# Patient Record
Sex: MALE | Race: WHITE | Employment: OTHER | URBAN - NONMETROPOLITAN AREA
[De-identification: names, ages, dates, MRNs, and addresses within clinical notes are randomized per-mention and may not be internally consistent; named-entity substitution may affect disease eponyms.]

---

## 2020-06-28 ENCOUNTER — HOSPITAL ENCOUNTER (EMERGENCY)
Age: 80
Discharge: ANOTHER HOSPITAL | End: 2020-06-28
Attending: EMERGENCY MEDICINE
Payer: MEDICARE

## 2020-06-28 VITALS
DIASTOLIC BLOOD PRESSURE: 55 MMHG | HEART RATE: 75 BPM | RESPIRATION RATE: 14 BRPM | OXYGEN SATURATION: 97 % | TEMPERATURE: 97.6 F | WEIGHT: 277 LBS | BODY MASS INDEX: 39.65 KG/M2 | HEIGHT: 70 IN | SYSTOLIC BLOOD PRESSURE: 136 MMHG

## 2020-06-28 LAB
ACETAMINOPHEN LEVEL: < 5 UG/ML (ref 0–20)
ALBUMIN SERPL-MCNC: 4 G/DL (ref 3.5–5.1)
ALP BLD-CCNC: 60 U/L (ref 38–126)
ALT SERPL-CCNC: 12 U/L (ref 11–66)
AMPHETAMINE+METHAMPHETAMINE URINE SCREEN: NEGATIVE
ANION GAP SERPL CALCULATED.3IONS-SCNC: 11 MEQ/L (ref 8–16)
AST SERPL-CCNC: 14 U/L (ref 5–40)
BARBITURATE QUANTITATIVE URINE: NEGATIVE
BENZODIAZEPINE QUANTITATIVE URINE: NEGATIVE
BILIRUB SERPL-MCNC: 0.4 MG/DL (ref 0.3–1.2)
BUN BLDV-MCNC: 30 MG/DL (ref 7–22)
CALCIUM SERPL-MCNC: 9.2 MG/DL (ref 8.5–10.5)
CANNABINOID QUANTITATIVE URINE: NEGATIVE
CHLORIDE BLD-SCNC: 101 MEQ/L (ref 98–111)
CO2: 24 MEQ/L (ref 23–33)
COCAINE METABOLITE QUANTITATIVE URINE: NEGATIVE
CREAT SERPL-MCNC: 1.1 MG/DL (ref 0.4–1.2)
EKG ATRIAL RATE: 71 BPM
EKG P AXIS: 5 DEGREES
EKG P-R INTERVAL: 216 MS
EKG Q-T INTERVAL: 426 MS
EKG QRS DURATION: 156 MS
EKG QTC CALCULATION (BAZETT): 462 MS
EKG R AXIS: -37 DEGREES
EKG T AXIS: 11 DEGREES
EKG VENTRICULAR RATE: 71 BPM
ERYTHROCYTE [DISTWIDTH] IN BLOOD BY AUTOMATED COUNT: 13.3 % (ref 11.5–14.5)
ERYTHROCYTE [DISTWIDTH] IN BLOOD BY AUTOMATED COUNT: 47.1 FL (ref 35–45)
ETHYL ALCOHOL, SERUM: < 0.01 %
GFR SERPL CREATININE-BSD FRML MDRD: 65 ML/MIN/1.73M2
GLUCOSE BLD-MCNC: 204 MG/DL (ref 70–108)
HCT VFR BLD CALC: 42.3 % (ref 42–52)
HEMOGLOBIN: 13.8 GM/DL (ref 14–18)
MCH RBC QN AUTO: 31.3 PG (ref 26–33)
MCHC RBC AUTO-ENTMCNC: 32.6 GM/DL (ref 32.2–35.5)
MCV RBC AUTO: 95.9 FL (ref 80–94)
OPIATES, URINE: NEGATIVE
OSMOLALITY CALCULATION: 284 MOSMOL/KG (ref 275–300)
OXYCODONE: NEGATIVE
PHENCYCLIDINE QUANTITATIVE URINE: NEGATIVE
PLATELET # BLD: 151 THOU/MM3 (ref 130–400)
PMV BLD AUTO: 10.8 FL (ref 9.4–12.4)
POTASSIUM REFLEX MAGNESIUM: 4.6 MEQ/L (ref 3.5–5.2)
RBC # BLD: 4.41 MILL/MM3 (ref 4.7–6.1)
SALICYLATE, SERUM: < 0.3 MG/DL (ref 2–10)
SARS-COV-2, NAAT: NOT DETECTED
SODIUM BLD-SCNC: 136 MEQ/L (ref 135–145)
T4 FREE: 1.09 NG/DL (ref 0.93–1.76)
TOTAL PROTEIN: 6.8 G/DL (ref 6.1–8)
TSH SERPL DL<=0.05 MIU/L-ACNC: 2.54 UIU/ML (ref 0.4–4.2)
WBC # BLD: 7 THOU/MM3 (ref 4.8–10.8)

## 2020-06-28 PROCEDURE — 84439 ASSAY OF FREE THYROXINE: CPT

## 2020-06-28 PROCEDURE — G0480 DRUG TEST DEF 1-7 CLASSES: HCPCS

## 2020-06-28 PROCEDURE — 80053 COMPREHEN METABOLIC PANEL: CPT

## 2020-06-28 PROCEDURE — 36415 COLL VENOUS BLD VENIPUNCTURE: CPT

## 2020-06-28 PROCEDURE — 99284 EMERGENCY DEPT VISIT MOD MDM: CPT

## 2020-06-28 PROCEDURE — 80307 DRUG TEST PRSMV CHEM ANLYZR: CPT

## 2020-06-28 PROCEDURE — 85027 COMPLETE CBC AUTOMATED: CPT

## 2020-06-28 PROCEDURE — 84443 ASSAY THYROID STIM HORMONE: CPT

## 2020-06-28 PROCEDURE — 93010 ELECTROCARDIOGRAM REPORT: CPT | Performed by: INTERNAL MEDICINE

## 2020-06-28 PROCEDURE — U0002 COVID-19 LAB TEST NON-CDC: HCPCS

## 2020-06-28 PROCEDURE — 93005 ELECTROCARDIOGRAM TRACING: CPT | Performed by: EMERGENCY MEDICINE

## 2020-06-28 RX ORDER — LEVOTHYROXINE SODIUM 0.07 MG/1
75 TABLET ORAL EVERY 8 HOURS PRN
COMMUNITY

## 2020-06-28 RX ORDER — ATORVASTATIN CALCIUM 40 MG/1
40 TABLET, FILM COATED ORAL DAILY
COMMUNITY

## 2020-06-28 RX ORDER — TAMSULOSIN HYDROCHLORIDE 0.4 MG/1
0.4 CAPSULE ORAL DAILY
COMMUNITY

## 2020-06-28 RX ORDER — LISINOPRIL 10 MG/1
10 TABLET ORAL DAILY
Status: ON HOLD | COMMUNITY
End: 2021-03-05 | Stop reason: HOSPADM

## 2020-06-28 RX ORDER — SERTRALINE HYDROCHLORIDE 100 MG/1
100 TABLET, FILM COATED ORAL DAILY
Status: ON HOLD | COMMUNITY
End: 2021-02-17

## 2020-06-28 RX ORDER — CLOMIPRAMINE HYDROCHLORIDE 50 MG/1
50 CAPSULE ORAL NIGHTLY
Status: ON HOLD | COMMUNITY
End: 2021-02-17

## 2020-06-28 RX ORDER — GABAPENTIN 600 MG/1
800 TABLET ORAL 3 TIMES DAILY
Status: ON HOLD | COMMUNITY
End: 2021-02-17

## 2020-06-28 ASSESSMENT — SLEEP AND FATIGUE QUESTIONNAIRES
DO YOU HAVE DIFFICULTY SLEEPING: YES
RESTFUL SLEEP: NO
DIFFICULTY ARISING: NO
SLEEP PATTERN: DIFFICULTY FALLING ASLEEP;DISTURBED/INTERRUPTED SLEEP;RESTLESSNESS
DIFFICULTY STAYING ASLEEP: YES
DO YOU USE A SLEEP AID: NO
DIFFICULTY FALLING ASLEEP: YES
AVERAGE NUMBER OF SLEEP HOURS: 6

## 2020-06-28 ASSESSMENT — ENCOUNTER SYMPTOMS
SINUS PAIN: 0
CHEST TIGHTNESS: 0
SHORTNESS OF BREATH: 0
BLOOD IN STOOL: 0
ABDOMINAL PAIN: 0
DIARRHEA: 0
EYE PAIN: 0
SINUS PRESSURE: 0
CONSTIPATION: 0
RECTAL PAIN: 0
BACK PAIN: 0
COUGH: 0
NAUSEA: 0

## 2020-06-28 ASSESSMENT — PATIENT HEALTH QUESTIONNAIRE - PHQ9: SUM OF ALL RESPONSES TO PHQ QUESTIONS 1-9: 11

## 2020-06-28 NOTE — ED NOTES
Pt up and ambulated in hallway to relieve discomfort. Returned to bed. Vitals stable. Call light in reach.  Will continue to monitor     David Yanes RN  06/28/20 [id number]

## 2020-06-28 NOTE — PROGRESS NOTES
This staff met with pt and his son Mago Morris to discuss advance care planning. Pt does have his adv dir completed appointing son Mago Morris as his Kirit Florch. They are on file at [facility]. Pt daughter is second DPOA. A son  in a car accident in the , pts depression began. It became worse after his parents and wife  15+ years ago. Pt was in an assisted living until his monies ran out, he is now in an apartment in [facility]. COVID has been very challenging emotionally for him.      See ACP full note

## 2020-06-28 NOTE — PROGRESS NOTES
Provisional Diagnosis:   Major Depressive Disorder     Risk, Psychosocial and Contextual Factors:  Pt is not able to identify a reason for living, very depressed and don't care if he wakes up or not    Current  Treatment: With Dr. Yeimi Carroll      Present Suicidal Behavior:      Verbal: XX         Attempt:    Access to Weapons:  Kitchen knives    Current Suicide Risk: Low, Moderate or High:  Moderate      Past Suicidal Behavior:       Verbal:    Attempt: XX    Self-Injurious/Self-Mutilation: Denies    Traumatic Event Within Past 2 Weeks:  Denies     Current Abuse: Denies    Legal: Denies    Violence: Denies    Protective Factors:  Pt was not able to identify any    Housing:   Resides alone in an apartment     CPAP/Oxygen/Ambulation Difficulties: CPAP     Basic Vital Signs Normal?: Check with Patients Nurse prior to Calling Psychiatry    Critical Labs?: Check with Patients Nurse prior to Calling Psychiatry    Clinical Summary:      Patient is a 79year old male who presents to the ED voluntarily by EMS reporting he has suffered from Major Depressive disorder for several years and he has suffered a lot of loss in his life. Pt was in assisted living for about 10 years and less than a year ago he was discharged from the home and his son got him an apartment. Pt's son reports pt had been doing well until [facility] when his elderly day services closed, his house keeper was not able to come back he hs not been able to get out much. Pt reports he has lost interest in things and its gotten worse in the past week or so. Pt's son reports his dad is on the tablet all the time and he reads all this stuff on COVID and all the other news that is taking place currently and it's getting pt down worse. Pt reports he is currently having suicidal ideations and wishes he could just go to sleep and not wake up. Pt reports he has no current plan to end life and he also can't identify any reason for living.  Pt reports a few years ago he attempted

## 2020-06-28 NOTE — ED NOTES
Pt updated on POC at this time. Pt verbalized understanding. Pt vitals stable. Denies any needs. Call light in reach. Will continue to monitor.       Jannie Maldonado RN  06/28/20 [id number]

## 2020-06-28 NOTE — ED PROVIDER NOTES
[place] ENCOUNTER          Pt Name: Semaj Hdz  MRN: [id number]  [place] 1940  Date of evaluation: 6/28/2020  Physician: Jose Gaytan MD, Ronald Sunshine [facility]    CHIEF COMPLAINT       Chief Complaint   Patient presents with    Depression     History obtained from the patient, his son and chart review. HISTORY OF PRESENT ILLNESS    HPI  Semaj Hdz is a 79 y.o. male with a past medical history of HTN, Depression, and Arthritis who presents to the emergency department for evaluation of depression and suicidal ideation with no intention. Patient reports a history of depression and suicidal thought. Patient reports a history of attempts by cutting his wrist with a knife. Patient states that he has had these thoughts again in the last 3 weeks, but denies acting on them. Patient's son reports a history of these depressive episodes that onset around 1995 when the patient lost his son suddenly due to an MVC. Son states these episodes onset again in 2004 when then patient lost his wife to cancer. Son reports the patient has been evaluated and stayed at many different facilities to receive treatment. The patient was doing well at one facility, but had to leave due to not enough finances to continue to stay. Patient is currently living in an apartment independently. Son reports the patient has established care with Dr. Isabela Card (psychiatrist). Patient was last evaluated via Tele-health around February, and does not have another appointment until September. Patient reports compliance with all of his medications. Patient state that his depression and suicidal ideations have been increased over the last three weeks. Son believes things have gradually progressed since the lockdown were started [facility] to [facility]. He reports that he is continuously struggling with doubts concerning his Mandaen, the Bible, and God.  Patient states that he has lost all of his hope and kary. Patient reports there is no meaning in life, and that nothing matters because everything dies. Patient reports a recent fall that resulted in minor skin breakdown to his left knee. Patient reports this was caused by crawling to the couch to help assist himself off the ground. Patient also reports diabetic ulcer to his right foot. Patient has both of the wounds managed and monitored by the wound clinic. The patient has no other acute complaints at this time. HPI provided by the patient and patient's son. REVIEW OF SYSTEMS   Review of Systems   Constitutional: Negative for chills, fever and unexpected weight change. HENT: Negative for congestion, ear pain, nosebleeds, sinus pressure and sinus pain. Eyes: Negative for pain. Respiratory: Negative for cough, chest tightness and shortness of breath. Cardiovascular: Negative for chest pain. Gastrointestinal: Negative for abdominal pain, blood in stool, constipation, diarrhea, nausea and rectal pain. Endocrine: Negative for polydipsia and polyuria. Genitourinary: Negative for dysuria and flank pain. Musculoskeletal: Negative for arthralgias, back pain, myalgias and neck pain. Skin: Negative for rash. Neurological: Negative for tremors, seizures, weakness and headaches. Psychiatric/Behavioral: Positive for dysphoric mood (depression), self-injury (history of ) and suicidal ideas (no intention ). All other systems reviewed and are negative. PAST MEDICAL AND SURGICAL HISTORY     Past Medical History:   Diagnosis Date    Arthritis     Depression     Hypertension      Past Surgical History:   Procedure Laterality Date    KNEE SURGERY Right          MEDICATIONS   No current facility-administered medications for this encounter.      Current Outpatient Medications:     levothyroxine (SYNTHROID) 75 MCG tablet, Take 75 mcg by mouth Daily, Disp: , Rfl:     metoprolol tartrate (LOPRESSOR) 25 MG tablet, Take 25 mg by mouth 2 times daily, Disp: , Rfl:     lisinopril (PRINIVIL;ZESTRIL) 10 MG tablet, Take 10 mg by mouth daily, Disp: , Rfl:     sertraline (ZOLOFT) 100 MG tablet, Take 100 mg by mouth daily, Disp: , Rfl:     tamsulosin (FLOMAX) 0.4 MG capsule, Take 0.4 mg by mouth daily, Disp: , Rfl:     atorvastatin (LIPITOR) 40 MG tablet, Take 40 mg by mouth daily, Disp: , Rfl:     clomiPRAMINE (ANAFRANIL) 50 MG capsule, Take 50 mg by mouth nightly, Disp: , Rfl:     gabapentin (NEURONTIN) 600 MG tablet, Take 600 mg by mouth 3 times daily. , Disp: , Rfl:       SOCIAL HISTORY     Social History     Social History Narrative    Not on file     Social History     Tobacco Use    Smoking status: Never Smoker    Smokeless tobacco: Never Used   Substance Use Topics    Alcohol use: Not Currently    Drug use: Never         ALLERGIES     Allergies   Allergen Reactions    Triamcinolone Rash     Patient states his arm felt like it was hot, like it was burning. FAMILY HISTORY   History reviewed. No pertinent family history. PREVIOUS RECORDS   Previous records reviewed: This is this patient's first visit to [facility] ED, no previous records available on EMR. PHYSICAL EXAM     ED Triage Vitals [06/28/20 1124]   BP Temp Temp Source Pulse Resp SpO2 Height Weight   (!) 129/47 97.6 °F (36.4 °C) Oral 70 14 97 % 5' 10\" (1.778 m) 277 lb (125.6 kg)     Initial vital signs and nursing assessment reviewed and normal. Pulsoximetry is normal per my interpretation. Additional Vital Signs:  Vitals:    06/28/20 1310   BP: 125/61   Pulse: 69   Resp: 14   Temp:    SpO2: 96%       Physical Exam  Vitals signs and nursing note reviewed. Constitutional:       General: He is not in acute distress. Appearance: He is well-developed. HENT:      Head: Normocephalic and atraumatic.       Right Ear: External ear normal.      Left Ear: External ear normal.      Nose: Nose normal.      Mouth/Throat:      Mouth: Mucous membranes are moist.   Eyes:

## 2020-06-28 NOTE — ACP (ADVANCE CARE PLANNING)
otherwise healthy. Unfortunately, CPR does not typically restart the heart for people who have serious health conditions or who are very sick. \"    \"In the event your heart stopped as a result of an underlying serious health condition, would you want attempts to be made to restart your heart (answer \"yes\" for attempt to resuscitate) or would you prefer a natural death (answer \"no\" for do not attempt to resuscitate)? \" yes         [x] Yes   [] No   Educated Patient / Rhonda Quinn regarding differences between Advance Directives and portable DNR orders. Length of ACP Conversation in minutes:  15    Conversation Outcomes:  [x] ACP discussion completed  [x] Existing advance directive reviewed with patient; no changes to patient's previously recorded wishes  [facility]                                               [] New Advance Directive completed  [] Portable Do Not Rescitate prepared for Provider review and signature  [] POLST/POST/MOLST/MOST prepared for Provider review and signature      Follow-up plan:    [] Schedule follow-up conversation to continue planning  [x] Referred individual to Provider for additional questions/concerns   [] Advised patient/agent/surrogate to review completed ACP document and update if needed with changes in condition, patient preferences or care setting    [x] This note routed to one or more involved healthcare providers     This staff met with pt and his son Mago Morris to discuss advance care planning. Pt does have his adv dir completed appointing son Mago Morris as his Douglasraine Petra. They are on file at [facility]. Pt daughter is second DPOA. A son  in a car accident in the , pts depression began. It became worse after his parents and wife  15+ years ago. Pt was in an assisted living until his monies ran out, he is now in an apartment in [facility]. COVID has been very challenging emotionally for him.

## 2020-07-04 LAB — THC GC/MS CONF: < 15 NG/ML

## 2020-07-15 ENCOUNTER — OUTSIDE SERVICES (OUTPATIENT)
Dept: FAMILY MEDICINE CLINIC | Age: 80
End: 2020-07-15
Payer: MEDICARE

## 2020-07-15 VITALS
RESPIRATION RATE: 18 BRPM | HEART RATE: 68 BPM | TEMPERATURE: 98.7 F | BODY MASS INDEX: 38.68 KG/M2 | WEIGHT: 269.6 LBS | OXYGEN SATURATION: 94 % | DIASTOLIC BLOOD PRESSURE: 73 MMHG | SYSTOLIC BLOOD PRESSURE: 144 MMHG

## 2020-07-15 PROBLEM — E03.9 ACQUIRED HYPOTHYROIDISM: Status: ACTIVE | Noted: 2020-07-15

## 2020-07-15 PROBLEM — F33.2 SEVERE EPISODE OF RECURRENT MAJOR DEPRESSIVE DISORDER, WITHOUT PSYCHOTIC FEATURES (HCC): Status: ACTIVE | Noted: 2020-07-15

## 2020-07-15 PROBLEM — E11.9 TYPE 2 DIABETES MELLITUS WITHOUT COMPLICATION, WITHOUT LONG-TERM CURRENT USE OF INSULIN (HCC): Status: ACTIVE | Noted: 2020-07-15

## 2020-07-15 PROCEDURE — 99490 CHRNC CARE MGMT STAFF 1ST 20: CPT | Performed by: NURSE PRACTITIONER

## 2020-07-15 PROCEDURE — 99306 1ST NF CARE HIGH MDM 50: CPT | Performed by: NURSE PRACTITIONER

## 2020-07-15 ASSESSMENT — ENCOUNTER SYMPTOMS
SORE THROAT: 0
WHEEZING: 0
EYE REDNESS: 0
SHORTNESS OF BREATH: 0
NAUSEA: 0
COUGH: 0
CONSTIPATION: 0
VOMITING: 0
DIARRHEA: 0
RHINORRHEA: 0

## 2020-07-15 NOTE — PROGRESS NOTES
Ray Geller is a 79 y.o. male who presents today for his medical conditions/complaints as noted below. Chief Complaint   Patient presents with    New Patient     Admission to the facility            HPI:     Veronica Sharp is seen today for admission visit. He was admitted from [place] [address] after an admission for MDD for increasing depressive symptoms affecting his day to day activity along with suicidal ideation. He was admitted on 6/29 to the hospital and discharged here on 6/14. Based on review of charting from the hospital his s/s were not changed and he remained depressed, dysphoric and hopeless. He made remarks this am that he has recurring thoughts of harming self and others, but has no plan. He has been placed on suicide precautions. He has other health conditions of DM, HTN, hypothyroidism, OA. These are overall controlled. He does complain of chronic pain in the back of his neck and he has been on Neurontin for this pain. He is on SSI and accu checks but his blood sugars are controlled and he is refusing this. His last A1C was 5.9 and he is not on any other medications. Past Medical History:   Diagnosis Date    Arthritis     Depression     Hypertension       Past Surgical History:   Procedure Laterality Date    KNEE SURGERY Right        No family history on file. Social History     Tobacco Use    Smoking status: Never Smoker    Smokeless tobacco: Never Used   Substance Use Topics    Alcohol use: Not Currently      Allergies   Allergen Reactions    Triamcinolone Rash     Patient states his arm felt like it was hot, like it was burning.          Health Maintenance   Topic Date Due    Lipid screen  06/07/1950    DTaP/Tdap/Td vaccine (1 - Tdap) 06/07/1960    Shingles Vaccine (1 of 2) 06/07/1991    Pneumococcal 65+ years Vaccine (1 of 1 - PPSV23) 06/07/2006    Annual Wellness Visit (AWV)  06/10/2020    Flu vaccine (1) 09/01/2020    Potassium monitoring  06/28/2021    Creatinine rate and regular rhythm. Heart sounds: Normal heart sounds. Pulmonary:      Effort: Pulmonary effort is normal. No respiratory distress. Breath sounds: Normal breath sounds. No stridor. No wheezing or rales. Abdominal:      General: Bowel sounds are normal. There is no distension. Palpations: Abdomen is soft. Tenderness: There is no abdominal tenderness. Musculoskeletal: Normal range of motion. General: No deformity. Right shoulder: He exhibits no tenderness, no bony tenderness and no pain. Left shoulder: He exhibits no tenderness, no bony tenderness and no pain. Cervical back: He exhibits no tenderness, no bony tenderness and no pain. Thoracic back: He exhibits no tenderness, no bony tenderness, no pain and no spasm. Lumbar back: He exhibits no tenderness, no bony tenderness and no pain. Lymphadenopathy:      Cervical: No cervical adenopathy. Upper Body:      Right upper body: No supraclavicular adenopathy. Left upper body: No supraclavicular adenopathy. Skin:     General: Skin is warm and dry. Findings: No erythema or rash. Neurological:      Mental Status: He is alert and oriented to person, place, and time. Motor: No atrophy, abnormal muscle tone or seizure activity. Psychiatric:         Mood and Affect: Mood is depressed. Affect is labile and flat. Speech: Speech is delayed. Behavior: Behavior is withdrawn. Behavior is cooperative. Thought Content: Thought content is not paranoid or delusional. Thought content includes suicidal (no stated plan) ideation. Thought content does not include suicidal plan. Judgment: Judgment is not impulsive or inappropriate. BP (!) 144/73   Pulse 68   Temp 98.7 °F (37.1 °C)   Resp 18   Wt 269 lb 9.6 oz (122.3 kg)   SpO2 94%   BMI 38.68 kg/m²     Assessment/Plan      1.  Severe episode of recurrent major depressive disorder, without psychotic features [facility])   Continue suicide precaution; contact [facility] psych services; continue Zoloft; Seroquel; Encourage to journal. Contact psych unit for potential readmission. 2. Essential hypertension - chronic and controlled overall. Continue Metoprolol and Lisinopril    3. Arthritis - chronic and mainly in neck. Therapy to eval and treat. Continue Tylenol and Neurontin. 4. ASHD (arteriosclerotic heart disease) - Denies chest pain. Continue Lipitor and Metoprolol. 5. Acquired hypothyroidism - Chronic and continue Levothyroxine and monitor labs. 6. Type 2 diabetes mellitus without complication, without long-term current use of insulin (HCC) - Will dc SSI and monitor labs. Resident has MDD, HTN, ASHD, DM and it is expected to last 15 or more months. These chronic conditions place resident at a significant risk of death, acute exacerbation, decline in function or functional decline. His comprehensive plan was established today. I spent 20 minutes reviewing plan. Patient seen and examined. History partially obtained by chart review and nursing notes. I have reviewed patient's past medical, surgical, social, and family history and have made updates where appropriate.   See facility EMR for updated medication list.       Electronically signed by SYLVESTER Alcazar CNP on 7/15/2020 at 1:13 PM

## 2020-08-05 ENCOUNTER — OUTSIDE SERVICES (OUTPATIENT)
Dept: FAMILY MEDICINE CLINIC | Age: 80
End: 2020-08-05
Payer: MEDICARE

## 2020-08-05 VITALS
RESPIRATION RATE: 16 BRPM | TEMPERATURE: 98 F | HEART RATE: 58 BPM | OXYGEN SATURATION: 92 % | SYSTOLIC BLOOD PRESSURE: 100 MMHG | BODY MASS INDEX: 36.99 KG/M2 | WEIGHT: 257.8 LBS | DIASTOLIC BLOOD PRESSURE: 48 MMHG

## 2020-08-05 PROBLEM — U07.1 COVID-19 WITH MULTIPLE COMORBIDITIES: Status: ACTIVE | Noted: 2020-08-05

## 2020-08-05 PROCEDURE — 99309 SBSQ NF CARE MODERATE MDM 30: CPT | Performed by: NURSE PRACTITIONER

## 2020-08-05 PROCEDURE — 99358 PROLONG SERVICE W/O CONTACT: CPT | Performed by: NURSE PRACTITIONER

## 2020-08-05 ASSESSMENT — ENCOUNTER SYMPTOMS
SHORTNESS OF BREATH: 0
RHINORRHEA: 0
CHEST TIGHTNESS: 0
DIARRHEA: 1
VOMITING: 0
SORE THROAT: 0
WHEEZING: 0
NAUSEA: 0
EYE REDNESS: 0
COUGH: 1
CONSTIPATION: 0

## 2020-08-18 ENCOUNTER — OUTSIDE SERVICES (OUTPATIENT)
Dept: FAMILY MEDICINE CLINIC | Age: 80
End: 2020-08-18
Payer: MEDICARE

## 2020-08-18 PROCEDURE — 99490 CHRNC CARE MGMT STAFF 1ST 20: CPT | Performed by: PHYSICAL MEDICINE & REHABILITATION

## 2020-08-18 PROCEDURE — 99306 1ST NF CARE HIGH MDM 50: CPT | Performed by: PHYSICAL MEDICINE & REHABILITATION

## 2020-08-22 VITALS
OXYGEN SATURATION: 93 % | WEIGHT: 253.7 LBS | RESPIRATION RATE: 18 BRPM | SYSTOLIC BLOOD PRESSURE: 130 MMHG | DIASTOLIC BLOOD PRESSURE: 64 MMHG | BODY MASS INDEX: 36.4 KG/M2 | TEMPERATURE: 98.2 F | HEART RATE: 84 BPM

## 2020-08-22 PROBLEM — Z92.89 HISTORY OF RECENT HOSPITALIZATION: Status: ACTIVE | Noted: 2020-08-22

## 2020-08-22 ASSESSMENT — ENCOUNTER SYMPTOMS
DIARRHEA: 0
FACIAL SWELLING: 0
EYE DISCHARGE: 0
RHINORRHEA: 0
CHOKING: 0
SHORTNESS OF BREATH: 1
EYE REDNESS: 0
CONSTIPATION: 0
COLOR CHANGE: 0
ABDOMINAL DISTENTION: 0

## 2020-08-22 NOTE — PROGRESS NOTES
Sabra Goldman is a 79 y.o. male that presents for Other (Admission visit)      This visit was performed via synchronous telecommunication system. Patient is located in the SNF  I am located in my office at home. HPI:    Huseyin Hook is seen with the assist of Urvashi Cardona the nurse on the Covid unit. He was readmitted to [facility] on 7/31/2020 from [facility] for psychiatry in [facility]. He was originally admitted to the facility on 7/14/2020 after a camila-psych stay at [facility]. He was admitted there and essentially he remained despondent and depressed with suicidal ideation. He was referred back to [facility] but they did not feel they could assist any further, so he was referred to [facility] for Psychiatry in [place]. He was admitted there on 7/16/2020 and readmitted here on 7/31/2020. He had multiple medication changes. In ER he remained suicidal and stated he had a plan, but could not state what or pick which plan he would use. He had also reported homicidal ideation but not towards any one person. He reported to psychiatry \"the loss of all hopes and beliefs. \" He had also reported on intake about his closet homosexuality. He had stated he has known he has been homosexual for decades and has been hiding this from his family. He feels this will hurt them. He has other chronic health conditions of DM, HTN, OA, ASHD, hypothyroidism, and DM. He was tested for COVID 19 and it was detected. He initially had no s/s and later developed respiratory s/s of congestion, cough, low pulse ox requiring supplemental oxygen, and was started on Azithromycin. He also had diarrhea and confusion. He is currently not complaining of pain in neck or back but he does have chronic pain and remains on Aleve and Gabapentin. At this time, he is not on any meds for his diabetes and of note his blood sugar was elevated in the hospital.  His blood sugars are being monitored.     When seen virtually today, he reports doing much better, both mentally and physically. He is scheduled to come out of isolation on August 26th. Currently on 4L of suppplemental O2 per NC. He has been sleeping and his bowels have been moving. He reports his appetite is somewhat decreased. I have reviewed the patient's past medical history, past surgical history, allergies,medications, social and family history and I have made updates where appropriate. Past Medical History:   Diagnosis Date    Arthritis     Depression     Hypertension        Past Surgical History:   Procedure Laterality Date    KNEE SURGERY Right        Social History     Tobacco Use    Smoking status: Never Smoker    Smokeless tobacco: Never Used   Substance Use Topics    Alcohol use: Not Currently    Drug use: Never       No family history on file. Review of Systems   Constitutional: Positive for activity change and appetite change. Negative for diaphoresis. Tolerating therapies. HENT: Negative for drooling, ear discharge, facial swelling, rhinorrhea and sneezing. Eyes: Negative for discharge and redness. Respiratory: Positive for shortness of breath. Negative for choking. Cardiovascular: Negative for chest pain. Gastrointestinal: Negative for abdominal distention, constipation and diarrhea. Endocrine: Negative for polydipsia, polyphagia and polyuria. Genitourinary: Negative for difficulty urinating, flank pain and frequency. Musculoskeletal: Positive for myalgias. Chronic pain syndrome   Skin: Negative for color change. Neurological: Negative for tremors, seizures, facial asymmetry, speech difficulty and headaches. Psychiatric/Behavioral: Positive for confusion and hallucinations. Negative for sleep disturbance. The patient is nervous/anxious.          Hallucinations at times; improving           PHYSICAL EXAM:  Vitals:    08/22/20 1642   BP: 130/64   Pulse: 84   Resp: 18   Temp: 98.2 °F (36.8 °C)   SpO2: 93%        Physical Exam  Vitals signs and nursing note reviewed. Exam conducted with a chaperone present. Constitutional:       General: He is not in acute distress. Appearance: Normal appearance. He is not toxic-appearing. HENT:      Head: Normocephalic and atraumatic. Right Ear: External ear normal.      Left Ear: External ear normal.      Nose: Nose normal. No rhinorrhea. Eyes:      General:         Right eye: No discharge. Left eye: No discharge. Extraocular Movements: Extraocular movements intact. Conjunctiva/sclera: Conjunctivae normal.   Neck:      Musculoskeletal: Normal range of motion. Cardiovascular:      Rate and Rhythm: Normal rate. Pulses: Normal pulses. Pulmonary:      Effort: Pulmonary effort is normal. No respiratory distress. Abdominal:      General: Abdomen is flat. There is no distension. Musculoskeletal:         General: No signs of injury. Right lower leg: No edema. Left lower leg: No edema. Skin:     Coloration: Skin is not jaundiced. Neurological:      Mental Status: He is alert. Mental status is at baseline. ASSESSMENT & PLAN    Chay Peres was seen today for admission visit   Diagnoses and all orders for this visit:     1. Severe episode of recurrent major depressive disorder, without psychotic features ([address]  2. Recent psych stay related to suicide ideation. Continue to follow up with psych services. Support and provide family visit through video conference. Wellbutrin, Abilify, Zoloft, Doxepin, Remeron, and Buspar. Recent decrease in Buspar related to increased somnolence. Will monitor. 3.  COVID-19 with multiple comorbidities  Covid + after recent psych stay. . Completed course of Azithromycin. Continue Combivent and Symbicort. Respiratory therapy to continue to see. Oxygen therapy as resident continues to desaturate without it. CPAP at night or when asleep. Encourage to be up and continue cough and deep breath.  Continue good intakes as noted recent weight loss from 261.2# in July to 253.7# in August, multivitamin and zinc added. 4.  Essential hypertension  Chronic; Continue Lisinopril and Metoprolol and continue parameters. .   5. Arthritis  Denies pain today. Currently on Gabapentin and Aleve. Will continue and monitor. Encourage to be up and ambulate as tolerated. Monitor for falls. 6.  ASHD (arteriosclerotic heart disease)  Denies chest pain. Continue BB (Metoprolol) and blood pressure management. 7.  Acquired hypothyroidism  Chronic; continue Levothyroxine. 8.  Type 2 diabetes mellitus without complication, without long-term current use of insulin ([facility] [address]  Blood sugar elevated at hospital. Last A1C was 5.9. Noted weight loss and recent Covid illness. Continue to monitor blood sugars. Accuchecks have been stable.      No follow-ups on file.     Resident has DM, MDD, ASHD, HTN, Covid 19 and it is expected to last 12 or more months. These chronic conditions place resident at a significant risk of death, acute exacerbation, decline in function or functional decline. The patient's comprehensive plan was revised andmonitored today. I spent 20 minutes reviewing plan.      I certify that I spent 47 minutes reviewing hospital records and devising plan with staff and implementing plan of care.      I have seen and examined the patient virtually and chaperone was present. The history was obtained through the patient, past medical records, and the staff. The patient's chart was updated as appropriate.   Please see facility EMR for complete medication reconciliation.     Pursuant to the emergency declaration under the [facility]5 waiver authority and the [employer] and [employer] Act, this Virtual  Visit was conducted, with patient's consent, to reduce the patient's risk of exposure to COVID-19 and provide continuity of care for an established patient.     Services were provided through a video synchronous discussion virtually to substitute for in-person SNF visit.       Electronically signed by Marsha Saucedo MD on 8/18/2020 at 4:47 PM

## 2020-09-16 ENCOUNTER — OUTSIDE SERVICES (OUTPATIENT)
Dept: FAMILY MEDICINE CLINIC | Age: 80
End: 2020-09-16
Payer: MEDICARE

## 2020-09-16 VITALS
DIASTOLIC BLOOD PRESSURE: 72 MMHG | RESPIRATION RATE: 16 BRPM | WEIGHT: 267 LBS | SYSTOLIC BLOOD PRESSURE: 136 MMHG | HEART RATE: 86 BPM | TEMPERATURE: 97.8 F | OXYGEN SATURATION: 100 % | BODY MASS INDEX: 38.31 KG/M2

## 2020-09-16 PROCEDURE — 99309 SBSQ NF CARE MODERATE MDM 30: CPT | Performed by: NURSE PRACTITIONER

## 2020-09-16 PROCEDURE — 99490 CHRNC CARE MGMT STAFF 1ST 20: CPT | Performed by: NURSE PRACTITIONER

## 2020-09-16 ASSESSMENT — ENCOUNTER SYMPTOMS
COUGH: 0
WHEEZING: 0
RHINORRHEA: 0
EYE REDNESS: 0
SHORTNESS OF BREATH: 0
CONSTIPATION: 0
DIARRHEA: 0
VOMITING: 0
SORE THROAT: 0
NAUSEA: 0

## 2020-09-16 NOTE — PROGRESS NOTES
Sean Lopez is a 79 y.o. male who presents today for his medical conditions/complaints as noted below. Chief Complaint   Patient presents with    1 Month Follow-Up     Follow up on chronic health conditions           HPI:     Gia Haley is seen today for follow up on chronic health conditions including long standing depression, DM, HTN, OA, ASHD, Hypothyroidism, and DM. He recently returned at the end of July for and tested positive for Covid 19. He was treated x 2 with Azithromycin and breathing treatments. He does complain of some tremors but states that these are not bothersome. He does continue to follow with Dr. Aundrea Almanzar monthly. He had labs on 9/24 and these were reviewed. He will have repeat labs next month. His blood sugars are overall controlled. Blood pressures are currently controlled. He is planning to [place] in the near future. Past Medical History:   Diagnosis Date    Arthritis     Depression     Hypertension       Past Surgical History:   Procedure Laterality Date    KNEE SURGERY Right        No family history on file. Social History     Tobacco Use    Smoking status: Never Smoker    Smokeless tobacco: Never Used   Substance Use Topics    Alcohol use: Not Currently      Allergies   Allergen Reactions    Triamcinolone Rash     Patient states his arm felt like it was hot, like it was burning. Health Maintenance   Topic Date Due    Lipid screen  06/07/1950    DTaP/Tdap/Td vaccine (1 - Tdap) 06/07/1960    Shingles Vaccine (1 of 2) 06/07/1991    Pneumococcal 65+ years Vaccine (1 of 1 - PPSV23) 06/07/2006    Annual Wellness Visit (AWV)  06/10/2020    Flu vaccine (1) 09/01/2020    TSH testing  06/28/2021    Potassium monitoring  06/28/2021    Creatinine monitoring  06/28/2021    Hepatitis A vaccine  Aged Out    Hib vaccine  Aged Out    Meningococcal (ACWY) vaccine  Aged Out       Subjective:      Review of Systems   Constitutional: Negative for chills, fatigue and fever.    HENT: Negative for congestion, rhinorrhea and sore throat. Eyes: Positive for visual disturbance (wears glasses). Negative for redness. Respiratory: Negative for cough, shortness of breath and wheezing. Cardiovascular: Negative for chest pain and leg swelling. Gastrointestinal: Negative for constipation, diarrhea, nausea and vomiting. Endocrine: Negative for polydipsia and polyuria. Genitourinary: Negative for dysuria, frequency and hematuria. Musculoskeletal: Negative for arthralgias, gait problem and myalgias. Skin: Negative for rash and wound. Allergic/Immunologic: Negative for environmental allergies and immunocompromised state. Neurological: Positive for tremors. Negative for dizziness, light-headedness and headaches. Hematological: Does not bruise/bleed easily. Psychiatric/Behavioral: Negative for dysphoric mood and sleep disturbance. The patient is not nervous/anxious. Objective:     Physical Exam  Vitals signs and nursing note reviewed. Constitutional:       General: He is not in acute distress. Appearance: He is well-developed. HENT:      Head: Normocephalic and atraumatic. Right Ear: External ear normal.      Left Ear: External ear normal.      Nose: Nose normal.   Eyes:      General: No scleral icterus. Right eye: No discharge. Left eye: No discharge. Conjunctiva/sclera: Conjunctivae normal.   Neck:      Musculoskeletal: Neck supple. Thyroid: No thyromegaly. Vascular: No JVD. Cardiovascular:      Rate and Rhythm: Normal rate and regular rhythm. Heart sounds: Normal heart sounds. Pulmonary:      Effort: Pulmonary effort is normal. No respiratory distress. Breath sounds: Normal breath sounds. No stridor. No wheezing or rales. Abdominal:      General: Bowel sounds are normal. There is no distension. Palpations: Abdomen is soft. Tenderness: There is no abdominal tenderness.    Musculoskeletal: Normal range of motion. General: No deformity. Right shoulder: He exhibits no tenderness, no bony tenderness and no pain. Left shoulder: He exhibits no tenderness, no bony tenderness and no pain. Cervical back: He exhibits no tenderness, no bony tenderness and no pain. Thoracic back: He exhibits no tenderness, no bony tenderness, no pain and no spasm. Lumbar back: He exhibits no tenderness, no bony tenderness and no pain. Lymphadenopathy:      Cervical: No cervical adenopathy. Upper Body:      Right upper body: No supraclavicular adenopathy. Left upper body: No supraclavicular adenopathy. Skin:     General: Skin is warm and dry. Findings: No erythema or rash. Neurological:      Mental Status: He is alert and oriented to person, place, and time. Motor: Tremor (mild) present. No atrophy, abnormal muscle tone or seizure activity. Psychiatric:         Speech: Speech normal.         Behavior: Behavior normal.       /72   Pulse 86   Temp 97.8 °F (36.6 °C)   Resp 16   Wt 267 lb (121.1 kg)   SpO2 100%   BMI 38.31 kg/m²     Assessment/Plan      1. Severe episode of recurrent major depressive disorder, without psychotic features ([facility] [address] -follows with Dr. Erum Wiley. Mood much improved. Continue Wellbutrin, Abilify, Zoloft, doxepin, Remeron, and BuSpar. No change in current medications as he is tolerating all well thank you. 2. Essential hypertension -chronic and improved. Continue metoprolol and lisinopril. No hypo-or hypertension episodes noted. 3. Arthritis -denies pain continue gabapentin and Aleve. Able to ambulate and dress self independently. Getting ready to go to assisted living once Medicaid process goes through. 4. ASHD (arteriosclerotic heart disease) -denies chest pain. Continue beta-blocker and blood pressure management. 5. Acquired hypothyroidism chronic and continue levothyroxine.    6. Type 2 diabetes mellitus without complication, without long-term current use of insulin (HCC) -weight is improving since COVID-19 exposure. Almost back to baseline. A1c due next month. Resident has DM, Hypothyroidism, HTN, ASHD and it is expected to last 15 or more months. These chronic conditions place resident at a significant risk of death, acute exacerbation, or functional decline. The patient's comprehensive plan was monitored today. I spent 20 minutes reviewing plan. Patient seen and examined. History partially obtained by chart review and nursing notes. I have reviewed patient's past medical, surgical, social, and family history and have made updates where appropriate.   See facility EMR for updated medication list.       Electronically signed by SYLVESTER Mascorro CNP on 9/16/2020 at 1:18 PM

## 2020-10-16 ENCOUNTER — OUTSIDE SERVICES (OUTPATIENT)
Dept: FAMILY MEDICINE CLINIC | Age: 80
End: 2020-10-16
Payer: MEDICARE

## 2020-10-16 VITALS
RESPIRATION RATE: 18 BRPM | SYSTOLIC BLOOD PRESSURE: 138 MMHG | WEIGHT: 273.8 LBS | TEMPERATURE: 97.8 F | DIASTOLIC BLOOD PRESSURE: 60 MMHG | HEART RATE: 71 BPM | BODY MASS INDEX: 39.29 KG/M2 | OXYGEN SATURATION: 97 %

## 2020-10-16 PROCEDURE — 99490 CHRNC CARE MGMT STAFF 1ST 20: CPT | Performed by: NURSE PRACTITIONER

## 2020-10-16 PROCEDURE — 99309 SBSQ NF CARE MODERATE MDM 30: CPT | Performed by: NURSE PRACTITIONER

## 2020-10-16 ASSESSMENT — ENCOUNTER SYMPTOMS
SHORTNESS OF BREATH: 0
DIARRHEA: 0
WHEEZING: 0
COUGH: 0
RHINORRHEA: 0
EYE REDNESS: 0
CONSTIPATION: 0
NAUSEA: 0
SORE THROAT: 0
VOMITING: 0

## 2020-10-16 NOTE — PROGRESS NOTES
Neha Cruz is a 79 y.o. male who presents today for his medical conditions/complaints as noted below. Chief Complaint   Patient presents with    1 Month Follow-Up     Follow up on chronic health conditons           HPI:     Mahogany Kennedy is seen today for follow up on chronic health conditions including long standing depression, DM, HTN, OA, ASHD, Hypothyroidism, and DM. He does continue to follow with Dr. Christel Salazar monthly. He had labs on 10/12and these were reviewed and overall stable. His blood sugars are overall controlled. Blood pressures are currently controlled on current regimen. He is planning to [place] hopefully within the next month. Past Medical History:   Diagnosis Date    Arthritis     Depression     Hypertension       Past Surgical History:   Procedure Laterality Date    KNEE SURGERY Right        No family history on file. Social History     Tobacco Use    Smoking status: Never Smoker    Smokeless tobacco: Never Used   Substance Use Topics    Alcohol use: Not Currently      Allergies   Allergen Reactions    Triamcinolone Rash     Patient states his arm felt like it was hot, like it was burning. Health Maintenance   Topic Date Due    Lipid screen  06/07/1950    DTaP/Tdap/Td vaccine (1 - Tdap) 06/07/1960    Shingles Vaccine (1 of 2) 06/07/1991    Pneumococcal 65+ years Vaccine (1 of 1 - PPSV23) 06/07/2006    Annual Wellness Visit (AWV)  06/10/2020    Flu vaccine (1) 09/01/2020    TSH testing  06/28/2021    Potassium monitoring  06/28/2021    Creatinine monitoring  06/28/2021    Hepatitis A vaccine  Aged Out    Hib vaccine  Aged Out    Meningococcal (ACWY) vaccine  Aged Out       Subjective:      Review of Systems   Constitutional: Negative for chills, fatigue and fever. HENT: Negative for congestion, rhinorrhea and sore throat. Eyes: Positive for visual disturbance (wears glasses). Negative for redness. Respiratory: Negative for cough, shortness of breath and wheezing. Cardiovascular: Negative for chest pain and leg swelling. Gastrointestinal: Negative for constipation, diarrhea, nausea and vomiting. Endocrine: Negative for polydipsia and polyuria. Genitourinary: Negative for dysuria, frequency and hematuria. Musculoskeletal: Positive for gait problem (one fall in the past month). Negative for arthralgias and myalgias. Skin: Negative for rash and wound. Allergic/Immunologic: Negative for environmental allergies and immunocompromised state. Neurological: Negative for dizziness, tremors, light-headedness and headaches. Hematological: Bruises/bleeds easily. Psychiatric/Behavioral: Negative for dysphoric mood and sleep disturbance. The patient is not nervous/anxious. Objective:     Physical Exam  Vitals signs and nursing note reviewed. Constitutional:       General: He is not in acute distress. Appearance: He is well-developed. HENT:      Head: Normocephalic and atraumatic. Right Ear: External ear normal.      Left Ear: External ear normal.      Nose: Nose normal.   Eyes:      General: No scleral icterus. Right eye: No discharge. Left eye: No discharge. Conjunctiva/sclera: Conjunctivae normal.   Neck:      Musculoskeletal: Neck supple. Thyroid: No thyromegaly. Vascular: No JVD. Cardiovascular:      Rate and Rhythm: Normal rate and regular rhythm. Heart sounds: Normal heart sounds. Pulmonary:      Effort: Pulmonary effort is normal. No respiratory distress. Breath sounds: Normal breath sounds. No stridor. No wheezing or rales. Abdominal:      General: Bowel sounds are normal. There is no distension. Palpations: Abdomen is soft. Tenderness: There is no abdominal tenderness. Musculoskeletal: Normal range of motion. General: No deformity. Right shoulder: He exhibits no tenderness, no bony tenderness and no pain.       Left shoulder: He exhibits no tenderness, no bony tenderness and no pain. Cervical back: He exhibits no tenderness, no bony tenderness and no pain. Thoracic back: He exhibits no tenderness, no bony tenderness, no pain and no spasm. Lumbar back: He exhibits no tenderness, no bony tenderness and no pain. Lymphadenopathy:      Cervical: No cervical adenopathy. Upper Body:      Right upper body: No supraclavicular adenopathy. Left upper body: No supraclavicular adenopathy. Skin:     General: Skin is warm and dry. Findings: Bruising (left arm) present. No erythema or rash. Neurological:      Mental Status: He is alert and oriented to person, place, and time. Motor: Tremor (mild) present. No atrophy, abnormal muscle tone or seizure activity. Psychiatric:         Attention and Perception: Attention normal.         Mood and Affect: Mood normal.         Speech: Speech normal.         Behavior: Behavior normal.         Cognition and Memory: Memory normal.       /60   Pulse 71   Temp 97.8 °F (36.6 °C)   Resp 18   Wt 273 lb 12.8 oz (124.2 kg)   SpO2 97%   BMI 39.29 kg/m²     Assessment/Plan      1. Severe episode of recurrent major depressive disorder, without psychotic features ([facility] [address] -follows with Dr. Tk Richard. Mood much improved. Continue Wellbutrin, Abilify, Zoloft, doxepin, Remeron, and BuSpar. No change in current medications as he is tolerating all well. 2. Essential hypertension -chronic and improved. Continue metoprolol and lisinopril. No hypo-or hypertension episodes noted. 3. Arthritis -denies pain continue gabapentin and Aleve. Able to ambulate and dress self independently. One fall in the last month and states was not using walker. May need home health when moving to [place]. Reviewed this with him upon discharge. .   4. ASHD (arteriosclerotic heart disease) -denies chest pain. Continue beta-blocker and blood pressure management. 5. Acquired hypothyroidism chronic and continue levothyroxine.    6. Type 2 diabetes mellitus without complication, without long-term current use of insulin (HCC) -weight is up 6 pounds. A1c 5.3     Resident has DM, Hypothyroidism, HTN, ASHD and it is expected to last 12 or more months. These chronic conditions place resident at a significant risk of death, acute exacerbation, or functional decline. The patient's comprehensive plan was monitored today. I spent 20 minutes reviewing plan. Patient seen and examined. History partially obtained by chart review and nursing notes. I have reviewed patient's past medical, surgical, social, and family history and have made updates where appropriate.   See facility EMR for updated medication list.       Electronically signed by SYLVESTER Borrero CNP on 10/16/2020 at 5:22 PM

## 2020-11-25 ENCOUNTER — OUTSIDE SERVICES (OUTPATIENT)
Dept: FAMILY MEDICINE CLINIC | Age: 80
End: 2020-11-25
Payer: MEDICARE

## 2020-11-25 VITALS
DIASTOLIC BLOOD PRESSURE: 62 MMHG | OXYGEN SATURATION: 95 % | SYSTOLIC BLOOD PRESSURE: 131 MMHG | HEART RATE: 69 BPM | BODY MASS INDEX: 40.63 KG/M2 | TEMPERATURE: 97.8 F | RESPIRATION RATE: 18 BRPM | WEIGHT: 283.2 LBS

## 2020-11-25 PROCEDURE — 99490 CHRNC CARE MGMT STAFF 1ST 20: CPT | Performed by: NURSE PRACTITIONER

## 2020-11-25 ASSESSMENT — ENCOUNTER SYMPTOMS
WHEEZING: 0
DIARRHEA: 0
RHINORRHEA: 0
NAUSEA: 0
SORE THROAT: 0
EYE REDNESS: 0
COUGH: 0
SHORTNESS OF BREATH: 0
CHEST TIGHTNESS: 1
VOMITING: 0
CONSTIPATION: 0

## 2020-11-25 NOTE — PROGRESS NOTES
Daren Franklin is a 79 y.o. male who presents today for his medical conditions/complaints as noted below. Chief Complaint   Patient presents with    3 Month Follow-Up     Follow up on chronic health conditions           HPI:     Loren Chiu is seen today for admission to the assisted living. He was [facility] to the [place] on 10/19/2020. He has chronic health conditions of depression, DM, hypertension, osteoarthritis, ASHD, hypothyroidism. He states he is doing well and is enjoying his time in the assisted living. He has had no falls and blood pressures and blood sugars are doing well. He states his weight is going up since he has moved to the assisted living. He does complain of pain in his chest wall from turning wrong in the bed. Past Medical History:   Diagnosis Date    Arthritis     Depression     Hypertension       Past Surgical History:   Procedure Laterality Date    KNEE SURGERY Right        No family history on file. Social History     Tobacco Use    Smoking status: Never Smoker    Smokeless tobacco: Never Used   Substance Use Topics    Alcohol use: Not Currently      Allergies   Allergen Reactions    Triamcinolone Rash     Patient states his arm felt like it was hot, like it was burning. Health Maintenance   Topic Date Due    Lipid screen  06/07/1950    DTaP/Tdap/Td vaccine (1 - Tdap) 06/07/1960    Shingles Vaccine (1 of 2) 06/07/1991    Pneumococcal 65+ years Vaccine (1 of 1 - PPSV23) 06/07/2006    Annual Wellness Visit (AWV)  06/10/2020    Flu vaccine (1) 09/01/2020    TSH testing  06/28/2021    Potassium monitoring  06/28/2021    Creatinine monitoring  06/28/2021    Hepatitis A vaccine  Aged Out    Hib vaccine  Aged Out    Meningococcal (ACWY) vaccine  Aged Out       Subjective:      Review of Systems   Constitutional: Negative for chills, fatigue and fever. HENT: Negative for congestion, rhinorrhea and sore throat. Eyes: Negative for redness and visual disturbance. Respiratory: Positive for chest tightness (muscular). Negative for cough, shortness of breath and wheezing. Cardiovascular: Negative for chest pain and leg swelling. Gastrointestinal: Negative for constipation, diarrhea, nausea and vomiting. Endocrine: Negative for polydipsia and polyuria. Genitourinary: Negative for dysuria, frequency and hematuria. Musculoskeletal: Negative for arthralgias, gait problem and myalgias. Skin: Negative for rash and wound. Allergic/Immunologic: Negative for environmental allergies and immunocompromised state. Neurological: Negative for dizziness, light-headedness and headaches. Hematological: Does not bruise/bleed easily. Psychiatric/Behavioral: Negative for dysphoric mood and sleep disturbance. The patient is not nervous/anxious. Objective:     Physical Exam  Vitals signs and nursing note reviewed. Constitutional:       General: He is not in acute distress. Appearance: He is well-developed. HENT:      Head: Normocephalic and atraumatic. Right Ear: External ear normal.      Left Ear: External ear normal.      Nose: Nose normal.   Eyes:      General: No scleral icterus. Right eye: No discharge. Left eye: No discharge. Conjunctiva/sclera: Conjunctivae normal.   Neck:      Musculoskeletal: Neck supple. Thyroid: No thyromegaly. Vascular: No JVD. Cardiovascular:      Rate and Rhythm: Normal rate and regular rhythm. Heart sounds: Normal heart sounds. Pulmonary:      Effort: Pulmonary effort is normal. No respiratory distress. Breath sounds: Normal breath sounds. No stridor. No wheezing or rales. Abdominal:      General: Bowel sounds are normal. There is no distension. Palpations: Abdomen is soft. Tenderness: There is no abdominal tenderness. Musculoskeletal: Normal range of motion. General: No deformity. Right shoulder: He exhibits no tenderness, no bony tenderness and no pain. Left shoulder: He exhibits no tenderness, no bony tenderness and no pain. Cervical back: He exhibits no tenderness, no bony tenderness and no pain. Thoracic back: He exhibits no tenderness, no bony tenderness, no pain and no spasm. Lumbar back: He exhibits no tenderness, no bony tenderness and no pain. Right lower le+ Pitting Edema present. Left lower le+ Pitting Edema present. Lymphadenopathy:      Cervical: No cervical adenopathy. Upper Body:      Right upper body: No supraclavicular adenopathy. Left upper body: No supraclavicular adenopathy. Skin:     General: Skin is warm and dry. Findings: No erythema or rash. Neurological:      Mental Status: He is alert and oriented to person, place, and time. Motor: No atrophy, abnormal muscle tone or seizure activity. Psychiatric:         Speech: Speech normal.         Behavior: Behavior normal.       /62   Pulse 69   Temp 97.8 °F (36.6 °C)   Resp 18   Wt 283 lb 3.2 oz (128.5 kg)   SpO2 95%   BMI 40.63 kg/m²     Assessment/Plan      1. Severe episode of recurrent major depressive disorder, without psychotic features ([facility] - Chronic and continue to follow with Dr. Edilia Soulier. Mood much improved. Sleeping and eating well. Continue all psych meds. 2. Essential hypertension - Blood pressures stable. Continue Lisinopril. 3. Arthritis - Chronic and continue Gabapentin, aleve and Tylenol. 4. ASHD (arteriosclerotic heart disease) - Denies chest pain or shortness of breath. Continue Statin and monitor for chest pain. 5. Acquired hypothyroidism - Chronic and continue Levothyroxine and monitor labs. 6. Type 2 diabetes mellitus without complication, without long-term current use of insulin ([facility] - Will continue labs. Monitor intakes and weights. Resident has MDD, HTN, ASHD, DM and it is expected to last 15 or more months.  These chronic conditions place resident at a significant risk of death, acute exacerbation, or functional decline. The patient's comprehensive plan was monitored today. I spent 20 minutes reviewing plan. Patient seen and examined. History partially obtained by chart review and nursing notes. I have reviewed patient's past medical, surgical, social, and family history and have made updates where appropriate.   See facility EMR for updated medication list.       Electronically signed by SYLVESTER Valentin CNP on 11/25/2020 at 3:20 PM

## 2021-01-27 ENCOUNTER — OUTSIDE SERVICES (OUTPATIENT)
Dept: FAMILY MEDICINE CLINIC | Age: 81
End: 2021-01-27
Payer: MEDICARE

## 2021-01-27 VITALS
WEIGHT: 299.6 LBS | BODY MASS INDEX: 42.99 KG/M2 | RESPIRATION RATE: 16 BRPM | SYSTOLIC BLOOD PRESSURE: 127 MMHG | DIASTOLIC BLOOD PRESSURE: 70 MMHG | TEMPERATURE: 98.1 F | OXYGEN SATURATION: 97 % | HEART RATE: 57 BPM

## 2021-01-27 DIAGNOSIS — M19.90 ARTHRITIS: ICD-10-CM

## 2021-01-27 DIAGNOSIS — I25.10 ASHD (ARTERIOSCLEROTIC HEART DISEASE): ICD-10-CM

## 2021-01-27 DIAGNOSIS — E03.9 ACQUIRED HYPOTHYROIDISM: ICD-10-CM

## 2021-01-27 DIAGNOSIS — I10 ESSENTIAL HYPERTENSION: ICD-10-CM

## 2021-01-27 DIAGNOSIS — L03.115 CELLULITIS OF RIGHT LOWER EXTREMITY: Primary | ICD-10-CM

## 2021-01-27 DIAGNOSIS — R91.1 PULMONARY NODULE: ICD-10-CM

## 2021-01-27 DIAGNOSIS — F33.2 SEVERE EPISODE OF RECURRENT MAJOR DEPRESSIVE DISORDER, WITHOUT PSYCHOTIC FEATURES (HCC): ICD-10-CM

## 2021-01-27 DIAGNOSIS — E11.9 TYPE 2 DIABETES MELLITUS WITHOUT COMPLICATION, WITHOUT LONG-TERM CURRENT USE OF INSULIN (HCC): ICD-10-CM

## 2021-01-27 PROCEDURE — 99309 SBSQ NF CARE MODERATE MDM 30: CPT | Performed by: NURSE PRACTITIONER

## 2021-01-27 ASSESSMENT — ENCOUNTER SYMPTOMS
SORE THROAT: 0
RHINORRHEA: 0
VOMITING: 0
CONSTIPATION: 0
DIARRHEA: 0
EYE REDNESS: 0
NAUSEA: 0
WHEEZING: 0
SHORTNESS OF BREATH: 0
COUGH: 0

## 2021-01-27 NOTE — PROGRESS NOTES
Nicole Romo is a 79 y.o. male who presents today for his medical conditions/complaints as noted below. Chief Complaint   Patient presents with    Other     Review of medication           HPI:     Beau Stewart is seen today for review of medications. He was admitted on 1/26/21 from [facility] after an admission on 1/22/21 for AMS and noted sepsis secondary to cellulitis. He had a chief complaint of having generalized weakness. He was admitted from the ED and was found to be septic secondary to right leg cellulitis. Further work-up and management was noted with a pulmonary nodule which was an incidental finding on a CT of the chest.  This nodule is 3.2 x 2.6 cm and the impression is noted to be malignancy as the diagnosis of exclusion. They did recommend a PET/CT with biopsy this is scheduled for end of this week or beginning of next week. Patient was started on IV Zosyn and is cultured were followed up with. Blood cultures grew E. coli. His hospital course was uneventful and he returned to the facility on IV Rocephin for 7 days. He does have a past medical history of hypertension, ASHD, diabetes mellitus, osteoarthritis, MDD, hypothyroidism, history of COVID-19. Patient is seen in his room today and states he is feeling better than he was a week ago. He does state that he is weak and is continuing to have some pain in his right lower extremity but there is no red or warmth noted to the right lower extremity. He also had a Velez catheter placed while in the hospital and this was to ascertain urinalysis. He was not able to void and this may be likely related to being septic. We are going to discontinue this today and they may reinsert if patient is not able to void. Past Medical History:   Diagnosis Date    Arthritis     Depression     Hypertension       Past Surgical History:   Procedure Laterality Date    KNEE SURGERY Right        No family history on file.     Social History     Tobacco Use    Smoking status: Never Smoker    Smokeless tobacco: Never Used   Substance Use Topics    Alcohol use: Not Currently      Allergies   Allergen Reactions    Triamcinolone Rash     Patient states his arm felt like it was hot, like it was burning. Health Maintenance   Topic Date Due    Hepatitis C screen  1940    Lipid screen  06/07/1951    COVID-19 Vaccine (1 of 2) 06/07/1957    DTaP/Tdap/Td vaccine (1 - Tdap) 06/07/1960    Shingles Vaccine (1 of 2) 06/07/1991    Pneumococcal 65+ years Vaccine (1 of 1 - PPSV23) 06/07/2006    Annual Wellness Visit (AWV)  06/10/2020    Flu vaccine (1) 09/01/2020    TSH testing  06/28/2021    Potassium monitoring  06/28/2021    Creatinine monitoring  06/28/2021    Hepatitis A vaccine  Aged Out    Hib vaccine  Aged Out    Meningococcal (ACWY) vaccine  Aged Out       Subjective:      Review of Systems   Constitutional: Positive for fatigue. Negative for chills, fever and unexpected weight change. HENT: Negative for congestion, rhinorrhea and sore throat. Eyes: Positive for visual disturbance. Negative for redness. Respiratory: Negative for cough, shortness of breath and wheezing. Cardiovascular: Negative for chest pain and leg swelling. Gastrointestinal: Negative for constipation, diarrhea, nausea and vomiting. Endocrine: Negative for polydipsia and polyuria. Genitourinary: Negative for dysuria, frequency and hematuria. Velez catheter   Musculoskeletal: Positive for gait problem. Negative for arthralgias and myalgias. Skin: Positive for pallor. Negative for rash and wound. Allergic/Immunologic: Negative for environmental allergies and immunocompromised state. Neurological: Positive for weakness. Negative for dizziness, light-headedness and headaches. Hematological: Does not bruise/bleed easily. Psychiatric/Behavioral: Positive for decreased concentration. Negative for dysphoric mood and sleep disturbance.  The patient is not nervous/anxious. Objective:     Physical Exam  Vitals signs and nursing note reviewed. Constitutional:       General: He is not in acute distress. Appearance: He is well-developed. He is obese. HENT:      Head: Normocephalic and atraumatic. Right Ear: External ear normal.      Left Ear: External ear normal.      Nose: Nose normal.   Eyes:      General: No scleral icterus. Right eye: No discharge. Left eye: No discharge. Conjunctiva/sclera: Conjunctivae normal.   Neck:      Musculoskeletal: Neck supple. Thyroid: No thyromegaly. Vascular: No JVD. Cardiovascular:      Rate and Rhythm: Normal rate and regular rhythm. Heart sounds: Normal heart sounds. Pulmonary:      Effort: Pulmonary effort is normal. No respiratory distress. Breath sounds: Normal breath sounds. No stridor. No wheezing or rales. Abdominal:      General: Bowel sounds are normal. There is no distension. Palpations: Abdomen is soft. Tenderness: There is no abdominal tenderness. Musculoskeletal: Normal range of motion. General: No deformity. Right shoulder: He exhibits no tenderness, no bony tenderness and no pain. Left shoulder: He exhibits no tenderness, no bony tenderness and no pain. Cervical back: He exhibits no tenderness, no bony tenderness and no pain. Thoracic back: He exhibits no tenderness, no bony tenderness, no pain and no spasm. Lumbar back: He exhibits no tenderness, no bony tenderness and no pain. Right lower leg: No edema. Left lower leg: No edema. Lymphadenopathy:      Cervical: No cervical adenopathy. Upper Body:      Right upper body: No supraclavicular adenopathy. Left upper body: No supraclavicular adenopathy. Skin:     General: Skin is warm and dry. Coloration: Skin is pale. Findings: No erythema or rash.           Neurological:      Mental Status: He is alert and oriented to person, place, and time. Motor: No atrophy, abnormal muscle tone or seizure activity. Psychiatric:         Speech: Speech normal.         Behavior: Behavior normal.       /70   Pulse 57   Temp 98.1 °F (36.7 °C)   Resp 16   Wt 299 lb 9.6 oz (135.9 kg)   SpO2 97%   BMI 42.99 kg/m²     Assessment/Plan      1. Cellulitis of right lower extremity -sent hospitalization. Therapy to eval and treat. Continue Rocephin. 2. Severe episode of recurrent major depressive disorder, without psychotic features ([facility] [address] -chronic and continue current medications of Zoloft, Abilify, Wellbutrin, BuSpar, doxepin, Remeron,  quetiapine. Patient has been stable on all medications and reduction in these medication increase symptoms. 3. Essential hypertension -blood pressure stable on current medications. Continue metoprolol and lisinopril. 4. Arthritis -states has pain in lower extremities. Continue Tylenol for pain. 5. ASHD (arteriosclerotic heart disease) -denies chest pain. Continue Metoprolol, Mag ox and atorvastatin. 6. Acquired hypothyroidism -chronic and continue current dose of levothyroxine. Continue to monitor labs. 7. Type 2 diabetes mellitus without complication, without long-term current use of insulin (HCC) -no current medications. Continue to monitor blood sugars. 8. Pulmonary nodule -follow-up with PET scan. Continue to follow-up with pulmonology subsequently afterwards. Patient seen and examined. History partially obtained by chart review and nursing notes. I have reviewed patient's past medical, surgical, social, and family history and have made updates where appropriate.   See facility EMR for updated medication list.       Electronically signed by SYLVESTER Harris CNP on 1/27/2021 at 5:32 PM

## 2021-02-04 ENCOUNTER — OUTSIDE SERVICES (OUTPATIENT)
Dept: FAMILY MEDICINE CLINIC | Age: 81
End: 2021-02-04
Payer: MEDICARE

## 2021-02-04 VITALS
TEMPERATURE: 97.3 F | DIASTOLIC BLOOD PRESSURE: 69 MMHG | HEART RATE: 81 BPM | WEIGHT: 288.6 LBS | SYSTOLIC BLOOD PRESSURE: 128 MMHG | OXYGEN SATURATION: 98 % | RESPIRATION RATE: 16 BRPM | BODY MASS INDEX: 41.41 KG/M2

## 2021-02-04 DIAGNOSIS — F33.2 SEVERE EPISODE OF RECURRENT MAJOR DEPRESSIVE DISORDER, WITHOUT PSYCHOTIC FEATURES (HCC): ICD-10-CM

## 2021-02-04 DIAGNOSIS — E03.9 ACQUIRED HYPOTHYROIDISM: ICD-10-CM

## 2021-02-04 DIAGNOSIS — R91.1 PULMONARY NODULE: ICD-10-CM

## 2021-02-04 DIAGNOSIS — L03.115 CELLULITIS OF RIGHT LOWER EXTREMITY: Primary | ICD-10-CM

## 2021-02-04 DIAGNOSIS — I25.10 ASHD (ARTERIOSCLEROTIC HEART DISEASE): ICD-10-CM

## 2021-02-04 DIAGNOSIS — M19.90 ARTHRITIS: ICD-10-CM

## 2021-02-04 DIAGNOSIS — I10 ESSENTIAL HYPERTENSION: ICD-10-CM

## 2021-02-04 DIAGNOSIS — E11.9 TYPE 2 DIABETES MELLITUS WITHOUT COMPLICATION, WITHOUT LONG-TERM CURRENT USE OF INSULIN (HCC): ICD-10-CM

## 2021-02-04 PROCEDURE — 99306 1ST NF CARE HIGH MDM 50: CPT | Performed by: PHYSICAL MEDICINE & REHABILITATION

## 2021-02-04 PROCEDURE — 99490 CHRNC CARE MGMT STAFF 1ST 20: CPT | Performed by: PHYSICAL MEDICINE & REHABILITATION

## 2021-02-04 ASSESSMENT — ENCOUNTER SYMPTOMS
EYE PAIN: 0
STRIDOR: 0
RHINORRHEA: 0
CONSTIPATION: 0
COUGH: 0
PHOTOPHOBIA: 0
DIARRHEA: 0
WHEEZING: 0
EYE ITCHING: 0
FACIAL SWELLING: 0
SHORTNESS OF BREATH: 0
SORE THROAT: 0
EYE DISCHARGE: 0
NAUSEA: 0
ABDOMINAL PAIN: 0
VOMITING: 0
VOICE CHANGE: 0
CHOKING: 0
TROUBLE SWALLOWING: 0
EYE REDNESS: 0

## 2021-02-04 NOTE — PROGRESS NOTES
75 MCG tablet Take 75 mcg by mouth Daily      metoprolol tartrate (LOPRESSOR) 25 MG tablet Take 25 mg by mouth 2 times daily      lisinopril (PRINIVIL;ZESTRIL) 10 MG tablet Take 10 mg by mouth daily      sertraline (ZOLOFT) 100 MG tablet Take 100 mg by mouth daily      tamsulosin (FLOMAX) 0.4 MG capsule Take 0.4 mg by mouth daily      atorvastatin (LIPITOR) 40 MG tablet Take 40 mg by mouth daily      clomiPRAMINE (ANAFRANIL) 50 MG capsule Take 50 mg by mouth nightly      gabapentin (NEURONTIN) 600 MG tablet Take 600 mg by mouth 3 times daily. No current facility-administered medications for this visit. Allergies   Allergen Reactions    Triamcinolone Rash     Patient states his arm felt like it was hot, like it was burning. Health Maintenance   Topic Date Due    Hepatitis C screen  1940    Lipid screen  06/07/1951    COVID-19 Vaccine (1 of 2) 06/07/1957    DTaP/Tdap/Td vaccine (1 - Tdap) 06/07/1960    Shingles Vaccine (1 of 2) 06/07/1991    Pneumococcal 65+ years Vaccine (1 of 1 - PPSV23) 06/07/2006    Annual Wellness Visit (AWV)  06/10/2020    Flu vaccine (1) 09/01/2020    TSH testing  06/28/2021    Potassium monitoring  06/28/2021    Creatinine monitoring  06/28/2021    Hepatitis A vaccine  Aged Out    Hib vaccine  Aged Out    Meningococcal (ACWY) vaccine  Aged Out       Subjective:      Review of Systems   Constitutional: Positive for activity change. Negative for chills, fatigue and fever. He has been tolerating therapies   HENT: Negative for congestion, drooling, ear discharge, facial swelling, rhinorrhea, sore throat, trouble swallowing and voice change. Eyes: Negative for photophobia, pain, discharge, redness, itching and visual disturbance. Respiratory: Negative for cough, choking, shortness of breath, wheezing and stridor. Cardiovascular: Negative for chest pain, palpitations and leg swelling.    Gastrointestinal: Negative for abdominal pain, constipation, diarrhea, nausea and vomiting. Endocrine: Negative for polydipsia and polyuria. Genitourinary: Negative for dysuria, frequency, hematuria and urgency. Musculoskeletal: Negative for arthralgias, gait problem and myalgias. Skin: Negative for rash and wound. Allergic/Immunologic: Negative for environmental allergies and immunocompromised state. Neurological: Negative for dizziness, seizures, facial asymmetry, speech difficulty, light-headedness and headaches. Hematological: Does not bruise/bleed easily. Psychiatric/Behavioral: Negative for agitation, behavioral problems, confusion, decreased concentration, dysphoric mood and sleep disturbance. The patient is not nervous/anxious. Objective:     Physical Exam  Vitals signs and nursing note reviewed. Constitutional:       General: He is not in acute distress. Appearance: Normal appearance. He is well-developed. Comments: Overweight with a BMI of 41.41   HENT:      Head: Normocephalic and atraumatic. Right Ear: External ear normal.      Left Ear: External ear normal.      Nose: Nose normal. No congestion or rhinorrhea. Mouth/Throat:      Mouth: Mucous membranes are moist.      Pharynx: Oropharynx is clear. Eyes:      General: No scleral icterus. Right eye: No discharge. Left eye: No discharge. Extraocular Movements: Extraocular movements intact. Conjunctiva/sclera: Conjunctivae normal.      Pupils: Pupils are equal, round, and reactive to light. Neck:      Musculoskeletal: Neck supple. Thyroid: No thyromegaly. Vascular: No JVD. Cardiovascular:      Rate and Rhythm: Normal rate and regular rhythm. Pulses: Normal pulses. Heart sounds: Normal heart sounds. No friction rub. No gallop. Pulmonary:      Effort: Pulmonary effort is normal. No respiratory distress. Breath sounds: Normal breath sounds. No stridor. No wheezing, rhonchi or rales.    Abdominal: General: Bowel sounds are normal. There is no distension. Palpations: Abdomen is soft. Tenderness: There is no abdominal tenderness. There is no guarding or rebound. Musculoskeletal: Normal range of motion. General: No deformity. Right shoulder: He exhibits no tenderness, no bony tenderness and no pain. Left shoulder: He exhibits no tenderness, no bony tenderness and no pain. Cervical back: He exhibits no tenderness, no bony tenderness and no pain. Thoracic back: He exhibits no tenderness, no bony tenderness, no pain and no spasm. Lumbar back: He exhibits no tenderness, no bony tenderness and no pain. Lymphadenopathy:      Cervical: No cervical adenopathy. Upper Body:      Right upper body: No supraclavicular adenopathy. Left upper body: No supraclavicular adenopathy. Skin:     General: Skin is warm and dry. Coloration: Skin is not jaundiced. Findings: No erythema or rash. Neurological:      Mental Status: He is alert and oriented to person, place, and time. Cranial Nerves: No cranial nerve deficit. Motor: No atrophy, abnormal muscle tone or seizure activity. Coordination: Coordination normal.   Psychiatric:         Mood and Affect: Mood normal.         Speech: Speech normal.         Behavior: Behavior normal.         Thought Content: Thought content normal.         Judgment: Judgment normal.         VITALS  /69   Pulse 81   Temp 97.3 °F (36.3 °C)   Resp 16   Wt 288 lb 9.6 oz (130.9 kg)   SpO2 98%   BMI 41.41 kg/m²     Assessment/Plan:     1. Cellulitis of right lower extremity -s/p hospitalization. Therapy following   Course of Rocephin completed. 2. Severe episode of recurrent major depressive disorder, without psychotic features ([facility] [address] -chronic and continue current medications of Zoloft, Abilify, Wellbutrin, BuSpar, doxepin, Remeron, quetiapine.  Patient has been stable on all medications and reduction in these medication increase symptoms. 3. Essential hypertension -blood pressure stable on current medications. Continue metoprolol and lisinopril. 4. Arthritis -states has pain in lower extremities. Continue Tylenol for pain. 5. ASHD (arteriosclerotic heart disease) -denies chest pain. Continue Metoprolol, Mag ox and atorvastatin. 6. Acquired hypothyroidism -chronic and continue current dose of levothyroxine. Continue to monitor labs. 7. Type 2 diabetes mellitus without complication, without long-term current use of insulin (HCC) -no current medications. Continue to monitor blood sugars. 8. Pulmonary nodule -follow-up with PET scan. Continue to follow-up with pulmonology . Patient seen and examined. History partially obtained by chart review and nursing notes. I have reviewed patient's past medical, surgical, social, and family history and have made updates where appropriate. See facility EMR for updated medication list.     Resident has depression, hypertension, arthritis, and arteriosclerotic heart disease and they are expected to last 12 or more months. These chronic conditions place resident at a significant risk of death, acute exacerbation, or functional decline. The patient's comprehensive plan was monitored today. I spent 20 minutes reviewing plan.      Electronically signed by Silver Horne MD on 2/4/2021 at 11:36 AM

## 2021-02-16 PROBLEM — I95.9 HYPOTENSION: Status: ACTIVE | Noted: 2021-02-16

## 2021-02-16 NOTE — PROGRESS NOTES
Patient is a 79year old from [facility] ICU with Dr Olga Lidia Thompson transferring. Patient was admitted 2/14/2021 with fever, weakness, diaphoresis, loose stools and hypotension. CDiff (+). T-Max 101.4F. Levophed was started yesterday, currently on 6 mcg. Creatinine July 2020 was 1.1 and today is 4.3. K+ normal today. HCO3 gtt running. WBC 22,800 with Lactic 2.9. They had consider hemodialysis yesterday but since K+ decreased they held off. Patient accepted to ICU under Dr Lisa Kelley with diagnosis ARF/Hypotension and will transfer here once an ICU bed becomes available.

## 2021-02-17 ENCOUNTER — APPOINTMENT (OUTPATIENT)
Dept: GENERAL RADIOLOGY | Age: 81
DRG: 853 | End: 2021-02-17
Attending: INTERNAL MEDICINE
Payer: MEDICARE

## 2021-02-17 ENCOUNTER — APPOINTMENT (OUTPATIENT)
Dept: CT IMAGING | Age: 81
DRG: 853 | End: 2021-02-17
Attending: INTERNAL MEDICINE
Payer: MEDICARE

## 2021-02-17 ENCOUNTER — HOSPITAL ENCOUNTER (INPATIENT)
Age: 81
LOS: 16 days | Discharge: NURSING, REHAB OR LONG-TERM CARE | DRG: 853 | End: 2021-03-05
Attending: INTERNAL MEDICINE | Admitting: INTERNAL MEDICINE
Payer: MEDICARE

## 2021-02-17 ENCOUNTER — APPOINTMENT (OUTPATIENT)
Dept: ULTRASOUND IMAGING | Age: 81
DRG: 853 | End: 2021-02-17
Attending: INTERNAL MEDICINE
Payer: MEDICARE

## 2021-02-17 DIAGNOSIS — Z90.49 S/P TOTAL COLECTOMY: Primary | ICD-10-CM

## 2021-02-17 LAB
ALBUMIN SERPL-MCNC: 2 G/DL (ref 3.5–5.1)
ALP BLD-CCNC: 61 U/L (ref 38–126)
ALT SERPL-CCNC: 11 U/L (ref 11–66)
AMYLASE: 34 U/L (ref 20–104)
ANION GAP SERPL CALCULATED.3IONS-SCNC: 11 MEQ/L (ref 8–16)
ANION GAP SERPL CALCULATED.3IONS-SCNC: 13 MEQ/L (ref 8–16)
ANION GAP SERPL CALCULATED.3IONS-SCNC: 9 MEQ/L (ref 8–16)
ANION GAP SERPL CALCULATED.3IONS-SCNC: 9 MEQ/L (ref 8–16)
AST SERPL-CCNC: 19 U/L (ref 5–40)
BACTERIA: ABNORMAL
BILIRUB SERPL-MCNC: 0.4 MG/DL (ref 0.3–1.2)
BILIRUBIN DIRECT: < 0.2 MG/DL (ref 0–0.3)
BILIRUBIN URINE: NEGATIVE
BLOOD, URINE: ABNORMAL
BUN BLDV-MCNC: 83 MG/DL (ref 7–22)
BUN BLDV-MCNC: 86 MG/DL (ref 7–22)
BUN BLDV-MCNC: 90 MG/DL (ref 7–22)
BUN BLDV-MCNC: 92 MG/DL (ref 7–22)
CALCIUM IONIZED: 0.92 MMOL/L (ref 1.12–1.32)
CALCIUM SERPL-MCNC: 7.6 MG/DL (ref 8.5–10.5)
CALCIUM SERPL-MCNC: 7.6 MG/DL (ref 8.5–10.5)
CALCIUM SERPL-MCNC: 7.7 MG/DL (ref 8.5–10.5)
CALCIUM SERPL-MCNC: 7.7 MG/DL (ref 8.5–10.5)
CASTS: ABNORMAL /LPF
CASTS: ABNORMAL /LPF
CHARACTER, URINE: ABNORMAL
CHLORIDE BLD-SCNC: 100 MEQ/L (ref 98–111)
CHLORIDE BLD-SCNC: 101 MEQ/L (ref 98–111)
CHLORIDE BLD-SCNC: 102 MEQ/L (ref 98–111)
CHLORIDE BLD-SCNC: 102 MEQ/L (ref 98–111)
CO2: 25 MEQ/L (ref 23–33)
CO2: 25 MEQ/L (ref 23–33)
CO2: 27 MEQ/L (ref 23–33)
CO2: 27 MEQ/L (ref 23–33)
COLOR: YELLOW
CORTISOL COLLECTION INFO: NORMAL
CORTISOL: 19.85 UG/DL
CREAT SERPL-MCNC: 4.6 MG/DL (ref 0.4–1.2)
CREAT SERPL-MCNC: 4.7 MG/DL (ref 0.4–1.2)
CREAT SERPL-MCNC: 4.9 MG/DL (ref 0.4–1.2)
CREAT SERPL-MCNC: 5 MG/DL (ref 0.4–1.2)
CREATININE URINE: 114.2 MG/DL
CREATININE URINE: 222.4 MG/DL
CRYSTALS: ABNORMAL
EPITHELIAL CELLS, UA: ABNORMAL /HPF
ERYTHROCYTE [DISTWIDTH] IN BLOOD BY AUTOMATED COUNT: 15.5 % (ref 11.5–14.5)
ERYTHROCYTE [DISTWIDTH] IN BLOOD BY AUTOMATED COUNT: 54 FL (ref 35–45)
GFR SERPL CREATININE-BSD FRML MDRD: 11 ML/MIN/1.73M2
GFR SERPL CREATININE-BSD FRML MDRD: 11 ML/MIN/1.73M2
GFR SERPL CREATININE-BSD FRML MDRD: 12 ML/MIN/1.73M2
GFR SERPL CREATININE-BSD FRML MDRD: 12 ML/MIN/1.73M2
GLUCOSE BLD-MCNC: 139 MG/DL (ref 70–108)
GLUCOSE BLD-MCNC: 154 MG/DL (ref 70–108)
GLUCOSE BLD-MCNC: 164 MG/DL (ref 70–108)
GLUCOSE BLD-MCNC: 167 MG/DL (ref 70–108)
GLUCOSE, URINE: NEGATIVE MG/DL
HCT VFR BLD CALC: 27 % (ref 42–52)
HCT VFR BLD CALC: 32.3 % (ref 42–52)
HEMOGLOBIN: 10.2 GM/DL (ref 14–18)
HEMOGLOBIN: 8.7 GM/DL (ref 14–18)
INR BLD: 1.25 (ref 0.85–1.13)
KETONES, URINE: NEGATIVE
LACTIC ACID, SEPSIS: 1.5 MMOL/L (ref 0.5–1.9)
LACTIC ACID, SEPSIS: 1.6 MMOL/L (ref 0.5–1.9)
LEUKOCYTE EST, POC: ABNORMAL
LIPASE: 12.3 U/L (ref 5.6–51.3)
MCH RBC QN AUTO: 29.8 PG (ref 26–33)
MCHC RBC AUTO-ENTMCNC: 31.6 GM/DL (ref 32.2–35.5)
MCV RBC AUTO: 94.4 FL (ref 80–94)
MISCELLANEOUS LAB TEST RESULT: ABNORMAL
NITRITE, URINE: NEGATIVE
OSMOLALITY URINE: 363 MOSMOL/KG (ref 250–750)
PH UA: 5 (ref 5–9)
PHOSPHORUS: 3.3 MG/DL (ref 2.4–4.7)
PLATELET # BLD: 147 THOU/MM3 (ref 130–400)
PMV BLD AUTO: 11.5 FL (ref 9.4–12.4)
POTASSIUM REFLEX MAGNESIUM: 4.4 MEQ/L (ref 3.5–5.2)
POTASSIUM REFLEX MAGNESIUM: 4.4 MEQ/L (ref 3.5–5.2)
POTASSIUM REFLEX MAGNESIUM: 4.5 MEQ/L (ref 3.5–5.2)
POTASSIUM SERPL-SCNC: 4.4 MEQ/L (ref 3.5–5.2)
PROT/CREAT RATIO, UR: 0.64
PROTEIN UA: 100 MG/DL
PROTEIN, URINE: 73.5 MG/DL
RBC # BLD: 3.42 MILL/MM3 (ref 4.7–6.1)
RBC URINE: ABNORMAL /HPF
RENAL EPITHELIAL, UA: ABNORMAL
SODIUM BLD-SCNC: 136 MEQ/L (ref 135–145)
SODIUM BLD-SCNC: 137 MEQ/L (ref 135–145)
SODIUM BLD-SCNC: 138 MEQ/L (ref 135–145)
SODIUM BLD-SCNC: 140 MEQ/L (ref 135–145)
SODIUM URINE: 44 MEQ/L
SPECIFIC GRAVITY UA: 1.02 (ref 1–1.03)
TOTAL CK: 54 U/L (ref 55–170)
TOTAL PROTEIN: 5.2 G/DL (ref 6.1–8)
UROBILINOGEN, URINE: 0.2 EU/DL (ref 0–1)
VANCOMYCIN RESISTANT ENTEROCOCCUS: NEGATIVE
WBC # BLD: 18.5 THOU/MM3 (ref 4.8–10.8)
WBC UA: ABNORMAL /HPF
YEAST: ABNORMAL

## 2021-02-17 PROCEDURE — 84156 ASSAY OF PROTEIN URINE: CPT

## 2021-02-17 PROCEDURE — 2000000000 HC ICU R&B

## 2021-02-17 PROCEDURE — 83690 ASSAY OF LIPASE: CPT

## 2021-02-17 PROCEDURE — 80076 HEPATIC FUNCTION PANEL: CPT

## 2021-02-17 PROCEDURE — 85018 HEMOGLOBIN: CPT

## 2021-02-17 PROCEDURE — 85014 HEMATOCRIT: CPT

## 2021-02-17 PROCEDURE — 85610 PROTHROMBIN TIME: CPT

## 2021-02-17 PROCEDURE — 6360000002 HC RX W HCPCS: Performed by: STUDENT IN AN ORGANIZED HEALTH CARE EDUCATION/TRAINING PROGRAM

## 2021-02-17 PROCEDURE — 2580000003 HC RX 258: Performed by: NURSE PRACTITIONER

## 2021-02-17 PROCEDURE — 83605 ASSAY OF LACTIC ACID: CPT

## 2021-02-17 PROCEDURE — 71045 X-RAY EXAM CHEST 1 VIEW: CPT

## 2021-02-17 PROCEDURE — P9047 ALBUMIN (HUMAN), 25%, 50ML: HCPCS | Performed by: NURSE PRACTITIONER

## 2021-02-17 PROCEDURE — 82150 ASSAY OF AMYLASE: CPT

## 2021-02-17 PROCEDURE — 84100 ASSAY OF PHOSPHORUS: CPT

## 2021-02-17 PROCEDURE — 36556 INSERT NON-TUNNEL CV CATH: CPT

## 2021-02-17 PROCEDURE — 02HV33Z INSERTION OF INFUSION DEVICE INTO SUPERIOR VENA CAVA, PERCUTANEOUS APPROACH: ICD-10-PCS | Performed by: FAMILY MEDICINE

## 2021-02-17 PROCEDURE — 2500000003 HC RX 250 WO HCPCS: Performed by: STUDENT IN AN ORGANIZED HEALTH CARE EDUCATION/TRAINING PROGRAM

## 2021-02-17 PROCEDURE — 82330 ASSAY OF CALCIUM: CPT

## 2021-02-17 PROCEDURE — 87040 BLOOD CULTURE FOR BACTERIA: CPT

## 2021-02-17 PROCEDURE — 85027 COMPLETE CBC AUTOMATED: CPT

## 2021-02-17 PROCEDURE — 2500000003 HC RX 250 WO HCPCS: Performed by: INTERNAL MEDICINE

## 2021-02-17 PROCEDURE — 99223 1ST HOSP IP/OBS HIGH 75: CPT | Performed by: SURGERY

## 2021-02-17 PROCEDURE — 99223 1ST HOSP IP/OBS HIGH 75: CPT | Performed by: INTERNAL MEDICINE

## 2021-02-17 PROCEDURE — 99221 1ST HOSP IP/OBS SF/LOW 40: CPT | Performed by: INTERNAL MEDICINE

## 2021-02-17 PROCEDURE — 81001 URINALYSIS AUTO W/SCOPE: CPT

## 2021-02-17 PROCEDURE — 83935 ASSAY OF URINE OSMOLALITY: CPT

## 2021-02-17 PROCEDURE — 84300 ASSAY OF URINE SODIUM: CPT

## 2021-02-17 PROCEDURE — 82550 ASSAY OF CK (CPK): CPT

## 2021-02-17 PROCEDURE — 6370000000 HC RX 637 (ALT 250 FOR IP): Performed by: NURSE PRACTITIONER

## 2021-02-17 PROCEDURE — 6360000002 HC RX W HCPCS: Performed by: NURSE PRACTITIONER

## 2021-02-17 PROCEDURE — 2580000003 HC RX 258: Performed by: STUDENT IN AN ORGANIZED HEALTH CARE EDUCATION/TRAINING PROGRAM

## 2021-02-17 PROCEDURE — APPNB180 APP NON BILLABLE TIME > 60 MINS: Performed by: NURSE PRACTITIONER

## 2021-02-17 PROCEDURE — 87106 FUNGI IDENTIFICATION YEAST: CPT

## 2021-02-17 PROCEDURE — 74018 RADEX ABDOMEN 1 VIEW: CPT

## 2021-02-17 PROCEDURE — 2500000003 HC RX 250 WO HCPCS: Performed by: NURSE PRACTITIONER

## 2021-02-17 PROCEDURE — 87086 URINE CULTURE/COLONY COUNT: CPT

## 2021-02-17 PROCEDURE — 82533 TOTAL CORTISOL: CPT

## 2021-02-17 PROCEDURE — 76705 ECHO EXAM OF ABDOMEN: CPT

## 2021-02-17 PROCEDURE — 80048 BASIC METABOLIC PNL TOTAL CA: CPT

## 2021-02-17 PROCEDURE — 87081 CULTURE SCREEN ONLY: CPT

## 2021-02-17 PROCEDURE — 74176 CT ABD & PELVIS W/O CONTRAST: CPT

## 2021-02-17 PROCEDURE — 87500 VANOMYCIN DNA AMP PROBE: CPT

## 2021-02-17 PROCEDURE — 2580000003 HC RX 258: Performed by: INTERNAL MEDICINE

## 2021-02-17 PROCEDURE — 36556 INSERT NON-TUNNEL CV CATH: CPT | Performed by: FAMILY MEDICINE

## 2021-02-17 PROCEDURE — 36415 COLL VENOUS BLD VENIPUNCTURE: CPT

## 2021-02-17 PROCEDURE — 82570 ASSAY OF URINE CREATININE: CPT

## 2021-02-17 RX ORDER — GABAPENTIN 800 MG/1
800 TABLET ORAL 3 TIMES DAILY
COMMUNITY

## 2021-02-17 RX ORDER — ACETAMINOPHEN 650 MG/1
650 SUPPOSITORY RECTAL EVERY 6 HOURS PRN
Status: DISCONTINUED | OUTPATIENT
Start: 2021-02-17 | End: 2021-03-05 | Stop reason: HOSPADM

## 2021-02-17 RX ORDER — OYSTER SHELL CALCIUM WITH VITAMIN D 500; 200 MG/1; [IU]/1
1 TABLET, FILM COATED ORAL DAILY
Status: ON HOLD | COMMUNITY
End: 2021-02-23

## 2021-02-17 RX ORDER — NOREPINEPHRINE BIT/0.9 % NACL 16MG/250ML
2-100 INFUSION BOTTLE (ML) INTRAVENOUS CONTINUOUS
Status: DISCONTINUED | OUTPATIENT
Start: 2021-02-17 | End: 2021-02-17 | Stop reason: SDUPTHER

## 2021-02-17 RX ORDER — CLOMIPRAMINE HYDROCHLORIDE 50 MG/1
50 CAPSULE ORAL NIGHTLY
Status: DISCONTINUED | OUTPATIENT
Start: 2021-02-17 | End: 2021-02-21 | Stop reason: ALTCHOICE

## 2021-02-17 RX ORDER — 0.9 % SODIUM CHLORIDE 0.9 %
1000 INTRAVENOUS SOLUTION INTRAVENOUS ONCE
Status: COMPLETED | OUTPATIENT
Start: 2021-02-17 | End: 2021-02-17

## 2021-02-17 RX ORDER — NOREPINEPHRINE BIT/0.9 % NACL 16MG/250ML
2-100 INFUSION BOTTLE (ML) INTRAVENOUS CONTINUOUS
Status: DISCONTINUED | OUTPATIENT
Start: 2021-02-17 | End: 2021-02-23

## 2021-02-17 RX ORDER — PROMETHAZINE HYDROCHLORIDE 25 MG/1
12.5 TABLET ORAL EVERY 6 HOURS PRN
Status: DISCONTINUED | OUTPATIENT
Start: 2021-02-17 | End: 2021-02-25 | Stop reason: SDUPTHER

## 2021-02-17 RX ORDER — 0.9 % SODIUM CHLORIDE 0.9 %
1000 INTRAVENOUS SOLUTION INTRAVENOUS ONCE
Status: DISCONTINUED | OUTPATIENT
Start: 2021-02-17 | End: 2021-02-17

## 2021-02-17 RX ORDER — SODIUM CHLORIDE 9 MG/ML
INJECTION, SOLUTION INTRAVENOUS CONTINUOUS
Status: DISCONTINUED | OUTPATIENT
Start: 2021-02-17 | End: 2021-02-22

## 2021-02-17 RX ORDER — DOXEPIN HYDROCHLORIDE 10 MG/1
10 CAPSULE ORAL NIGHTLY
COMMUNITY
End: 2021-07-07

## 2021-02-17 RX ORDER — M-VIT,TX,IRON,MINS/CALC/FOLIC 27MG-0.4MG
1 TABLET ORAL DAILY
COMMUNITY

## 2021-02-17 RX ORDER — ERGOCALCIFEROL 1.25 MG/1
50000 CAPSULE ORAL WEEKLY
COMMUNITY

## 2021-02-17 RX ORDER — SERTRALINE HYDROCHLORIDE 100 MG/1
100 TABLET, FILM COATED ORAL DAILY
Status: DISCONTINUED | OUTPATIENT
Start: 2021-02-17 | End: 2021-02-21 | Stop reason: DRUGHIGH

## 2021-02-17 RX ORDER — BUSPIRONE HYDROCHLORIDE 15 MG/1
10 TABLET ORAL 3 TIMES DAILY
COMMUNITY

## 2021-02-17 RX ORDER — COVID-19 ANTIGEN TEST
1 KIT MISCELLANEOUS 2 TIMES DAILY PRN
Status: ON HOLD | COMMUNITY
End: 2021-03-05 | Stop reason: HOSPADM

## 2021-02-17 RX ORDER — ONDANSETRON 2 MG/ML
4 INJECTION INTRAMUSCULAR; INTRAVENOUS EVERY 6 HOURS PRN
Status: DISCONTINUED | OUTPATIENT
Start: 2021-02-17 | End: 2021-02-25 | Stop reason: SDUPTHER

## 2021-02-17 RX ORDER — ATORVASTATIN CALCIUM 40 MG/1
40 TABLET, FILM COATED ORAL DAILY
Status: DISCONTINUED | OUTPATIENT
Start: 2021-02-17 | End: 2021-03-05 | Stop reason: HOSPADM

## 2021-02-17 RX ORDER — SERTRALINE HYDROCHLORIDE 100 MG/1
200 TABLET, FILM COATED ORAL DAILY
COMMUNITY

## 2021-02-17 RX ORDER — BUPROPION HYDROCHLORIDE 300 MG/1
300 TABLET ORAL EVERY MORNING
COMMUNITY

## 2021-02-17 RX ORDER — MIRTAZAPINE 45 MG/1
45 TABLET, FILM COATED ORAL DAILY
COMMUNITY

## 2021-02-17 RX ORDER — LEVOTHYROXINE SODIUM 0.07 MG/1
75 TABLET ORAL DAILY
Status: DISCONTINUED | OUTPATIENT
Start: 2021-02-17 | End: 2021-03-05 | Stop reason: HOSPADM

## 2021-02-17 RX ORDER — METOPROLOL SUCCINATE 100 MG/1
6.25 TABLET, EXTENDED RELEASE ORAL 2 TIMES DAILY
Status: ON HOLD | COMMUNITY
End: 2021-02-17

## 2021-02-17 RX ORDER — NOREPINEPHRINE BIT/0.9 % NACL 16MG/250ML
INFUSION BOTTLE (ML) INTRAVENOUS
Status: DISPENSED
Start: 2021-02-17 | End: 2021-02-17

## 2021-02-17 RX ORDER — ACETAMINOPHEN 325 MG/1
650 TABLET ORAL EVERY 4 HOURS PRN
COMMUNITY
End: 2021-03-15 | Stop reason: ALTCHOICE

## 2021-02-17 RX ORDER — QUETIAPINE FUMARATE 100 MG/1
100 TABLET, FILM COATED ORAL DAILY
COMMUNITY
End: 2021-07-07

## 2021-02-17 RX ORDER — TAMSULOSIN HYDROCHLORIDE 0.4 MG/1
0.4 CAPSULE ORAL DAILY
Status: DISCONTINUED | OUTPATIENT
Start: 2021-02-17 | End: 2021-03-05 | Stop reason: HOSPADM

## 2021-02-17 RX ORDER — POLYETHYLENE GLYCOL 3350 17 G/17G
17 POWDER, FOR SOLUTION ORAL DAILY PRN
Status: DISCONTINUED | OUTPATIENT
Start: 2021-02-17 | End: 2021-03-05 | Stop reason: HOSPADM

## 2021-02-17 RX ORDER — DOCUSATE SODIUM 100 MG/1
100 CAPSULE, LIQUID FILLED ORAL DAILY PRN
COMMUNITY

## 2021-02-17 RX ORDER — ALBUMIN (HUMAN) 12.5 G/50ML
25 SOLUTION INTRAVENOUS EVERY 6 HOURS
Status: COMPLETED | OUTPATIENT
Start: 2021-02-17 | End: 2021-02-17

## 2021-02-17 RX ORDER — LOPERAMIDE HYDROCHLORIDE 2 MG/1
2 CAPSULE ORAL EVERY 8 HOURS PRN
COMMUNITY

## 2021-02-17 RX ORDER — HEPARIN SODIUM 5000 [USP'U]/ML
5000 INJECTION, SOLUTION INTRAVENOUS; SUBCUTANEOUS EVERY 8 HOURS
Status: DISCONTINUED | OUTPATIENT
Start: 2021-02-17 | End: 2021-03-05 | Stop reason: HOSPADM

## 2021-02-17 RX ORDER — ARIPIPRAZOLE 10 MG/1
10 TABLET ORAL NIGHTLY
COMMUNITY
End: 2021-03-15 | Stop reason: ALTCHOICE

## 2021-02-17 RX ORDER — DOCUSATE SODIUM 100 MG/1
100 CAPSULE, LIQUID FILLED ORAL DAILY
COMMUNITY
End: 2021-03-15 | Stop reason: ALTCHOICE

## 2021-02-17 RX ORDER — ACETAMINOPHEN 325 MG/1
650 TABLET ORAL EVERY 6 HOURS PRN
Status: DISCONTINUED | OUTPATIENT
Start: 2021-02-17 | End: 2021-03-05 | Stop reason: HOSPADM

## 2021-02-17 RX ADMIN — Medication 500 MG: at 17:26

## 2021-02-17 RX ADMIN — ALBUMIN (HUMAN) 25 G: 0.25 INJECTION, SOLUTION INTRAVENOUS at 14:28

## 2021-02-17 RX ADMIN — Medication 500 MG: at 13:00

## 2021-02-17 RX ADMIN — ALBUMIN (HUMAN) 25 G: 0.25 INJECTION, SOLUTION INTRAVENOUS at 09:05

## 2021-02-17 RX ADMIN — ALBUMIN (HUMAN) 25 G: 0.25 INJECTION, SOLUTION INTRAVENOUS at 20:44

## 2021-02-17 RX ADMIN — ACETAMINOPHEN 650 MG: 325 TABLET ORAL at 03:23

## 2021-02-17 RX ADMIN — FAMOTIDINE 20 MG: 10 INJECTION, SOLUTION INTRAVENOUS at 20:44

## 2021-02-17 RX ADMIN — METRONIDAZOLE 500 MG: 500 INJECTION, SOLUTION INTRAVENOUS at 14:36

## 2021-02-17 RX ADMIN — Medication 500 MG: at 23:45

## 2021-02-17 RX ADMIN — SODIUM CHLORIDE: 9 INJECTION, SOLUTION INTRAVENOUS at 18:02

## 2021-02-17 RX ADMIN — TAMSULOSIN HYDROCHLORIDE 0.4 MG: 0.4 CAPSULE ORAL at 13:06

## 2021-02-17 RX ADMIN — HEPARIN SODIUM 5000 UNITS: 5000 INJECTION INTRAVENOUS; SUBCUTANEOUS at 21:40

## 2021-02-17 RX ADMIN — SODIUM CHLORIDE 1000 ML: 9 INJECTION, SOLUTION INTRAVENOUS at 03:23

## 2021-02-17 RX ADMIN — METOPROLOL TARTRATE 25 MG: 25 TABLET ORAL at 23:03

## 2021-02-17 RX ADMIN — MICONAZOLE NITRATE: 20 POWDER TOPICAL at 21:35

## 2021-02-17 RX ADMIN — FAMOTIDINE 20 MG: 10 INJECTION, SOLUTION INTRAVENOUS at 09:05

## 2021-02-17 RX ADMIN — SERTRALINE 100 MG: 100 TABLET, FILM COATED ORAL at 13:05

## 2021-02-17 RX ADMIN — ATORVASTATIN CALCIUM 40 MG: 40 TABLET, FILM COATED ORAL at 13:06

## 2021-02-17 RX ADMIN — CLOMIPRAMINE HYDROCHLORIDE 50 MG: 50 CAPSULE ORAL at 20:45

## 2021-02-17 RX ADMIN — SODIUM CHLORIDE 1000 ML: 9 INJECTION, SOLUTION INTRAVENOUS at 02:28

## 2021-02-17 RX ADMIN — CALCIUM GLUCONATE 2000 MG: 98 INJECTION, SOLUTION INTRAVENOUS at 07:32

## 2021-02-17 RX ADMIN — METRONIDAZOLE 500 MG: 500 INJECTION, SOLUTION INTRAVENOUS at 22:58

## 2021-02-17 RX ADMIN — LEVOTHYROXINE SODIUM 75 MCG: 75 TABLET ORAL at 13:06

## 2021-02-17 RX ADMIN — METRONIDAZOLE 500 MG: 500 INJECTION, SOLUTION INTRAVENOUS at 07:32

## 2021-02-17 ASSESSMENT — PAIN SCALES - WONG BAKER
WONGBAKER_NUMERICALRESPONSE: 2
WONGBAKER_NUMERICALRESPONSE: 0
WONGBAKER_NUMERICALRESPONSE: 2
WONGBAKER_NUMERICALRESPONSE: 0
WONGBAKER_NUMERICALRESPONSE: 2

## 2021-02-17 ASSESSMENT — PAIN SCALES - GENERAL
PAINLEVEL_OUTOF10: 0
PAINLEVEL_OUTOF10: 0

## 2021-02-17 NOTE — CARE COORDINATION
2/17/21, 10:49 AM EST    DISCHARGE PLANNING EVALUATION    Order received stating pt is from [facility]. KARL spoke with pt, states he plans to return to [facility]. SW called VC of [facility] and left detailed message and requested staff return call. KARL called pt son Anjelica Carpio, he states pt is actually from [address] [place] [address] on a Waiver. Son states he went to [facility] after last hospitalization to receive IVAB for sepsis and then was released approximately one week ago back to [place]. Son states pt will need to go into ecf at discharge for additional rehab prior to going back to [place]. Oksana Sharp will be needed, son is aware of that. Waiting on [facility] of [facility] to return my call. Angel Werner from [facility] accepted pt pending precert and covid test prior to coming.

## 2021-02-17 NOTE — PROGRESS NOTES
Wound ostomy consulted for DTPI to coccyx, POA. Patient in bed upon arrival. Assessment and photo to follow. Primary RN assisted with evaluation. Patient assisted onto left side. Cleansed wound with normal saline and gauze. Pat dry with clean gauze. Patient noted to have blanchable redness to coccyx and bilateral buttocks. Evidence of chronic staining. EPC barrier cream applied. Staff to apply BID and PRN. Velez catheter and rectal tube in place. Chux pad changed due to minimal fecal leakage from rectal tube. Pillow placed under right hip, BLE, and BUE. Continue to turn every 2 hours and PRN, offload with pillows, ensure patient is on low air loss support surface Zimplistic, SPR+, [place], Bariatric bed), utilize moisture wicking underpads, limit use of depends, and if applicable, use waffle cushion when in chair. Primary RN in room, bed in low. Wound ostomy to sign off at this time. Call as needed. Wound type:  Intact blanchable redness to coccyx and bilateral buttock with chronic staining  Wound assessment/color: 100% pink  Rosy wound: Intact, blanchable  Exposed structure: None

## 2021-02-17 NOTE — PLAN OF CARE
Problem: Falls - Risk of:  Goal: Absence of physical injury  Description: Absence of physical injury  Outcome: Met This Shift     Problem: Discharge Planning:  Goal: Participates in care planning  Description: Participates in care planning  Outcome: Ongoing  Note: Pt is very lethargic. Updated patient and family as well with plan of care     Problem: Discharge Planning:  Goal: Discharged to appropriate level of care  Description: Discharged to appropriate level of care  Outcome: Ongoing  Note: Plan discharge to skilled nursing facility when stable.  assisting with discharge plans     Problem: Bowel Function - Altered:  Goal: Bowel elimination is within specified parameters  Description: Bowel elimination is within specified parameters  Outcome: Ongoing  Note: Continues with diarrhea. Bloody today. Continue to monitor H/H and BP     Problem: Cardiac Output - Decreased:  Goal: Hemodynamic stability will improve  Description: Hemodynamic stability will improve  Outcome: Ongoing  Note: Weaning levophed as able throughout the day     Problem: Fluid Volume - Imbalance:  Goal: Absence of imbalanced fluid volume signs and symptoms  Description: Absence of imbalanced fluid volume signs and symptoms  Outcome: Ongoing  Note: Kidney function remains impaired. Starting IV fluid at 50 ml/hour. Reassess in am for possible need for dialysis     Problem: Pain:  Goal: Pain level will decrease  Description: Pain level will decrease  Outcome: Ongoing  Note: Pt resting except pain with turning.       Problem: Pain:  Goal: Recognizes and communicates pain  Description: Recognizes and communicates pain  Outcome: Ongoing     Problem: Pain:  Goal: Control of acute pain  Description: Control of acute pain  Outcome: Ongoing     Problem: Pain:  Goal: Control of chronic pain  Description: Control of chronic pain  Outcome: Ongoing     Problem: Skin Integrity - Impaired:  Goal: Will show no infection signs and symptoms  Description: Will show no infection signs and symptoms  Outcome: Ongoing  Note: Low grade temp. Continue to monitor     Problem: Skin Integrity - Impaired:  Goal: Absence of new skin breakdown  Description: Absence of new skin breakdown  Outcome: Ongoing  Note: Turn and reposition every 2 hours and PRN. Keep heels and elbows elevated off bed. Watch for pressure from medical devices. Problem: Urinary Elimination:  Goal: Signs and symptoms of infection will decrease  Description: Signs and symptoms of infection will decrease  Outcome: Ongoing  Note: No sign of infection at catheter site. Continue to monitor     Problem: Urinary Elimination:  Goal: Complications related to the disease process, condition or treatment will be avoided or minimized  Description: Complications related to the disease process, condition or treatment will be avoided or minimized  Outcome: Ongoing  Note: King care every shift. Remove king when no longer indicated. Problem: Falls - Risk of:  Goal: Will remain free from falls  Description: Will remain free from falls  Outcome: Ongoing  Note: No fall this shift. Pt is very lethargic and weak. Frequent checks and hourly rounds to assess needs. Bed alarm on    Care plan reviewed with patient and family. Patient and family verbalize understanding of the plan of care and contribute to goal setting.

## 2021-02-17 NOTE — CARE COORDINATION
2/17/21, 8:14 AM EST  DISCHARGE PLANNING EVALUATION:    Catalina Vickers       Admitted: 2/17/2021/ [address] day: 0   Location: -10/010-A Reason for admit: Hypotension [I95.9]   PMH:  has a past medical history of Arthritis, Depression, and Hypertension. Procedure:   2/17 CVC RIJ  2/17 CT Abd/pelvis: Pancolitis consistent with the clinical history of C. difficile colitis, no bowel perforation; probable cholelithiasis; Bibasilar pulmonary consolidation which may be on the basis of atelectasis or pneumonia; small bilateral nonobstructing renal calculi; small RML pulmonary nodule  2/17 US GB/RUQ: Distended appearance to the gallbladder which has a slightly thickened wall. Sludge is also present within its lumen; Sonographic Hamilton's sign is positive per the technologist. These findings could be on the basis of acute cholecystitis    Barriers to Discharge: Presented to [facility] on 2/14 with c/o hypotension, diaphoretic, Tmax 102. Diagnosed with sepsis, given fluid resuscitation, and started on levophed drip. Started on Zosyn. Had worsening renal function and Nephrology consulted. On 2/16 had change in mental status, worsening creatinine, and +Cdiff. Transferred to [facility] ICU. General Surgery consulted for fulminant colitis and cholelithiasis. Nephrology consulted for acute on chronic kidney injury. Wound Care consulted for DTI coccyx. May need CRRT vs HD. Tmax 101.2. NSR. Sats 100% on 3L O2. Ox3, not situation. Follows commands. Lethargic this morning. Flexiseal. Velez. Levo @ 8 mcg/min, IV albumin Q6H, lipitor, clomipramine, IV pepcid, sq heparin, synthroid, IV flagyl, zoloft, flomax, po vancomycin, Electrolyte replacement protocols. Received 2L in fld bolus. BUN 83 - now 86, Creat 4.9 - now 5, Ical 0.92, alb 2, wbc 18.5, hgb 10.2. Blood and urine cultures sent.    PCP: Pete Lozano MD  Readmission Risk Score: 12%    Patient Goals/Plan/Treatment Preferences: From [facility]; plan for skilled bed at [facility] at discharge - will require precert. Will need PT/OT when more alert. Transportation/Food Security/Housekeeping Addressed:  No issues identified.

## 2021-02-17 NOTE — CONSULTS
[address] Surgery Consultation - Colleen Llamas MD      Pt Name: Jamar Osei  MRN: [id number]  YOB: 1940  Date of evaluation: 2/17/2021  Primary Care Physician: Edel Adams MD  Patient evaluated at the request of  [address]  Reason for evaluation: colitis  IMPRESSIONS:   1. C. difficile pancolitis nonsurgical abdomen at this point  2. Mildly distended gallbladder with normal hepatic functions. .  Gallbladder wall does not appear thickened to me given his body habitus. 3. UTI  4. Acute on chronic renal failure  5. Hypovolemia likely secondary to #1. 6. Pulmonary nodule-PET scan outside facility most suggestive of neoplasm cannot rule out inflammatory process. 7. History of recent leg cellulitis  8.   has a past medical history of Arthritis, Depression, and Hypertension. RECOMMENDATIONS:   1. Continue medical treatment of colitis with oral vancomycin and IV Flagyl. No acute indications for surgical intervention at this time. 2. I do not feel he has evidence of cholecystitis requiring any intervention at this point. Diffuse abdominal pain likely secondary to colitis. No rebound guarding or peritoneal signs. 3. Surgery will follow. Of colitis fails to resolve with adequate treatment consider consultation with infectious disease as well. SUBJECTIVE:   History of Chief Complaint:    Oracio Grubbs is a 79 y.o.male who was transferred to [facility] from an outside facility. Patient is tired history obtained from medical record. He was admitted from an ECF to joint [facility] in [facility] with altered mental status and sepsis from right leg cellulitis at the end of January. He had an incidental pulmonary nodule at that time as well. He had blood cultures that grew E. coli. He returned to a skilled nursing facility on IV Rocephin. On February 14 he returned to the hospital he was diaphoretic and hypotensive. His temperature was 102.   He was diagnosed with sepsis with unknown etiology. Further evaluation revealed positive C. difficile. He was started on vancomycin just yesterday. His creatinine worsened and he was transferred to [facility]. Past Medical History   has a past medical history of Arthritis, Depression, and Hypertension. Past Surgical History   has a past surgical history that includes knee surgery (Right). Medications  Prior to Admission medications    Medication Sig Start Date End Date Taking? Authorizing Provider   levothyroxine (SYNTHROID) 75 MCG tablet Take 75 mcg by mouth Daily    Historical Provider, MD   metoprolol tartrate (LOPRESSOR) 25 MG tablet Take 25 mg by mouth 2 times daily    Historical Provider, MD   lisinopril (PRINIVIL;ZESTRIL) 10 MG tablet Take 10 mg by mouth daily    Historical Provider, MD   sertraline (ZOLOFT) 100 MG tablet Take 100 mg by mouth daily    Historical Provider, MD   tamsulosin (FLOMAX) 0.4 MG capsule Take 0.4 mg by mouth daily    Historical Provider, MD   atorvastatin (LIPITOR) 40 MG tablet Take 40 mg by mouth daily    Historical Provider, MD   clomiPRAMINE (ANAFRANIL) 50 MG capsule Take 50 mg by mouth nightly    Historical Provider, MD   gabapentin (NEURONTIN) 600 MG tablet Take 600 mg by mouth 3 times daily.     Historical Provider, MD    Scheduled Meds:   norepinephrine-sodium chloride        heparin (porcine)  5,000 Units Subcutaneous Q8H    atorvastatin  40 mg Oral Daily    clomiPRAMINE  50 mg Oral Nightly    levothyroxine  75 mcg Oral Daily    metoprolol tartrate  25 mg Oral BID    sertraline  100 mg Oral Daily    tamsulosin  0.4 mg Oral Daily    metroNIDAZOLE  500 mg Intravenous Q8H    vancomycin  500 mg Per NG tube 4 times per day    famotidine (PEPCID) injection  20 mg Intravenous BID    calcium replacement protocol   Other RX Placeholder    albumin human  25 g Intravenous Q6H     Continuous Infusions:   norepinephrine 8 mcg/min (02/17/21 0955)     PRN Meds:.promethazine **OR** ondansetron, polyethylene glycol, acetaminophen **OR** acetaminophen  Allergies  is allergic to triamcinolone. Family History  family history is not on file. Social History   reports that he has never smoked. He has never used smokeless tobacco. He reports previous alcohol use. He reports that he does not use drugs. Review of Systems  Patient unable to state at this point. SUBJECTIVE:   CURRENT VITALS:  weight is 296 lb 4.8 oz (134.4 kg). His rectal temperature is 99.2 °F (37.3 °C). His blood pressure is 118/47 (abnormal) and his pulse is 89. His respiration is 15 and oxygen saturation is 97%. Body mass index is 42.51 kg/m².   Temperature Range (24h):Temp: 99.2 °F (37.3 °C) Temp  Av.6 °F (37.6 °C)  Min: 98.4 °F (36.9 °C)  Max: 101.2 °F (38.4 °C)  BP Range (31R): Systolic (78WMZ), YXR:415 , Min:70 , AVR:967     Diastolic (50OTU), YVQ:60, Min:34, Max:121    Pulse Range (24h): Pulse  Av.7  Min: 49  Max: 99  Respiration Range (24h): Resp  Av.8  Min: 13  Max: 26  Current Pulse Ox (24h):  SpO2: 97 %  Pulse Ox Range (24h):  SpO2  Av.9 %  Min: 96 %  Max: 100 %  Oxygen Amount and Delivery: O2 Flow Rate (L/min): 3 L/min  CONSTITUTIONAL: Follows commands appears fatigued  SKIN: Warm and dry  LYMPH: No palpable cervical adenopathy  HEENT: Pupils equal and reactive sclera anicteric  NECK: Full no JVD   CHEST/LUNGS: Clear no audible wheezing or rhonchi  CARDIOVASCULAR: Normal heart rate   ABDOMEN: Soft, nondistended, subjective diffuse tenderness without rebound or guarding  RECTAL: Flexi-Seal in place  NEUROLOGIC: Moves all extremities oriented x1  EXTREMITIES: No gross edema  LABS:     Recent Labs     21  0318 21  0830    137   K 4.5 4.4    101   CO2 25 27   BUN 83* 86*   CREATININE 4.9* 5.0*   PHOS 3.3  --    CALCIUM 7.7* 7.7*   AST 19  --    ALT 11  --    BILITOT 0.4  --    BILIDIR <0.2  --    AMYLASE 34  --    LIPASE 12.3  --      RADIOLOGY:     US GALLBLADDER RUQ   Final Result   Distended appearance to the gallbladder which has a slightly thickened wall. Sludge is also present within its lumen. Sonographic Hamilton's sign is positive per the technologist. These findings could be on the basis of acute cholecystitis. If clinically    warranted, further evaluation with a nuclear medicine HIDA scan would be beneficial for further characterization. **This report has been created using voice recognition software. It may contain minor errors which are inherent in voice recognition technology. **      Final report electronically signed by Dr Debora Kong on 2/17/2021 9:37 AM      CT ABDOMEN PELVIS WO CONTRAST Additional Contrast? None   Final Result   Impression:   1. Pancolitis consistent with the clinical history of C. difficile    colitis. No bowel perforation. 2.  Probable cholelithiasis. 3.  Bibasilar pulmonary consolidation which may be on the basis of    atelectasis or pneumonia. 4.  Small bilateral nonobstructing renal calculi. 5.  Small right middle lobe pulmonary nodule. This document has been electronically signed by: Carol Garcia MD on    02/17/2021 05:51 AM      All CTs at this facility use dose modulation techniques and iterative    reconstructions, and/or weight-based dosing   when appropriate to reduce radiation to a low as reasonably achievable. XR CHEST PORTABLE   Final Result   Impression:      Right central line tip proximal SVC. Stable bibasilar consolidation and atelectasis. This document has been electronically signed by: Leonel Diza MD on    02/17/2021 04:20 AM      XR CHEST PORTABLE   Final Result   Impression:      Stable bilateral basilar consolidation and atelectasis      This document has been electronically signed by: Leonel Diaz MD on    02/17/2021 03:20 AM      XR ABDOMEN FOR NG/OG/NE TUBE PLACEMENT   Final Result   Impression:   1. Distal NG tube is not well seen but is likely in the distal stomach.       This document has been electronically signed by: Yolie Turcios MD on    02/17/2021 02:30 AM      XR CHEST PORTABLE   Final Result   Impression:   1. Right lower lobe airspace disease may represent atelectasis or    pneumonia.       This document has been electronically signed by: Yolie Turcios MD on    02/17/2021 02:28 AM          Imaging reviewed  Electronically signed by Rose Ferguson MD on 2/17/2021 at 11:01 AM

## 2021-02-17 NOTE — H&P
CRITICAL CARE PROGRESS NOTE      Patient:  Vanesa Ferrer    Unit/Bed:4D-10/010-A  YOB: 1940  MRN: [id number]   PCP: Moni Del Rosario MD  Date of Admission: 2/17/2021  Chief Complaint:- hypotension    Assessment and Plan:    1. Both Hypovolemic and Distributive: At time of admission to the ICU patient appears dry. 0.9NS fluid bolusing will be completed and will continue Levophed to maintain MAP >65. Lactic Acid is pending  2. Fulminant Colitis: 2/17/2021 CTof ABD/PELVIS  Pancolitis consistent with c difficile colitis. No bowel perforation, no ileus. Positive for generalized abdominal pain on examination and AMS. Oral Vancomycin and Flagyl continues. Surgery will be consulted. 3. Acute on chronic kidney (baseline Crea 1.9):  Nonoliguric, Urine positive for bacteria from outlying facility. IV fluids to continue, awaiting on labs. Patient appears dehydrated. Levophed needed to maintain MAP >65. Nephrology to follow. Urine Elytes are pending. Dose medications to GFR, no NSAIDS to be given. 4. Complicated UTI:  Awaiting on culture. 5. Pulmonary nodule:  PET scan completed 2/12/2021, pulmonary nodule noted RLL ? metastatic however infectious and inflammatory could have similar appearance. Monitor for now. Should repeat CT chest in  3 months. 6. Cholithiasis, probable:  See on CT of ABD/PELVIS, US of GB is pending. 7. Acute metabolic encephalopathy:  Likely secondary to above. Continue to monitor. INITIAL H AND P AND ICU COURSE:  Chris Gonzalez is a 78-year-old  male admitted to [facility] ICU on 02- as a transfer from [facility].     Patient has a past medical history that is consistent of lifetime non-smoker, obstructive sleep apnea, ASHD s/p CABG, hypothyroidism, dysphagia, CKD stage 3 (baseline Crea 1.1), anemia, pulmonary nodule, cellulitis, pneumonia, osteoarthritis, Covid-19, depression    Patient was admitted to [facility] on 1/26/2021 from [facility] after admission on 1/22/2021 for altered mental status and noted sepsis secondary to right leg cellulitis. Other work-up and management was noted to have a pulmonary nodule which was an incidental finding on a CT scan of the chest.  This nodule is 3 x 2 x 2.6 cm and the impression is noted to be malignancy as a diagnosis of exclusion. Blood cultures grew E. coli. She did return to [facility] for IV Rocephin for 7 days. Glenda Alberto was transferred to [facility] from [facility] on 2/14/2021 as he was found to be diaphoretic and hypotensive. T-max 102. Patient received Covid-19 vaccine on 1/9/2021    Patient was evaluated emergency room he was diagnosed to have sepsis but source was unknown at the time of admission patient was given fluid resuscitation was started on Levophed drip. Patient was empirically started on Zosyn. Patient was found to have a worsening renal function and subsequently Nephrology was consulted. 2/16/2021 patient was seen by Dr. Tyler Denny. Further workup did reveal C. difficile colitis. Patient was started on vancomycin. 2/16/201 noted change in mental status and worsening in creatinine patient was transferred to [facility] ICU for further management and care. Past Medical History: See HPI. Family History: Mother and father unknown. Social History: Lifetime non-smoker denies any alcohol or illicit drug use.     ROS   GENERAL: Positive for fever, fatigue, poor appetite   SKN: No lesions or rashes.   HEAD: No headaches or recent injury  EYES: No acute changes in vision, no diplopia or blurred vision, drainage  EARS: No hearing loss, no tinnitus, no drainage  NOSE/THROAT: No rhinorrhea or pharyngitis, no nasal drainage  NECK: No lumps or unusual neck stiffness  PULMONARY: Positive for hypoxia no dyspnea, orthopnea or paroxysmal nocturnal dyspnea, writer or wheezing  CARDIAC: Hypotension no chest pain, pressure, anus or tightness  GI: Positive for abdominal pain, and nonbloody diarrhea no history melena or hematochezia, no constipation  PERIPHERAL VASCULAR: No intermittent claudication or unusual leg cramps  MUSCULOSKELETAL: Occasional arthralgias, myalgias  NEUROLOGICAL: No Seizures or Syncope  HEMATOLOGIC:  No  unusual bruising or bleeding  PSYCH: No homicidal or suicidal ideations    Scheduled Meds:   norepinephrine-sodium chloride        sodium chloride  1,000 mL Intravenous Once    Followed by    sodium chloride  1,000 mL Intravenous Once     Continuous Infusions:    PHYSICAL EXAMINATION:  T: 101.2.  P: 93. RR: 20. B/P: 121/101. FiO2: 4 L. O2 Sat: 99.  I/O: Pending  Body mass index is 42.51 kg/m². GCS:   12  General:   Flushed pale dry warm acutely ill in appearance  HEENT:  normocephalic and atraumatic. No scleral icterus. PERR  Neck: supple. No Thyromegaly. Lungs: clear to auscultation, crackles in right base. No retractions  Cardiac: RRR-tachycardic and hypotensive. No JVD. Abdomen: soft, bowel sounds present positive for generalized pain aggravated with palpation did various ecchymotic areas in all quadrants of abdomen. Extremities:  No clubbing, cyanosis, or edema x 4. Noted left leg scarring questionable skin grafting history  Vasculature: capillary refill < 3 seconds. Palpable dorsalis pedis pulses. Skin:  warm and dry. Psych:  Alert and oriented x 1. Affect appropriate  Lymph:  No supraclavicular adenopathy. Neurologic:  No focal deficit. No seizures. Data: (All radiographs, tracings, PFTs, and imaging are personally viewed and interpreted unless otherwise noted).     Results were obtained from outlying facility   02- 05 30 magnesium 1.8, phosphorus 3.6, glucose 151   BUN 73, creatinine is 4.3,   sodium 133 potassium 3.9 chlorides 87 CO2 36 calcium 6.6   White count 22.8 hemoglobin 12.5 hematocrit is 38.0 platelet count 564   02- BUN 68 creatinine 3.8   02- BUN 52 creatinine 2.8   02- renal ultrasound unremarkable appearance of the kidneys without hydronephrosis   02- urinalysis cloudy negative for blood negative for protein negative for nitrates 1+ bacteria   02- lactic acid 2.9    urine sodium less than 31, urine chloride less than 25   02- BUN 65 creatinine is 4.0   02- chest x-ray no acute cardiopulmonary abnormality, status post sternotomy and CABG. Heart size mildly enlarged. No focal consolidation, pleural effusion or pneumothorax. Pulmonary vasculature is within normal limits. Degenerative changes in the left glenohumeral joint.  02- influenza A-negative influenza B-negative   Covid-19 - negative   02-    02- PET scan-solitary pulmonary nodule left lower lobe. A mildly hypermetabolic 3 x 2 x 2.4 cm mass in the left lower lobe has an appearance most suggestive of neoplasia, although an infectious or inflammatory process could appear the same. No findings of metastatic disease. Aortic valve leaflet calcified a finding that can be seen with aortic valve stenosis. Severe coronary atherosclerotic calcification status post bypass grafting and possible stenting grafting mildly distended gallbladder containing layering hyperdensity potentially due to noncalcified gallstones or biliary sludge. Mild to moderate pancreatic atrophy with some parenchymal calcifications in the tail suggesting chronic pancreatitis. Moderate colonic diverticulosis without findings of acute diverticulitis.  2/14/ 2021 EKG sinus rhythm with first-degree AV block left axis deviation right bundle branch block heart rate 97     Cardiac telemetry normal sinus rhythm with bundle branch block    I did speak to his son-POA reviewed current condition and plan of care. CODE STATUS was reviewed patient is a limited CODE STATUS no to intubation no do CPR no to defibrillation no to emergency medicines.   Yes they are agreeable to Levophed for blood pressure support and to continue current course of treatment in the ICU. Consent was obtained for CVC catheter placement. Seen with multidisciplinary ICU team yes. Meets Continued ICU Level Care Criteria:    [x] Yes   [] No - Transfer Planned to listed location:  [] HOSPITALIST CONTACTED-      Case and plan discussed with Dr. Rita Gardner. Electronically signed by SYLVESTER Patterson - [facility]  CRITICAL CARE SPECIALIST  Patient seen by me. Case discussed with NP. Patient with both distributive and hypovolemic shock. On pressors and volume resuscitation while receiving antibiotics for C. Diff colitis. Patient is a limited code. Continue to add volume for renal failure. Time was discontiguous. Time does not include procedures. Time does include my direct assessment of the patient and coordination of care.   Electronically signed by Nilda Abebe MD on 2/17/2021 at 9:30 AM

## 2021-02-17 NOTE — PROCEDURES
Central Venous Catheterization Procedure Note    Indication:  [] Lack of adequate peripheral IV access    [x] Infusion of medications with high risk of extravasation injury  [] Hemodynamic monitoring  [] Extracorporeal therapies  [] Other:                     Time Out:  [x] Time out was completed immediately prior to the start of the procedure which included verification of the correct patient, correct site and agreement on the procedure to be done. [] Time out was not done because procedure was emergent    Consent:  [x] The patient/surrogate decision maker was informed of the procedure indications, risks, benefits and alternatives. Questions were answered and consent was obtained to proceed with the procedure. [] Consent was not obtained, because the procedure was emergent or patient was unable to consent and surrogate decision maker was not available. Type of Central Line Being Placed:   [x] Central venous    [] Hemodialysis  [] Pulmonary artery    Location:   [x] Internal Jugular Vein [x] Right [] Left  [] Subclavian Vein  [] Right [] Left  [] Femoral Vein   [] Right [] Left      Central Line Bundle:  [x] I washed/disinfected my hands prior to starting procedure  [x] Hat      [x] Mask      [x] Sterile gown      [x] Sterile gloves were worn throughout the procedure  [x] Everyone in the room during the procedure wore a mask  [x] Chlorhexidine was utilized to prep the skin and allowed to dry completely  [] Povidone-iodine was used to prep the skin and allowed to dry completely  [x] Full body drape was used to cover the patient    Ultrasound Guidance:   [x] Yes      [] No    Anesthetic Used:  [x] Lidocaine      [] Other    Sterile Technique Used Throughout Procedure?   [x] Yes      [] No    Description/Findings:   [x] Dark non-pulsatile blood return obtained from all ports  [] Appropriate waveform(s) seen  [] Other    Complications:  [x] None apparent    Follow-up x-ray: reviewed by me / line placement appropriate / no PTX    Procedure Performed By: Dr. Nieves Martines    Electronically signed by Aide Vogel MD on 2/17/2021 at 4:07 AM

## 2021-02-17 NOTE — PLAN OF CARE
Mr. Marlee Arredondo was seen and examined on 2/17 and plan of care was discussed with the attending intensivist.  Mr. Mike Hidalgo was altered on exam, and lethargic. There is difficulties arousing him to answer questions, however he was able to answer several ROS questions. He states continued diffuse abdominal pain in the urge to defecate. He denies chest pain, headache, or difficulty breathing. On physical exam, he did have bloody discharge from his NG tube. He also has mild edema in all 4 limbs. He was seen and examined by surgery as well to due to the results of the CT scan with concern for bowel perforation. Surgery states that at this point time there does not appear to be any perforation. Patient is now on Flagyl and oral vancomycin. We will continue to trend CBC, BMP, and other necessary lab values to determine if treatment is working. Patient was on Levophed upon entering the ICU, trying to wean down as able. We will also trend creatinine and urine output. May have to start CRRT or dialysis. Brief review of labs shows:  Sodium, potassium, chloride, within normal limits  Calcium 7.7 with an iCal of 0.97, replacement protocol in place  Lipase, amylase normal  Creatinine 4.9 with a baseline of 1.1.   Low urine output    Edith Rodriguez, DO

## 2021-02-17 NOTE — CONSULTS
[facility]    [address], [address] [place]  [facility]  2/17/2021 10:52 AM    Pt Name:    Ventura Pyle  MRN:     [id number]   [id number]  YOB: 1940  Admit Date:    2/17/2021 12:35 AM  Primary Care Physician: Nicohlas Monsivais MD    CSN Number:   [id number]    Reason for Consult:  Acute kidney injury  Requesting provider:  SYLVESTER Orlando Arm-CNP    History:   The patient is a 79 y.o. male with history of hypertension, coronary artery disease, CKD with baseline creatinine of 1.1, pulmonary nodule, osteoarthritis, thyroid disorder who was recently admitted at [address] [place] [address] for cellulitis. At that time patient was noted to have E. coli bacteremia. He was managed and then discharged to [facility] on 26 January 2021. Patient was transferred back to [facility] on 14 February with complaints of low blood pressure associated with some diarrhea. Nephrology was consulted for management of acute kidney injury and hyperkalemia. Initially patient was noted to be hyperkalemic. He was managed medically for hyperkalemia initially. However patient's serum creatinine continued to rise slowly over time. Clinical condition declined and he was transferred to intensive care unit at [facility] and was started on IV Levophed support. Due to decline in his clinical condition and worsening renal function,  patient was transferred from [address] [place] [address] to [facility]. Nephrology is seeing him for continued management of acute kidney injury. Patient was seen and examined earlier today. He is lethargic. He is on Levophed. Past Medical History:  Past Medical History:   Diagnosis Date    Arthritis     Depression     Hypertension        Past Surgical History:  Past Surgical History:   Procedure Laterality Date    KNEE SURGERY Right        Family History:  No family history on file.     Social Provider, MD   atorvastatin (LIPITOR) 40 MG tablet Take 40 mg by mouth daily    Historical Provider, MD   clomiPRAMINE (ANAFRANIL) 50 MG capsule Take 50 mg by mouth nightly    Historical Provider, MD   gabapentin (NEURONTIN) 600 MG tablet Take 600 mg by mouth 3 times daily. Historical Provider, MD       Review of Systems:  Cannot be obtained from patient himself. Current Meds:  Infusion:    norepinephrine 8 mcg/min (02/17/21 0955)     Meds:    norepinephrine-sodium chloride        heparin (porcine)  5,000 Units Subcutaneous Q8H    atorvastatin  40 mg Oral Daily    clomiPRAMINE  50 mg Oral Nightly    levothyroxine  75 mcg Oral Daily    metoprolol tartrate  25 mg Oral BID    sertraline  100 mg Oral Daily    tamsulosin  0.4 mg Oral Daily    metroNIDAZOLE  500 mg Intravenous Q8H    vancomycin  500 mg Per NG tube 4 times per day    famotidine (PEPCID) injection  20 mg Intravenous BID    calcium replacement protocol   Other RX Placeholder    albumin human  25 g Intravenous Q6H     Meds prn: promethazine **OR** ondansetron, polyethylene glycol, acetaminophen **OR** acetaminophen     Allergies/Intolerances: ALLERGIES: Triamcinolone    24HR INTAKE/OUTPUT:      Intake/Output Summary (Last 24 hours) at 2/17/2021 1052  Last data filed at 2/17/2021 0610  Gross per 24 hour   Intake 2047 ml   Output 250 ml   Net 1797 ml     I/O last 3 completed shifts: In: 2047 [I.V.:2047]  Out: 250 [Urine:150; Stool:100]  No intake/output data recorded. Admission weight: 296 lb 4.8 oz (134.4 kg)  Wt Readings from Last 3 Encounters:   02/17/21 296 lb 4.8 oz (134.4 kg)   11/25/20 283 lb 3.2 oz (128.5 kg)   10/16/20 273 lb 12.8 oz (124.2 kg)     Body mass index is 42.51 kg/m².     Physical Examination:  VITALS:   Vitals:    02/17/21 0645 02/17/21 0700 02/17/21 0715 02/17/21 0730   BP: (!) 108/52 (!) 116/48 (!) 127/50 (!) 118/47   Pulse: 90 90 92 89   Resp: 14 14 16 15   Temp:       TempSrc:       SpO2: 98% 98% 98% 97%   Weight: Weight:   Wt Readings from Last 3 Encounters:   02/17/21 296 lb 4.8 oz (134.4 kg)   11/25/20 283 lb 3.2 oz (128.5 kg)   10/16/20 273 lb 12.8 oz (124.2 kg)     Constitutional and General Appearance: Ill-appearing, no acute distress  Eyes: no icteric sclera in left eye or right eye, both eyes  Ears and Nose: normal external appearance of left and right ear. No active drainage from nose. Oral: Mucous membranes moist  Neck: No jugular venous distention noted  Lungs: Air entry diminished, no use of accessory muscles or labored breathing  Heart:  S1, S2  Extremities: Trace LE edema, no tenderness  GI: soft, non-tender, no guarding, no distention  Skin: no rash seen on exposed extremities  Neuro: no slurred speech, no facial drooping    Lab Data  CBC: No results for input(s): WBC, HGB, HCT, PLT in the last 72 hours. BMP:  Recent Labs     02/17/21  0318 02/17/21  0830    137   K 4.5 4.4    101   CO2 25 27   BUN 83* 86*   CREATININE 4.9* 5.0*   GLUCOSE 139* 164*   CALCIUM 7.7* 7.7*     Hepatic:   Recent Labs     02/17/21  0318   LABALBU 2.0*   AST 19   ALT 11   BILITOT 0.4   ALKPHOS 61       Additional Labs: Chest x-ray shows stable bibasilar consolidation and atelectasis  Diagnostics: US kidneys: Unremarkable    Old labs and diagnostics reviewed. Baseline creatinine 1.1 June 2020      Impression and Plan:  1. Acute kidney injury superimposed on CKD stage II. CARMEN is likely secondary to ischemic ATN. Patient is oliguric. He has underlying sepsis from C. difficile. He is currently on IV pressors. We will start him on normal saline at 50 mL/h. Serum creatinine will be repeated. Immediate need for renal replacement therapy but if renal function continues to decline then patient will require dialysis likely next 1 to 2 days. Clinically very ill-appearing. On Levophed pressor support. Will repeat UA  2. Shock. Likely hypovolemic from GI losses. Patient is on IV pressors. Add IV fluids.   3. C. difficile colitis. Patient is on oral vancomycin and IV Flagyl  4. Pulmonary nodule  5. History of CAD   6. Azotemia. Prerenal azotemia. Continue with IV fluids. Discussed with nursing staff  Thank you for the consult. Please feel free to call me if you have any questions.      Siva Driver MD  [facility]

## 2021-02-18 LAB
ANION GAP SERPL CALCULATED.3IONS-SCNC: 10 MEQ/L (ref 8–16)
BUN BLDV-MCNC: 95 MG/DL (ref 7–22)
CALCIUM SERPL-MCNC: 7.7 MG/DL (ref 8.5–10.5)
CHLORIDE BLD-SCNC: 103 MEQ/L (ref 98–111)
CO2: 26 MEQ/L (ref 23–33)
CREAT SERPL-MCNC: 4.6 MG/DL (ref 0.4–1.2)
ERYTHROCYTE [DISTWIDTH] IN BLOOD BY AUTOMATED COUNT: 15.7 % (ref 11.5–14.5)
ERYTHROCYTE [DISTWIDTH] IN BLOOD BY AUTOMATED COUNT: 54.9 FL (ref 35–45)
GFR SERPL CREATININE-BSD FRML MDRD: 12 ML/MIN/1.73M2
GLUCOSE BLD-MCNC: 126 MG/DL (ref 70–108)
HCT VFR BLD CALC: 27.2 % (ref 42–52)
HEMOGLOBIN: 8.7 GM/DL (ref 14–18)
MAGNESIUM: 2.2 MG/DL (ref 1.6–2.4)
MCH RBC QN AUTO: 30.2 PG (ref 26–33)
MCHC RBC AUTO-ENTMCNC: 32 GM/DL (ref 32.2–35.5)
MCV RBC AUTO: 94.4 FL (ref 80–94)
PHOSPHORUS: 2.7 MG/DL (ref 2.4–4.7)
PLATELET # BLD: 131 THOU/MM3 (ref 130–400)
PMV BLD AUTO: 12 FL (ref 9.4–12.4)
POTASSIUM SERPL-SCNC: 4.2 MEQ/L (ref 3.5–5.2)
RBC # BLD: 2.88 MILL/MM3 (ref 4.7–6.1)
SODIUM BLD-SCNC: 139 MEQ/L (ref 135–145)
WBC # BLD: 14.5 THOU/MM3 (ref 4.8–10.8)

## 2021-02-18 PROCEDURE — 36415 COLL VENOUS BLD VENIPUNCTURE: CPT

## 2021-02-18 PROCEDURE — 99233 SBSQ HOSP IP/OBS HIGH 50: CPT | Performed by: SURGERY

## 2021-02-18 PROCEDURE — 2000000000 HC ICU R&B

## 2021-02-18 PROCEDURE — 84100 ASSAY OF PHOSPHORUS: CPT

## 2021-02-18 PROCEDURE — 2700000000 HC OXYGEN THERAPY PER DAY

## 2021-02-18 PROCEDURE — 99291 CRITICAL CARE FIRST HOUR: CPT | Performed by: INTERNAL MEDICINE

## 2021-02-18 PROCEDURE — 99233 SBSQ HOSP IP/OBS HIGH 50: CPT | Performed by: INTERNAL MEDICINE

## 2021-02-18 PROCEDURE — 85027 COMPLETE CBC AUTOMATED: CPT

## 2021-02-18 PROCEDURE — 2500000003 HC RX 250 WO HCPCS: Performed by: NURSE PRACTITIONER

## 2021-02-18 PROCEDURE — 94761 N-INVAS EAR/PLS OXIMETRY MLT: CPT

## 2021-02-18 PROCEDURE — 83735 ASSAY OF MAGNESIUM: CPT

## 2021-02-18 PROCEDURE — 2580000003 HC RX 258: Performed by: NURSE PRACTITIONER

## 2021-02-18 PROCEDURE — 6370000000 HC RX 637 (ALT 250 FOR IP): Performed by: NURSE PRACTITIONER

## 2021-02-18 PROCEDURE — 6360000002 HC RX W HCPCS: Performed by: NURSE PRACTITIONER

## 2021-02-18 PROCEDURE — 80048 BASIC METABOLIC PNL TOTAL CA: CPT

## 2021-02-18 PROCEDURE — 2500000003 HC RX 250 WO HCPCS: Performed by: STUDENT IN AN ORGANIZED HEALTH CARE EDUCATION/TRAINING PROGRAM

## 2021-02-18 RX ADMIN — FAMOTIDINE 20 MG: 10 INJECTION, SOLUTION INTRAVENOUS at 09:04

## 2021-02-18 RX ADMIN — METRONIDAZOLE 500 MG: 500 INJECTION, SOLUTION INTRAVENOUS at 06:49

## 2021-02-18 RX ADMIN — ACETAMINOPHEN 650 MG: 325 TABLET ORAL at 13:11

## 2021-02-18 RX ADMIN — SODIUM CHLORIDE: 9 INJECTION, SOLUTION INTRAVENOUS at 18:00

## 2021-02-18 RX ADMIN — LEVOTHYROXINE SODIUM 75 MCG: 75 TABLET ORAL at 06:50

## 2021-02-18 RX ADMIN — MICONAZOLE NITRATE: 20 POWDER TOPICAL at 09:16

## 2021-02-18 RX ADMIN — METRONIDAZOLE 500 MG: 500 INJECTION, SOLUTION INTRAVENOUS at 15:06

## 2021-02-18 RX ADMIN — CLOMIPRAMINE HYDROCHLORIDE 50 MG: 50 CAPSULE ORAL at 21:18

## 2021-02-18 RX ADMIN — Medication 500 MG: at 12:17

## 2021-02-18 RX ADMIN — ATORVASTATIN CALCIUM 40 MG: 40 TABLET, FILM COATED ORAL at 09:00

## 2021-02-18 RX ADMIN — MICONAZOLE NITRATE: 20 POWDER TOPICAL at 21:18

## 2021-02-18 RX ADMIN — TAMSULOSIN HYDROCHLORIDE 0.4 MG: 0.4 CAPSULE ORAL at 09:00

## 2021-02-18 RX ADMIN — HEPARIN SODIUM 5000 UNITS: 5000 INJECTION INTRAVENOUS; SUBCUTANEOUS at 23:12

## 2021-02-18 RX ADMIN — SERTRALINE 100 MG: 100 TABLET, FILM COATED ORAL at 09:00

## 2021-02-18 RX ADMIN — Medication 500 MG: at 06:48

## 2021-02-18 RX ADMIN — Medication 500 MG: at 18:11

## 2021-02-18 ASSESSMENT — PAIN SCALES - WONG BAKER
WONGBAKER_NUMERICALRESPONSE: 2
WONGBAKER_NUMERICALRESPONSE: 0
WONGBAKER_NUMERICALRESPONSE: 2
WONGBAKER_NUMERICALRESPONSE: 0

## 2021-02-18 ASSESSMENT — PAIN SCALES - GENERAL
PAINLEVEL_OUTOF10: 0
PAINLEVEL_OUTOF10: 0

## 2021-02-18 NOTE — FLOWSHEET NOTE
1830-Family, daughter, son in law and grandson here to visit. Updated and questions answered. Pt more alert and talking appropriately.

## 2021-02-18 NOTE — PROGRESS NOTES
out for metastatic process. The nodule could also be infectious or inflammatory, monitor for now. CT of the chest should be repeated in 3 months. 6. Probable cholecystitis: CT scan of abdomen showed probable cholelithiasis, and subsequent US gallbladder shows an enlarged gallbladder with mild wall thickening and sludge. On exam, the patient is diffusely tender across the abdomen. There is no sign of bowel perforation, and surgery has already seen the patient and stated the will not do surgery at this time. WBC is currently downtrending, if there is enough spike in the count, will obtain a MRCP. If patient suddenly becomes nauseous or has increased vomiting, we will obtain a lipase to check for pancreatitis. 7. Acute metabolic encephalopathy: Secondary to hypotension and hypoperfusion of the brain, as well as uremia. Continue to monitor for resolution with treatment. Patient seems of gotten more confused overnight. 8. Hypocalcemia: Calcium has remained low since arrival with a secondary low iCal.  Replacement protocol in place, replace as necessary. 9. Acute on chronic anemia: Hgb on arrival was 10.2, subsequent decrease in 2/18 to 8.7 following reports from the nursing staff of blood in his stools and his NG tube. There is a component of blood loss anemia on chronic macrocytic anemia. Patient's heparin has been held, applying SCDs instead. Continue to trend hemoglobin, will transfuse if <7.0. INITIAL H AND P AND ICU COURSE:   Oswaldo Collado is a 56-year-old male was transferred from [facility] [place] on 2/17 for acute kidney injury and C. difficile colitis. Patient was recently admitted to [facility] on 2/26 for altered mental status and sepsis secondary to right leg cellulitis. He was treated with Zosyn and Rocephin, and also discharged with antibiotics to the nursing home at the time. During the course of hospital treatment, they also noted a pulmonary nodule on CT scan that measured 3 x 2 x 2.6 cm. It was a concern for malignancy at the time, the requested follow-up however patient does not have any further scans or PET scans. Blood cultures grew E. coli at the time. During this hospital course, the patient was originally sent from the SNF to [facility] [place] on 2/14 when he was found to be diaphoretic and hypotensive with a fever of 102 °F.  Patient was evaluated in the emergency room and diagnosed to have sepsis but the source was unknown at the time and he was given fluid resuscitation and started on a Levophed drip. Patient was started on Zosyn at time. During his hospital stay he was found to have worsening renal function and nephrology was consulted. On 2/16 the patient was seen by Dr. Kai Sood from nephrology. Further work-up did show C. difficile colitis. Patient was started on vancomycin. He had a noted mental change and worsening creatinine on 2/16 so he was transferred to [facility] ICU for further management and care per Dr. Kai Sood.    2/18: Patient this morning remains on pressors as needed to maintain his blood pressure. He has been receiving fluid throughout the night. His hemoglobin did drop from 10 to about 8, however it is stayed stable since then, continue to monitor. His WBC has decreased along with his creatinine. However his BUN increased overnight. The patient seems more confused today with an inability to answer where or when he is. The nephrologist stated that if his BUN increases over 100 dialysis would be warranted at that time. The surgeon also states that at this current point if surgery is warranted it would be a total colectomy. Both of these options were discussed with the family who stated that they would be okay starting dialysis if needed. They with prefer to hold off on surgery right now.     Past Medical History: OLGA, ASHD s/p CABG, hypothyroidism, dysphagia, CKD III, anemia, pulmonary nodule, history of cellulitis, osteoarthritis, prior COVID-19 infection, and depression. Family History: No significant family medical history. Social History: Lifetime non-smoker, currently denies alcohol use. ROS   Review of Systems - General ROS: negative for - chills, fatigue or fever  Respiratory ROS: no cough, shortness of breath, or wheezing  Cardiovascular ROS: no chest pain or dyspnea on exertion  Gastrointestinal ROS: positive for -feeling of needing to defecate, abdominal pain diffuse  negative for - constipation, gas/bloating, heartburn or nausea/vomiting  Genito-Urinary ROS: no dysuria, trouble voiding, or hematuria  Musculoskeletal ROS: negative for - joint stiffness, joint swelling, muscle pain or muscular weakness  Neurological ROS: positive for - confusion  negative for - dizziness, headaches, numbness/tingling or seizures  Dermatological ROS: negative for - pruritus, rash or skin lesion changes      Scheduled Meds:   heparin (porcine)  5,000 Units Subcutaneous Q8H    atorvastatin  40 mg Oral Daily    clomiPRAMINE  50 mg Oral Nightly    levothyroxine  75 mcg Oral Daily    metoprolol tartrate  25 mg Oral BID    sertraline  100 mg Oral Daily    tamsulosin  0.4 mg Oral Daily    metroNIDAZOLE  500 mg Intravenous Q8H    vancomycin  500 mg Per NG tube 4 times per day    famotidine (PEPCID) injection  20 mg Intravenous BID    calcium replacement protocol   Other RX Placeholder    miconazole   Topical BID     Continuous Infusions:   norepinephrine 2 mcg/min (02/17/21 1704)    sodium chloride 100 mL/hr at 02/17/21 2320       PHYSICAL EXAMINATION:  T:  99.6F.  P:  89. RR:  25. B/P:  100/39. FiO2:  2L NC. O2 Sat:  95%. I/O:  +265  Body mass index is 42.51 kg/m². GCS:   14, confused  General:   Generally ill-appearing elderly male  HEENT:  normocephalic and atraumatic. No scleral icterus. PERRL  Neck: supple. No Thyromegaly. Lungs: Diminished breath sounds over bilateral bases, with mild crackles. No wheezes heard. Cardiac: RRR. No JVD.   Abdomen: soft, but firmer than yesterday, diffusely/moderately tender. Somewhat increased bowel sounds heard  Extremities:  No clubbing or cyanosis, mild edema x4  Vasculature: capillary refill < 3 seconds. Palpable dorsalis pedis pulses. Skin:  warm and dry. Psych: Spontaneously opens eyes, less lethargic than yesterday however more confused and cannot answer to where or when he is. Lymph:  No supraclavicular adenopathy. Neurologic:  No focal deficit. No seizures. Data: (All radiographs, tracings, PFTs, and imaging are personally viewed and interpreted unless otherwise noted).  Sodium 139   Potassium 4.2   Calcium 7.7    Creatinine 4.6   HgB 8.7 (drop from yesterday by 2 points)   WBC 14.5      Phosphorus 2.7   Magnesium 2.2   Telemetry shows Normal Sinus Rhythm   CXR 2/17 shows right lower lobe airspace disease, right central line tip placement in SVC   X-ray abdomen 2/17 shows NG tube in the distal stomach   CT abdomen pelvis without contrast shows pancolitis consistent with C. difficile, no bowel perforation, probable cholelithiasis, and bibasilar pulmonary consolidations   US gallbladder shows distended gallbladder with slight wall thickening, sludge present within the lumen, Hamilton sign positive        Seen with multidisciplinary ICU team.  Meets Continued ICU Level Care Criteria:  [x] Yes, On pressors   [] No - Transfer Planned to listed location:  [] HOSPITALIST CONTACTED-      Case and plan discussed with Dr. Ana Becerril. Electronically signed by Sammy Adams DO  CRITICAL CARE SPECIALIST  Patient seen by me. Case discussed with resident physician. Case discussed with Dr. Yadi Patterson.  Undergoing volume resuscitation for volume contraction related to C. difficile colitis. If surgical intervention is desired, he will require total colectomy. Patient also approaching the point of requiring dialysis. Need to verify with family the parameters of care.   Presently current level of care will not provide sufficient intervention for survival.  Patient should either accelerate care with dialysis or proceed with comfort measures. CC time 35 minutes. Time was discontiguous. Time does not include procedures. Time does include my direct assessment of the patient and coordination of care.   Electronically signed by Jessica Sanchez MD on 2/18/2021 at 3:59 PM

## 2021-02-18 NOTE — PROGRESS NOTES
Pharmacy Renal Adjustment    Keyshawn Hannon is a 79 y.o. male. Pharmacy renally adjust the following medications per P&T approved policy: famotidine    Recent Labs     02/17/21  2145 02/18/21  0350   BUN 92* 95*   CREATININE 4.6* 4.6*     CrCl cannot be calculated (Unknown ideal weight.). Calculated CrCl: 18    Height:   Ht Readings from Last 1 Encounters:   06/28/20 5' 10\" (1.778 m)     Weight:  Wt Readings from Last 1 Encounters:   02/18/21 299 lb 6.2 oz (135.8 kg)     Plan: Adjustments based on renal function:          Decrease famotidine from 20 mg twice daily to 20 mg daily.                 Lilian Gupta, PharmD, BCPS, BCCCP  2/18/2021 11:49 AM

## 2021-02-18 NOTE — PROGRESS NOTES
[facility]   Nephrology progress note  2/18/2021, 10:58 AM      Pt Name:    Yasmin Godinez  MRN:     [id number]     YOB: 1940  Admit Date:    2/17/2021 12:35 AM  Primary Care Physician: Joanie Monge MD   Room number  4D-10/010-A    Chief Complaint: Nephrology following for CARMEN    Subjective:  Patient seen and examined  Lethargic  Remains on Levophed  Normal saline  Has dark stool output  Urine output 1200 mL in last 24 hours    Objective:  24HR INTAKE/OUTPUT:      Intake/Output Summary (Last 24 hours) at 2/18/2021 1058  Last data filed at 2/18/2021 0800  Gross per 24 hour   Intake 2005 ml   Output 1650 ml   Net 355 ml     I/O last 3 completed shifts: In: 1915 [P.O.:150; I.V.:1765]  Out: 1650 [Urine:1200; Stool:450]  I/O this shift:  In: 240 [P.O.:240]  Out: -   Admission weight: 296 lb 4.8 oz (134.4 kg)  Wt Readings from Last 3 Encounters:   02/18/21 299 lb 6.2 oz (135.8 kg)   11/25/20 283 lb 3.2 oz (128.5 kg)   10/16/20 273 lb 12.8 oz (124.2 kg)     Body mass index is 42.96 kg/m².     Physical examination  VITALS:     Vitals:    02/18/21 0930 02/18/21 0945 02/18/21 1000 02/18/21 1015   BP: (!) 104/53 (!) 107/54 (!) 115/50 (!) 110/51   Pulse: 91 91 92 92   Resp: 17 17 17 21   Temp:       TempSrc:       SpO2: 97% 97% 96% 96%   Weight:         General Appearance: Ill-appearing, lethargic  Mouth/Throat: Oral mucosa dry  Neck: No JVD  Lungs: Air entry B/L, no rales, no use of accessory muscles  Heart:  S1, S2 heard  GI: soft, non-tender, no guarding  Extremities: trace LE edema      Lab Data  CBC:   Recent Labs     02/17/21  1105 02/17/21  2145 02/18/21  0350   WBC 18.5*  --  14.5*   HGB 10.2* 8.7* 8.7*   HCT 32.3* 27.0* 27.2*     --  131     BMP:  Recent Labs     02/17/21  0318 02/17/21  0318 02/17/21  1615 02/17/21  2145 02/18/21  0350      < > 138 140 139   K 4.5   < > 4.4 4.4 4.2      < > 102 102 103   CO2 25   < > 27 25 26   BUN 83*   < > 90* 92* 95* CREATININE 4.9*   < > 4.7* 4.6* 4.6*   GLUCOSE 139*   < > 154* 167* 126*   CALCIUM 7.7*   < > 7.6* 7.6* 7.7*   MG  --   --   --   --  2.2   PHOS 3.3  --   --   --  2.7    < > = values in this interval not displayed. Hepatic:   Recent Labs     02/17/21  0318   LABALBU 2.0*   AST 19   ALT 11   BILITOT 0.4   ALKPHOS 61         Meds:  Infusion:    norepinephrine 4 mcg/min (02/18/21 0855)    sodium chloride 100 mL/hr at 02/17/21 2320     Meds:    heparin (porcine)  5,000 Units Subcutaneous Q8H    atorvastatin  40 mg Oral Daily    clomiPRAMINE  50 mg Oral Nightly    levothyroxine  75 mcg Oral Daily    metoprolol tartrate  25 mg Oral BID    sertraline  100 mg Oral Daily    tamsulosin  0.4 mg Oral Daily    metroNIDAZOLE  500 mg Intravenous Q8H    vancomycin  500 mg Per NG tube 4 times per day    famotidine (PEPCID) injection  20 mg Intravenous BID    calcium replacement protocol   Other RX Placeholder    miconazole   Topical BID     Meds prn: promethazine **OR** ondansetron, polyethylene glycol, acetaminophen **OR** acetaminophen       Impression and Plan:  1. Acute kidney injury secondary to septic ATN  Patient is on Levophed   urine output low but appears to have improved little bit in last 24 hours  No immediate need for renal replacement therapy  However will likely need dialysis in the next 24 hours if no improvement in renal function  I spoke with patient's daughter Aaliyah Grace on the phone [phone]  Family would like patient to be dialyzed if and when needed    2.  C. difficile colitis- mildly bloody stools: Surgery following, input noted/appreciated  3. Azotemia likely secondary to CARMEN plus ? GI bleed  4. Pulmonary nodule  5. History of CAD  6.   Anemia    D/W ICU RN, intensivist  Discussed with family including daughter and son-in-law on the phone in detail  We will plan hemodialysis treatment tomorrow if no improvement in next 24 hours    Ml Ventura MD  [facility]

## 2021-02-18 NOTE — CARE COORDINATION
2/18/21, 11:36 AM EST    DISCHARGE ON GOING EVALUATION    [facility] day: 1  Location: -10/010-A Reason for admit: Hypotension [I95.9]   Procedure:   2/17 CVC RIJ  2/17 CT Abd/pelvis: Pancolitis consistent with the clinical history of C. difficile colitis, no bowel perforation; probable cholelithiasis; Bibasilar pulmonary consolidation which may be on the basis of atelectasis or pneumonia; small bilateral nonobstructing renal calculi; small RML pulmonary nodule  2/17 US GB/RUQ: Distended appearance to the gallbladder which has a slightly thickened wall. Sludge is also present within its lumen; Sonographic Hamilton's sign is positive per the technologist. These findings could be on the basis of acute cholecystitis    Barriers to Discharge: May need dialysis soon per Nephrology. Tmax 99.6. NSR. On room air. Oriented to self. Follow commands. Intensivist, Nephrology, and General Surgery following. Wound Care following. PT/OT. CVC. Flexiseal. Velez. Levo @ 4 mcg/min, lipitor, clomipramine, IV pepcid, sq heparin, synthroid, IV flagyl, desenex, zoloft, flomax, po vancomycin, Electrolyte replacement protocols. BUN 95, Creat 4.6, wbc 14.5, hgb 8.7, INR 1.25. PCP: Mony Garcia MD  Readmission Risk Score: 15%  Patient Goals/Plan/Treatment Preferences: From [facility]; plan for skilled bed at [facility] at discharge - will require precert. Will need PT/OT when more alert.

## 2021-02-18 NOTE — PROGRESS NOTES
Monae Maciel MD  Daily Progress Note  Pt Name: Arcelia Page  Medical Record Number: [id number]  Date of Birth 1940   Today's Date: 2/18/2021  Chief complaint: Nursing reports increased confusion  ASSESSMENT:   1. Hospital day # 1   2. C. difficile pancolitis-mildly bloody stools  3. Gallbladder distention likely secondary to #2  4. Acute on chronic renal failure some improvement. 5. Cute on chronic anemia  6. UTI   has a past medical history of Arthritis, Chronic kidney disease, Depression, Diabetes mellitus ([address] Hyperlipidemia, Hypertension, and Thyroid disease. RECOMMENDATIONS:   1. IV hydration, oral vancomycin and IV Flagyl. Consider vancomycin enema  2. Analgesics and antiemetics as needed  3. N.p.o.  4. Local management per ICU. 5. Patient hemodynamically stable but some increased confusion. Multiple medical comorbidities. High risk for surgical intervention which if indicated would need total colectomy with end ileostomy. Family may be in later this evening. Discussed with ICU staff they need to discuss with family as the patient is a DNR if they would want to even consider any surgical intervention if becomes warranted. Certainly they would have to consider at least short-term ventilatory support if they wanted to proceed with any surgical intervention. Continue medical management at present. SUBJECTIVE:   Jennifer Velez is about the same. Renal functions look slightly improved. Nephrology was considering possible dialysis. Patient's abdomen remains soft without guarding. Mildly tender right lower quadrant on today's examination no significant tenderness over the gallbladder itself. Flexi-Seal green blood-tinged stool. With rehydration hemoglobin has dropped a couple grams.   MEDICATIONS   Scheduled Meds:   heparin (porcine)  5,000 Units Subcutaneous Q8H    atorvastatin  40 mg Oral Daily    clomiPRAMINE  50 mg Oral Nightly    levothyroxine  75 mcg Oral Daily    metoprolol tartrate  25 mg Oral BID    sertraline  100 mg Oral Daily    tamsulosin  0.4 mg Oral Daily    metroNIDAZOLE  500 mg Intravenous Q8H    vancomycin  500 mg Per NG tube 4 times per day    famotidine (PEPCID) injection  20 mg Intravenous BID    calcium replacement protocol   Other RX Placeholder    miconazole   Topical BID     Continuous Infusions:   norepinephrine 2 mcg/min (21 1704)    sodium chloride 100 mL/hr at 21 2320     PRN Meds:.promethazine **OR** ondansetron, polyethylene glycol, acetaminophen **OR** acetaminophen  OBJECTIVE   CURRENT VITALS:  weight is 299 lb 6.2 oz (135.8 kg). His oral temperature is 99.6 °F (37.6 °C). His blood pressure is 114/89 and his pulse is 93. His respiration is 14 and oxygen saturation is 94%. Temperature Range (24h):Temp: 99.6 °F (37.6 °C) Temp  Av.2 °F (37.3 °C)  Min: 99 °F (37.2 °C)  Max: 99.6 °F (37.6 °C)  BP Range (74G): Systolic (79EJF), EYS:639 , Min:70 , IP     Diastolic (84TJQ), MLF:96, Min:37, Max:99    Pulse Range (24h): Pulse  Av.5  Min: 89  Max: 107  Respiration Range (24h): Resp  Av.3  Min: 12  Max: 30  Current Pulse Ox (24h):  SpO2: 94 %  Pulse Ox Range (24h):  SpO2  Av %  Min: 77 %  Max: 100 %  Oxygen Amount and Delivery: O2 Flow Rate (L/min): 3 L/min  Incentive Spirometry Tx:            GENERAL: Ill-appearing though in no acute distress. Confused  LUNGS: Diminished in the bases  HEART: Normal heart rate  ABDOMEN: Obese soft nontympanic.   Mild tenderness to palpation nonfocal   EXTREMITY: Mild edema  In: 1251 [I.V.:1251]  Out: 800 [Urine:650]     Date 21 0000 - 21 2359   Shift 7804-4247 4141-4622 2156-4219 24 Hour Total   INTAKE   I.V.(mL/kg) 905(6.7)   905(6.7)   Shift Total(mL/kg) 905(6.7)   905(6.7)   OUTPUT   Urine(mL/kg/hr) 250(0.2)   250   Stool(mL/kg) 50(0.4)   50(0.4)   Shift Total(mL/kg) 300(2.2)   300(2.2)   Weight (kg) 135.8 135.8 135.8 135.8     LABS     Recent Labs signed by: Dhruv Suazo MD on    02/17/2021 05:51 AM      All CTs at this facility use dose modulation techniques and iterative    reconstructions, and/or weight-based dosing   when appropriate to reduce radiation to a low as reasonably achievable. XR CHEST PORTABLE   Final Result   Impression:      Right central line tip proximal SVC. Stable bibasilar consolidation and atelectasis. This document has been electronically signed by: Vladimir Rowell MD on    02/17/2021 04:20 AM      XR CHEST PORTABLE   Final Result   Impression:      Stable bilateral basilar consolidation and atelectasis      This document has been electronically signed by: Vladimir Rowell MD on    02/17/2021 03:20 AM      XR ABDOMEN FOR NG/OG/NE TUBE PLACEMENT   Final Result   Impression:   1. Distal NG tube is not well seen but is likely in the distal stomach. This document has been electronically signed by: Yvon Luna MD on    02/17/2021 02:30 AM      XR CHEST PORTABLE   Final Result   Impression:   1. Right lower lobe airspace disease may represent atelectasis or    pneumonia.       This document has been electronically signed by: Yvon Luna MD on    02/17/2021 02:28 AM          Electronically signed by Keiko Wood MD on 2/18/2021 at 8:28 AM

## 2021-02-19 ENCOUNTER — APPOINTMENT (OUTPATIENT)
Dept: GENERAL RADIOLOGY | Age: 81
DRG: 853 | End: 2021-02-19
Attending: INTERNAL MEDICINE
Payer: MEDICARE

## 2021-02-19 LAB
ALBUMIN SERPL-MCNC: 2.6 G/DL (ref 3.5–5.1)
ALP BLD-CCNC: 59 U/L (ref 38–126)
ALT SERPL-CCNC: 9 U/L (ref 11–66)
ANION GAP SERPL CALCULATED.3IONS-SCNC: 9 MEQ/L (ref 8–16)
AST SERPL-CCNC: 16 U/L (ref 5–40)
BILIRUB SERPL-MCNC: 0.2 MG/DL (ref 0.3–1.2)
BILIRUBIN DIRECT: < 0.2 MG/DL (ref 0–0.3)
BUN BLDV-MCNC: 95 MG/DL (ref 7–22)
CALCIUM SERPL-MCNC: 7.6 MG/DL (ref 8.5–10.5)
CHLORIDE BLD-SCNC: 105 MEQ/L (ref 98–111)
CO2: 26 MEQ/L (ref 23–33)
CREAT SERPL-MCNC: 3.7 MG/DL (ref 0.4–1.2)
EKG ATRIAL RATE: 100 BPM
EKG P AXIS: 27 DEGREES
EKG P-R INTERVAL: 212 MS
EKG Q-T INTERVAL: 364 MS
EKG QRS DURATION: 158 MS
EKG QTC CALCULATION (BAZETT): 469 MS
EKG R AXIS: -48 DEGREES
EKG T AXIS: -7 DEGREES
EKG VENTRICULAR RATE: 100 BPM
ERYTHROCYTE [DISTWIDTH] IN BLOOD BY AUTOMATED COUNT: 15.9 % (ref 11.5–14.5)
ERYTHROCYTE [DISTWIDTH] IN BLOOD BY AUTOMATED COUNT: 55.9 FL (ref 35–45)
GFR SERPL CREATININE-BSD FRML MDRD: 16 ML/MIN/1.73M2
GLUCOSE BLD-MCNC: 136 MG/DL (ref 70–108)
HCT VFR BLD CALC: 30.3 % (ref 42–52)
HEMOGLOBIN: 9.4 GM/DL (ref 14–18)
MCH RBC QN AUTO: 29.6 PG (ref 26–33)
MCHC RBC AUTO-ENTMCNC: 31 GM/DL (ref 32.2–35.5)
MCV RBC AUTO: 95.3 FL (ref 80–94)
MRSA SCREEN: NORMAL
MRSA SCREEN: NORMAL
ORGANISM: ABNORMAL
PLATELET # BLD: 142 THOU/MM3 (ref 130–400)
PMV BLD AUTO: 11.8 FL (ref 9.4–12.4)
POTASSIUM SERPL-SCNC: 4.2 MEQ/L (ref 3.5–5.2)
RBC # BLD: 3.18 MILL/MM3 (ref 4.7–6.1)
SODIUM BLD-SCNC: 140 MEQ/L (ref 135–145)
TOTAL PROTEIN: 5.4 G/DL (ref 6.1–8)
URINE CULTURE, ROUTINE: ABNORMAL
WBC # BLD: 17.3 THOU/MM3 (ref 4.8–10.8)

## 2021-02-19 PROCEDURE — 82248 BILIRUBIN DIRECT: CPT

## 2021-02-19 PROCEDURE — 2000000000 HC ICU R&B

## 2021-02-19 PROCEDURE — 80053 COMPREHEN METABOLIC PANEL: CPT

## 2021-02-19 PROCEDURE — 2500000003 HC RX 250 WO HCPCS: Performed by: NURSE PRACTITIONER

## 2021-02-19 PROCEDURE — 93005 ELECTROCARDIOGRAM TRACING: CPT | Performed by: INTERNAL MEDICINE

## 2021-02-19 PROCEDURE — 6360000002 HC RX W HCPCS: Performed by: INTERNAL MEDICINE

## 2021-02-19 PROCEDURE — 85027 COMPLETE CBC AUTOMATED: CPT

## 2021-02-19 PROCEDURE — 99233 SBSQ HOSP IP/OBS HIGH 50: CPT | Performed by: INTERNAL MEDICINE

## 2021-02-19 PROCEDURE — 99233 SBSQ HOSP IP/OBS HIGH 50: CPT | Performed by: SURGERY

## 2021-02-19 PROCEDURE — 2580000003 HC RX 258: Performed by: NURSE PRACTITIONER

## 2021-02-19 PROCEDURE — 2500000003 HC RX 250 WO HCPCS: Performed by: STUDENT IN AN ORGANIZED HEALTH CARE EDUCATION/TRAINING PROGRAM

## 2021-02-19 PROCEDURE — 74018 RADEX ABDOMEN 1 VIEW: CPT

## 2021-02-19 PROCEDURE — 36415 COLL VENOUS BLD VENIPUNCTURE: CPT

## 2021-02-19 PROCEDURE — 94761 N-INVAS EAR/PLS OXIMETRY MLT: CPT

## 2021-02-19 PROCEDURE — 6370000000 HC RX 637 (ALT 250 FOR IP): Performed by: NURSE PRACTITIONER

## 2021-02-19 PROCEDURE — 99291 CRITICAL CARE FIRST HOUR: CPT | Performed by: INTERNAL MEDICINE

## 2021-02-19 PROCEDURE — 6360000002 HC RX W HCPCS: Performed by: NURSE PRACTITIONER

## 2021-02-19 RX ORDER — MORPHINE SULFATE 2 MG/ML
2 INJECTION, SOLUTION INTRAMUSCULAR; INTRAVENOUS
Status: DISCONTINUED | OUTPATIENT
Start: 2021-02-19 | End: 2021-02-25

## 2021-02-19 RX ADMIN — TAMSULOSIN HYDROCHLORIDE 0.4 MG: 0.4 CAPSULE ORAL at 08:18

## 2021-02-19 RX ADMIN — MICONAZOLE NITRATE: 20 POWDER TOPICAL at 21:09

## 2021-02-19 RX ADMIN — MORPHINE SULFATE 2 MG: 2 INJECTION, SOLUTION INTRAMUSCULAR; INTRAVENOUS at 10:21

## 2021-02-19 RX ADMIN — ACETAMINOPHEN 650 MG: 325 TABLET ORAL at 09:29

## 2021-02-19 RX ADMIN — MICONAZOLE NITRATE: 20 POWDER TOPICAL at 08:19

## 2021-02-19 RX ADMIN — METRONIDAZOLE 500 MG: 500 INJECTION, SOLUTION INTRAVENOUS at 15:07

## 2021-02-19 RX ADMIN — SODIUM CHLORIDE: 9 INJECTION, SOLUTION INTRAVENOUS at 17:48

## 2021-02-19 RX ADMIN — ONDANSETRON 4 MG: 2 INJECTION INTRAMUSCULAR; INTRAVENOUS at 09:29

## 2021-02-19 RX ADMIN — SERTRALINE 100 MG: 100 TABLET, FILM COATED ORAL at 08:18

## 2021-02-19 RX ADMIN — METRONIDAZOLE 500 MG: 500 INJECTION, SOLUTION INTRAVENOUS at 00:24

## 2021-02-19 RX ADMIN — METRONIDAZOLE 500 MG: 500 INJECTION, SOLUTION INTRAVENOUS at 22:21

## 2021-02-19 RX ADMIN — HEPARIN SODIUM 5000 UNITS: 5000 INJECTION INTRAVENOUS; SUBCUTANEOUS at 06:08

## 2021-02-19 RX ADMIN — Medication 500 MG: at 00:25

## 2021-02-19 RX ADMIN — LEVOTHYROXINE SODIUM 75 MCG: 75 TABLET ORAL at 06:08

## 2021-02-19 RX ADMIN — Medication 500 MG: at 14:11

## 2021-02-19 RX ADMIN — FAMOTIDINE 20 MG: 10 INJECTION, SOLUTION INTRAVENOUS at 08:18

## 2021-02-19 RX ADMIN — ACETAMINOPHEN 650 MG: 325 TABLET ORAL at 22:21

## 2021-02-19 RX ADMIN — Medication 500 MG: at 17:52

## 2021-02-19 RX ADMIN — ATORVASTATIN CALCIUM 40 MG: 40 TABLET, FILM COATED ORAL at 08:18

## 2021-02-19 RX ADMIN — METRONIDAZOLE 500 MG: 500 INJECTION, SOLUTION INTRAVENOUS at 06:08

## 2021-02-19 RX ADMIN — HEPARIN SODIUM 5000 UNITS: 5000 INJECTION INTRAVENOUS; SUBCUTANEOUS at 21:08

## 2021-02-19 RX ADMIN — CLOMIPRAMINE HYDROCHLORIDE 50 MG: 50 CAPSULE ORAL at 22:21

## 2021-02-19 RX ADMIN — Medication 500 MG: at 06:08

## 2021-02-19 RX ADMIN — HEPARIN SODIUM 5000 UNITS: 5000 INJECTION INTRAVENOUS; SUBCUTANEOUS at 14:13

## 2021-02-19 RX ADMIN — SODIUM CHLORIDE: 9 INJECTION, SOLUTION INTRAVENOUS at 06:18

## 2021-02-19 ASSESSMENT — PAIN SCALES - GENERAL
PAINLEVEL_OUTOF10: 5
PAINLEVEL_OUTOF10: 4
PAINLEVEL_OUTOF10: 3

## 2021-02-19 ASSESSMENT — PAIN SCALES - WONG BAKER: WONGBAKER_NUMERICALRESPONSE: 2

## 2021-02-19 NOTE — CARE COORDINATION
2/19/21, 1:17 PM EST    DISCHARGE ON GOING EVALUATION    [facility] day: 2  Location: -10/010-A Reason for admit: Hypotension [I95.9]   Procedure:   2/17 CVC RIJ  2/17 CT Abd/pelvis: Pancolitis consistent with the clinical history of C. difficile colitis, no bowel perforation; probable cholelithiasis; Bibasilar pulmonary consolidation which may be on the basis of atelectasis or pneumonia; small bilateral nonobstructing renal calculi; small RML pulmonary nodule  2/17 US GB/RUQ: Distended appearance to the gallbladder which has a slightly thickened wall. Sludge is also present within its lumen; Sonographic Hamilton's sign is positive per the technologist. These findings could be on the basis of acute cholecystitis    Barriers to Discharge: Patient/family wanting surgery as last resort. Clear liquid diet. ID consulted for cdiff and increasing WBC. Tmax 100.4. NSR. On room air. Ox3, not time. Follow commands. Urine +candida albicans. Intensivist, Nephrology, and General Surgery following. Wound Care following. PT/OT. CVC. NG clamped. Flexiseal. Velez. IVF, Levo @ 3 mcg/min, lipitor, clomipramine, IV pepcid, sq heparin, synthroid, lopressor, IV flagyl, desenex, prn IV morphine, prn zofran, zoloft, flomax, po vancomycin, Electrolyte replacement protocols. BUN 95, Creat 3.7, alb 2.6, wbc 17.3, hgb 9.4. PCP: Rebecca Cheney MD  Readmission Risk Score: 15%  Patient Goals/Plan/Treatment Preferences: From [facility]; plan for skilled bed at [facility] at discharge - will require precert.

## 2021-02-19 NOTE — PROGRESS NOTES
CRITICAL CARE PROGRESS NOTE      Patient:  Drew Becerril    Unit/Bed:4D-10/010-A  YOB: 1940  MRN: [id number]   PCP: Jessica Youngblood MD  Date of Admission: 2/17/2021  Chief Complaint:- Hypotension, worsening creatinine in the setting of C. difficile colitis    Assessment and Plan:    1. Hypotension secondary to hypovolemia and distributive shock: At time of admission to ICU, patient appeared dry. Patient was given normal saline bolus. Patient remained hypotensive, so Levophed was continued to maintain a MAP >65. Lactic acid normal.  Patient required Levophed overnight, weaning down as able. Continue to treat with fluids as able. 2. Fulminant C. difficile colitis: CT of the abdomen pelvis on 2/17 shows pancolitis that is consistent with C. difficile. There was no bowel perforation or ileus. Surgery has seen the patient and states that no operation is warranted at this time. Patient does have generalized abdominal pain on examination and altered mental status. Continue with oral vancomycin and Flagyl. Patient's WBC increased overnight. Due to this we are consulting infectious disease. 3. CARMEN on CKD II: Baseline creatinine 1.1. Patient is oliguric. UA tested positive for bacteria and yeast.  IV fluids should be continued at this time. Patient appears clinically dehydrated on arrival and needed Levophed to maintain blood pressures. Nephrology following. CARMEN secondary to ischemic ATN. Dose medications appropriately. Patient making scant urine. Creatinine is minimally decreasing, BUN is increasing. Nephrology stated that if BUN >100, dialysis would be warranted. The family is okay with starting dialysis if needed. Currently the patient's creatinine is downtrending, and does not need dialysis at this moment. 4. Complicated UTI: UA shows bacteria, yeast, blood, and WBC. Blood culture currently negative, awaiting results of urine culture.   Patient currently on vancomycin and Flagyl. 5. Incidental pulmonary nodule: Seen incidentally on CT scan in January. PET scan completed in 2/12. Nodule noted in right lower lobe, with primary rule out for metastatic process. The nodule could also be infectious or inflammatory, monitor for now. CT of the chest should be repeated in 3 months. 6. Probable cholecystitis: CT scan of abdomen showed probable cholelithiasis, and subsequent US gallbladder shows an enlarged gallbladder with mild wall thickening and sludge. On exam, the patient is diffusely tender across the abdomen. There is no sign of bowel perforation, and surgery has already seen the patient and stated the will not do surgery at this time. WBC is currently downtrending, if there is enough spike in the count, will obtain a MRCP. If patient suddenly becomes nauseous or has increased vomiting, we will obtain a lipase to check for pancreatitis. WBC did increase overnight, however this does not appear to be from cholecystitis at this time, though the patient does have more right-sided pain than left-sided. 7. Acute metabolic encephalopathy: Secondary to hypotension and hypoperfusion of the brain, as well as uremia. Continue to monitor for resolution with treatment. Patient seems of gotten more confused overnight. Medication review shows that multiple psychiatric medications that the patient was on to have a harmful effect on the kidneys. The only one that may be able to be restarted as Wellbutrin, however that will likely not cause any significant change in encephalopathy. 8. Hypocalcemia: Calcium has remained low since arrival with a secondary low iCal.  Replacement protocol in place, replace as necessary. 9. Acute on chronic anemia: Hgb on arrival was 10.2, subsequent decrease in 2/18 to 8.7 following reports from the nursing staff of blood in his stools and his NG tube. There is a component of blood loss anemia on chronic macrocytic anemia.   Patient's heparin has been held, applying SCDs instead. Continue to trend hemoglobin, will transfuse if <7.0. INITIAL H AND P AND ICU COURSE:   Elías Naik is a 67-year-old male was transferred from [facility] on 2/17 for acute kidney injury and C. difficile colitis. Patient was recently admitted to [facility] on 2/26 for altered mental status and sepsis secondary to right leg cellulitis. He was treated with Zosyn and Rocephin, and also discharged with antibiotics to the nursing home at the time. During the course of hospital treatment, they also noted a pulmonary nodule on CT scan that measured 3 x 2 x 2.6 cm. It was a concern for malignancy at the time, the requested follow-up however patient does not have any further scans or PET scans. Blood cultures grew E. coli at the time. During this hospital course, the patient was originally sent from the SNF to [facility] on 2/14 when he was found to be diaphoretic and hypotensive with a fever of 102 °F.  Patient was evaluated in the emergency room and diagnosed to have sepsis but the source was unknown at the time and he was given fluid resuscitation and started on a Levophed drip. Patient was started on Zosyn at time. During his hospital stay he was found to have worsening renal function and nephrology was consulted. On 2/16 the patient was seen by Dr. Karthik Ramirez from nephrology. Further work-up did show C. difficile colitis. Patient was started on vancomycin. He had a noted mental change and worsening creatinine on 2/16 so he was transferred to [facility] ICU for further management and care per Dr. Karthik Ramirez.    2/18: Patient this morning remains on pressors as needed to maintain his blood pressure. He has been receiving fluid throughout the night. His hemoglobin did drop from 10 to about 8, however it is stayed stable since then, continue to monitor. His WBC has decreased along with his creatinine. However his BUN increased overnight.   The patient seems more confused today with an inability to answer where or when he is. The nephrologist stated that if his BUN increases over 100 dialysis would be warranted at that time. The surgeon also states that at this current point if surgery is warranted it would be a total colectomy. Both of these options were discussed with the family who stated that they would be okay starting dialysis if needed. They with prefer to hold off on surgery right now. 2/19: Patient remains on pressors to maintain his blood pressure however the dose has been weaned significantly. He still currently receiving IVF. Hgb has remained stable. WBC did increase overnight. His creatinine has decreased to 3.7. BUN has stated 95. The nephrologist had seen the patient earlier in the morning, and stated that dialysis is not needed at this time. Due to the rising WBC, infectious disease has been consulted. Past Medical History: OLGA, ASHD s/p CABG, hypothyroidism, dysphagia, CKD III, anemia, pulmonary nodule, history of cellulitis, osteoarthritis, prior COVID-19 infection, and depression. Family History: No significant family medical history. Social History: Lifetime non-smoker, currently denies alcohol use.     ROS   Review of Systems - General ROS: negative for - chills, fatigue or fever  Respiratory ROS: no cough, shortness of breath, or wheezing  Cardiovascular ROS: no chest pain or dyspnea on exertion  Gastrointestinal ROS: positive for -feeling of needing to defecate, abdominal pain diffuse with a focal pain on the right side, just beneath the right upper quadrant  negative for - constipation, gas/bloating, heartburn or nausea/vomiting  Genito-Urinary ROS: no dysuria, trouble voiding, or hematuria  Musculoskeletal ROS: negative for - joint stiffness, joint swelling, muscle pain or muscular weakness  Neurological ROS: positive for - confusion  negative for - dizziness, headaches, numbness/tingling or seizures  Dermatological ROS: negative for - pruritus, rash or skin lesion changes      Scheduled Meds:   famotidine (PEPCID) injection  20 mg Intravenous Daily    heparin (porcine)  5,000 Units Subcutaneous Q8H    atorvastatin  40 mg Oral Daily    clomiPRAMINE  50 mg Oral Nightly    levothyroxine  75 mcg Oral Daily    metoprolol tartrate  25 mg Oral BID    sertraline  100 mg Oral Daily    tamsulosin  0.4 mg Oral Daily    metroNIDAZOLE  500 mg Intravenous Q8H    vancomycin  500 mg Per NG tube 4 times per day    calcium replacement protocol   Other RX Placeholder    miconazole   Topical BID     Continuous Infusions:   norepinephrine 3 mcg/min (02/18/21 1800)    sodium chloride 100 mL/hr at 02/18/21 1800       PHYSICAL EXAMINATION:  T:  100.4F. P:  101. RR:  16. B/P:  115/59. FiO2:  2L NC. O2 Sat:  93%. I/O:  +974. 9mL  Body mass index is 42.96 kg/m². GCS:   14, confused  General:   Generally ill-appearing elderly male  HEENT:  normocephalic and atraumatic. No scleral icterus. PERRL  Neck: supple. No Thyromegaly. Lungs: Diminished breath sounds over bilateral bases, with mild crackles. No wheezes heard. Cardiac: RRR. No JVD. Abdomen: soft, but firmer than yesterday, diffusely/moderately tender. Somewhat increased bowel sounds heard  Extremities:  No clubbing or cyanosis, mild edema x4  Vasculature: capillary refill < 3 seconds. Palpable dorsalis pedis pulses. Skin:  warm and dry. Psych: Spontaneously opens eyes, less lethargic than yesterday however more confused and cannot answer to where or when he is. Lymph:  No supraclavicular adenopathy. Neurologic:  No focal deficit. No seizures. Data: (All radiographs, tracings, PFTs, and imaging are personally viewed and interpreted unless otherwise noted).     Sodium 140   Potassium 4.2   Calcium 7.6    Creatinine 3.7   HgB 9.4   WBC 17.3      Phosphorus 2.7   Magnesium 2.2   Telemetry shows Normal Sinus Rhythm   CXR 2/17 shows right lower lobe airspace disease, right central line tip placement in SVC   X-ray abdomen 2/17 shows NG tube in the distal stomach   CT abdomen pelvis without contrast shows pancolitis consistent with C. difficile, no bowel perforation, probable cholelithiasis, and bibasilar pulmonary consolidations   US gallbladder shows distended gallbladder with slight wall thickening, sludge present within the lumen, Hamilton sign positive   CXR 2/19 shows good NG tube placement.  Urine Culture 2/19 shows yeast (unspecified currently) with 90,000 colonies. Seen with multidisciplinary ICU team.  Meets Continued ICU Level Care Criteria:  [x] Yes, On pressors   [] No - Transfer Planned to listed location:  [] HOSPITALIST CONTACTED-      Case and plan discussed with Dr. Lisa Kelley. Electronically signed by Mahendra Leggett DO  CRITICAL CARE SPECIALIST  Patient seen by me. Case discussed with resident physician. Case discussed with Dr Gary Salinas. Continue with volume resuscitation. Agree with loop ileostomy with colon irrigation with vancomycin if current therapy fails. BUN remains 95. Presently, no dialysis required. Continue with pressors. Patient remains critical..  CC time 35 minutes. Time was discontiguous. Time does not include procedures. Time does include my direct assessment of the patient and coordination of care.   Electronically signed by Yu Samuel MD on 2/19/2021 at 6:08 PM

## 2021-02-19 NOTE — PLAN OF CARE
Problem: Falls - Risk of:  Goal: Will remain free from falls  Description: Will remain free from falls  Outcome: Met This Shift  Note: Pt is very lethargic and confused at times. Frequent checks and hourly rounds to assess needs. Bed alarm on     Problem: Falls - Risk of:  Goal: Absence of physical injury  Description: Absence of physical injury  Outcome: Met This Shift     Problem: Discharge Planning:  Goal: Participates in care planning  Outcome: Ongoing  Note: Pt and family kept updated and contributing to plan of care. Problem: Discharge Planning:  Goal: Discharged to appropriate level of care  Description: Discharged to appropriate level of care  Outcome: Ongoing  Note: Pt will go to SNF at discharge.  assisting with discharge plans     Problem: Cardiac Output - Decreased:  Goal: Hemodynamic stability will improve  Description: Hemodynamic stability will improve  Outcome: Ongoing  Note: Remains on levophed. Continue to wean if able     Problem: Fluid Volume - Imbalance:  Goal: Absence of imbalanced fluid volume signs and symptoms  Description: Absence of imbalanced fluid volume signs and symptoms  Outcome: Ongoing  Note: Pt continues on IV fluid saline at 100 ml/hour. Making fair urine output, plan for possible dialysis in the morning     Problem: Pain:  Goal: Pain level will decrease  Description: Pain level will decrease  Outcome: Ongoing  Note: Pt complains of generalized pain with repositioning.  Medicate with tylenol as needed     Problem: Pain:  Goal: Recognizes and communicates pain  Description: Recognizes and communicates pain  Outcome: Ongoing     Problem: Pain:  Goal: Control of acute pain  Description: Control of acute pain  Outcome: Ongoing     Problem: Pain:  Goal: Control of chronic pain  Description: Control of chronic pain  Outcome: Ongoing     Problem: Skin Integrity - Impaired:  Goal: Will show no infection signs and symptoms  Description: Will show no infection signs and symptoms  Outcome: Ongoing     Problem: Skin Integrity - Impaired:  Goal: Absence of new skin breakdown  Description: Absence of new skin breakdown  Outcome: Ongoing  Note: Rectal area reddened and purple. Turn and reposition every 2 hours and PRN. Keep heels and elbows elevated off bed. Watch for pressure from medical devices. Protective cream to rectal area and desenex powder to excoriated groins and breast areas     Problem: Urinary Elimination:  Goal: Signs and symptoms of infection will decrease  Description: Signs and symptoms of infection will decrease  Outcome: Ongoing     Problem: Urinary Elimination:  Goal: Complications related to the disease process, condition or treatment will be avoided or minimized  Description: Complications related to the disease process, condition or treatment will be avoided or minimized  Outcome: Ongoing  Note: Continue king for now to monitor output. Remove when no longer indicated     Problem: Skin Integrity:  Goal: Will show no infection signs and symptoms  Description: Will show no infection signs and symptoms  Outcome: Ongoing     Problem: Skin Integrity:  Goal: Absence of new skin breakdown  Description: Absence of new skin breakdown  Outcome: Ongoing     Problem: Bowel/Gastric:  Goal: Occurrences of diarrhea will decrease  Description: Occurrences of diarrhea will decrease  Outcome: Ongoing  Note: Continues with fair amount of liquid stool     Problem: Physical Regulation:  Goal: Signs and symptoms of infection will decrease  Description: Signs and symptoms of infection will decrease  Outcome: Ongoing     Problem: Physical Regulation:  Goal: Prevent transmision of infection  Description: Prevent transmision of infection  Outcome: Ongoing  Note: Continue contact plus precautions.      Problem: Physical Regulation:  Goal: Ability to avoid or minimize complications of infection will improve  Description: Ability to avoid or minimize complications of infection will improve  Outcome: Ongoing     Problem: Bowel Function - Altered:  Goal: Bowel elimination is within specified parameters  Description: Bowel elimination is within specified parameters  Outcome: Not Met This Shift  Note: Pt continues to have blood tinged liquid brown stools. Flexiseal continued    Care plan reviewed with patient and family. Patient and family verbalize understanding of the plan of care and contribute to goal setting.

## 2021-02-19 NOTE — FLOWSHEET NOTE
0800 Mr Kanika Contreras rests in the bed. He has complaints of pain to his right lower abd. He denies SOB. He has an NGT at 65 cm which is currently clamped. He has a Flexicil in place which is draining liquid stool. He has a king patent to gravity. he has a 3 Lumen CVC right IJ site clear. He requires Levophed at 4 ug.min to maintain his BP at ordered parameters. The CVC site remains clear. He is currently on room air.     0900 The intensivist has come to see. 0930 Dr Arthur Samples has come to see. ID has been consulted to see.

## 2021-02-19 NOTE — PROGRESS NOTES
WBC  --   --  18.5*  --   --  14.5* 17.3*   HGB  --    < > 10.2*  --  8.7* 8.7* 9.4*   HCT  --    < > 32.3*  --  27.0* 27.2* 30.3*   PLT  --   --  147  --   --  131 142      < >  --    < > 140 139 140   K 4.5   < >  --    < > 4.4 4.2 4.2      < >  --    < > 102 103 105   CO2 25   < >  --    < > 25 26 26   BUN 83*   < >  --    < > 92* 95* 95*   CREATININE 4.9*   < >  --    < > 4.6* 4.6* 3.7*   MG  --   --   --   --   --  2.2  --    PHOS 3.3  --   --   --   --  2.7  --    CALCIUM 7.7*   < >  --    < > 7.6* 7.7* 7.6*    < > = values in this interval not displayed. Recent Labs     02/17/21  2145   INR 1.25*     Recent Labs     02/17/21  0318   AST 19   ALT 11   BILITOT 0.4   BILIDIR <0.2   AMYLASE 34   LIPASE 12.3     No results for input(s): TROPONINT in the last 72 hours. RADIOLOGY     XR ABDOMEN FOR NG/OG/NE TUBE PLACEMENT   Final Result   Impression:   Limited exam   1. NG tube in the stomach. Nonspecific bowel gas pattern      This document has been electronically signed by: Adrián Saez MD on    02/19/2021 01:59 AM      US GALLBLADDER RUQ   Final Result   Distended appearance to the gallbladder which has a slightly thickened wall. Sludge is also present within its lumen. Sonographic Hamilton's sign is positive per the technologist. These findings could be on the basis of acute cholecystitis. If clinically    warranted, further evaluation with a nuclear medicine HIDA scan would be beneficial for further characterization. **This report has been created using voice recognition software. It may contain minor errors which are inherent in voice recognition technology. **      Final report electronically signed by Dr Schwartz on 2/17/2021 9:37 AM      CT ABDOMEN PELVIS WO CONTRAST Additional Contrast? None   Final Result   Impression:   1. Pancolitis consistent with the clinical history of C. difficile    colitis. No bowel perforation. 2.  Probable cholelithiasis.    3. Bibasilar pulmonary consolidation which may be on the basis of    atelectasis or pneumonia. 4.  Small bilateral nonobstructing renal calculi. 5.  Small right middle lobe pulmonary nodule. This document has been electronically signed by: Kermit Layne MD on    02/17/2021 05:51 AM      All CTs at this facility use dose modulation techniques and iterative    reconstructions, and/or weight-based dosing   when appropriate to reduce radiation to a low as reasonably achievable. XR CHEST PORTABLE   Final Result   Impression:      Right central line tip proximal SVC. Stable bibasilar consolidation and atelectasis. This document has been electronically signed by: Vani Adams MD on    02/17/2021 04:20 AM      XR CHEST PORTABLE   Final Result   Impression:      Stable bilateral basilar consolidation and atelectasis      This document has been electronically signed by: Vani Adams MD on    02/17/2021 03:20 AM      XR ABDOMEN FOR NG/OG/NE TUBE PLACEMENT   Final Result   Impression:   1. Distal NG tube is not well seen but is likely in the distal stomach. This document has been electronically signed by: Sky Yanez MD on    02/17/2021 02:30 AM      XR CHEST PORTABLE   Final Result   Impression:   1. Right lower lobe airspace disease may represent atelectasis or    pneumonia.       This document has been electronically signed by: Sky Yanez MD on    02/17/2021 02:28 AM          Electronically signed by Em Montelongo MD on 2/19/2021 at 9:40 AM

## 2021-02-19 NOTE — PLAN OF CARE
Problem: Discharge Planning:  Goal: Participates in care planning  Note: He feels like he wants to return to his assisted living facility upon discharge. Problem: Bowel Function - Altered:  Goal: Bowel elimination is within specified parameters  Description: Bowel elimination is within specified parameters  Outcome: Ongoing  Note: His abd is rounded and distended. He has an NGT to LIS. Problem: Cardiac Output - Decreased:  Goal: Hemodynamic stability will improve  Description: Hemodynamic stability will improve  Outcome: Ongoing  Note: He remains on a levophed gtt. Will try to wean off as he tolerates. Problem: Fluid Volume - Imbalance:  Goal: Absence of imbalanced fluid volume signs and symptoms  Description: Absence of imbalanced fluid volume signs and symptoms  Outcome: Ongoing  Note: He continues to have NS at 100 cc/hr. He tolerates this well. Problem: Pain:  Goal: Pain level will decrease  Description: Pain level will decrease  Outcome: Ongoing  Note: He has C/O abd pain at times. He has been medicated with MS for this. Problem: Skin Integrity - Impaired:  Goal: Will show no infection signs and symptoms  Description: Will show no infection signs and symptoms  Outcome: Ongoing  Note: WBC's have been and remain elevated. Problem: Urinary Elimination:  Goal: Signs and symptoms of infection will decrease  Description: Signs and symptoms of infection will decrease  Outcome: Ongoing  Note: He continues to have adequate urine output. Problem: Falls - Risk of:  Goal: Will remain free from falls  Description: Will remain free from falls  Outcome: Ongoing  Note: Bed alarm on. He remains a fall risk. Problem: Skin Integrity:  Goal: Will show no infection signs and symptoms  Description: Will show no infection signs and symptoms  Outcome: Ongoing  Note: He requires frequent position change. Problem:  Bowel/Gastric:  Goal: Occurrences of diarrhea will decrease  Description: Occurrences

## 2021-02-19 NOTE — CONSULTS
CONSULTATION NOTE :ID       Patient - Jorge Sinha,  Age - 79 y.o.    - 1940      Room Number - 4D-10/010-A   MRN -  [id number]   Acct # - [de-identified]  Date of Admission -  2021 12:35 AM  Patient's PCP: Florentino Marie MD     Requesting Physician: Bahman Villanueva MD    REASON FOR CONSULTATION   C.difficle with pancolites for opinon  CHIEF COMPLAINT   C.difficile induced diarrhea    HISTORY OF PRESENT ILLNESS       This is a very pleasant 79 y.o. male who was admitted to the hospital after he presented to out side hospital with abdominal pain and weakness. He was found to have C.difficile pancolites with CARMEN. He is in ICU getting IV flagyl and oral vancomycin. He was treated over two wks ago with oral antibioic for leg cellulites. He is in a nursing home. Has hx of major depression. He is diabetic. Has hx of CKD.     PAST MEDICAL  HISTORY       Past Medical History:   Diagnosis Date    Arthritis     Chronic kidney disease     Depression     Diabetes mellitus ([address]     Hyperlipidemia     Hypertension     Thyroid disease        PAST SURGICAL HISTORY     Past Surgical History:   Procedure Laterality Date    KNEE SURGERY Right          MEDICATIONS:       Scheduled Meds:   famotidine (PEPCID) injection  20 mg Intravenous Daily    heparin (porcine)  5,000 Units Subcutaneous Q8H    atorvastatin  40 mg Oral Daily    clomiPRAMINE  50 mg Oral Nightly    levothyroxine  75 mcg Oral Daily    metoprolol tartrate  25 mg Oral BID    sertraline  100 mg Oral Daily    tamsulosin  0.4 mg Oral Daily    metroNIDAZOLE  500 mg Intravenous Q8H    vancomycin  500 mg Per NG tube 4 times per day    calcium replacement protocol   Other RX Placeholder    miconazole   Topical BID     Continuous Infusions:   norepinephrine 3 mcg/min (21 0941)    sodium chloride 100 mL/hr at 21 0618     PRN Meds:morphine, promethazine **OR** ondansetron, polyethylene glycol, acetaminophen **OR** acetaminophen  Allergies:   ALLERGIES:    Triamcinolone        SOCIAL HISTORY:     TOBACCO:   reports that he has never smoked. He has never used smokeless tobacco.     ETOH:   reports previous alcohol use. Patient currently lives in a nursing home      FAMILY HISTORY:     No family history on file. REVIEW OF SYSTEMS:     Constitutional: no fever, no night sweats, + fatigue, no weight loss. Head: no head ache , no head injury, no migranes. Eye: no eye discharge, blurring of vision, no double vision,no eye pain. Ears: no hearing difficulty, no tinnitus  Mouth/throat: no ulceration, dental caries ,    Respiratory: no cough no chest pain,no shortness of breath,no wheezing  CVS: no palpitation, no chest pain,   GI:+ abdominal pain,   CARI: no dysuria, frequency and urgency, no hematuria, no kidney stones  Musculoskeletal: no joint pain, swelling , stiffness,  Endocrine: no polyuria, polydipsia, no cold or heat intolerance  Hematology: no anemia, no easy brusing or bleeding, no hx of clotting disorder  Dermatology: no skin rash, no skin lesions, no pruritis,  Neurological:no headaches,no dizziness, no seizure, no numbness. Psychiatry: + depression, no anxiety,no panic attacks, no suicide ideation    PHYSICAL EXAM:     BP (!) 101/47   Pulse 95   Temp 98.4 °F (36.9 °C) (Oral)   Resp 18   Wt (!) 312 lb 6.3 oz (141.7 kg)   SpO2 99%   BMI 44.82 kg/m²   General apperance:  Awake, alert, sick looking  HEENT: pale  conjunctiva, unicteric sclera, moist oral mucosa. Chest: bilateral air entry   Cardiovascular:  RRR ,S1S2, no murmur or gallop. Abdomen:  Distended, tympanitic  Extremities: callus on both feet, old surgical scar on the lower right leg  Skin:  Warm and dry.   CNS:orietned to person place and time        LABS:     CBC:   Recent Labs     02/17/21  1105 02/17/21  2145 02/18/21  0350 02/19/21  0620   WBC 18.5*  --  14.5* 17.3*   HGB 10.2* 8.7* 8.7* 9.4*     --  131 142     BMP:    Recent Labs 02/17/21  2145 02/18/21  0350 02/19/21  0620    139 140   K 4.4 4.2 4.2    103 105   CO2 25 26 26   BUN 92* 95* 95*   CREATININE 4.6* 4.6* 3.7*   GLUCOSE 167* 126* 136*     Calcium:  Recent Labs     02/19/21  0620   CALCIUM 7.6*     Ionized Calcium:No results for input(s): IONCA in the last 72 hours. Magnesium:  Recent Labs     02/18/21  0350   MG 2.2     Phosphorus:  Recent Labs     02/18/21  0350   PHOS 2.7     BNP:No results for input(s): BNP in the last 72 hours. Glucose:No results for input(s): POCGLU in the last 72 hours. HgbA1C: No results for input(s): LABA1C in the last 72 hours. INR:   Recent Labs     02/17/21 2145   INR 1.25*     Hepatic:   Recent Labs     02/19/21  0620   ALKPHOS 59   ALT 9*   AST 16   PROT 5.4*   BILITOT 0.2*   BILIDIR <0.2   LABALBU 2.6*     Amylase and Lipase:  Recent Labs     02/17/21  0318   AMYLASE 34     Lactic Acid: No results for input(s): LACTA in the last 72 hours.   Troponin:   Recent Labs     02/17/21  0318   CKTOTAL 54*      UA:   Recent Labs     02/17/21  1805   SPECGRAV 1.017   PHUR 5.0   COLORU YELLOW   PROTEINU 100*   BLOODU SMALL*   RBCUA 10-15   WBCUA 50-75   BACTERIA MODERATE   NITRU NEGATIVE   BILIRUBINUR NEGATIVE   UROBILINOGEN 0.2   KETUA NEGATIVE   LABCAST >15 HYALINE  >15 FINE GRAN         IMAGING:    Micro:   Lab Results   Component Value Date    BC No growth-preliminary  02/17/2021       Problem list of patient      Patient Active Problem List   Diagnosis Code    Essential hypertension I10    ASHD (arteriosclerotic heart disease) I25.10    Arthritis M19.90    Severe episode of recurrent major depressive disorder, without psychotic features ([facility] [address] F33.2    Acquired hypothyroidism E03.9    Type 2 diabetes mellitus without complication, without long-term current use of insulin ([facility] [address] E11.9    COVID-19 with multiple comorbidities U07.1    History of recent hospitalization Z92.89    Cellulitis of right lower extremity L03.115    Pulmonary nodule R91.1    Hypotension I95.9    Septic shock (HCC) A41.9, R65.21    Hypovolemic shock (HCC) R57.1    Colitis, Clostridium difficile A04.72           Impression and Recommendation:   C.difficile induced pancolites  CARMEN on chronic  Hx of depression with hx of psychotic episode  DM  He is getting appropriate treatment. if his condition doesn't improve,consider Loop ileostomy as a colon preserving strategy and use the distal limb to flush with vancomycin. Discussed with ICU team.  Discussed with family  Infection Control:   Contact precautions   Thank you Rich Shabazz MD for allowing me to participate in this patient's care.     Alan Morales MD,FACP 2/19/2021 5:01 PM

## 2021-02-20 ENCOUNTER — APPOINTMENT (OUTPATIENT)
Dept: GENERAL RADIOLOGY | Age: 81
DRG: 853 | End: 2021-02-20
Attending: INTERNAL MEDICINE
Payer: MEDICARE

## 2021-02-20 ENCOUNTER — APPOINTMENT (OUTPATIENT)
Dept: CT IMAGING | Age: 81
DRG: 853 | End: 2021-02-20
Attending: INTERNAL MEDICINE
Payer: MEDICARE

## 2021-02-20 ENCOUNTER — ANESTHESIA EVENT (OUTPATIENT)
Dept: OPERATING ROOM | Age: 81
DRG: 853 | End: 2021-02-20
Payer: MEDICARE

## 2021-02-20 ENCOUNTER — ANESTHESIA (OUTPATIENT)
Dept: OPERATING ROOM | Age: 81
DRG: 853 | End: 2021-02-20
Payer: MEDICARE

## 2021-02-20 VITALS — SYSTOLIC BLOOD PRESSURE: 113 MMHG | DIASTOLIC BLOOD PRESSURE: 53 MMHG | OXYGEN SATURATION: 92 % | TEMPERATURE: 97.3 F

## 2021-02-20 LAB
ABO: NORMAL
ANION GAP SERPL CALCULATED.3IONS-SCNC: 8 MEQ/L (ref 8–16)
ANTIBODY IDENTIFICATION: NORMAL
ANTIBODY IDENTIFICATION: NORMAL
ANTIBODY SCREEN: NORMAL
BUN BLDV-MCNC: 84 MG/DL (ref 7–22)
CALCIUM SERPL-MCNC: 7.8 MG/DL (ref 8.5–10.5)
CHLORIDE BLD-SCNC: 109 MEQ/L (ref 98–111)
CO2: 24 MEQ/L (ref 23–33)
CREAT SERPL-MCNC: 2.8 MG/DL (ref 0.4–1.2)
ERYTHROCYTE [DISTWIDTH] IN BLOOD BY AUTOMATED COUNT: 16 % (ref 11.5–14.5)
ERYTHROCYTE [DISTWIDTH] IN BLOOD BY AUTOMATED COUNT: 56.6 FL (ref 35–45)
GFR SERPL CREATININE-BSD FRML MDRD: 22 ML/MIN/1.73M2
GLUCOSE BLD-MCNC: 145 MG/DL (ref 70–108)
HCT VFR BLD CALC: 28.2 % (ref 42–52)
HCT VFR BLD CALC: 32.5 % (ref 42–52)
HEMOGLOBIN: 8.6 GM/DL (ref 14–18)
HEMOGLOBIN: 9.9 GM/DL (ref 14–18)
MAGNESIUM: 2 MG/DL (ref 1.6–2.4)
MCH RBC QN AUTO: 29.2 PG (ref 26–33)
MCHC RBC AUTO-ENTMCNC: 30.5 GM/DL (ref 32.2–35.5)
MCV RBC AUTO: 95.9 FL (ref 80–94)
PHOSPHORUS: 3.3 MG/DL (ref 2.4–4.7)
PLATELET # BLD: 196 THOU/MM3 (ref 130–400)
PMV BLD AUTO: 11.7 FL (ref 9.4–12.4)
POTASSIUM SERPL-SCNC: 4.1 MEQ/L (ref 3.5–5.2)
PROCALCITONIN: 1.04 NG/ML (ref 0.01–0.09)
RBC # BLD: 3.39 MILL/MM3 (ref 4.7–6.1)
RH FACTOR: NORMAL
SARS-COV-2, NAAT: NOT DETECTED
SODIUM BLD-SCNC: 141 MEQ/L (ref 135–145)
WBC # BLD: 22.6 THOU/MM3 (ref 4.8–10.8)

## 2021-02-20 PROCEDURE — 86850 RBC ANTIBODY SCREEN: CPT

## 2021-02-20 PROCEDURE — 86870 RBC ANTIBODY IDENTIFICATION: CPT

## 2021-02-20 PROCEDURE — 84100 ASSAY OF PHOSPHORUS: CPT

## 2021-02-20 PROCEDURE — 85018 HEMOGLOBIN: CPT

## 2021-02-20 PROCEDURE — 83735 ASSAY OF MAGNESIUM: CPT

## 2021-02-20 PROCEDURE — C1729 CATH, DRAINAGE: HCPCS | Performed by: SURGERY

## 2021-02-20 PROCEDURE — 44150 REMOVAL OF COLON: CPT | Performed by: SURGERY

## 2021-02-20 PROCEDURE — 0DTE0ZZ RESECTION OF LARGE INTESTINE, OPEN APPROACH: ICD-10-PCS | Performed by: SURGERY

## 2021-02-20 PROCEDURE — 6360000002 HC RX W HCPCS: Performed by: INTERNAL MEDICINE

## 2021-02-20 PROCEDURE — 86900 BLOOD TYPING SEROLOGIC ABO: CPT

## 2021-02-20 PROCEDURE — 85014 HEMATOCRIT: CPT

## 2021-02-20 PROCEDURE — 3700000001 HC ADD 15 MINUTES (ANESTHESIA): Performed by: SURGERY

## 2021-02-20 PROCEDURE — 2000000000 HC ICU R&B

## 2021-02-20 PROCEDURE — 2500000003 HC RX 250 WO HCPCS: Performed by: STUDENT IN AN ORGANIZED HEALTH CARE EDUCATION/TRAINING PROGRAM

## 2021-02-20 PROCEDURE — P9041 ALBUMIN (HUMAN),5%, 50ML: HCPCS | Performed by: INTERNAL MEDICINE

## 2021-02-20 PROCEDURE — 2580000003 HC RX 258: Performed by: NURSE PRACTITIONER

## 2021-02-20 PROCEDURE — 80048 BASIC METABOLIC PNL TOTAL CA: CPT

## 2021-02-20 PROCEDURE — 84145 PROCALCITONIN (PCT): CPT

## 2021-02-20 PROCEDURE — 3600000014 HC SURGERY LEVEL 4 ADDTL 15MIN: Performed by: SURGERY

## 2021-02-20 PROCEDURE — 94002 VENT MGMT INPAT INIT DAY: CPT

## 2021-02-20 PROCEDURE — 99232 SBSQ HOSP IP/OBS MODERATE 35: CPT | Performed by: INTERNAL MEDICINE

## 2021-02-20 PROCEDURE — 88307 TISSUE EXAM BY PATHOLOGIST: CPT

## 2021-02-20 PROCEDURE — 97163 PT EVAL HIGH COMPLEX 45 MIN: CPT

## 2021-02-20 PROCEDURE — 6360000002 HC RX W HCPCS: Performed by: NURSE PRACTITIONER

## 2021-02-20 PROCEDURE — 2720000010 HC SURG SUPPLY STERILE: Performed by: SURGERY

## 2021-02-20 PROCEDURE — 97530 THERAPEUTIC ACTIVITIES: CPT

## 2021-02-20 PROCEDURE — 93010 ELECTROCARDIOGRAM REPORT: CPT | Performed by: INTERNAL MEDICINE

## 2021-02-20 PROCEDURE — 2709999900 HC NON-CHARGEABLE SUPPLY: Performed by: SURGERY

## 2021-02-20 PROCEDURE — 3700000000 HC ANESTHESIA ATTENDED CARE: Performed by: SURGERY

## 2021-02-20 PROCEDURE — 85027 COMPLETE CBC AUTOMATED: CPT

## 2021-02-20 PROCEDURE — 94761 N-INVAS EAR/PLS OXIMETRY MLT: CPT

## 2021-02-20 PROCEDURE — 2700000000 HC OXYGEN THERAPY PER DAY

## 2021-02-20 PROCEDURE — 2580000003 HC RX 258: Performed by: ANESTHESIOLOGY

## 2021-02-20 PROCEDURE — 36415 COLL VENOUS BLD VENIPUNCTURE: CPT

## 2021-02-20 PROCEDURE — 99291 CRITICAL CARE FIRST HOUR: CPT | Performed by: INTERNAL MEDICINE

## 2021-02-20 PROCEDURE — 87635 SARS-COV-2 COVID-19 AMP PRB: CPT

## 2021-02-20 PROCEDURE — 6360000002 HC RX W HCPCS: Performed by: ANESTHESIOLOGY

## 2021-02-20 PROCEDURE — 2500000003 HC RX 250 WO HCPCS: Performed by: NURSE PRACTITIONER

## 2021-02-20 PROCEDURE — 3600000004 HC SURGERY LEVEL 4 BASE: Performed by: SURGERY

## 2021-02-20 PROCEDURE — 86901 BLOOD TYPING SEROLOGIC RH(D): CPT

## 2021-02-20 PROCEDURE — 6370000000 HC RX 637 (ALT 250 FOR IP): Performed by: NURSE PRACTITIONER

## 2021-02-20 PROCEDURE — 74018 RADEX ABDOMEN 1 VIEW: CPT

## 2021-02-20 PROCEDURE — 99233 SBSQ HOSP IP/OBS HIGH 50: CPT | Performed by: SURGERY

## 2021-02-20 PROCEDURE — 2500000003 HC RX 250 WO HCPCS: Performed by: ANESTHESIOLOGY

## 2021-02-20 PROCEDURE — 74176 CT ABD & PELVIS W/O CONTRAST: CPT

## 2021-02-20 PROCEDURE — 0D1B0Z4 BYPASS ILEUM TO CUTANEOUS, OPEN APPROACH: ICD-10-PCS | Performed by: SURGERY

## 2021-02-20 RX ORDER — PIPERACILLIN SODIUM, TAZOBACTAM SODIUM 3; .375 G/15ML; G/15ML
INJECTION, POWDER, LYOPHILIZED, FOR SOLUTION INTRAVENOUS PRN
Status: DISCONTINUED | OUTPATIENT
Start: 2021-02-20 | End: 2021-02-20 | Stop reason: SDUPTHER

## 2021-02-20 RX ORDER — PROPOFOL 10 MG/ML
INJECTION, EMULSION INTRAVENOUS PRN
Status: DISCONTINUED | OUTPATIENT
Start: 2021-02-20 | End: 2021-02-20 | Stop reason: SDUPTHER

## 2021-02-20 RX ORDER — DIPHENHYDRAMINE HYDROCHLORIDE 50 MG/ML
12.5 INJECTION INTRAMUSCULAR; INTRAVENOUS
Status: DISCONTINUED | OUTPATIENT
Start: 2021-02-20 | End: 2021-02-20 | Stop reason: HOSPADM

## 2021-02-20 RX ORDER — ALBUMIN, HUMAN INJ 5% 5 %
25 SOLUTION INTRAVENOUS ONCE
Status: COMPLETED | OUTPATIENT
Start: 2021-02-20 | End: 2021-02-20

## 2021-02-20 RX ORDER — SUCCINYLCHOLINE/SOD CL,ISO/PF 200MG/10ML
SYRINGE (ML) INTRAVENOUS PRN
Status: DISCONTINUED | OUTPATIENT
Start: 2021-02-20 | End: 2021-02-20 | Stop reason: SDUPTHER

## 2021-02-20 RX ORDER — SODIUM CHLORIDE 9 MG/ML
INJECTION, SOLUTION INTRAVENOUS CONTINUOUS PRN
Status: DISCONTINUED | OUTPATIENT
Start: 2021-02-20 | End: 2021-02-20 | Stop reason: SDUPTHER

## 2021-02-20 RX ORDER — ONDANSETRON 2 MG/ML
4 INJECTION INTRAMUSCULAR; INTRAVENOUS
Status: DISCONTINUED | OUTPATIENT
Start: 2021-02-20 | End: 2021-02-20 | Stop reason: HOSPADM

## 2021-02-20 RX ORDER — FENTANYL CITRATE 50 UG/ML
50 INJECTION, SOLUTION INTRAMUSCULAR; INTRAVENOUS EVERY 5 MIN PRN
Status: DISCONTINUED | OUTPATIENT
Start: 2021-02-20 | End: 2021-02-20 | Stop reason: HOSPADM

## 2021-02-20 RX ORDER — MORPHINE SULFATE 2 MG/ML
2 INJECTION, SOLUTION INTRAMUSCULAR; INTRAVENOUS EVERY 5 MIN PRN
Status: DISCONTINUED | OUTPATIENT
Start: 2021-02-20 | End: 2021-02-20 | Stop reason: HOSPADM

## 2021-02-20 RX ORDER — MIDAZOLAM HYDROCHLORIDE 1 MG/ML
INJECTION INTRAMUSCULAR; INTRAVENOUS PRN
Status: DISCONTINUED | OUTPATIENT
Start: 2021-02-20 | End: 2021-02-20 | Stop reason: SDUPTHER

## 2021-02-20 RX ORDER — MEPERIDINE HYDROCHLORIDE 25 MG/ML
12.5 INJECTION INTRAMUSCULAR; INTRAVENOUS; SUBCUTANEOUS EVERY 5 MIN PRN
Status: DISCONTINUED | OUTPATIENT
Start: 2021-02-20 | End: 2021-02-20 | Stop reason: HOSPADM

## 2021-02-20 RX ORDER — HYDRALAZINE HYDROCHLORIDE 20 MG/ML
5 INJECTION INTRAMUSCULAR; INTRAVENOUS EVERY 10 MIN PRN
Status: DISCONTINUED | OUTPATIENT
Start: 2021-02-20 | End: 2021-02-20 | Stop reason: HOSPADM

## 2021-02-20 RX ORDER — ROCURONIUM BROMIDE 10 MG/ML
INJECTION, SOLUTION INTRAVENOUS PRN
Status: DISCONTINUED | OUTPATIENT
Start: 2021-02-20 | End: 2021-02-20 | Stop reason: SDUPTHER

## 2021-02-20 RX ORDER — GLYCOPYRROLATE 1 MG/5 ML
SYRINGE (ML) INTRAVENOUS PRN
Status: DISCONTINUED | OUTPATIENT
Start: 2021-02-20 | End: 2021-02-20 | Stop reason: SDUPTHER

## 2021-02-20 RX ORDER — LABETALOL 20 MG/4 ML (5 MG/ML) INTRAVENOUS SYRINGE
5 4 TIMES DAILY PRN
Status: DISCONTINUED | OUTPATIENT
Start: 2021-02-20 | End: 2021-02-20 | Stop reason: HOSPADM

## 2021-02-20 RX ORDER — PHENYLEPHRINE HYDROCHLORIDE 10 MG/ML
INJECTION INTRAVENOUS PRN
Status: DISCONTINUED | OUTPATIENT
Start: 2021-02-20 | End: 2021-02-20 | Stop reason: SDUPTHER

## 2021-02-20 RX ADMIN — PHENYLEPHRINE HYDROCHLORIDE 400 MCG: 10 INJECTION INTRAVENOUS at 21:17

## 2021-02-20 RX ADMIN — HEPARIN SODIUM 5000 UNITS: 5000 INJECTION INTRAVENOUS; SUBCUTANEOUS at 06:23

## 2021-02-20 RX ADMIN — PHENYLEPHRINE HYDROCHLORIDE 20 MCG: 10 INJECTION INTRAVENOUS at 21:25

## 2021-02-20 RX ADMIN — MIDAZOLAM HYDROCHLORIDE 2 MG: 1 INJECTION, SOLUTION INTRAMUSCULAR; INTRAVENOUS at 23:15

## 2021-02-20 RX ADMIN — MORPHINE SULFATE 2 MG: 2 INJECTION, SOLUTION INTRAMUSCULAR; INTRAVENOUS at 14:00

## 2021-02-20 RX ADMIN — Medication 500 MG: at 14:41

## 2021-02-20 RX ADMIN — LEVOTHYROXINE SODIUM 75 MCG: 75 TABLET ORAL at 06:23

## 2021-02-20 RX ADMIN — Medication 500 MG: at 00:36

## 2021-02-20 RX ADMIN — PIPERACILLIN AND TAZOBACTAM 3.38 G: 3; .375 INJECTION, POWDER, LYOPHILIZED, FOR SOLUTION INTRAVENOUS at 21:33

## 2021-02-20 RX ADMIN — Medication 500 MG: at 06:23

## 2021-02-20 RX ADMIN — ATORVASTATIN CALCIUM 40 MG: 40 TABLET, FILM COATED ORAL at 08:59

## 2021-02-20 RX ADMIN — TAMSULOSIN HYDROCHLORIDE 0.4 MG: 0.4 CAPSULE ORAL at 08:59

## 2021-02-20 RX ADMIN — Medication 140 MG: at 21:07

## 2021-02-20 RX ADMIN — MORPHINE SULFATE 2 MG: 2 INJECTION, SOLUTION INTRAMUSCULAR; INTRAVENOUS at 09:08

## 2021-02-20 RX ADMIN — Medication 2 MCG/MIN: at 23:58

## 2021-02-20 RX ADMIN — SERTRALINE 100 MG: 100 TABLET, FILM COATED ORAL at 08:59

## 2021-02-20 RX ADMIN — MORPHINE SULFATE 2 MG: 2 INJECTION, SOLUTION INTRAMUSCULAR; INTRAVENOUS at 02:06

## 2021-02-20 RX ADMIN — PHENYLEPHRINE HYDROCHLORIDE 500 MCG: 10 INJECTION INTRAVENOUS at 21:20

## 2021-02-20 RX ADMIN — Medication 0.4 MG: at 22:36

## 2021-02-20 RX ADMIN — METRONIDAZOLE 500 MG: 500 INJECTION, SOLUTION INTRAVENOUS at 15:40

## 2021-02-20 RX ADMIN — ROCURONIUM BROMIDE 50 MG: 10 INJECTION INTRAVENOUS at 21:12

## 2021-02-20 RX ADMIN — PROPOFOL 120 MG: 10 INJECTION, EMULSION INTRAVENOUS at 21:07

## 2021-02-20 RX ADMIN — ROCURONIUM BROMIDE 50 MG: 10 INJECTION INTRAVENOUS at 22:08

## 2021-02-20 RX ADMIN — PHENYLEPHRINE HYDROCHLORIDE 100 MCG: 10 INJECTION INTRAVENOUS at 21:10

## 2021-02-20 RX ADMIN — FAMOTIDINE 20 MG: 10 INJECTION, SOLUTION INTRAVENOUS at 08:57

## 2021-02-20 RX ADMIN — ALBUMIN (HUMAN) 25 G: 12.5 INJECTION, SOLUTION INTRAVENOUS at 17:22

## 2021-02-20 RX ADMIN — Medication 500 MG: at 18:43

## 2021-02-20 RX ADMIN — METRONIDAZOLE 500 MG: 500 INJECTION, SOLUTION INTRAVENOUS at 06:23

## 2021-02-20 RX ADMIN — SODIUM CHLORIDE: 9 INJECTION, SOLUTION INTRAVENOUS at 05:14

## 2021-02-20 RX ADMIN — MICONAZOLE NITRATE: 20 POWDER TOPICAL at 09:00

## 2021-02-20 RX ADMIN — SODIUM CHLORIDE: 9 INJECTION, SOLUTION INTRAVENOUS at 21:02

## 2021-02-20 RX ADMIN — PHENYLEPHRINE HYDROCHLORIDE 200 MCG: 10 INJECTION INTRAVENOUS at 21:14

## 2021-02-20 ASSESSMENT — PULMONARY FUNCTION TESTS
PIF_VALUE: 20
PIF_VALUE: 22
PIF_VALUE: 18
PIF_VALUE: 17
PIF_VALUE: 26
PIF_VALUE: 27
PIF_VALUE: 20
PIF_VALUE: 2
PIF_VALUE: 17
PIF_VALUE: 18
PIF_VALUE: 18
PIF_VALUE: 20
PIF_VALUE: 28
PIF_VALUE: 29
PIF_VALUE: 18
PIF_VALUE: 19
PIF_VALUE: 18
PIF_VALUE: 21
PIF_VALUE: 18
PIF_VALUE: 17
PIF_VALUE: 18
PIF_VALUE: 21
PIF_VALUE: 21
PIF_VALUE: 17
PIF_VALUE: 21
PIF_VALUE: 18
PIF_VALUE: 18
PIF_VALUE: 21
PIF_VALUE: 17
PIF_VALUE: 20
PIF_VALUE: 19
PIF_VALUE: 19
PIF_VALUE: 18
PIF_VALUE: 0
PIF_VALUE: 19
PIF_VALUE: 17
PIF_VALUE: 18
PIF_VALUE: 21
PIF_VALUE: 1
PIF_VALUE: 17
PIF_VALUE: 17
PIF_VALUE: 19
PIF_VALUE: 18
PIF_VALUE: 18
PIF_VALUE: 20
PIF_VALUE: 17
PIF_VALUE: 18
PIF_VALUE: 2
PIF_VALUE: 1
PIF_VALUE: 17
PIF_VALUE: 27
PIF_VALUE: 18
PIF_VALUE: 20
PIF_VALUE: 19
PIF_VALUE: 17
PIF_VALUE: 18
PIF_VALUE: 28
PIF_VALUE: 27
PIF_VALUE: 21
PIF_VALUE: 26
PIF_VALUE: 19
PIF_VALUE: 20
PIF_VALUE: 19
PIF_VALUE: 18
PIF_VALUE: 18
PIF_VALUE: 17
PIF_VALUE: 22
PIF_VALUE: 17
PIF_VALUE: 17
PIF_VALUE: 19
PIF_VALUE: 28

## 2021-02-20 ASSESSMENT — PAIN - FUNCTIONAL ASSESSMENT: PAIN_FUNCTIONAL_ASSESSMENT: PREVENTS OR INTERFERES SOME ACTIVE ACTIVITIES AND ADLS

## 2021-02-20 ASSESSMENT — PAIN DESCRIPTION - PROGRESSION
CLINICAL_PROGRESSION: NOT CHANGED

## 2021-02-20 ASSESSMENT — PAIN DESCRIPTION - DESCRIPTORS: DESCRIPTORS: CRAMPING

## 2021-02-20 ASSESSMENT — PAIN SCALES - WONG BAKER
WONGBAKER_NUMERICALRESPONSE: 4
WONGBAKER_NUMERICALRESPONSE: 4
WONGBAKER_NUMERICALRESPONSE: 2

## 2021-02-20 ASSESSMENT — PAIN DESCRIPTION - FREQUENCY: FREQUENCY: CONTINUOUS

## 2021-02-20 ASSESSMENT — PAIN SCALES - GENERAL: PAINLEVEL_OUTOF10: 6

## 2021-02-20 ASSESSMENT — PAIN DESCRIPTION - ONSET: ONSET: ON-GOING

## 2021-02-20 NOTE — PROGRESS NOTES
Renal Progress Note  [facility]    Patient :  Eldon Armas; 79 y.o. MRN# [id number]  Location:  [facility]10/010-A  Attending:  Yu Samuel MD  Admit Date:  2/17/2021   Hospital Day: 3      Subjective:     Nephrology is following the patient for CARMEN. Patient seen and examined. He is on levophed 4 mcg, 0.9 @ 100 cc/hr. Good urine output. On room air. Outpatient Medications:     Medications Prior to Admission: gabapentin (NEURONTIN) 100 MG capsule, Take 800 mg by mouth 3 times daily.   sertraline (ZOLOFT) 100 MG tablet, Take 200 mg by mouth daily  metoprolol tartrate (LOPRESSOR) 25 MG tablet, Take 6.25 mg by mouth 2 times daily  ARIPiprazole (ABILIFY) 10 MG tablet, Take 10 mg by mouth nightly  acetaminophen (TYLENOL) 325 MG tablet, Take 650 mg by mouth every 4 hours as needed for Pain or Fever (mild [pain or temp greater than 101.5)  Naproxen Sodium (ALEVE) 220 MG CAPS, Take 1 capsule by mouth 2 times daily as needed for Pain (moderate pain)  busPIRone (BUSPAR) 15 MG tablet, Take 15 mg by mouth 3 times daily  calcium-vitamin D (OSCAL-500) 500-200 MG-UNIT per tablet, Take 1 tablet by mouth daily  docusate sodium (COLACE) 100 MG capsule, Take 100 mg by mouth daily as needed for Constipation  docusate sodium (COLACE) 100 MG capsule, Take 100 mg by mouth daily  doxepin (SINEQUAN) 10 MG capsule, Take 10 mg by mouth nightly  loperamide (IMODIUM) 2 MG capsule, Take 2 mg by mouth every 6 hours as needed for Diarrhea  magnesium hydroxide (MILK OF MAGNESIA) 400 MG/5ML suspension, Take by mouth daily as needed for Constipation  Multiple Vitamins-Minerals (THERAPEUTIC MULTIVITAMIN-MINERALS) tablet, Take 1 tablet by mouth daily  QUEtiapine (SEROQUEL) 100 MG tablet, Take 100 mg by mouth daily  mirtazapine (REMERON) 15 MG tablet, Take 45 mg by mouth daily  vitamin D (ERGOCALCIFEROL) 1.25 MG (56521 UT) CAPS capsule, Take 50,000 Units by mouth once a week Every friday  buPROPion (WELLBUTRIN XL) 300 MG extended release tablet, Take 300 mg by mouth every morning  [DISCONTINUED] metoprolol succinate (TOPROL XL) 100 MG extended release tablet, Take 6.25 mg by mouth 2 times daily  levothyroxine (SYNTHROID) 75 MCG tablet, Take 75 mcg by mouth Daily  lisinopril (PRINIVIL;ZESTRIL) 10 MG tablet, Take 10 mg by mouth daily  tamsulosin (FLOMAX) 0.4 MG capsule, Take 0.4 mg by mouth daily  atorvastatin (LIPITOR) 40 MG tablet, Take 40 mg by mouth daily  [DISCONTINUED] metoprolol tartrate (LOPRESSOR) 25 MG tablet, Take 6.25 mg by mouth 2 times daily   [DISCONTINUED] sertraline (ZOLOFT) 100 MG tablet, Take 100 mg by mouth daily  [DISCONTINUED] clomiPRAMINE (ANAFRANIL) 50 MG capsule, Take 50 mg by mouth nightly  [DISCONTINUED] gabapentin (NEURONTIN) 600 MG tablet, Take 800 mg by mouth 3 times daily. Current Medications:     Scheduled Meds:    famotidine (PEPCID) injection  20 mg Intravenous Daily    heparin (porcine)  5,000 Units Subcutaneous Q8H    atorvastatin  40 mg Oral Daily    clomiPRAMINE  50 mg Oral Nightly    levothyroxine  75 mcg Oral Daily    metoprolol tartrate  25 mg Oral BID    sertraline  100 mg Oral Daily    tamsulosin  0.4 mg Oral Daily    metroNIDAZOLE  500 mg Intravenous Q8H    vancomycin  500 mg Per NG tube 4 times per day    calcium replacement protocol   Other RX Placeholder    miconazole   Topical BID     Continuous Infusions:    norepinephrine 4 mcg/min (02/19/21 2340)    sodium chloride 100 mL/hr at 02/20/21 0514     PRN Meds:  morphine, promethazine **OR** ondansetron, polyethylene glycol, acetaminophen **OR** acetaminophen    Input/Output:       I/O last 3 completed shifts: In: 2897.6 [P.O.:600; I.V.:2297.6]  Out: 0054 [Urine:2375; Emesis/NG output:200; Stool:200].       Patient Vitals for the past 96 hrs (Last 3 readings):   Weight   02/20/21 0400 299 lb 6.2 oz (135.8 kg)   02/19/21 0626 (!) 312 lb 6.3 oz (141.7 kg)   02/18/21 0600 299 lb 6.2 oz (135.8 kg)       Vital Signs:   Temperature: Temp: 98.7 °F (37.1 °C)  TMax:   Temp (24hrs), Av.1 °F (37.3 °C), Min:98.4 °F (36.9 °C), Max:100.4 °F (38 °C)    Respirations:  Resp: 15  Pulse:   Pulse: 96  BP:    BP: (!) 118/52  BP Range: Systolic (48HXR), PRE:207 , Min:91 , SFJ:063       Diastolic (58MHO), LUCINDA:48, Min:39, Max:67      Physical Examination:     General:  Awake, has some confusion noted  HEENT: NC/AT/ MMM +NG tube  Chest:               diminished  Cardiac:  S1 S2   Abdomen: Distended, hypoactive bowel sounds  Neuro:  No facial droop, No Asterixis  SKIN:  No rashes, good skin turgor.   Extremities:  Edema in arms noted right > left    Labs:       Recent Labs     21  0621  0640   WBC 14.5* 17.3* 22.6*   RBC 2.88* 3.18* 3.39*   HGB 8.7* 9.4* 9.9*   HCT 27.2* 30.3* 32.5*   MCV 94.4* 95.3* 95.9*   MCH 30.2 29.6 29.2   MCHC 32.0* 31.0* 30.5*    142 196   MPV 12.0 11.8 11.7      BMP:   Recent Labs     21  03521  0620 21  0640    140 141   K 4.2 4.2 4.1    105 109   CO2 26 26 24   BUN 95* 95* 84*   CREATININE 4.6* 3.7* 2.8*   GLUCOSE 126* 136* 145*   CALCIUM 7.7* 7.6* 7.8*      Phosphorus:     Recent Labs     21  03521  0640   PHOS 2.7 3.3     Magnesium:    Recent Labs     21  03521  0640   MG 2.2 2.0     Albumin:    Recent Labs     21  0620   LABALBU 2.6*     BNP:    No results found for: BNP  LIANA:    No results found for: LIANA  SPEP:  Lab Results   Component Value Date    PROT 5.4 2021     UPEP:   No results found for: LABPE  C3:   No results found for: C3  C4:   No results found for: C4  MPO ANCA:   No results found for: MPO  PR3 ANCA:   No results found for: PR3  Anti-GBM:   No results found for: GBMABIGG  Hep BsAg:       No results found for: HEPBSAG  Hep C AB:        No results found for: HEPCAB    Urinalysis/Chemistries:      Lab Results   Component Value Date    NITRU NEGATIVE 2021    COLORU YELLOW 2021    PHUR 5.0 2021 LABCAST >15 HYALINE 02/17/2021    LABCAST >15 FINE GRAN 02/17/2021    WBCUA 50-75 02/17/2021    RBCUA 10-15 02/17/2021    YEAST MANY BUDDING 02/17/2021    BACTERIA MODERATE 02/17/2021    SPECGRAV 1.017 02/17/2021    LEUKOCYTESUR MODERATE 02/17/2021    UROBILINOGEN 0.2 02/17/2021    BILIRUBINUR NEGATIVE 02/17/2021    BLOODU SMALL 02/17/2021    KETUA NEGATIVE 02/17/2021     Urine Sodium:   No results found for: CHERI  Urine Potassium:  No results found for: KUR  Urine Chloride:  No results found for: CLUR  Urine Osmolarity:   Lab Results   Component Value Date    OSMOU 363 02/17/2021     Urine Protein:   No components found for: TOTALPROTEIN, URINE   Urine Creatinine:   No results found for: LABCREA  Urine Eosinophils:  No components found for: UEOS        Impression and Plan:  1. CARMEN: multifactorial, likely ATN from prolonged diarrhea, infection,  Improving. Starting to have some edema will reduce IVF rate to 75 ml/hour. BUN is improving  2. Hypotension on Levophed  3. Leukocytosis, worse  4. C. Diff colitis  5. Anemia  6. UTI  7. Possible cholecystitis  8. Altered mental status        Please don't hesitate to call with any questions.   Electronically signed by Brandon Rivers DO on 2/20/2021 at 8:03 AM

## 2021-02-20 NOTE — PROGRESS NOTES
Problem: Discharge Planning:  Goal: Participates in care planning  Outcome: Ongoing  Note: Pt unable to participate in care planning at this time. Problem: Discharge Planning:  Goal: Discharged to appropriate level of care  Description: Discharged to appropriate level of care  Outcome: Ongoing  Note: Plan SNF at discharge     Problem: Bowel Function - Altered:  Goal: Bowel elimination is within specified parameters  Description: Bowel elimination is within specified parameters  Outcome: Ongoing  Note: Pt continues with loose, watery stools     Problem: Cardiac Output - Decreased:  Goal: Hemodynamic stability will improve  Description: Hemodynamic stability will improve  Outcome: Ongoing  Note: Pt requiring low dose Levo gtt     Problem: Fluid Volume - Imbalance:  Goal: Absence of imbalanced fluid volume signs and symptoms  Description: Absence of imbalanced fluid volume signs and symptoms  Outcome: Ongoing  Note: Strict I & O      Problem: Pain:  Goal: Control of acute pain  Description: Control of acute pain  Outcome: Ongoing  Note: Pain Assessment: Faces  Pain Level: 4       Is pain goal met at this time? No      Non-pharmacological measures: Repositioning, rest        Problem: Skin Integrity - Impaired:  Goal: Will show no infection signs and symptoms  Description: Will show no infection signs and symptoms  Outcome: Ongoing  Note: No s/s of infection r/t skin     Problem: Skin Integrity - Impaired:  Goal: Absence of new skin breakdown  Description: Absence of new skin breakdown  Outcome: Ongoing  Note: No new skin breakdown. Pt repositioned every two hours     Problem: Urinary Elimination:  Goal: Signs and symptoms of infection will decrease  Description: Signs and symptoms of infection will decrease  Outcome: Ongoing  Note: Velez care done once per shift and PRN.       Problem: Urinary Elimination:  Goal: Complications related to the disease process, condition or treatment will be avoided or

## 2021-02-20 NOTE — PROGRESS NOTES
[address] [facility] PHYSICAL THERAPY  EVALUATION  [facility] ICU 4D - 4D-10/010-A    Time In: 8768  Time Out: 1110  Timed Code Treatment Minutes: 15 Minutes  Minutes: 28          Date: 2021  Patient Name: Deisy Lugo,  Gender:  male        MRN: [id number]  : 1940  (79 y.o.)      Referring Practitioner: Dr. Silvio Chambers  Diagnosis: hypotension  Additional Pertinent Hx: admit with above diagnosis, +Cdiff induced pancolitis, CARMEN     Restrictions/Precautions:  Restrictions/Precautions: General Precautions, Fall Risk  Position Activity Restriction  Other position/activity restrictions: NG tube    Subjective:  Chart Reviewed: Yes  Patient assessed for rehabilitation services?: Yes  Subjective: ok with RN assist during session, pt cooperative, confusion noted    General:            Hearing: Within functional limits         Pain: not rated, pain in abdomen    Social/Functional History:    Lives With: Alone  Type of Home: Assisted living  Home Layout: One level  Home Equipment: (per pt used walker PTA)     Ambulation Assistance: Independent  Transfer Assistance: Independent    Additional Comments: info per pt, no caregiver present to verify    OBJECTIVE:  Range of Motion:  Bilateral Lower Extremity: WFL    Strength:  Bilateral Lower Extremity: Impaired - grossly 3/5    Balance:  Static Sitting Balance:  Minimal Assistance, to primarily CGA,  Static Standing Balance: Minimal Assistance, Moderate Assistance, X 2, HHA, cues for erect posture, x3 trials with bedpan placed under pt after first trial, pt impulsive to try to std, tolerated 10-20 sec each trial    Bed Mobility:  Rolling to Left: Moderate Assistance, Maximum Assistance, X 2   Rolling to Right: Moderate Assistance, Maximum Assistance, X 2   Supine to Sit: Maximum Assistance, X 2  Sit to Supine: Maximum Assistance, X 2   Scooting: Maximum Assistance, X 1, fwd in sitting to edge of bed first 3 trials, last 3 trials with CGA    Transfers:  Sit to Stand: fluctuated modAx2 to minAx1 with pt impulsive to std, cues for safety, HHA, from edge of bed x3 trials  Stand to Sit:fluctuated minAx1-2    Ambulation:  Not Tested        Sup before session /55  Sitting   BP 88/65  After returned to sup /72    RN aware of vitals    Functional Outcome Measures: Completed  AM-PAC Inpatient Mobility Raw Score : 6  AM-PAC Inpatient T-Scale Score : 23.55    ASSESSMENT:  Activity Tolerance:  Patient tolerance of  treatment: fair. Treatment Initiated: Treatment and education initiated within context of evaluation. Evaluation time included review of current medical information, gathering information related to past medical, social and functional history, completion of standardized testing, formal and informal observation of tasks, assessment of data and development of plan of care and goals. Treatment time included skilled education and facilitation of tasks to increase safety and independence with functional mobility for improved independence and quality of life. Assessment: Body structures, Functions, Activity limitations: Decreased functional mobility , Increased pain, Decreased balance, Decreased strength, Decreased safe awareness, Decreased cognition, Decreased endurance  Assessment: pt with NG tube, IV lines, king catheter, ICU monitors, generalized weakness, confusion, dec balance, impulsive with safety concerns, inc assist for safe mobility, recommend cont PT to inc pt functional mobility  Prognosis: Good    REQUIRES PT FOLLOW UP: Yes    Discharge Recommendations:  Discharge Recommendations: Continue to assess pending progress, Subacute/Skilled Nursing Facility, Patient would benefit from continued therapy after discharge    Patient Education:  PT Education: Goals, PT Role, Plan of Care, Functional Mobility Training    Equipment Recommendations:   Other: cont to assess needs    Plan:  Times per week: 3-5X GM  Times per day: Daily  Specific instructions for Next Treatment: therex,bed mobility, sitting balance, transfers, ery steady, PT to assess gait    Goals:  Patient goals : not stated  Short term goals  Time Frame for Short term goals: by discharge  Short term goal 1: roll L/R with Chuy for pressure relief  Short term goal 2: sidelying to/from sit with modAx1 to get in/out of bed  Short term goal 3: sit to std with minAx2 to get in/out of chairs  Short term goal 4: tolerate >15 min sitting balance activity with </=SBA to demonstrate inc strength for progression with home mobility  Short term goal 5: PT to assess gait  Long term goals  Time Frame for Long term goals : no LTGs set secondary to short ELOS    Following session, patient left in safe position with all fall risk precautions in place.

## 2021-02-20 NOTE — PROGRESS NOTES
CRITICAL CARE PROGRESS NOTE      Patient:  Elías Naik    Unit/Bed:4D-10/010-A  YOB: 1940  MRN: [id number]   PCP: Zoe Sanon MD  Date of Admission: 2/17/2021  Chief Complaint:- Hypotension, worsening creatinine in the setting of C. difficile colitis    Assessment and Plan:    1. Hypotension secondary to hypovolemia and distributive shock: At time of admission to ICU, patient appeared dry. Patient was given normal saline bolus. Patient remained hypotensive, so Levophed was continued to maintain a MAP >65. Lactic acid normal.  Patient required Levophed overnight, weaning down as able. Continue to treat with fluids as able. A.m. cortisol checked on 2/17 showing 19.85.  2. Fulminant C. difficile colitis: CT of the abdomen pelvis on 2/17 shows pancolitis that is consistent with C. difficile. There was no bowel perforation or ileus. Surgery has seen the patient and states that no operation is warranted at this time. Patient does have generalized abdominal pain on examination and altered mental status. Continue with oral vancomycin and Flagyl. Patient's WBC increased overnight. Infectious disease saw the patient and stated that he would likely benefit from loop ileostomy with vancomycin irrigation. CT scan with p.o. contrast ordered to check for micro perforation due to not improving abdomen. 3. CARMEN on CKD II: Baseline creatinine 1.1. Patient is oliguric. UA tested positive for bacteria and yeast.  IV fluids should be continued at this time. Patient appears clinically dehydrated on arrival and needed Levophed to maintain blood pressures. Nephrology following. CARMEN secondary to ischemic ATN. Dose medications appropriately. Patient making scant urine. Creatinine is minimally decreasing, BUN is increasing. Nephrology stated that if BUN >100, dialysis would be warranted. The family is okay with starting dialysis if needed.   Currently the patient's creatinine is downtrending, and does not need dialysis. 4. Complicated UTI: UA shows bacteria, yeast, blood, and WBC. Blood culture currently negative, awaiting results of urine culture. Patient currently on vancomycin and Flagyl. Holding treatment of Candida infection due to CARMEN. 5. Incidental pulmonary nodule: Seen incidentally on CT scan in January. PET scan completed in 2/12. Nodule noted in right lower lobe, with primary rule out for metastatic process. The nodule could also be infectious or inflammatory, monitor for now. CT of the chest should be repeated in 3 months. 6. Probable cholecystitis: CT scan of abdomen showed probable cholelithiasis, and subsequent US gallbladder shows an enlarged gallbladder with mild wall thickening and sludge. On exam, the patient is diffusely tender across the abdomen. There is no sign of bowel perforation, and surgery has already seen the patient and stated the will not do surgery at this time. WBC is currently downtrending, if there is enough spike in the count, will obtain a MRCP. If patient suddenly becomes nauseous or has increased vomiting, we will obtain a lipase to check for pancreatitis. WBC remains elevated, patient complaining of diffuse abdominal pain at this time, checking for perforation. 7. Acute metabolic encephalopathy: Secondary to hypotension and hypoperfusion of the brain, as well as uremia. Continue to monitor for resolution with treatment. Patient becoming more alert and less confused secondary due to decrease in BUN. 8. Hypocalcemia: Calcium has remained low since arrival with a secondary low iCal.  Replacement protocol in place, replace as necessary. 9. Acute on chronic anemia: Hgb on arrival was 10.2, subsequent decrease in 2/18 to 8.7 following reports from the nursing staff of blood in his stools and his NG tube. There is a component of blood loss anemia on chronic macrocytic anemia. Continue to trend hemoglobin, will transfuse if <7.0. Heparin restarted.     INITIAL H AND P AND ICU COURSE:   Shefali Lyons is a 76-year-old male was transferred from [facility] on 2/17 for acute kidney injury and C. difficile colitis. Patient was recently admitted to [facility] on 2/26 for altered mental status and sepsis secondary to right leg cellulitis. He was treated with Zosyn and Rocephin, and also discharged with antibiotics to the nursing home at the time. During the course of hospital treatment, they also noted a pulmonary nodule on CT scan that measured 3 x 2 x 2.6 cm. It was a concern for malignancy at the time, the requested follow-up however patient does not have any further scans or PET scans. Blood cultures grew E. coli at the time. During this hospital course, the patient was originally sent from the SNF to [facility] [place] on 2/14 when he was found to be diaphoretic and hypotensive with a fever of 102 °F.  Patient was evaluated in the emergency room and diagnosed to have sepsis but the source was unknown at the time and he was given fluid resuscitation and started on a Levophed drip. Patient was started on Zosyn at time. During his hospital stay he was found to have worsening renal function and nephrology was consulted. On 2/16 the patient was seen by Dr. Ninetta Denver from nephrology. Further work-up did show C. difficile colitis. Patient was started on vancomycin. He had a noted mental change and worsening creatinine on 2/16 so he was transferred to [facility] ICU for further management and care per Dr. Ninetta Denver.    2/18: Patient this morning remains on pressors as needed to maintain his blood pressure. He has been receiving fluid throughout the night. His hemoglobin did drop from 10 to about 8, however it is stayed stable since then, continue to monitor. His WBC has decreased along with his creatinine. However his BUN increased overnight. The patient seems more confused today with an inability to answer where or when he is.   The nephrologist stated that if his BUN increases over 100 dialysis would be warranted at that time. The surgeon also states that at this current point if surgery is warranted it would be a total colectomy. Both of these options were discussed with the family who stated that they would be okay starting dialysis if needed. They with prefer to hold off on surgery right now. 2/19: Patient remains on pressors to maintain his blood pressure however the dose has been weaned significantly. He still currently receiving IVF. Hgb has remained stable. WBC did increase overnight. His creatinine has decreased to 3.7. BUN has stated 95. The nephrologist had seen the patient earlier in the morning, and stated that dialysis is not needed at this time. Due to the rising WBC, infectious disease has been consulted. 2/20: Patient remains on pressors, cortisol was checked on 2/17 which was normal for a.m. Infectious diseases did recommend a loop ileostomy with vancomycin irrigation believes the patient would benefit from it. Obtaining surgery recommendations. His creatinine continues to decrease, and no dialysis is needed at this point, BUN decreasing as well. WBC irvin again overnight, and patient still complains of stomach pains. Past Medical History: OLGA, ASHD s/p CABG, hypothyroidism, dysphagia, CKD III, anemia, pulmonary nodule, history of cellulitis, osteoarthritis, prior COVID-19 infection, and depression. Family History: No significant family medical history. Social History: Lifetime non-smoker, currently denies alcohol use.     ROS   Review of Systems - General ROS: negative for - chills, fatigue or fever  Respiratory ROS: no cough, shortness of breath, or wheezing  Cardiovascular ROS: no chest pain or dyspnea on exertion  Gastrointestinal ROS: positive for -feeling of needing to defecate, abdominal pain diffuse   negative for - constipation, gas/bloating, heartburn or nausea/vomiting  Genito-Urinary ROS: no dysuria, trouble voiding, or 196   Phosphorus 3.3   Magnesium 2.0   Telemetry shows Normal Sinus Rhythm   CXR 2/17 shows right lower lobe airspace disease, right central line tip placement in SVC   X-ray abdomen 2/17 shows NG tube in the distal stomach   CT abdomen pelvis without contrast shows pancolitis consistent with C. difficile, no bowel perforation, probable cholelithiasis, and bibasilar pulmonary consolidations   US gallbladder shows distended gallbladder with slight wall thickening, sludge present within the lumen, Hamilton sign positive   CXR 2/19 shows good NG tube placement.  Urine Culture 2/19 shows yeast (unspecified currently) with 90,000 colonies. Seen with multidisciplinary ICU team.  Meets Continued ICU Level Care Criteria:  [x] Yes, On pressors   [] No - Transfer Planned to listed location:  [] HOSPITALIST CONTACTED-      Case and plan discussed with Dr. Brigitte Loco.         Electronically signed by Karla Tipton DO  CRITICAL CARE SPECIALIST

## 2021-02-20 NOTE — FLOWSHEET NOTE
0900-Pt pulled out Ng tube, pulled BP cuff off, and pulling gown off. Restless, confused. Reoriented. 0930-Discussed with intensivist and # 18 FR NG reinserted into rt nostril without difficulty. Obstruction met when attempted left nostril. Air bolus heard over stomach and stomach contents draining.   Order placed for xray to confirm placement

## 2021-02-20 NOTE — PLAN OF CARE
decrease  2/19/2021 2242 by Alejandra Archibald RN  Outcome: Ongoing  Note: Velez care done once per shift and PRN. Problem: Urinary Elimination:  Goal: Complications related to the disease process, condition or treatment will be avoided or minimized  Description: Complications related to the disease process, condition or treatment will be avoided or minimized  Outcome: Ongoing     Problem: Falls - Risk of:  Goal: Will remain free from falls  Description: Will remain free from falls  2/19/2021 2242 by Alejandra Archibald RN  Outcome: Ongoing  Note: No falls this shift. Hourly rounding in effect. Bed alarm on. Problem: Falls - Risk of:  Goal: Absence of physical injury  Description: Absence of physical injury  Outcome: Ongoing  Note: No injury this shift. Will continue to monitor     Problem: Bowel/Gastric:  Goal: Occurrences of diarrhea will decrease  Description: Occurrences of diarrhea will decrease  2/19/2021 2242 by Alejandra Archibald RN  Outcome: Ongoing  Note: Pt still with loose, watery stool     Problem: Physical Regulation:  Goal: Signs and symptoms of infection will decrease  Description: Signs and symptoms of infection will decrease  2/19/2021 2242 by Alejandra Archibald RN  Outcome: Ongoing  Note: Velez care done once per shift and PRN. Problem: Physical Regulation:  Goal: Prevent transmision of infection  Description: Prevent transmision of infection  Note: Pt in Contact plus precautions      Care plan reviewed with patient and family. Patient and family verbalize understanding of the plan of care and contribute to goal setting.

## 2021-02-21 ENCOUNTER — APPOINTMENT (OUTPATIENT)
Dept: GENERAL RADIOLOGY | Age: 81
DRG: 853 | End: 2021-02-21
Attending: INTERNAL MEDICINE
Payer: MEDICARE

## 2021-02-21 LAB
ALLEN TEST: ABNORMAL
ANION GAP SERPL CALCULATED.3IONS-SCNC: 10 MEQ/L (ref 8–16)
BASE EXCESS (CALCULATED): -1.6 MMOL/L (ref -2.5–2.5)
BUN BLDV-MCNC: 76 MG/DL (ref 7–22)
CALCIUM SERPL-MCNC: 7.5 MG/DL (ref 8.5–10.5)
CHLORIDE BLD-SCNC: 108 MEQ/L (ref 98–111)
CO2: 21 MEQ/L (ref 23–33)
COLLECTED BY:: ABNORMAL
CREAT SERPL-MCNC: 2.3 MG/DL (ref 0.4–1.2)
DEVICE: ABNORMAL
ERYTHROCYTE [DISTWIDTH] IN BLOOD BY AUTOMATED COUNT: 16.2 % (ref 11.5–14.5)
ERYTHROCYTE [DISTWIDTH] IN BLOOD BY AUTOMATED COUNT: 58.1 FL (ref 35–45)
GFR SERPL CREATININE-BSD FRML MDRD: 28 ML/MIN/1.73M2
GLUCOSE BLD-MCNC: 168 MG/DL (ref 70–108)
HCO3: 24 MMOL/L (ref 23–28)
HCT VFR BLD CALC: 27.1 % (ref 42–52)
HEMOGLOBIN: 8.1 GM/DL (ref 14–18)
MCH RBC QN AUTO: 29 PG (ref 26–33)
MCHC RBC AUTO-ENTMCNC: 29.9 GM/DL (ref 32.2–35.5)
MCV RBC AUTO: 97.1 FL (ref 80–94)
MODE: 35
O2 SATURATION: 98 %
PCO2: 42 MMHG (ref 35–45)
PH BLOOD GAS: 7.36 (ref 7.35–7.45)
PLATELET # BLD: 244 THOU/MM3 (ref 130–400)
PMV BLD AUTO: 11.3 FL (ref 9.4–12.4)
PO2: 107 MMHG (ref 71–104)
POTASSIUM SERPL-SCNC: 3.9 MEQ/L (ref 3.5–5.2)
RBC # BLD: 2.79 MILL/MM3 (ref 4.7–6.1)
SET PEEP: 6 MMHG
SET PRESS SUPP: 10 CMH2O
SODIUM BLD-SCNC: 139 MEQ/L (ref 135–145)
SOURCE, BLOOD GAS: ABNORMAL
WBC # BLD: 28.5 THOU/MM3 (ref 4.8–10.8)

## 2021-02-21 PROCEDURE — 36415 COLL VENOUS BLD VENIPUNCTURE: CPT

## 2021-02-21 PROCEDURE — 36600 WITHDRAWAL OF ARTERIAL BLOOD: CPT

## 2021-02-21 PROCEDURE — 6370000000 HC RX 637 (ALT 250 FOR IP): Performed by: INTERNAL MEDICINE

## 2021-02-21 PROCEDURE — 99024 POSTOP FOLLOW-UP VISIT: CPT | Performed by: SURGERY

## 2021-02-21 PROCEDURE — 2500000003 HC RX 250 WO HCPCS: Performed by: STUDENT IN AN ORGANIZED HEALTH CARE EDUCATION/TRAINING PROGRAM

## 2021-02-21 PROCEDURE — 94003 VENT MGMT INPAT SUBQ DAY: CPT

## 2021-02-21 PROCEDURE — 6360000002 HC RX W HCPCS: Performed by: NURSE PRACTITIONER

## 2021-02-21 PROCEDURE — 99291 CRITICAL CARE FIRST HOUR: CPT | Performed by: INTERNAL MEDICINE

## 2021-02-21 PROCEDURE — 99232 SBSQ HOSP IP/OBS MODERATE 35: CPT | Performed by: INTERNAL MEDICINE

## 2021-02-21 PROCEDURE — 6370000000 HC RX 637 (ALT 250 FOR IP): Performed by: NURSE PRACTITIONER

## 2021-02-21 PROCEDURE — 94761 N-INVAS EAR/PLS OXIMETRY MLT: CPT

## 2021-02-21 PROCEDURE — 2000000000 HC ICU R&B

## 2021-02-21 PROCEDURE — 74019 RADEX ABDOMEN 2 VIEWS: CPT

## 2021-02-21 PROCEDURE — 2700000000 HC OXYGEN THERAPY PER DAY

## 2021-02-21 PROCEDURE — 6360000002 HC RX W HCPCS: Performed by: INTERNAL MEDICINE

## 2021-02-21 PROCEDURE — 71045 X-RAY EXAM CHEST 1 VIEW: CPT

## 2021-02-21 PROCEDURE — 82803 BLOOD GASES ANY COMBINATION: CPT

## 2021-02-21 PROCEDURE — 2580000003 HC RX 258: Performed by: INTERNAL MEDICINE

## 2021-02-21 PROCEDURE — 85027 COMPLETE CBC AUTOMATED: CPT

## 2021-02-21 PROCEDURE — 80048 BASIC METABOLIC PNL TOTAL CA: CPT

## 2021-02-21 PROCEDURE — 2500000003 HC RX 250 WO HCPCS: Performed by: NURSE PRACTITIONER

## 2021-02-21 RX ORDER — ARIPIPRAZOLE 10 MG/1
10 TABLET ORAL DAILY
Status: DISCONTINUED | OUTPATIENT
Start: 2021-02-21 | End: 2021-03-05 | Stop reason: HOSPADM

## 2021-02-21 RX ORDER — MIRTAZAPINE 45 MG/1
45 TABLET, FILM COATED ORAL NIGHTLY
Status: DISCONTINUED | OUTPATIENT
Start: 2021-02-21 | End: 2021-03-05 | Stop reason: HOSPADM

## 2021-02-21 RX ORDER — QUETIAPINE FUMARATE 100 MG/1
100 TABLET, FILM COATED ORAL NIGHTLY
Status: DISCONTINUED | OUTPATIENT
Start: 2021-02-21 | End: 2021-03-05 | Stop reason: HOSPADM

## 2021-02-21 RX ORDER — BUPROPION HYDROCHLORIDE 100 MG/1
200 TABLET, EXTENDED RELEASE ORAL DAILY
Status: DISCONTINUED | OUTPATIENT
Start: 2021-02-21 | End: 2021-02-23

## 2021-02-21 RX ORDER — PROPOFOL 10 MG/ML
5-50 INJECTION, EMULSION INTRAVENOUS
Status: DISCONTINUED | OUTPATIENT
Start: 2021-02-21 | End: 2021-02-23

## 2021-02-21 RX ORDER — BUSPIRONE HYDROCHLORIDE 7.5 MG/1
15 TABLET ORAL 3 TIMES DAILY
Status: DISCONTINUED | OUTPATIENT
Start: 2021-02-21 | End: 2021-03-05 | Stop reason: HOSPADM

## 2021-02-21 RX ORDER — DOXEPIN HYDROCHLORIDE 10 MG/1
10 CAPSULE ORAL NIGHTLY
Status: DISCONTINUED | OUTPATIENT
Start: 2021-02-21 | End: 2021-03-05 | Stop reason: HOSPADM

## 2021-02-21 RX ORDER — SERTRALINE HYDROCHLORIDE 100 MG/1
200 TABLET, FILM COATED ORAL DAILY
Status: DISCONTINUED | OUTPATIENT
Start: 2021-02-21 | End: 2021-02-21 | Stop reason: SDUPTHER

## 2021-02-21 RX ORDER — SERTRALINE HYDROCHLORIDE 100 MG/1
200 TABLET, FILM COATED ORAL DAILY
Status: DISCONTINUED | OUTPATIENT
Start: 2021-02-22 | End: 2021-03-05 | Stop reason: HOSPADM

## 2021-02-21 RX ADMIN — SODIUM CHLORIDE: 9 INJECTION, SOLUTION INTRAVENOUS at 22:36

## 2021-02-21 RX ADMIN — MORPHINE SULFATE 2 MG: 2 INJECTION, SOLUTION INTRAMUSCULAR; INTRAVENOUS at 22:35

## 2021-02-21 RX ADMIN — PROPOFOL 5 MCG/KG/MIN: 10 INJECTION, EMULSION INTRAVENOUS at 00:00

## 2021-02-21 RX ADMIN — SODIUM CHLORIDE: 9 INJECTION, SOLUTION INTRAVENOUS at 09:11

## 2021-02-21 RX ADMIN — DOXEPIN HYDROCHLORIDE 10 MG: 10 CAPSULE ORAL at 20:35

## 2021-02-21 RX ADMIN — MORPHINE SULFATE 2 MG: 2 INJECTION, SOLUTION INTRAMUSCULAR; INTRAVENOUS at 20:35

## 2021-02-21 RX ADMIN — MORPHINE SULFATE 2 MG: 2 INJECTION, SOLUTION INTRAMUSCULAR; INTRAVENOUS at 15:12

## 2021-02-21 RX ADMIN — BUPROPION HYDROCHLORIDE 200 MG: 100 TABLET, FILM COATED, EXTENDED RELEASE ORAL at 17:18

## 2021-02-21 RX ADMIN — METRONIDAZOLE 500 MG: 500 INJECTION, SOLUTION INTRAVENOUS at 15:13

## 2021-02-21 RX ADMIN — ARIPIPRAZOLE 10 MG: 10 TABLET ORAL at 14:11

## 2021-02-21 RX ADMIN — METRONIDAZOLE 500 MG: 500 INJECTION, SOLUTION INTRAVENOUS at 00:18

## 2021-02-21 RX ADMIN — PROPOFOL 30 MCG/KG/MIN: 10 INJECTION, EMULSION INTRAVENOUS at 03:47

## 2021-02-21 RX ADMIN — PROPOFOL 30 MCG/KG/MIN: 10 INJECTION, EMULSION INTRAVENOUS at 07:11

## 2021-02-21 RX ADMIN — MIRTAZAPINE 45 MG: 45 TABLET, FILM COATED ORAL at 20:35

## 2021-02-21 RX ADMIN — SODIUM CHLORIDE: 9 INJECTION, SOLUTION INTRAVENOUS at 00:18

## 2021-02-21 RX ADMIN — MORPHINE SULFATE 2 MG: 2 INJECTION, SOLUTION INTRAMUSCULAR; INTRAVENOUS at 11:10

## 2021-02-21 RX ADMIN — MICONAZOLE NITRATE: 20 POWDER TOPICAL at 09:15

## 2021-02-21 RX ADMIN — METRONIDAZOLE 500 MG: 500 INJECTION, SOLUTION INTRAVENOUS at 22:33

## 2021-02-21 RX ADMIN — QUETIAPINE FUMARATE 100 MG: 100 TABLET ORAL at 20:35

## 2021-02-21 RX ADMIN — MICONAZOLE NITRATE: 20 POWDER TOPICAL at 20:35

## 2021-02-21 RX ADMIN — METRONIDAZOLE 500 MG: 500 INJECTION, SOLUTION INTRAVENOUS at 06:22

## 2021-02-21 RX ADMIN — BUSPIRONE HYDROCHLORIDE 15 MG: 7.5 TABLET ORAL at 14:11

## 2021-02-21 RX ADMIN — BUSPIRONE HYDROCHLORIDE 15 MG: 7.5 TABLET ORAL at 20:35

## 2021-02-21 RX ADMIN — ATORVASTATIN CALCIUM 40 MG: 40 TABLET, FILM COATED ORAL at 11:16

## 2021-02-21 RX ADMIN — MORPHINE SULFATE 2 MG: 2 INJECTION, SOLUTION INTRAMUSCULAR; INTRAVENOUS at 17:59

## 2021-02-21 RX ADMIN — Medication 500 MG: at 12:08

## 2021-02-21 RX ADMIN — FAMOTIDINE 20 MG: 10 INJECTION, SOLUTION INTRAVENOUS at 09:06

## 2021-02-21 RX ADMIN — SERTRALINE 100 MG: 100 TABLET, FILM COATED ORAL at 11:16

## 2021-02-21 RX ADMIN — Medication 500 MG: at 18:02

## 2021-02-21 ASSESSMENT — PAIN DESCRIPTION - FREQUENCY: FREQUENCY: CONTINUOUS

## 2021-02-21 ASSESSMENT — PULMONARY FUNCTION TESTS
PIF_VALUE: 17
PIF_VALUE: 17
PIF_VALUE: 16

## 2021-02-21 ASSESSMENT — PAIN SCALES - GENERAL
PAINLEVEL_OUTOF10: 8
PAINLEVEL_OUTOF10: 8

## 2021-02-21 ASSESSMENT — PAIN DESCRIPTION - PROGRESSION: CLINICAL_PROGRESSION: NOT CHANGED

## 2021-02-21 ASSESSMENT — PAIN DESCRIPTION - LOCATION: LOCATION: ABDOMEN

## 2021-02-21 ASSESSMENT — PAIN DESCRIPTION - PAIN TYPE: TYPE: SURGICAL PAIN

## 2021-02-21 NOTE — PROGRESS NOTES
Progress note: Infectious diseases    Patient - Arcelia Page,  Age - 79 y.o.    - 1940      Room Number - 4D-10/010-A   N -  [id number]   Acct # - [de-identified]  Date of Admission -  2021 12:35 AM    SUBJECTIVE:   He had total colectomy due to worsening symptoms and megacolon with pneumatosis formation  The ileostomy is functioning  OBJECTIVE   VITALS    height is 5' 10\" (1.778 m) and weight is 296 lb 1.2 oz (134.3 kg). His oral temperature is 99 °F (37.2 °C). His blood pressure is 126/47 (abnormal) and his pulse is 91. His respiration is 15 and oxygen saturation is 100%. Wt Readings from Last 3 Encounters:   21 296 lb 1.2 oz (134.3 kg)   20 283 lb 3.2 oz (128.5 kg)   10/16/20 273 lb 12.8 oz (124.2 kg)       I/O (24 Hours)    Intake/Output Summary (Last 24 hours) at 2021 1540  Last data filed at 2021 1514  Gross per 24 hour   Intake 3474 ml   Output 4170 ml   Net -696 ml       General Appearance  Asleep  HEENT - normocephalic, atraumatic, slighlty pale  conjunctiva,  anicteric sclera  Neck - Supple, no mass  Lungs -  Bilateral   air entry, no rhonchi, no wheeze  Cardiovascular - Heart sounds are normal.     Abdomen - soft, midline dressed wound.  Ileostomy on the right lower quadrant  Neurologic -asleep  Skin - No bruising or bleeding  Extremities - No edema, no cyanosis, clubbing     MEDICATIONS:      ARIPiprazole  10 mg Oral Daily    busPIRone  15 mg Oral TID    doxepin  10 mg Oral Nightly    QUEtiapine  100 mg Oral Nightly    mirtazapine  45 mg Oral Nightly    buPROPion  200 mg Oral Daily    [START ON 2021] sertraline  200 mg Oral Daily    piperacillin-tazobactam  3,375 mg Intravenous Once    famotidine (PEPCID) injection  20 mg Intravenous Daily    heparin (porcine)  5,000 Units Subcutaneous Q8H    atorvastatin  40 mg Oral Daily    levothyroxine  75 mcg Oral Daily    metoprolol tartrate  25 mg Oral BID    [Held by provider] tamsulosin  0.4 mg Oral Daily    metroNIDAZOLE  500 mg Intravenous Q8H    vancomycin  500 mg Per NG tube 4 times per day    calcium replacement protocol   Other RX Placeholder    miconazole   Topical BID      propofol Stopped (02/21/21 1124)    norepinephrine 4 mcg/min (02/21/21 1532)    sodium chloride 75 mL/hr at 02/21/21 0911     morphine, promethazine **OR** ondansetron, polyethylene glycol, acetaminophen **OR** acetaminophen      LABS:     CBC:   Recent Labs     02/19/21  0620 02/20/21  0640 02/20/21  1840 02/21/21  0434   WBC 17.3* 22.6*  --  28.5*   HGB 9.4* 9.9* 8.6* 8.1*    196  --  244     BMP:    Recent Labs     02/19/21  0620 02/20/21  0640 02/21/21  0434    141 139   K 4.2 4.1 3.9    109 108   CO2 26 24 21*   BUN 95* 84* 76*   CREATININE 3.7* 2.8* 2.3*   GLUCOSE 136* 145* 168*     Calcium:  Recent Labs     02/21/21  0434   CALCIUM 7.5*     Ionized Calcium:No results for input(s): IONCA in the last 72 hours. Magnesium:  Recent Labs     02/20/21  0640   MG 2.0     Phosphorus:  Recent Labs     02/20/21  0640   PHOS 3.3      Recent Labs     02/19/21  0620   ALKPHOS 59   ALT 9*   AST 16   PROT 5.4*   BILITOT 0.2*   BILIDIR <0.2   LABALBU 2.6*        CULTURES:   UA: No results for input(s): SPECGRAV, PHUR, COLORU, CLARITYU, MUCUS, PROTEINU, BLOODU, RBCUA, WBCUA, BACTERIA, NITRU, GLUCOSEU, BILIRUBINUR, UROBILINOGEN, KETUA, LABCAST, LABCASTTY, AMORPHOS in the last 72 hours.     Invalid input(s): CRYSTALS  Micro:   Lab Results   Component Value Date    BC No growth-preliminary  02/17/2021         IMAGING:         Problem list of patient:     Patient Active Problem List   Diagnosis Code    Essential hypertension I10    ASHD (arteriosclerotic heart disease) I25.10    Arthritis M19.90    Severe episode of recurrent major depressive disorder, without psychotic features ([facility] [place] [address] F33.2    Acquired hypothyroidism E03.9    Type 2 diabetes mellitus without complication, without long-term current use of insulin ([facility] [address] E11.9    COVID-19 with multiple comorbidities U07.1    History of recent hospitalization Z92.89    Cellulitis of right lower extremity L03.115    Pulmonary nodule R91.1    Hypotension I95.9    Septic shock (HCC) A41.9, R65.21    Hypovolemic shock (HCC) R57.1    Colitis, Clostridium difficile A04.72         ASSESSMENT/PLAN   Severe C.difficile colites with megacolon ascites and pneumatosis: he had undergone total colectomy  Shock state: pressors being weaned  Continue current therapy  Call ID if needed.  He is in ICU getting appropriate care      Madison Chen MD, FACP 2/21/2021 3:40 PM

## 2021-02-21 NOTE — BRIEF OP NOTE
Brief Postoperative Note      Patient: Nicole Romo  YOB: 1940  MRN: [id number]    Date of Procedure: 2/20/2021    Pre-Op Diagnosis: C-DIFF toxic megacolon with pneumatosis    Post-Op Diagnosis: Same       Procedure(s):  TOTAL COLECTOMY AND ILLEOSTOMY PLACEMENT    Surgeon(s):  Diana Alfred MD    Assistant:  First Assistant: Rick Whatley    Anesthesia: General    Estimated Blood Loss (mL): 755 ml    Complications: None    Specimens:   ID Type Source Tests Collected by Time Destination   A : Colon Tissue Colon SURGICAL PATHOLOGY Diana Alfred MD 2/20/2021 2239        Implants:  * No implants in log *      Drains:   Closed/Suction Drain Left Abdomen Bulb 19 Polish (Active)       NG/OG/NJ/NE Tube Nasogastric 18 fr Right nostril (Active)   Surrounding Skin Dry 02/20/21 2000   Securement device Yes 02/20/21 2000   Status Suction-low intermittent 02/20/21 2000   Placement Verified by Gastric Contents 02/20/21 2000   Free Water Flush (mL) 720 mL 02/20/21 1344   Output (mL) 250 ml 02/20/21 1344       Urethral Catheter Straight-tip (Active)   Catheter Indications Need for fluid management in critically ill patients in a critical care setting not able to be managed by other means such as BSC with hat, bedpan, urinal, condom catheter, or short term intermittent urethral catherization 02/20/21 2000   Site Assessment Pink 02/20/21 2000   Urine Color Sarita 02/20/21 2000   Urine Appearance Cloudy 02/20/21 2000   Output (mL) 750 mL 02/20/21 1344       [REMOVED] NG/OG/NJ/NE Tube Nasogastric Left nostril (Removed)   Surrounding Skin Dry; Intact 02/17/21 0800   Securement device Yes 02/17/21 0800   Status Suction-low intermittent 02/17/21 0800   Placement Verified by Gastric Contents 02/17/21 0800   Drainage Appearance Bloody; Coffee Ground 02/17/21 0800   Output (mL) 0 ml 02/17/21 0045       [REMOVED] NG/OG/NJ/NE Tube Nasogastric Left nostril (Removed)   Surrounding Skin Dry; Intact 02/20/21 0400   Securement device Yes 02/20/21 0400   Status Suction-low intermittent 02/20/21 0400   Placement Verified by Respiratory Status;by External Catheter Length 02/20/21 0400   NG/OG/NJ/NE External Measurement (cm) 65 cm 02/20/21 0400   Drainage Appearance Clear;Green 02/20/21 0400   Tube Feeding Intake (mL) 200 ml 02/19/21 0622   Output (mL) 200 ml 02/19/21 1521       [REMOVED] Rectal Tube With balloon (Removed)   Stool Appearance Watery 02/19/21 0400   Stool Color Brown;Red 02/19/21 0400   Stool Amount Medium 02/19/21 0400   Rectal Tube Output 0 ml 02/20/21 0516       Findings: as above - see op note for details    Electronically signed by Ayah Montiel MD on 2/20/2021 at 11:17 PM

## 2021-02-21 NOTE — FLOWSHEET NOTE
12.5 ml omnipaque with 240 ml water given down NG tube for CT scan  1305-12.5 ml omnipaque with 240 ml water given down NG tube for CT scan  1324-12.5 ml omnipaque with 240 ml water given down NG tube for CT scan  1345-To CT scan per bed with nurse and monitor  1415-Returned to room

## 2021-02-21 NOTE — PLAN OF CARE
Problem: Falls - Risk of:  Goal: Will remain free from falls  Description: Will remain free from falls  Outcome: Met This Shift  Note: Pt is slightly confused and lethargic, poor mobility. Frequent checks and  hourly rounds to assess needs. Bed alarm on     Problem: Falls - Risk of:  Goal: Absence of physical injury  Description: Absence of physical injury  Outcome: Met This Shift     Problem: Discharge Planning:  Goal: Participates in care planning  Outcome: Ongoing  Note: Pt and family active in plan of care and discharge plan. Problem: Discharge Planning:  Goal: Discharged to appropriate level of care  Description: Discharged to appropriate level of care  Outcome: Ongoing  Note: Plan discharge to skilled nursing facility.  assisting with discharge plans     Problem: Bowel Function - Altered:  Goal: Bowel elimination is within specified parameters  Description: Bowel elimination is within specified parameters  Outcome: Ongoing  Note: Pt having diarrhea and now bloody stools     Problem: Fluid Volume - Imbalance:  Goal: Absence of imbalanced fluid volume signs and symptoms  Description: Absence of imbalanced fluid volume signs and symptoms  Outcome: Ongoing  Note: Continue IV fluid, monitor I and O and kidney function. Problem: Pain:  Goal: Pain level will decrease  Description: Pain level will decrease  Outcome: Ongoing  Note: Pt continues to have abdominal pain, especially with turning.  Medicate with morphine or tylenol as needed     Problem: Pain:  Goal: Recognizes and communicates pain  Description: Recognizes and communicates pain  Outcome: Ongoing     Problem: Pain:  Goal: Control of acute pain  Description: Control of acute pain  Outcome: Ongoing     Problem: Pain:  Goal: Control of chronic pain  Description: Control of chronic pain  Outcome: Ongoing     Problem: Skin Integrity - Impaired:  Goal: Will show no infection signs and symptoms  Description: Will show no infection signs and symptoms  Outcome: Ongoing     Problem: Skin Integrity - Impaired:  Goal: Absence of new skin breakdown  Description: Absence of new skin breakdown  Outcome: Ongoing  Note: Turn and reposition every 2 hours and PRN. Keep heels and elbows elevated off bed. Watch for pressure from medical devices. Protective cream to reddened rectal area     Problem: Urinary Elimination:  Goal: Signs and symptoms of infection will decrease  Description: Signs and symptoms of infection will decrease  Outcome: Ongoing  Note: Pt comes from [facility] with catheter in place. Assess daily and remove when no longer indicated     Problem: Urinary Elimination:  Goal: Complications related to the disease process, condition or treatment will be avoided or minimized  Description: Complications related to the disease process, condition or treatment will be avoided or minimized  Outcome: Ongoing     Problem: Skin Integrity:  Goal: Will show no infection signs and symptoms  Description: Will show no infection signs and symptoms  Outcome: Ongoing     Problem: Skin Integrity:  Goal: Absence of new skin breakdown  Description: Absence of new skin breakdown  Outcome: Ongoing     Problem: Physical Regulation:  Goal: Signs and symptoms of infection will decrease  Description: Signs and symptoms of infection will decrease  Outcome: Ongoing  Note: Pt comes from [address] with catheter in place.  Assess daily and remove when no longer indicated     Problem: Physical Regulation:  Goal: Prevent transmision of infection  Description: Prevent transmision of infection  Outcome: Ongoing  Note: Continue contact plus precautions as ordered for C diff     Problem: Physical Regulation:  Goal: Ability to avoid or minimize complications of infection will improve  Description: Ability to avoid or minimize complications of infection will improve  Outcome: Ongoing     Problem: Musculor/Skeletal Functional Status  Goal: Highest potential functional level  Outcome: Ongoing  Note: Pt is very weak and has pain with  movement. Physical therapy to work with pt as tolerates     Problem: Bowel/Gastric:  Goal: Occurrences of diarrhea will decrease  Description: Occurrences of diarrhea will decrease  Outcome: Not Met This Shift  Note: Pt continues to have diarrhea and now bloody stools     Problem: Cardiac Output - Decreased:  Goal: Hemodynamic stability will improve  Description: Hemodynamic stability will improve  Note: Levophed weaned off, will continue to monitor and restart if needed    Care plan reviewed with patient and family. Patient and family verbalize understanding of the plan of care and contribute to goal setting.

## 2021-02-21 NOTE — PROGRESS NOTES
Jacquelyn [facility] - Dr. 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 PLAN   N.p.o.  NG tube decompression  ANTONIETTA drain care  Wound care  IV fluid hydration  Wean off of vasopressors as possible  Ostomy care  PPI  Wean off of ventilator per intensivist  C. difficile treatment per admitting/intensivist  IV fluid hydration  Nephrology following  Patient still has rectal stump. C. difficile treatment may be needed from rectal region. No enemas rectally secondary to rectal stump staple line. Gallbladder did not appear to be acutely diseased intraoperatively therefore left alone given acuity of surgical intervention needed for C. difficile colitis. SCDs for DVT prophylaxis  SUBJECTIVE   Stable but critical overnight. Updated by nursing staff. Updated by ICU CNP. ANTONIETTA drain serosanguineous. Afebrile. Vital signs fairly stable. Weaning off of norepinephrine. Making some urine overnight. Dressings dry. NG tube in place.   CURRENT MEDICATIONS   Scheduled Meds:   piperacillin-tazobactam  3,375 mg Intravenous Once    famotidine (PEPCID) injection  20 mg Intravenous Daily    heparin (porcine)  5,000 Units Subcutaneous Q8H    atorvastatin  40 mg Oral Daily    clomiPRAMINE  50 mg Oral Nightly    levothyroxine  75 mcg Oral Daily    metoprolol tartrate  25 mg Oral BID    sertraline  100 mg Oral Daily    tamsulosin  0.4 mg Oral Daily    metroNIDAZOLE  500 mg Intravenous Q8H    vancomycin  500 mg Per NG tube 4 times per day    calcium replacement protocol   Other RX Placeholder    miconazole   Topical BID     Continuous Infusions:   propofol 30 mcg/kg/min (21 0711)    norepinephrine 12 mcg/min (21 0530)    sodium chloride 75 mL/hr at 21 0018     PRN Meds:.morphine, promethazine **OR** ondansetron, polyethylene glycol, acetaminophen **OR** acetaminophen  OBJECTIVE   CURRENT VITALS:  height is 5' 10\" (1.778 m) and weight is 296 lb 1.2 oz (134.3 kg). His oral temperature is 97.6 °F (36.4 °C). His blood pressure is 130/42 (abnormal) and his pulse is 88. His respiration is 7 (abnormal) and oxygen saturation is 100%. Temperature Range (24h):Temp: 97.6 °F (36.4 °C) Temp  Av.1 °F (36.2 °C)  Min: 96.6 °F (35.9 °C)  Max: 97.6 °F (36.4 °C)  BP Range (39J): Systolic (10UCN), NWK:661 , Min:58 , SDY:553     Diastolic (02KJV), YJX:38, Min:34, Max:98    Pulse Range (24h): Pulse  Av.8  Min: 81  Max: 131  Respiration Range (24h): Resp  Av.1  Min: 0  Max: 30  Current Pulse Ox (24h):  SpO2: 100 %  Pulse Ox Range (24h):  SpO2  Av.7 %  Min: 74 %  Max: 100 %  Oxygen Amount and Delivery: O2 Flow Rate (L/min): 0 L/min  Incentive Spirometry Tx:            GENERAL: Intubated and sedated  LUNGS: Decreased breath sounds at bases  HEART: normal rate and regular rhythm  ABDOMEN: non-distended, soft, incisional tenderness, minimal bowel sounds present. ANTONIETTA serosanguineous. INCISION: Dressings dry. No signs of bleeding. No shadowing.     EXTERMITY: no cyanosis, clubbing or edema    In: 2528 [I.V.:2528]  Out: 2275 [Urine:1600; Drains:675]  Date 21 0000 - 21 2359   Shift 2067-2716 6169-4264 3973-5323 24 Hour Total   INTAKE   I.V.(mL/kg) 5679(56.4)   1635(67.8)   Shift Total(mL/kg) 0128(94.3)   3278(07.4)   OUTPUT   Urine(mL/kg/hr) 1600(1.5)   1600   Drains(mL/kg) 675(5)   675(5)   Shift Total(mL/kg) 9162(21.8)   5797(86.2)   Weight (kg) 134.3 134.3 134.3 134.3     LABS     Recent Labs     02/19/21  0620 02/20/21  0640 02/20/21  1840 02/21/21  0434   WBC 17.3* 22.6*  --  28.5*   HGB 9.4* 9.9* 8.6* 8.1*   HCT 30.3* 32.5* 28.2* 27.1*    196  --  244    141  --  139   K 4.2 4.1  --  3.9    109  --  108   CO2 26 24  --  21*   BUN 95* 84*  --  76*   CREATININE 3.7* 2.8*  --  2.3*   MG  --  2.0  --   --    PHOS  --  3.3  --   --    CALCIUM 7.6* 7.8*  --  7.5*      No results for input(s): PTT, INR in the last 72 hours. Invalid input(s): PT  Recent Labs     02/19/21  0620   AST 16   ALT 9*   BILITOT 0.2*   BILIDIR <0.2     No results for input(s): TROPONINT in the last 72 hours. RADIOLOGY     Narrative   PROCEDURE: CT ABDOMEN PELVIS WO CONTRAST       CLINICAL INFORMATION: r/o toxic megacolon . Abdominal pain. C. difficile.       COMPARISON: CT abdomen pelvis 2/17/2021.       TECHNIQUE: Axial 5 mm CT images were obtained through the abdomen and pelvis. No intravenous contrast was given. Oral contrast was given. Coronal and sagittal reconstructions were obtained.       All CT scans at this facility use dose modulation, iterative reconstruction, and/or weight-based dosing when appropriate to reduce radiation dose to as low as reasonably achievable.       FINDINGS:           There are small bilateral layering pleural effusions. These have only slightly increased in size.  There is some mild compressive atelectasis in the lung bases. This is dependent. The patient has had a prior CABG. Coronary artery disease is present.       There is increasing free fluid in the abdomen. There is no free air. There is no gross abnormality of the liver. The spleen is normal. The adrenal glands and pancreas are grossly normal. The gallbladder is distended. There is layering dense material    which could represent intravenous contrast or sludge.  There is no hydronephrosis or stones of either kidney.  No

## 2021-02-21 NOTE — PROGRESS NOTES
refill < 3 seconds. Palpable dorsalis pedis pulses. Skin:  warm and dry. Psych:  Sedated, ventilated. Lymph:  No supraclavicular adenopathy. Neurologic:  With sedation decreased patient awake alert respond to verbal commands moving upper and lower extremities with no focal deficits. DATA:  CBC:   Lab Results   Component Value Date    WBC 28.5 02/21/2021    RBC 2.79 02/21/2021    HGB 8.1 02/21/2021    HCT 27.1 02/21/2021    MCV 97.1 02/21/2021     02/21/2021     CMP:    Lab Results   Component Value Date     02/21/2021    K 3.9 02/21/2021    K 4.4 02/17/2021     02/21/2021    CO2 21 02/21/2021    BUN 76 02/21/2021    CREATININE 2.3 02/21/2021    LABGLOM 28 02/21/2021    GLUCOSE 168 02/21/2021    PROT 5.4 02/19/2021    CALCIUM 7.5 02/21/2021    BILITOT 0.2 02/19/2021    ALKPHOS 59 02/19/2021    AST 16 02/19/2021    ALT 9 02/19/2021       KEY ISSUES/FINDINGS/ASSESSMENT AND PLAN:    Douglas Harvey is a 78-year-old male was transferred from [facility] [place] on 2/17 for acute kidney injury and C. difficile colitis. Patient was recently admitted to [facility] on 2/26 for altered mental status and sepsis secondary to right leg cellulitis. He was treated with Zosyn and Rocephin, and also discharged with antibiotics to the nursing home at the time. During the course of hospital treatment, they also noted a pulmonary nodule on CT scan that measured 3 x 2 x 2.6 cm. It was a concern for malignancy at the time, the requested follow-up however patient does not have any further scans or PET scans. Blood cultures grew E. coli at the time. During this hospital course, the patient was originally sent from the SNF to [facility] on 2/14 when he was found to be diaphoretic and hypotensive with a fever of 102 °F.  Patient was evaluated in the emergency room and diagnosed to have sepsis but the source was unknown at the time and he was given fluid resuscitation and started on a Levophed drip. Patient was started on Zosyn at time. During his hospital stay he was found to have worsening renal function and nephrology was consulted. On 2/16 the patient was seen by Dr. Dean Mi from nephrology. Further work-up did show C. difficile colitis. Patient was started on vancomycin. He had a noted mental change and worsening creatinine on 2/16 so he was transferred to [facility] ICU for further management and care per Dr. Dean Mi.     2/18: Patient this morning remains on pressors as needed to maintain his blood pressure. He has been receiving fluid throughout the night. His hemoglobin did drop from 10 to about 8, however it is stayed stable since then, continue to monitor. His WBC has decreased along with his creatinine. However his BUN increased overnight. The patient seems more confused today with an inability to answer where or when he is. The nephrologist stated that if his BUN increases over 100 dialysis would be warranted at that time. The surgeon also states that at this current point if surgery is warranted it would be a total colectomy. Both of these options were discussed with the family who stated that they would be okay starting dialysis if needed. They with prefer to hold off on surgery right now.     2/19: Patient remains on pressors to maintain his blood pressure however the dose has been weaned significantly. He still currently receiving IVF. Hgb has remained stable. WBC did increase overnight. His creatinine has decreased to 3.7. BUN has stated 95. The nephrologist had seen the patient earlier in the morning, and stated that dialysis is not needed at this time.   Due to the rising WBC, infectious disease has been consulted.     2/20-2/21:  In spite of the aggressive treatment for C. difficile colitis patient is still complaining of severe abdominal pain with severe abdominal distention , discussion  with the medical resident and also with nursing team  the patient is not improving over the last few days even the nursing did mention the patient is worsening over the last 24 hours with more distention of the abdomen with leukocytosis so plan to obtain CT scan abdomen pelvis with p.o. contrast to reassess his C. difficile colitis of the abdomen to see if there is any progression or improvement, CT scan abdomen pelvis done with contrast did show progression of the ulcerative cecal area of severe cecal distention and possibility for pneumatosis so general surgery consulted stat and he discussed the options with the patient and the patient family and the plan was done to go ahead for OR that was done last night, total colectomy, patient did after surgery have a smooth postop course still intubated in the weaning trial currently, will continue IV antibiotics fluids support hemodynamics pain control and in collaboration with the ID and general surgery team.    Assessment and Plan:    1. 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 contrast ordered to start that showed toxic megacolon, general surgery called and discussed with the family options and decision made to transfer the patient to the OR where total colectomy and ileostomy done for the patient transferred after that to the ICU intubated, stable hemodynamics will continue the usual with antibiotic/fluids/electrolyte replacement/vasopressors/other supportive management with the general surgery team.    2. Septic shock secondary to fulminant C difficile colitis: IV antibiotics, IV fluids with vasopressors with albumin IV, electrolyte monitoring and replacement, maintain CVP 8-12. 3. CARMEN on CKD II: Baseline creatinine 1.1.   CARMEN secondary to ischemic ATN we will continue to avoid nephrotoxins/avoid hypotension adjust medication as per kidney function and optimize

## 2021-02-21 NOTE — PROGRESS NOTES
Renal Progress Note  [facility]    Patient :  Jaime Bocanegra; 79 y.o. MRN# [id number]  Location:  4D-10/010-A  Attending:  Kasey Mack MD  Admit Date:  2/17/2021   Hospital Day: 4      Subjective:     Nephrology is following the patient for CARMEN. Patient seen and examined. He underwent total colectomy with end ileostomy overnight due to toxic megacolon. Currently on vent. Levophed at 12 mcg. Good urine output. Outpatient Medications:     Medications Prior to Admission: gabapentin (NEURONTIN) 100 MG capsule, Take 800 mg by mouth 3 times daily.   sertraline (ZOLOFT) 100 MG tablet, Take 200 mg by mouth daily  metoprolol tartrate (LOPRESSOR) 25 MG tablet, Take 6.25 mg by mouth 2 times daily  ARIPiprazole (ABILIFY) 10 MG tablet, Take 10 mg by mouth nightly  acetaminophen (TYLENOL) 325 MG tablet, Take 650 mg by mouth every 4 hours as needed for Pain or Fever (mild [pain or temp greater than 101.5)  Naproxen Sodium (ALEVE) 220 MG CAPS, Take 1 capsule by mouth 2 times daily as needed for Pain (moderate pain)  busPIRone (BUSPAR) 15 MG tablet, Take 15 mg by mouth 3 times daily  calcium-vitamin D (OSCAL-500) 500-200 MG-UNIT per tablet, Take 1 tablet by mouth daily  docusate sodium (COLACE) 100 MG capsule, Take 100 mg by mouth daily as needed for Constipation  docusate sodium (COLACE) 100 MG capsule, Take 100 mg by mouth daily  doxepin (SINEQUAN) 10 MG capsule, Take 10 mg by mouth nightly  loperamide (IMODIUM) 2 MG capsule, Take 2 mg by mouth every 6 hours as needed for Diarrhea  magnesium hydroxide (MILK OF MAGNESIA) 400 MG/5ML suspension, Take by mouth daily as needed for Constipation  Multiple Vitamins-Minerals (THERAPEUTIC MULTIVITAMIN-MINERALS) tablet, Take 1 tablet by mouth daily  QUEtiapine (SEROQUEL) 100 MG tablet, Take 100 mg by mouth daily  mirtazapine (REMERON) 15 MG tablet, Take 45 mg by mouth daily  vitamin D (ERGOCALCIFEROL) 1.25 MG (48134 UT) CAPS capsule, Take 50,000 Units by mouth readings):   Weight   21 0615 296 lb 1.2 oz (134.3 kg)   21 0400 299 lb 6.2 oz (135.8 kg)   21 0626 (!) 312 lb 6.3 oz (141.7 kg)       Vital Signs:   Temperature:  Temp: 97.6 °F (36.4 °C)  TMax:   Temp (24hrs), Av.1 °F (36.2 °C), Min:96.6 °F (35.9 °C), Max:97.6 °F (36.4 °C)    Respirations:  Resp: (!) 7  Pulse:   Pulse: 88  BP:    BP: (!) 130/42  BP Range: Systolic (70BGS), KFN:632 , Min:58 , IKW:927       Diastolic (00KIJ), BFC:07, Min:34, Max:98      Physical Examination:     General:  Intubated, sedated  HEENT: NC/AT/ MMM +ET tube  Chest:               diminished  Cardiac:  S1 S2   Abdomen: Soft + abdominal incision +ostomy + ANTONIETTA drain  Neuro:  No facial droop  SKIN:  No rashes, good skin turgor.   Extremities:  Edema in arms noted right > left    Labs:       Recent Labs     21  0621  0640 21  1840 21  0434   WBC 17.3* 22.6*  --  28.5*   RBC 3.18* 3.39*  --  2.79*   HGB 9.4* 9.9* 8.6* 8.1*   HCT 30.3* 32.5* 28.2* 27.1*   MCV 95.3* 95.9*  --  97.1*   MCH 29.6 29.2  --  29.0   MCHC 31.0* 30.5*  --  29.9*    196  --  244   MPV 11.8 11.7  --  11.3      BMP:   Recent Labs     21  0621  0640 21  0434    141 139   K 4.2 4.1 3.9    109 108   CO2 26 24 21*   BUN 95* 84* 76*   CREATININE 3.7* 2.8* 2.3*   GLUCOSE 136* 145* 168*   CALCIUM 7.6* 7.8* 7.5*      Phosphorus:     Recent Labs     21  0640   PHOS 3.3     Magnesium:    Recent Labs     21  0640   MG 2.0     Albumin:    Recent Labs     21  0620   LABALBU 2.6*     BNP:    No results found for: BNP  LIANA:    No results found for: LIANA  SPEP:  Lab Results   Component Value Date    PROT 5.4 2021     UPEP:   No results found for: LABPE  C3:   No results found for: C3  C4:   No results found for: C4  MPO ANCA:   No results found for: MPO  PR3 ANCA:   No results found for: PR3  Anti-GBM:   No results found for: GBMABIGG  Hep BsAg:       No results found for: HEPBSAG  Hep C AB:        No results found for: HEPCAB    Urinalysis/Chemistries:      Lab Results   Component Value Date    NITRU NEGATIVE 02/17/2021    COLORU YELLOW 02/17/2021    PHUR 5.0 02/17/2021    LABCAST >15 HYALINE 02/17/2021    LABCAST >15 FINE GRAN 02/17/2021    WBCUA 50-75 02/17/2021    RBCUA 10-15 02/17/2021    YEAST MANY BUDDING 02/17/2021    BACTERIA MODERATE 02/17/2021    SPECGRAV 1.017 02/17/2021    LEUKOCYTESUR MODERATE 02/17/2021    UROBILINOGEN 0.2 02/17/2021    BILIRUBINUR NEGATIVE 02/17/2021    BLOODU SMALL 02/17/2021    KETUA NEGATIVE 02/17/2021     Urine Sodium:   No results found for: CHERI  Urine Potassium:  No results found for: KUR  Urine Chloride:  No results found for: CLUR  Urine Osmolarity:   Lab Results   Component Value Date    OSMOU 363 02/17/2021     Urine Protein:   No components found for: TOTALPROTEIN, URINE   Urine Creatinine:   No results found for: LABCREA  Urine Eosinophils:  No components found for: UEOS        Impression and Plan:  1. CARMEN: multifactorial, likely ATN from prolonged diarrhea, infection,  Improving. Will continue IV fluids  2. Septic shock on Levophed. Hold flomax due to hypotension and he has a king  3. Leukocytosis  4. C. Diff colitis/toxic megacolon  5. S/p total colectomy with end ileostomy  6. Anemia  7. UTI  8. Post op resp failure on vent          Please don't hesitate to call with any questions.   Electronically signed by Jennifer Reeves DO on 2/21/2021 at 8:26 AM

## 2021-02-21 NOTE — OP NOTE
[address] [id number]                                OPERATIVE REPORT    PATIENT NAME: Danika Renteria                       :        1940  MED REC NO:   [id number]                           ROOM:       0010  ACCOUNT NO:   [de-identified]                           ADMIT DATE: 2021  PROVIDER:     Boo Mae M.D.    DATE OF PROCEDURE:  2021    PREOPERATIVE DIAGNOSIS:  C. difficile colitis with pneumatosis/toxic  megacolon. POSTOPERATIVE DIAGNOSIS:  C. difficile colitis with pneumatosis/toxic  megacolon. OPERATION PERFORMED:  Total colectomy with end ileostomy. SURGEON:  Boo Mae MD    ANESTHESIA:  General.    ESTIMATED BLOOD LOSS:  100 mL. DRAINS:  A #19 round Pietro drain. COMPLICATIONS:  None. DISPOSITION:  Critical to the intensive care unit. INDICATIONS:  The patient is a 78-year-old gentleman who I was asked to  see today as the patient is known to my partner, Dr. Aditya Avendaño. Apparently,  he was having worsening abdominal pain. He had known C. difficile  colitis. Worsening leukocytosis. Worsening abdominal pain with  bloating. CT, repeat, this morning demonstrated much worsening disease  including ascites and thickening of the colitis, worrisome for  pneumatosis on the right side. Long discussion with his sister who then  had a family discussion and another discussion with the son and they  wished to proceed with surgical intervention. Risks, benefits, and  alternatives were discussed with daughter, son, and the patient. All  questions were answered. They wished to proceed. DESCRIPTION OF THE PROCEDURE:  After informed consent was signed and  placed on the chart, the patient was taken back to the operating room  and placed supine on the operating room table. General anesthesia was  induced. He tolerated this well throughout the case.   All pressure redundant. This was all taken down again with the ligature. I went all  the way down to the pelvic brim and at that point, mesentery was taken  up to the end of the colon and the colon was then transected there just  above the pelvic brim with an [address] JENNIFER 60 green load x2. Specimen  was sent to Pathology for permanent. Copious amounts of irrigation was  used. IrriSept was used and allowed to sit for 5 minutes. Hemostasis  appeared to be adequate. A #19 round Pietro drain was placed through a  separate stab incision on the left side and secured to the skin with  silk suture. This laid down along the left paracolic gutter down into  the pelvis. Rectal stump staple line was reevaluated and overall looked  good. Hemostasis was adequate. A disc of skin was then made over there  on the right side of the abdomen for the end ileostomy. The ileum was  then brought up through this after two finger dilation was carried out  through the longitudinal incision to the rectus muscle. Ileum was  buttressed there on the undersurface of the peritoneum with interrupted  Vicryl suture. At that time then the fascia was re-approximated with  running 0 looped PDS suture x2. Subcutaneous tissues were irrigated. Skin was reapproximated with skin staples. Ileostomy was then matured  with multiple interrupted 3-0 Vicryl sutures. Ileostomy appliance  placed. Dry sterile dressings were placed. Sponge, needle, and  instrumentation counts were correct at the end of the procedure. The  patient tolerated the procedure well with no apparent complications and  only about 100 mL of blood loss. He was then transferred in stable but  critical condition back up to the intensive care unit.         Alie Vaughn M.D.    D: 02/20/2021 23:28:53       T: 02/21/2021 1:34:28     [email or web address]  Job#: [id number]     Doc#: [id number]    CC:

## 2021-02-21 NOTE — ANESTHESIA POSTPROCEDURE EVALUATION
Department of Anesthesiology  Postprocedure Note    Patient: Phi Bustamante  MRN: [id number]  YOB: 1940  Date of evaluation: 2/21/2021  Time:  9:22 AM     Procedure Summary     Date: 02/20/21 Room / Location: [facility]    Anesthesia Start: 2102 Anesthesia Stop: 2338    Procedure: TOTAL COLECTOMY AND ILLEOSTOMY PLACEMENT (N/A Abdomen) Diagnosis: (C-DIFF)    Surgeons: Adryan Champion MD Responsible Provider: Yu Gibbons MD    Anesthesia Type: general ASA Status: 4 - Emergent          Anesthesia Type: general    Jim Phase I:      Jim Phase II:      Last vitals: Reviewed and per EMR flowsheets.        Anesthesia Post Evaluation    Patient location during evaluation: ICU  Patient participation: complete - patient cannot participate  Level of consciousness: sedated and ventilated  Airway patency: patent  Nausea & Vomiting: no nausea  Complications: no  Cardiovascular status: blood pressure returned to baseline and hemodynamically stable  Respiratory status: acceptable and ventilator  Hydration status: euvolemic

## 2021-02-21 NOTE — ANESTHESIA PRE PROCEDURE
Multiple Vitamins-Minerals (THERAPEUTIC MULTIVITAMIN-MINERALS) tablet Take 1 tablet by mouth daily   Yes Historical Provider, MD   QUEtiapine (SEROQUEL) 100 MG tablet Take 100 mg by mouth daily   Yes Historical Provider, MD   mirtazapine (REMERON) 15 MG tablet Take 45 mg by mouth daily   Yes Historical Provider, MD   vitamin D (ERGOCALCIFEROL) 1.25 MG (05050 UT) CAPS capsule Take 50,000 Units by mouth once a week Every friday   Yes Historical Provider, MD   buPROPion (WELLBUTRIN XL) 300 MG extended release tablet Take 300 mg by mouth every morning   Yes Historical Provider, MD   levothyroxine (SYNTHROID) 75 MCG tablet Take 75 mcg by mouth Daily    Historical Provider, MD   lisinopril (PRINIVIL;ZESTRIL) 10 MG tablet Take 10 mg by mouth daily    Historical Provider, MD   tamsulosin (FLOMAX) 0.4 MG capsule Take 0.4 mg by mouth daily    Historical Provider, MD   atorvastatin (LIPITOR) 40 MG tablet Take 40 mg by mouth daily    Historical Provider, MD       Current medications:    Current Facility-Administered Medications   Medication Dose Route Frequency Provider Last Rate Last Admin    piperacillin-tazobactam (ZOSYN) 3,375 mg in dextrose 5 % 50 mL IVPB (mini-bag)  3,375 mg Intravenous Once Gerardo MD Guadalupe        morphine (PF) injection 2 mg  2 mg Intravenous Q2H PRN Carol Rodriguez MD   2 mg at 02/20/21 1400    famotidine (PEPCID) injection 20 mg  20 mg Intravenous Daily Edith Power DO   20 mg at 02/20/21 0857    promethazine (PHENERGAN) tablet 12.5 mg  12.5 mg Oral Q6H PRN Robyn Martinesoise Necessary, APRN - CNP        Or    ondansetron (ZOFRAN) injection 4 mg  4 mg Intravenous Q6H PRN Robyn Emelina Necessary, APRN - CNP   4 mg at 02/19/21 0929    polyethylene glycol (GLYCOLAX) packet 17 g  17 g Oral Daily PRN Robyn Seoe Necessary, APRN - CNP        acetaminophen (TYLENOL) tablet 650 mg  650 mg Oral Q6H PRN Robyn Emelina Necessary, APRN - CNP   650 mg at 02/19/21 2221    Or Patient states his arm felt like it was hot, like it was burning.          Problem List:    Patient Active Problem List   Diagnosis Code    Essential hypertension I10    ASHD (arteriosclerotic heart disease) I25.10    Arthritis M19.90    Severe episode of recurrent major depressive disorder, without psychotic features ([address] F33.2    Acquired hypothyroidism E03.9    Type 2 diabetes mellitus without complication, without long-term current use of insulin ([address] E11.9    COVID-19 with multiple comorbidities U07.1    History of recent hospitalization Z92.89    Cellulitis of right lower extremity L03.115    Pulmonary nodule R91.1    Hypotension I95.9    Septic shock ([address] A41.9, R65.21    Hypovolemic shock ([address] R57.1    Colitis, Clostridium difficile A04.72       Past Medical History:        Diagnosis Date    Arthritis     Chronic kidney disease     Depression     Diabetes mellitus ([address]     Hyperlipidemia     Hypertension     Thyroid disease        Past Surgical History:        Procedure Laterality Date    KNEE SURGERY Right        Social History:    Social History     Tobacco Use    Smoking status: Never Smoker    Smokeless tobacco: Never Used   Substance Use Topics    Alcohol use: Not Currently                                Counseling given: Not Answered      Vital Signs (Current):   Vitals:    02/20/21 0700 02/20/21 0800 02/20/21 0900 02/20/21 1726   BP: (!) 118/52 (!) 121/48 (!) 111/59    Pulse: 96 96 100    Resp: 15 17 17    Temp:  98.6 °F (37 °C)     TempSrc:  Oral     SpO2: 95% 95% 93% 97%   Weight:       Height:                                                  BP Readings from Last 3 Encounters:   02/20/21 (!) 111/59   11/25/20 131/62   10/16/20 138/60       NPO Status:                                                                                 BMI:   Wt Readings from Last 3 Encounters:   02/20/21 299 lb 6.2 oz (135.8 kg)   11/25/20 283 lb 3.2 oz (128.5 kg) 10/16/20 273 lb 12.8 oz (124.2 kg)     Body mass index is 42.96 kg/m². CBC:   Lab Results   Component Value Date    WBC 22.6 02/20/2021    RBC 3.39 02/20/2021    HGB 8.6 02/20/2021    HCT 28.2 02/20/2021    MCV 95.9 02/20/2021     02/20/2021       CMP:   Lab Results   Component Value Date     02/20/2021    K 4.1 02/20/2021    K 4.4 02/17/2021     02/20/2021    CO2 24 02/20/2021    BUN 84 02/20/2021    CREATININE 2.8 02/20/2021    LABGLOM 22 02/20/2021    GLUCOSE 145 02/20/2021    PROT 5.4 02/19/2021    CALCIUM 7.8 02/20/2021    BILITOT 0.2 02/19/2021    ALKPHOS 59 02/19/2021    AST 16 02/19/2021    ALT 9 02/19/2021       POC Tests: No results for input(s): POCGLU, POCNA, POCK, POCCL, POCBUN, POCHEMO, POCHCT in the last 72 hours. Coags:   Lab Results   Component Value Date    INR 1.25 02/17/2021       HCG (If Applicable): No results found for: PREGTESTUR, PREGSERUM, HCG, HCGQUANT     ABGs: No results found for: PHART, PO2ART, JXQ7UQA, TWD3RRQ, BEART, I4MRFXFL     Type & Screen (If Applicable):  Lab Results   Component Value Date    LABRH NEG 02/20/2021       Drug/Infectious Status (If Applicable):  No results found for: HIV, HEPCAB    COVID-19 Screening (If Applicable):   Lab Results   Component Value Date    COVID19 NOT DETECTED 02/20/2021         Anesthesia Evaluation    Airway: Mallampati: III       Mouth opening: > = 3 FB Dental:          Pulmonary:                              Cardiovascular:    (+) hypertension:, CAD:, CABG/stent:,       ECG reviewed                        Neuro/Psych:   (+) psychiatric history:            GI/Hepatic/Renal:             Endo/Other:    (+) Diabetes, hypothyroidism::., .                 Abdominal:   (+) obese,         Vascular:                                        Anesthesia Plan      general     ASA 4 - emergent       Induction: intravenous. Anesthetic plan and risks discussed with patient and child/children. Plan discussed with CRNA.

## 2021-02-21 NOTE — PROGRESS NOTES
After family meeting they have elected to proceed with surgical intervention tonight. Discussed with son at bedside and he is in agreement. Consent obtained by myself and nurse with son. Second team being called in for surgical intervention tonight. Most likely and ileostomy with subtotal colectomy.

## 2021-02-21 NOTE — PROGRESS NOTES
Patient has arrived to the pre-op holding area. More family is on their way and we will delay the surgery till they arrive, so they may see the patient before the operation. This is cleared with Dr. Nano Olivarez and Dr Jacob Rousseau. Nasal swabbing is performed and the patient's temperature is 98.9°F.

## 2021-02-22 ENCOUNTER — APPOINTMENT (OUTPATIENT)
Dept: GENERAL RADIOLOGY | Age: 81
DRG: 853 | End: 2021-02-22
Attending: INTERNAL MEDICINE
Payer: MEDICARE

## 2021-02-22 LAB
ANION GAP SERPL CALCULATED.3IONS-SCNC: 10 MEQ/L (ref 8–16)
BLOOD CULTURE, ROUTINE: NORMAL
BLOOD CULTURE, ROUTINE: NORMAL
BUN BLDV-MCNC: 52 MG/DL (ref 7–22)
CALCIUM SERPL-MCNC: 7.5 MG/DL (ref 8.5–10.5)
CHLORIDE BLD-SCNC: 115 MEQ/L (ref 98–111)
CO2: 23 MEQ/L (ref 23–33)
CREAT SERPL-MCNC: 1.7 MG/DL (ref 0.4–1.2)
ERYTHROCYTE [DISTWIDTH] IN BLOOD BY AUTOMATED COUNT: 15.9 % (ref 11.5–14.5)
ERYTHROCYTE [DISTWIDTH] IN BLOOD BY AUTOMATED COUNT: 58.5 FL (ref 35–45)
GFR SERPL CREATININE-BSD FRML MDRD: 39 ML/MIN/1.73M2
GLUCOSE BLD-MCNC: 131 MG/DL (ref 70–108)
HCT VFR BLD CALC: 24.4 % (ref 42–52)
HEMOGLOBIN: 7.3 GM/DL (ref 14–18)
MCH RBC QN AUTO: 29.9 PG (ref 26–33)
MCHC RBC AUTO-ENTMCNC: 29.9 GM/DL (ref 32.2–35.5)
MCV RBC AUTO: 100 FL (ref 80–94)
PLATELET # BLD: 216 THOU/MM3 (ref 130–400)
PMV BLD AUTO: 11 FL (ref 9.4–12.4)
POTASSIUM SERPL-SCNC: 3.5 MEQ/L (ref 3.5–5.2)
RBC # BLD: 2.44 MILL/MM3 (ref 4.7–6.1)
SODIUM BLD-SCNC: 143 MEQ/L (ref 135–145)
SODIUM BLD-SCNC: 148 MEQ/L (ref 135–145)
WBC # BLD: 18.8 THOU/MM3 (ref 4.8–10.8)

## 2021-02-22 PROCEDURE — 6360000002 HC RX W HCPCS: Performed by: INTERNAL MEDICINE

## 2021-02-22 PROCEDURE — 80048 BASIC METABOLIC PNL TOTAL CA: CPT

## 2021-02-22 PROCEDURE — APPSS45 APP SPLIT SHARED TIME 31-45 MINUTES: Performed by: NURSE PRACTITIONER

## 2021-02-22 PROCEDURE — 2500000003 HC RX 250 WO HCPCS: Performed by: STUDENT IN AN ORGANIZED HEALTH CARE EDUCATION/TRAINING PROGRAM

## 2021-02-22 PROCEDURE — 2060000000 HC ICU INTERMEDIATE R&B

## 2021-02-22 PROCEDURE — 6370000000 HC RX 637 (ALT 250 FOR IP): Performed by: NURSE PRACTITIONER

## 2021-02-22 PROCEDURE — 99291 CRITICAL CARE FIRST HOUR: CPT | Performed by: INTERNAL MEDICINE

## 2021-02-22 PROCEDURE — 36415 COLL VENOUS BLD VENIPUNCTURE: CPT

## 2021-02-22 PROCEDURE — 6360000002 HC RX W HCPCS: Performed by: NURSE PRACTITIONER

## 2021-02-22 PROCEDURE — 74018 RADEX ABDOMEN 1 VIEW: CPT

## 2021-02-22 PROCEDURE — 84295 ASSAY OF SERUM SODIUM: CPT

## 2021-02-22 PROCEDURE — 2500000003 HC RX 250 WO HCPCS: Performed by: NURSE PRACTITIONER

## 2021-02-22 PROCEDURE — 99024 POSTOP FOLLOW-UP VISIT: CPT | Performed by: NURSE PRACTITIONER

## 2021-02-22 PROCEDURE — 2580000003 HC RX 258: Performed by: INTERNAL MEDICINE

## 2021-02-22 PROCEDURE — 99232 SBSQ HOSP IP/OBS MODERATE 35: CPT | Performed by: INTERNAL MEDICINE

## 2021-02-22 PROCEDURE — 85027 COMPLETE CBC AUTOMATED: CPT

## 2021-02-22 PROCEDURE — 6370000000 HC RX 637 (ALT 250 FOR IP): Performed by: INTERNAL MEDICINE

## 2021-02-22 PROCEDURE — 92610 EVALUATE SWALLOWING FUNCTION: CPT

## 2021-02-22 PROCEDURE — 94761 N-INVAS EAR/PLS OXIMETRY MLT: CPT

## 2021-02-22 RX ORDER — HYDROCODONE BITARTRATE AND ACETAMINOPHEN 5; 325 MG/1; MG/1
2 TABLET ORAL EVERY 4 HOURS PRN
Status: DISCONTINUED | OUTPATIENT
Start: 2021-02-22 | End: 2021-03-05 | Stop reason: HOSPADM

## 2021-02-22 RX ORDER — HYDROCODONE BITARTRATE AND ACETAMINOPHEN 5; 325 MG/1; MG/1
1 TABLET ORAL EVERY 4 HOURS PRN
Status: DISCONTINUED | OUTPATIENT
Start: 2021-02-22 | End: 2021-03-05 | Stop reason: HOSPADM

## 2021-02-22 RX ORDER — DEXTROSE MONOHYDRATE 50 MG/ML
INJECTION, SOLUTION INTRAVENOUS CONTINUOUS
Status: DISCONTINUED | OUTPATIENT
Start: 2021-02-22 | End: 2021-02-28

## 2021-02-22 RX ADMIN — Medication 500 MG: at 06:37

## 2021-02-22 RX ADMIN — METRONIDAZOLE 500 MG: 500 INJECTION, SOLUTION INTRAVENOUS at 06:36

## 2021-02-22 RX ADMIN — LEVOTHYROXINE SODIUM 75 MCG: 75 TABLET ORAL at 06:45

## 2021-02-22 RX ADMIN — FAMOTIDINE 20 MG: 10 INJECTION, SOLUTION INTRAVENOUS at 08:54

## 2021-02-22 RX ADMIN — BUSPIRONE HYDROCHLORIDE 15 MG: 7.5 TABLET ORAL at 14:39

## 2021-02-22 RX ADMIN — HEPARIN SODIUM 5000 UNITS: 5000 INJECTION INTRAVENOUS; SUBCUTANEOUS at 22:34

## 2021-02-22 RX ADMIN — Medication 500 MG: at 01:37

## 2021-02-22 RX ADMIN — MORPHINE SULFATE 2 MG: 2 INJECTION, SOLUTION INTRAMUSCULAR; INTRAVENOUS at 00:30

## 2021-02-22 RX ADMIN — Medication 500 MG: at 12:57

## 2021-02-22 RX ADMIN — ATORVASTATIN CALCIUM 40 MG: 40 TABLET, FILM COATED ORAL at 08:54

## 2021-02-22 RX ADMIN — BUSPIRONE HYDROCHLORIDE 15 MG: 7.5 TABLET ORAL at 08:54

## 2021-02-22 RX ADMIN — DEXTROSE MONOHYDRATE: 50 INJECTION, SOLUTION INTRAVENOUS at 12:16

## 2021-02-22 RX ADMIN — MORPHINE SULFATE 2 MG: 2 INJECTION, SOLUTION INTRAMUSCULAR; INTRAVENOUS at 06:42

## 2021-02-22 RX ADMIN — MORPHINE SULFATE 2 MG: 2 INJECTION, SOLUTION INTRAMUSCULAR; INTRAVENOUS at 04:50

## 2021-02-22 RX ADMIN — METOPROLOL TARTRATE 25 MG: 25 TABLET ORAL at 08:53

## 2021-02-22 RX ADMIN — MORPHINE SULFATE 2 MG: 2 INJECTION, SOLUTION INTRAMUSCULAR; INTRAVENOUS at 02:47

## 2021-02-22 RX ADMIN — SERTRALINE 200 MG: 100 TABLET, FILM COATED ORAL at 08:53

## 2021-02-22 RX ADMIN — MICONAZOLE NITRATE: 20 POWDER TOPICAL at 14:00

## 2021-02-22 RX ADMIN — METRONIDAZOLE 500 MG: 500 INJECTION, SOLUTION INTRAVENOUS at 14:32

## 2021-02-22 RX ADMIN — HYDROCODONE BITARTRATE AND ACETAMINOPHEN 2 TABLET: 5; 325 TABLET ORAL at 17:38

## 2021-02-22 RX ADMIN — MORPHINE SULFATE 2 MG: 2 INJECTION, SOLUTION INTRAMUSCULAR; INTRAVENOUS at 09:12

## 2021-02-22 ASSESSMENT — PAIN DESCRIPTION - PAIN TYPE
TYPE: SURGICAL PAIN
TYPE: SURGICAL PAIN

## 2021-02-22 ASSESSMENT — PAIN DESCRIPTION - PROGRESSION: CLINICAL_PROGRESSION: NOT CHANGED

## 2021-02-22 ASSESSMENT — PAIN SCALES - GENERAL
PAINLEVEL_OUTOF10: 0
PAINLEVEL_OUTOF10: 9
PAINLEVEL_OUTOF10: 9
PAINLEVEL_OUTOF10: 0

## 2021-02-22 ASSESSMENT — PAIN DESCRIPTION - FREQUENCY: FREQUENCY: CONTINUOUS

## 2021-02-22 ASSESSMENT — PAIN DESCRIPTION - ONSET: ONSET: ON-GOING

## 2021-02-22 ASSESSMENT — PAIN DESCRIPTION - ORIENTATION: ORIENTATION: LOWER

## 2021-02-22 NOTE — PLAN OF CARE
Problem: Nutrition  Goal: Optimal nutrition therapy  2/22/2021 1515 by Sari Rice RD, LD  Outcome: Ongoing  Nutrition Problem #1: Inadequate oral intake  Intervention: Food and/or Nutrient Delivery: Continue NPO  Nutritional Goals: Pt. will receive adequate nutrition (FL diet vs. nutrition support) in next 1-2 days.

## 2021-02-22 NOTE — PROGRESS NOTES
Comprehensive Nutrition Assessment    Type and Reason for Visit:  Initial(NPO for 5 days)    Nutrition Recommendations/Plan:   Recommend diet per SLP, as GI function allows. If requires ongoing NPO status - consider TPN (suggest using dosing wt: 89 kgm; start with 15 kcals/kgm, 1 gm protein/kgm and 30% kcals from lipids). If requires continued NPO status but able to use gut - consider TF Vital 1.2 at 10 ml/hr. Nutrition Assessment:    Pt. nutritionally compromised AEB NPO status (for past 6 days). At risk for further nutrition compromise r/t altered GI function (Cdiff with toxic megacolon), increased nutrient needs for wound healing and underlying medical condition (hx CKD, DM and thyroid disease). Nutrition recommendations/interventions as per above. Malnutrition Assessment:  Malnutrition Status: At risk for malnutrition (Comment)    Context:  Acute Illness     Findings of the 6 clinical characteristics of malnutrition:  Energy Intake:  7 - 50% or less of estimated energy requirements for 5 or more days  Weight Loss:  (none noted pta; -2.8% in last week (? r/t edema, fluid))     Body Fat Loss:  No significant body fat loss     Muscle Mass Loss:  No significant muscle mass loss    Fluid Accumulation:  Unable to assess     Strength:  Not Performed    Estimated Daily Nutrient Needs:  Energy (kcal):  0763-5233 kcals (12-15); Weight Used for Energy Requirements:  (131 kgm 2/22)     Protein (g):  53-60 gm (0.7-0.8) - CKD; Weight Used for Protein Requirements:  Ideal(75 kgm)        Fluid (ml/day):  per MD; Method Used for Fluid Requirements:  Other (Comment)      Nutrition Related Findings:  Pt. seen in ICU; smiles but mumbles when asked questions; SLP to see- if passes-clamp NGT and trial CL diet; 150 ml ileostomy output past 24 hours; 350 ml NGT output past 24 hours (only 1 shift documented); 2/22: Glucose 131, BUN 52, Cr 1.7, Sodium 148;  Rx includes ATB, Remeron, Seroquel, Levophed, Pheneragan Wounds:  Unstageable(coccyx; abdominal incision; 2/17 total colectomy with end ileostomy)       Current Nutrition Therapies:    No diet orders on file    Anthropometric Measures:  · Height: 5' 10\" (177.8 cm)  · Current Body Weight: 288 lb 2.3 oz (130.7 kg)(2/22 trace, +1 edema)   · Admission Body Weight: 296 lb 4.8 oz (134.4 kg)(2/17 +1 edema)    · Usual Body Weight: (pt u/a state; per EMR: 6/28/20: 277#)     · Ideal Body Weight: 166 lbs;   · BMI: 41.3  · BMI Categories: Obese Class 3 (BMI 40.0 or greater)       Nutrition Diagnosis:   · Inadequate oral intake related to altered GI function as evidenced by NPO or clear liquid status due to medical condition      Nutrition Interventions:   Food and/or Nutrient Delivery:  Continue NPO  Nutrition Education/Counseling:  Education not appropriate   Coordination of Nutrition Care:  Continue to monitor while inpatient    Goals:  Pt. will receive adequate nutrition (FL diet vs. nutrition support) in next 1-2 days. Nutrition Monitoring and Evaluation:   Behavioral-Environmental Outcomes:  None Identified   Food/Nutrient Intake Outcomes:  Diet Advancement/Tolerance  Physical Signs/Symptoms Outcomes:  Biochemical Data, Chewing or Swallowing, GI Status, Fluid Status or Edema, Nutrition Focused Physical Findings, Skin, Weight     Discharge Planning:     Too soon to determine     Electronically signed by Nick Montana RD, RHIANNA on 2/22/21 at 3:16 PM EST    Contact: [phone]

## 2021-02-22 NOTE — PROGRESS NOTES
[address] ICU 4D  Bedside Swallowing Evaluation      SLP Individual Minutes  Time In: 7582  Time Out: 4081  Minutes: 10  Timed Code Treatment Minutes: 0 Minutes       Date: 2021  Patient Name: Guzman Mcgovern      CSN: [id number]   : 1940  (79 y.o.)  Gender: male   Referring Physician:  SYLVESTER Bnun CNP  Diagnosis: Hypotension  Secondary Diagnosis: Dysphagia    History of Present Illness/Injury: Per chart review: Lyle Webster is a 78-year-old  male admitted to [facility] ICU on 2021 as a transfer from [facility]. Patient has a past medical history that is consistent of lifetime non-smoker, obstructive sleep apnea, ASHD s/p CABG, hypothyroidism, dysphagia, CKD stage 3 (baseline Crea 1.1), anemia, pulmonary nodule, cellulitis, pneumonia, osteoarthritis, Covid-19, depressionPatient was admitted to [facility] 2021 from joint [facility] after admission on 2021 for altered mental status and noted sepsis secondary to right leg cellulitis. Other work-up and management was noted to have a pulmonary nodule which was an incidental finding on a CT scan of the chest.  This nodule is 3 x 2 x 2.6 cm and the impression is noted to be malignancy as a diagnosis of exclusion. Blood cultures grew E. coli. She did return to SNF for IV Rocephin for 7 days. Francheska Guardado was transferred to [facility] from [facility] on 2021 as he was found to be diaphoretic and hypotensive. T-max 102. Patient received Covid-19 vaccine on 2021. Patient was evaluated emergency room he was diagnosed to have sepsis but source was unknown at the time of admission patient was given fluid resuscitation was started on Levophed drip. Patient was found to have a worsening renal function and subsequently Nephrology was consulted. 2021 patient was seen by Dr. Peace Iverson. Further workup did reveal C. difficile colitis. Patient was started on vancomycin. 2/16/201 noted change in mental status and worsening in creatinine patient was transferred to [facility] ICU for further management and care. \" Pt intubated for total colectomy surgery 2/22 and extubated same day on 3L marco antonio cannula. Novel orders received per SYLVESTER Chambers CNP to conduct clinical swallow evaluation this date as pt is currently NPO. Past Medical History:   Diagnosis Date    Arthritis     Chronic kidney disease     Depression     Diabetes mellitus ([address]     Hyperlipidemia     Hypertension     Thyroid disease        SUBJECTIVE:  Pt able to be seen this date for clinical swallow evaluation. JOSLYN Villagran stated for ST to refrain from puree and stick to liquids only as pt is currently recovering from total colectomy. OBJECTIVE:    Pain:  Pt reported pain around catheter area. JOSLYN Villagran notified    Current Diet: NPO    Respiratory Status:  Nasal Canula; 3L/min    Behavioral Observation:  Alert and Confused    Oral Mechanism Evaluation:      Facial / Labial WFL    Lingual Impaired Dry, cracked appearance   Dentition WFL    Velum WFL    Vocal Quality WFL    Sensation Not Tested    Cough Not Tested      Patient Evaluated Using:  Ice chips, thin liquids via spoon/cup    Oral Phase:  Impaired:  Disorganized bolus control    Pharyngeal Phase: Impaired:  Delayed Swallow and Audible Swallow  *Pharyngeal phase dysfunctions cannot be r/o at bedside alone. Signs and Symptoms of Laryngeal Penetration/Aspiration: Delayed Cough    Impresssions: Pt presents with suspected moderate oropharyngeal dysphagia as evidenced by above skilled level findings. Pt with disorganized bolus control as noted by gurgling sounds prior to swallow reflex. Suspect potential premature spillage to the pharynx d/t disorganized bolus control. Pt also exhibited audible swallow x2 rising concerns for potential presence of delayed swallow. Pharyngeal phase deficits cannot be r/o at bedside alone.  Pt demonstrated delayed cough x1 Intern  Otto Hernandez M.A., [address] [place] [address]

## 2021-02-22 NOTE — PROGRESS NOTES
CRITICAL CARE PROGRESS NOTE      Patient:  Francisco Nicole    Unit/Bed:4D-10/010-A  YOB: 1940  MRN: [id number]   PCP: Yefri Aguilar MD  Date of Admission: 2/17/2021  Chief Complaint:- Hypotension, worsening creatinine in the setting of C. difficile colitis    Assessment and Plan:    1. Septic Shock secondary to fulminant C. difficile colitis: Patient was given normal saline bolus at time of admission. Patient remained hypotensive, so Levophed was continued to maintain a MAP >65.  A.m. cortisol checked on 2/17 showing 19.85. Also received IV albumin, and has been on IV antibiotics and oral antibiotics since admission. Currently weaning off of Levophed. 2. 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 difficile. There was no bowel perforation or ileus. Continued oral vancomycin with IV Flagyl. Surgery had been seeing patient and infectious diseases recommended loop ileostomy with vancomycin irrigation. The patient is WBC continue to increase, and his abdomen continues to be distended as well as firm. A CT scan was ordered on 2/20, which showed toxic megacolon with possible pneumatosis. General surgery was called, discussed with patient and family, and the decision was made for total colectomy with ileostomy. The patient was intubated for the procedure, and extubated overnight. 3. CARMEN on CKD II: Baseline creatinine 1.1. UA tested positive for bacteria and yeast.  Patient appeared clinically dehydrated on arrival and needed Levophed to maintain blood pressures. Nephrology following, stated if BUN >100, dialysis would be warranted. CARMEN secondary to ischemic ATN. Dose medications appropriately. Urine output has improved significantly, creatinine returning to baseline. 4. Complicated UTI: UA shows bacteria, yeast, blood, and WBC. Blood culture currently negative, awaiting results of urine culture.   Patient currently on vancomycin and Flagyl. Holding treatment of Candida infection due to CARMEN. 5. Incidental pulmonary nodule: Seen incidentally on CT scan in January. PET scan completed in 2/12. Nodule noted in right lower lobe, with primary rule out for metastatic process. The nodule could also be infectious or inflammatory, monitor for now. CT of the chest should be repeated in 3 months. 6. Cholelithiasis with no evidence of cholecystitis: CT scan of abdomen showed probable cholelithiasis, and subsequent US gallbladder shows an enlarged gallbladder with mild wall thickening and sludge. Gallbladder not removed at this time, WBC stable. Surgery has examined all films and states that excision is not warranted at this time. 7. Acute metabolic encephalopathy: Secondary to hypotension and hypoperfusion of the brain, as well as uremia. Continue to monitor for resolution with treatment. Patient becoming more alert and less confused secondary due to decrease in BUN. 8. Hypocalcemia: Calcium has remained low since arrival with a secondary low iCal.  Replacement protocol in place, replace as necessary. 9. Acute on chronic anemia: Hgb on arrival was 10.2, subsequent decrease in 2/18 to 8.7 following reports from the nursing staff of blood in his stools and his NG tube. There is a component of blood loss anemia on chronic macrocytic anemia. Continue to trend hemoglobin, will transfuse if <7.0. Heparin restarted. INITIAL H AND P AND ICU COURSE:   Nathaniel De Leon is a 71-year-old male was transferred from [facility] [place] on 2/17 for acute kidney injury and C. difficile colitis. Patient was recently admitted to [facility] on 2/26 for altered mental status and sepsis secondary to right leg cellulitis. He was treated with Zosyn and Rocephin, and also discharged with antibiotics to the nursing home at the time.   During the course of hospital treatment, they also noted a pulmonary nodule on CT scan that measured 3 x 2 x 2.6 cm.  It was a concern for malignancy at the time, the requested follow-up however patient does not have any further scans or PET scans. Blood cultures grew E. coli at the time. During this hospital course, the patient was originally sent from the [facility] [place] [facility] [place] on 2/14 when he was found to be diaphoretic and hypotensive with a fever of 102 °F.  Patient was evaluated in the emergency room and diagnosed to have sepsis but the source was unknown at the time and he was given fluid resuscitation and started on a Levophed drip. Patient was started on Zosyn at time. During his hospital stay he was found to have worsening renal function and nephrology was consulted. On 2/16 the patient was seen by Dr. David Mota from nephrology. Further work-up did show C. difficile colitis. Patient was started on vancomycin. He had a noted mental change and worsening creatinine on 2/16 so he was transferred to [facility] ICU for further management and care per Dr. David Mota.    2/18: Patient this morning remains on pressors as needed to maintain his blood pressure. He has been receiving fluid throughout the night. His hemoglobin did drop from 10 to about 8, however it is stayed stable since then, continue to monitor. His WBC has decreased along with his creatinine. However his BUN increased overnight. The patient seems more confused today with an inability to answer where or when he is. The nephrologist stated that if his BUN increases over 100 dialysis would be warranted at that time. The surgeon also states that at this current point if surgery is warranted it would be a total colectomy. Both of these options were discussed with the family who stated that they would be okay starting dialysis if needed. They with prefer to hold off on surgery right now. 2/19: Patient remains on pressors to maintain his blood pressure however the dose has been weaned significantly. He still currently receiving IVF.   Hgb has remained for the surgery, however extubated same day. Doing well on 3 L nasal cannula. Patient states that he feels he has to defecate, however his ostomy bag is draining properly. His wound is clean and dry. He still states some diffuse abdominal pain. His creatinine is trending back towards baseline, as well as his BUN. His Hgb remained stable. WBC has decreased mildly from yesterday. We will continue to monitor. Past Medical History: OLGA, ASHD s/p CABG, hypothyroidism, dysphagia, CKD III, anemia, pulmonary nodule, history of cellulitis, osteoarthritis, prior COVID-19 infection, and depression. Family History: No significant family medical history. Social History: Lifetime non-smoker, currently denies alcohol use.     ROS   Review of Systems - General ROS: negative for - chills, fatigue or fever  Respiratory ROS: no cough, shortness of breath, or wheezing  Cardiovascular ROS: no chest pain or dyspnea on exertion  Gastrointestinal ROS: positive for -feeling of needing to defecate, abdominal pain diffuse (less than prior to surgery)  negative for - constipation, gas/bloating, heartburn or nausea/vomiting  Genito-Urinary ROS: no dysuria, trouble voiding, or hematuria  Musculoskeletal ROS: negative for - joint stiffness, joint swelling, muscle pain or muscular weakness  Neurological ROS: negative for - dizziness, headaches, numbness/tingling or seizures  Dermatological ROS: negative for - pruritus, rash or skin lesion changes      Scheduled Meds:   ARIPiprazole  10 mg Oral Daily    busPIRone  15 mg Oral TID    doxepin  10 mg Oral Nightly    QUEtiapine  100 mg Oral Nightly    mirtazapine  45 mg Oral Nightly    buPROPion  200 mg Oral Daily    sertraline  200 mg Oral Daily    piperacillin-tazobactam  3,375 mg Intravenous Once    famotidine (PEPCID) injection  20 mg Intravenous Daily    heparin (porcine)  5,000 Units Subcutaneous Q8H    atorvastatin  40 mg Oral Daily    levothyroxine  75 mcg Oral Daily    metoprolol tartrate  25 mg Oral BID    [Held by provider] tamsulosin  0.4 mg Oral Daily    metroNIDAZOLE  500 mg Intravenous Q8H    vancomycin  500 mg Per NG tube 4 times per day    calcium replacement protocol   Other RX Placeholder    miconazole   Topical BID     Continuous Infusions:   propofol Stopped (02/21/21 1124)    norepinephrine 2 mcg/min (02/22/21 9959)    sodium chloride 75 mL/hr at 02/21/21 5656       PHYSICAL EXAMINATION:  T:  99F. P:  90. RR:  12. B/P:  127/48. FiO2:  3L NC. O2 Sat:  100%. I/O:  -2749mL  Body mass index is 41.34 kg/m². GCS:   14, confused  General:   Generally ill-appearing elderly male  HEENT:  normocephalic and atraumatic. No scleral icterus. PERRL  Neck: supple. No Thyromegaly. Lungs: Diminished breath sounds over bilateral bases, with mild crackles. No wheezes heard. Cardiac: RRR. No JVD. Abdomen: Firm abdomen, very tender to touch, diffuse. Bowel sounds normal  Extremities:  No clubbing or cyanosis, mild edema x4  Vasculature: capillary refill < 3 seconds. Palpable dorsalis pedis pulses. Skin:  warm and dry. Psych: Patient is alert and oriented to person place and time. Lymph:  No supraclavicular adenopathy. Neurologic:  No focal deficit. No seizures. Data: (All radiographs, tracings, PFTs, and imaging are personally viewed and interpreted unless otherwise noted).     Sodium 148   Potassium 3.5   Calcium 7.5    Creatinine 1.7   HgB 7.3 (sharp downtrend)   WBC 18.8      PH 7.36   PCO2 42   pHCO3 24   Phosphorus 3.3   Magnesium 2.0   Telemetry shows Normal Sinus Rhythm   CXR 2/17 shows right lower lobe airspace disease, right central line tip placement in SVC   X-ray abdomen 2/17 shows NG tube in the distal stomach   CT abdomen pelvis without contrast shows pancolitis consistent with C. difficile, no bowel perforation, probable cholelithiasis, and bibasilar pulmonary consolidations   US gallbladder shows distended gallbladder with slight wall thickening, sludge present within the lumen, Hamilton sign positive   CXR 2/19 shows good NG tube placement.  Urine Culture 2/19 shows yeast (unspecified currently) with 90,000 colonies.  CT scan of abdomen on 2/20 shows worsening appearance of the colon, diffusely distended, thickening of the distal sigmoid colon and rectum with possible pneumatosis. No free air in the abdomen. Seen with multidisciplinary ICU team.  Meets Continued ICU Level Care Criteria:  [x] Yes, On pressors   [] No - Transfer Planned to listed location:  [] HOSPITALIST CONTACTED-      Case and plan discussed with Dr. Lee Barnard. Electronically signed by Leora Simno DO  CRITICAL CARE SPECIALIST  Patient seen by me. Case discussed with resident physician. Patient status post total colectomy secondary to toxic megacolon secondary to C. difficile colitis. Case discussed with Dr. Razia Morris. No requiring pressors. Undergoing volume resuscitation. .  CC time 35 minutes. Time was discontiguous. Time does not include procedures. Time does include my direct assessment of the patient and coordination of care.   Electronically signed by Guerda Beck MD on 2/22/2021 at 11:15 AM

## 2021-02-22 NOTE — FLOWSHEET NOTE
02/22/21 1009   Encounter Summary   Services provided to: Patient   Referral/Consult From: Rounding   Continue Visiting Yes  (2/22)   Complexity of Encounter Low   Length of Encounter 15 minutes   Routine   Type Follow up   Assessment Sleeping; Unable to respond   Intervention Prayer   In my encounter with the 79 yr old patient, I attempted to see the patient but they were unresponsive at this time. No family was present in the room. The pt was admitted due to hypotension. A  will attempt to see the patient at a later time as a follow up. The pt receives spiritual support from [address] [place] [address].

## 2021-02-22 NOTE — FLOWSHEET NOTE
1307-Extubated per resp therapist to 4L NC, Tolerated well H/O needle biopsy  1/2018   Left Breast Mass: benign  H/O wisdom tooth extraction  7-12-18    X2  Normal spontaneous vaginal delivery  '01, '04  and ' 09  S/P rotator cuff repair  12/2017  Left H/O needle biopsy  1/2018   Left Breast Mass: benign  H/O tubal ligation  ' 2011  H/O wisdom tooth extraction  7-12-18    X2  Normal spontaneous vaginal delivery  '01, '04  and ' 09  S/P abdominoplasty  ' 2012  S/P left knee arthroscopy  ' 90's  S/P right knee arthroscopy  '90's  S/P rotator cuff repair  12/2017  Left  Status post cholecystectomy  ' 2001   Laproscopic

## 2021-02-22 NOTE — PLAN OF CARE
Control of chronic pain  Outcome: Ongoing     Problem: Skin Integrity - Impaired:  Goal: Will show no infection signs and symptoms  Description: Will show no infection signs and symptoms  Outcome: Ongoing     Problem: Skin Integrity - Impaired:  Goal: Absence of new skin breakdown  Description: Absence of new skin breakdown  Outcome: Ongoing  Note: Turn and reposition every 2 hours and PRN. Keep heels and elbows elevated off bed. Watch for pressure from medical devices. Problem: Urinary Elimination:  Goal: Signs and symptoms of infection will decrease  Description: Signs and symptoms of infection will decrease  Outcome: Ongoing     Problem: Urinary Elimination:  Goal: Complications related to the disease process, condition or treatment will be avoided or minimized  Description: Complications related to the disease process, condition or treatment will be avoided or minimized  Outcome: Ongoing  Note: Continue king to monitor urine output for now. Reassess need daily and remove when no longer indicated     Problem: Falls - Risk of:  Goal: Absence of physical injury  Description: Absence of physical injury  Outcome: Ongoing  Note: Pt is lethargic, medicated and confused. Frequent checks and hourly rounds to assess needs.  Bed alarm on     Problem: Skin Integrity:  Goal: Will show no infection signs and symptoms  Description: Will show no infection signs and symptoms  Outcome: Ongoing     Problem: Skin Integrity:  Goal: Absence of new skin breakdown  Description: Absence of new skin breakdown  Outcome: Ongoing     Problem: Physical Regulation:  Goal: Signs and symptoms of infection will decrease  Description: Signs and symptoms of infection will decrease  Outcome: Ongoing     Problem: Physical Regulation:  Goal: Prevent transmision of infection  Description: Prevent transmision of infection  Outcome: Ongoing  Note: Continue contact plus precautions as ordered for c diff     Problem: Physical Regulation:  Goal: Ability to avoid or minimize complications of infection will improve  Description: Ability to avoid or minimize complications of infection will improve  Outcome: Ongoing     Problem: Musculor/Skeletal Functional Status  Goal: Highest potential functional level  Outcome: Ongoing  Note: Pt has very limited mobility. PT/Ot to work with patient when stable. Care plan reviewed with patient and family. Patient and family verbalize understanding of the plan of care and contribute to goal setting.

## 2021-02-22 NOTE — PROGRESS NOTES
Oral Nightly    buPROPion  200 mg Oral Daily    sertraline  200 mg Oral Daily    piperacillin-tazobactam  3,375 mg Intravenous Once    famotidine (PEPCID) injection  20 mg Intravenous Daily    heparin (porcine)  5,000 Units Subcutaneous Q8H    atorvastatin  40 mg Oral Daily    levothyroxine  75 mcg Oral Daily    metoprolol tartrate  25 mg Oral BID    [Held by provider] tamsulosin  0.4 mg Oral Daily    metroNIDAZOLE  500 mg Intravenous Q8H    vancomycin  500 mg Per NG tube 4 times per day    calcium replacement protocol   Other RX Placeholder    miconazole   Topical BID     Continuous Infusions:   propofol Stopped (21 1124)    norepinephrine Stopped (21 0400)    sodium chloride 75 mL/hr at 21 2236     PRN Meds:. HYDROcodone 5 mg - acetaminophen **OR** HYDROcodone 5 mg - acetaminophen, morphine, promethazine **OR** ondansetron, polyethylene glycol, acetaminophen **OR** acetaminophen  OBJECTIVE   CURRENT VITALS:  height is 5' 10\" (1.778 m) and weight is 288 lb 2.3 oz (130.7 kg). His oral temperature is 98.3 °F (36.8 °C). His blood pressure is 135/51 (abnormal) and his pulse is 94. His respiration is 14 and oxygen saturation is 97%.    Temperature Range (24h):Temp: 98.3 °F (36.8 °C) Temp  Av.9 °F (37.2 °C)  Min: 98.3 °F (36.8 °C)  Max: 99.1 °F (37.3 °C)  BP Range (82O): Systolic (33EFJ), WT , Min:98 , BHM:848     Diastolic (89BAG), BKI:45, Min:34, Max:70    Pulse Range (24h): Pulse  Av.2  Min: 85  Max: 106  Respiration Range (24h): Resp  Av.2  Min: 7  Max: 30  Current Pulse Ox (24h):  SpO2: 97 %  Pulse Ox Range (24h):  SpO2  Av.1 %  Min: 75 %  Max: 100 %  Oxygen Amount and Delivery: O2 Flow Rate (L/min): 3 L/min  Incentive Spirometry Tx:            GENERAL: Weaning sedation so more awake today, answers some questions, extubated  LUNGS: Decreased breath sounds at bases  HEART: normal rate and regular rhythm  ABDOMEN: non-distended, soft, incisional tenderness, minimal bowel sounds present. ANTONIETTA serous. INCISION: Midline incision with staples well approximated, no bleeding   EXTERMITY: no cyanosis, clubbing or edema    In: 1325 [I.V.:1205; NG/GT:120]  Out: 3190 [Urine:1900; Drains:1140]  Date 02/22/21 0000 - 02/22/21 2359   Shift 6750-4914 3713-3589 3959-8848 24 Hour Total   INTAKE   I.V.(mL/kg/hr) 702(0.7)   702   NG/GT  120  120   Shift Total(mL/kg) 702(5.4) 120(0.9)  822(6.3)   OUTPUT   Urine(mL/kg/hr) 1150(1.1)   1150   Drains 730 110  840   Stool 100   100   Shift Total(mL/kg) 1980(15.1) 110(0.8)  9621(59)   Weight (kg) 130.7 130.7 130.7 130.7     LABS     Recent Labs     02/20/21  0640 02/20/21  1840 02/21/21  0434 02/22/21  0512   WBC 22.6*  --  28.5* 18.8*   HGB 9.9* 8.6* 8.1* 7.3*   HCT 32.5* 28.2* 27.1* 24.4*     --  244 216     --  139 148*   K 4.1  --  3.9 3.5     --  108 115*   CO2 24  --  21* 23   BUN 84*  --  76* 52*   CREATININE 2.8*  --  2.3* 1.7*   MG 2.0  --   --   --    PHOS 3.3  --   --   --    CALCIUM 7.8*  --  7.5* 7.5*      No results for input(s): PTT, INR in the last 72 hours. Invalid input(s): PT  No results for input(s): AST, ALT, BILITOT, BILIDIR, AMYLASE, LIPASE, LDH, LACTA in the last 72 hours. No results for input(s): TROPONINT in the last 72 hours. RADIOLOGY     Narrative   PROCEDURE: CT ABDOMEN PELVIS WO CONTRAST       CLINICAL INFORMATION: r/o toxic megacolon . Abdominal pain. C. difficile.       COMPARISON: CT abdomen pelvis 2/17/2021.       TECHNIQUE: Axial 5 mm CT images were obtained through the abdomen and pelvis. No intravenous contrast was given. Oral contrast was given. Coronal and sagittal reconstructions were obtained.       All CT scans at this facility use dose modulation, iterative reconstruction, and/or weight-based dosing when appropriate to reduce radiation dose to as low as reasonably achievable.       FINDINGS:           There are small bilateral layering pleural effusions.  These have only slightly increased in size.  There is some mild compressive atelectasis in the lung bases. This is dependent. The patient has had a prior CABG. Coronary artery disease is present.       There is increasing free fluid in the abdomen. There is no free air. There is no gross abnormality of the liver. The spleen is normal. The adrenal glands and pancreas are grossly normal. The gallbladder is distended. There is layering dense material    which could represent intravenous contrast or sludge.  There is no hydronephrosis or stones of either kidney. No contour deforming renal masses are noted.           No abnormalities of the small bowel loops are noted.  The IVC and aorta are of normal caliber.  There is atherosclerosis of the aorta. There is no adenopathy.           The urinary bladder is collapsed around a Velez catheter. There is scattered free fluid in the pelvis. There is a hazy appearance to the mesentery. There is no gas density in the vasculature.         The transverse colon is distended with air. This is new compared to prior examination. The rectum and sigmoid colon are markedly thickened. The sigmoid colon is distended with gas. The cecum has markedly thickened walls. Oral contrast has reached the    cecum. The colonic distention in the thickening of the cecal walls is new. There is some possible pneumatosis of the cecum. This is difficult to assess.        There is no adenopathy.  There are degenerative changes in the spine.  No suspicious osseous lesions are present.                   Impression       1. Worsening appearance to the colon. This is now diffusely distended. There is worsening thickening of the distal sigmoid colon and rectum. There is new thickening of the cecum with possible pneumatosis. 2. Worsening free fluid in the abdomen. No free air is noted. 3. Small layering bilateral pleural effusions with dependent bibasilar atelectasis.    4. Distended gallbladder.                   **This

## 2021-02-22 NOTE — CARE COORDINATION
2/22/21, 2:31 PM EST    DISCHARGE ON GOING EVALUATION    [facility] day: 5  Location: 4D-10/010-A Reason for admit: Hypotension [I95.9]   Procedure:   2/17 CVC RIJ  2/17 CT Abd/pelvis: Pancolitis consistent with the clinical history of C. difficile colitis, no bowel perforation; probable cholelithiasis; Bibasilar pulmonary consolidation which may be on the basis of atelectasis or pneumonia; small bilateral nonobstructing renal calculi; small RML pulmonary nodule  2/17 US GB/RUQ: Distended appearance to the gallbladder which has a slightly thickened wall. Sludge is also present within its lumen; Sonographic Hamilton's sign is positive per the technologist. These findings could be on the basis of acute cholecystitis  2/20 CT Abd/pelvis: Toxic megacolon w/possible pneumatosis  2/20 TOTAL COLECTOMY AND ILLEOSTOMY PLACEMENT  2/20 Intubated in surgery  2/21 Extubated    Barriers to Discharge: POD #2. General Surgery consulted on 2/20 for toxic megacolon and possible pneumatosis. Taken for surgery; Colectomy w/ileostomy placed Saturday 2/20. Failed swallow today. Will need MBS when can have barium. Levo weaned off this morning. ID signed off. Hypernatremic - started on D5 drip. Tmax 99.1. NSR. Sats 100% on 3L O2. . Oriented to person and place. Follow commands. Urine +candida albicans. Intensivist, Nephrology, and General Surgery following. Wound Care following. PT/OT. CVC. NG to BK. ANTONIETTA x1. Colostomy with stoma pink/moist w/small amount of green/brown watery output. Velez. D5 @ 50 ml/hr, lipitor, wellbutrin, buspar, doxepin, IV pepcid, sq heparin, synthroid, lopressor, IV flagyl, desenex, prn IV morphine, IV zosyn, prn zofran, seroquel, zoloft, flomax, po vancomycin, Electrolyte replacement protocols. Na+ 148, BUN 52, Creat down to 1.7, wbc 18.8, hgb 7.3.    PCP: Skylar Guy MD  Readmission Risk Score: 18%  Patient Goals/Plan/Treatment Preferences: From [facility]; plan for skilled bed at [facility] at discharge - will require precert.

## 2021-02-22 NOTE — PLAN OF CARE
Patient was weaned off of Levophed and blood pressure was stable for several hours, transfer order was placed to move patient out of ICU. Case discussed with Brionna GUIDO and she graciously agreed to accept the patient. He is 1 day postop total colectomy.     Abdelrahman Hughes, DO

## 2021-02-22 NOTE — PROGRESS NOTES
[facility]   Nephrology progress note  2/22/2021, 11:20 AM      Pt Name:    Vanesa Ferrer  MRN:     [id number]     YOB: 1940  Admit Date:    2/17/2021 12:35 AM  Primary Care Physician: Moni Del Rosario MD   Room number  4D-10/010-A    Chief Complaint: Nephrology following for CARMEN    Subjective:  Patient seen and examined  Seen and examined earlier today  Events noted  At this time off Levophed  On normal saline at 75 mls per hour  Still in place  NG tube is in place  On nasal cannula        Objective:  24HR INTAKE/OUTPUT:      Intake/Output Summary (Last 24 hours) at 2/22/2021 1120  Last data filed at 2/22/2021 0900  Gross per 24 hour   Intake 2271 ml   Output 5010 ml   Net -2739 ml     I/O last 3 completed shifts: In: 2151 [I.V.:2151]  Out: 4900 [Urine:2900; Emesis/NG output:350; Drains:1450; Stool:200]  I/O this shift:  In: 120 [NG/GT:120]  Out: 110 [Drains:110]  Admission weight: 296 lb 4.8 oz (134.4 kg)  Wt Readings from Last 3 Encounters:   02/22/21 288 lb 2.3 oz (130.7 kg)   11/25/20 283 lb 3.2 oz (128.5 kg)   10/16/20 273 lb 12.8 oz (124.2 kg)     Body mass index is 41.34 kg/m².     Physical examination  VITALS:     Vitals:    02/22/21 0700 02/22/21 0800 02/22/21 0815 02/22/21 0830   BP: (!) 126/48 (!) 137/47 (!) 137/46 (!) 135/51   Pulse: 90 95 93 94   Resp: 12 14 14 14   Temp:  98.3 °F (36.8 °C)     TempSrc:  Oral     SpO2: 100% 97% 100% 97%   Weight:       Height:         General Appearance: Ill-appearing, lethargic, but slightly more awake today  Neck: No JVD  Lungs: Air entry B/L, no rales, no use of accessory muscles  Heart:  S1, S2 heard  GI: + Ostomy present  Extremities: trace LE edema      Lab Data  CBC:   Recent Labs     02/20/21  0640 02/20/21  1840 02/21/21  0434 02/22/21  0512   WBC 22.6*  --  28.5* 18.8*   HGB 9.9* 8.6* 8.1* 7.3*   HCT 32.5* 28.2* 27.1* 24.4*     --  244 216     BMP:  Recent Labs     02/20/21  0640 02/21/21  0434 02/22/21  0512

## 2021-02-23 ENCOUNTER — APPOINTMENT (OUTPATIENT)
Dept: GENERAL RADIOLOGY | Age: 81
DRG: 853 | End: 2021-02-23
Attending: INTERNAL MEDICINE
Payer: MEDICARE

## 2021-02-23 LAB
ANION GAP SERPL CALCULATED.3IONS-SCNC: 7 MEQ/L (ref 8–16)
BUN BLDV-MCNC: 41 MG/DL (ref 7–22)
CALCIUM SERPL-MCNC: 7.9 MG/DL (ref 8.5–10.5)
CHLORIDE BLD-SCNC: 116 MEQ/L (ref 98–111)
CO2: 21 MEQ/L (ref 23–33)
CREAT SERPL-MCNC: 1.4 MG/DL (ref 0.4–1.2)
ERYTHROCYTE [DISTWIDTH] IN BLOOD BY AUTOMATED COUNT: 15.9 % (ref 11.5–14.5)
ERYTHROCYTE [DISTWIDTH] IN BLOOD BY AUTOMATED COUNT: 59.8 FL (ref 35–45)
GFR SERPL CREATININE-BSD FRML MDRD: 49 ML/MIN/1.73M2
GLUCOSE BLD-MCNC: 134 MG/DL (ref 70–108)
HCT VFR BLD CALC: 26.5 % (ref 42–52)
HEMOGLOBIN: 7.7 GM/DL (ref 14–18)
MAGNESIUM: 1.9 MG/DL (ref 1.6–2.4)
MCH RBC QN AUTO: 29.8 PG (ref 26–33)
MCHC RBC AUTO-ENTMCNC: 29.1 GM/DL (ref 32.2–35.5)
MCV RBC AUTO: 102.7 FL (ref 80–94)
PHOSPHORUS: 3.1 MG/DL (ref 2.4–4.7)
PLATELET # BLD: 238 THOU/MM3 (ref 130–400)
PMV BLD AUTO: 11.1 FL (ref 9.4–12.4)
POTASSIUM SERPL-SCNC: 3.6 MEQ/L (ref 3.5–5.2)
RBC # BLD: 2.58 MILL/MM3 (ref 4.7–6.1)
SODIUM BLD-SCNC: 144 MEQ/L (ref 135–145)
WBC # BLD: 17.6 THOU/MM3 (ref 4.8–10.8)

## 2021-02-23 PROCEDURE — 97535 SELF CARE MNGMENT TRAINING: CPT

## 2021-02-23 PROCEDURE — 36415 COLL VENOUS BLD VENIPUNCTURE: CPT

## 2021-02-23 PROCEDURE — 6360000002 HC RX W HCPCS: Performed by: NURSE PRACTITIONER

## 2021-02-23 PROCEDURE — 83735 ASSAY OF MAGNESIUM: CPT

## 2021-02-23 PROCEDURE — 2709999900 HC NON-CHARGEABLE SUPPLY

## 2021-02-23 PROCEDURE — C1751 CATH, INF, PER/CENT/MIDLINE: HCPCS

## 2021-02-23 PROCEDURE — 84100 ASSAY OF PHOSPHORUS: CPT

## 2021-02-23 PROCEDURE — 76937 US GUIDE VASCULAR ACCESS: CPT

## 2021-02-23 PROCEDURE — 2500000003 HC RX 250 WO HCPCS: Performed by: INTERNAL MEDICINE

## 2021-02-23 PROCEDURE — 97530 THERAPEUTIC ACTIVITIES: CPT

## 2021-02-23 PROCEDURE — 6370000000 HC RX 637 (ALT 250 FOR IP): Performed by: INTERNAL MEDICINE

## 2021-02-23 PROCEDURE — 92526 ORAL FUNCTION THERAPY: CPT

## 2021-02-23 PROCEDURE — 97167 OT EVAL HIGH COMPLEX 60 MIN: CPT

## 2021-02-23 PROCEDURE — 2060000000 HC ICU INTERMEDIATE R&B

## 2021-02-23 PROCEDURE — 80048 BASIC METABOLIC PNL TOTAL CA: CPT

## 2021-02-23 PROCEDURE — 71045 X-RAY EXAM CHEST 1 VIEW: CPT

## 2021-02-23 PROCEDURE — APPSS30 APP SPLIT SHARED TIME 16-30 MINUTES: Performed by: NURSE PRACTITIONER

## 2021-02-23 PROCEDURE — 2500000003 HC RX 250 WO HCPCS: Performed by: NURSE PRACTITIONER

## 2021-02-23 PROCEDURE — 99232 SBSQ HOSP IP/OBS MODERATE 35: CPT | Performed by: INTERNAL MEDICINE

## 2021-02-23 PROCEDURE — 85027 COMPLETE CBC AUTOMATED: CPT

## 2021-02-23 PROCEDURE — 6370000000 HC RX 637 (ALT 250 FOR IP): Performed by: PHYSICIAN ASSISTANT

## 2021-02-23 PROCEDURE — 6370000000 HC RX 637 (ALT 250 FOR IP): Performed by: NURSE PRACTITIONER

## 2021-02-23 PROCEDURE — 99232 SBSQ HOSP IP/OBS MODERATE 35: CPT | Performed by: PHYSICIAN ASSISTANT

## 2021-02-23 PROCEDURE — 2580000003 HC RX 258: Performed by: PHYSICIAN ASSISTANT

## 2021-02-23 RX ORDER — LORAZEPAM 0.5 MG/1
0.5 TABLET ORAL EVERY 6 HOURS PRN
Status: DISCONTINUED | OUTPATIENT
Start: 2021-02-23 | End: 2021-03-05 | Stop reason: HOSPADM

## 2021-02-23 RX ORDER — SODIUM CHLORIDE 0.9 % (FLUSH) 0.9 %
10 SYRINGE (ML) INJECTION EVERY 12 HOURS SCHEDULED
Status: DISCONTINUED | OUTPATIENT
Start: 2021-02-23 | End: 2021-02-25

## 2021-02-23 RX ORDER — BUPROPION HYDROCHLORIDE 150 MG/1
300 TABLET, EXTENDED RELEASE ORAL DAILY
Status: DISCONTINUED | OUTPATIENT
Start: 2021-02-24 | End: 2021-03-05 | Stop reason: HOSPADM

## 2021-02-23 RX ORDER — LIDOCAINE HYDROCHLORIDE 10 MG/ML
5 INJECTION, SOLUTION EPIDURAL; INFILTRATION; INTRACAUDAL; PERINEURAL ONCE
Status: DISCONTINUED | OUTPATIENT
Start: 2021-02-23 | End: 2021-03-05 | Stop reason: HOSPADM

## 2021-02-23 RX ORDER — FAMOTIDINE 20 MG/1
20 TABLET, FILM COATED ORAL 2 TIMES DAILY
Status: DISCONTINUED | OUTPATIENT
Start: 2021-02-23 | End: 2021-02-25

## 2021-02-23 RX ORDER — SODIUM CHLORIDE 0.9 % (FLUSH) 0.9 %
10 SYRINGE (ML) INJECTION PRN
Status: DISCONTINUED | OUTPATIENT
Start: 2021-02-23 | End: 2021-02-25

## 2021-02-23 RX ADMIN — HEPARIN SODIUM 5000 UNITS: 5000 INJECTION INTRAVENOUS; SUBCUTANEOUS at 22:35

## 2021-02-23 RX ADMIN — METRONIDAZOLE 500 MG: 500 INJECTION, SOLUTION INTRAVENOUS at 15:52

## 2021-02-23 RX ADMIN — Medication 500 MG: at 06:43

## 2021-02-23 RX ADMIN — SODIUM CHLORIDE, PRESERVATIVE FREE 10 ML: 5 INJECTION INTRAVENOUS at 11:51

## 2021-02-23 RX ADMIN — HYDROCODONE BITARTRATE AND ACETAMINOPHEN 2 TABLET: 5; 325 TABLET ORAL at 04:24

## 2021-02-23 RX ADMIN — HYDROCODONE BITARTRATE AND ACETAMINOPHEN 1 TABLET: 5; 325 TABLET ORAL at 17:11

## 2021-02-23 RX ADMIN — HYDROCODONE BITARTRATE AND ACETAMINOPHEN 1 TABLET: 5; 325 TABLET ORAL at 00:21

## 2021-02-23 RX ADMIN — Medication 500 MG: at 00:21

## 2021-02-23 RX ADMIN — ARIPIPRAZOLE 10 MG: 10 TABLET ORAL at 00:22

## 2021-02-23 RX ADMIN — QUETIAPINE FUMARATE 100 MG: 100 TABLET ORAL at 00:21

## 2021-02-23 RX ADMIN — HEPARIN SODIUM 5000 UNITS: 5000 INJECTION INTRAVENOUS; SUBCUTANEOUS at 15:53

## 2021-02-23 RX ADMIN — DOXEPIN HYDROCHLORIDE 10 MG: 10 CAPSULE ORAL at 00:22

## 2021-02-23 RX ADMIN — Medication 500 MG: at 17:11

## 2021-02-23 RX ADMIN — FAMOTIDINE 20 MG: 20 TABLET, FILM COATED ORAL at 22:35

## 2021-02-23 RX ADMIN — BUPROPION HYDROCHLORIDE 200 MG: 100 TABLET, FILM COATED, EXTENDED RELEASE ORAL at 09:53

## 2021-02-23 RX ADMIN — BUSPIRONE HYDROCHLORIDE 15 MG: 7.5 TABLET ORAL at 15:52

## 2021-02-23 RX ADMIN — MICONAZOLE NITRATE: 20 POWDER TOPICAL at 09:54

## 2021-02-23 RX ADMIN — MICONAZOLE NITRATE: 20 POWDER TOPICAL at 22:35

## 2021-02-23 RX ADMIN — BUSPIRONE HYDROCHLORIDE 15 MG: 7.5 TABLET ORAL at 00:21

## 2021-02-23 RX ADMIN — MIRTAZAPINE 45 MG: 45 TABLET, FILM COATED ORAL at 22:35

## 2021-02-23 RX ADMIN — QUETIAPINE FUMARATE 100 MG: 100 TABLET ORAL at 22:35

## 2021-02-23 RX ADMIN — METRONIDAZOLE 500 MG: 500 INJECTION, SOLUTION INTRAVENOUS at 00:21

## 2021-02-23 RX ADMIN — ARIPIPRAZOLE 10 MG: 10 TABLET ORAL at 22:35

## 2021-02-23 RX ADMIN — ATORVASTATIN CALCIUM 40 MG: 40 TABLET, FILM COATED ORAL at 09:53

## 2021-02-23 RX ADMIN — LEVOTHYROXINE SODIUM 75 MCG: 75 TABLET ORAL at 06:43

## 2021-02-23 RX ADMIN — DOXEPIN HYDROCHLORIDE 10 MG: 10 CAPSULE ORAL at 22:35

## 2021-02-23 RX ADMIN — MICONAZOLE NITRATE: 20 POWDER TOPICAL at 00:22

## 2021-02-23 RX ADMIN — HYDROCODONE BITARTRATE AND ACETAMINOPHEN 1 TABLET: 5; 325 TABLET ORAL at 22:34

## 2021-02-23 RX ADMIN — HEPARIN SODIUM 5000 UNITS: 5000 INJECTION INTRAVENOUS; SUBCUTANEOUS at 06:43

## 2021-02-23 RX ADMIN — SERTRALINE 200 MG: 100 TABLET, FILM COATED ORAL at 09:52

## 2021-02-23 RX ADMIN — Medication 500 MG: at 11:49

## 2021-02-23 RX ADMIN — MIRTAZAPINE 45 MG: 45 TABLET, FILM COATED ORAL at 00:22

## 2021-02-23 RX ADMIN — METRONIDAZOLE 500 MG: 500 INJECTION, SOLUTION INTRAVENOUS at 09:49

## 2021-02-23 RX ADMIN — METOPROLOL TARTRATE 25 MG: 25 TABLET ORAL at 22:34

## 2021-02-23 RX ADMIN — HYDROCODONE BITARTRATE AND ACETAMINOPHEN 1 TABLET: 5; 325 TABLET ORAL at 09:52

## 2021-02-23 RX ADMIN — FAMOTIDINE 20 MG: 20 TABLET, FILM COATED ORAL at 11:49

## 2021-02-23 RX ADMIN — BUSPIRONE HYDROCHLORIDE 15 MG: 7.5 TABLET ORAL at 22:35

## 2021-02-23 RX ADMIN — BUSPIRONE HYDROCHLORIDE 15 MG: 7.5 TABLET ORAL at 09:53

## 2021-02-23 ASSESSMENT — PAIN DESCRIPTION - PAIN TYPE
TYPE: ACUTE PAIN
TYPE: SURGICAL PAIN

## 2021-02-23 ASSESSMENT — PAIN DESCRIPTION - LOCATION
LOCATION: ABDOMEN
LOCATION: BUTTOCKS

## 2021-02-23 ASSESSMENT — PAIN SCALES - GENERAL
PAINLEVEL_OUTOF10: 6
PAINLEVEL_OUTOF10: 8
PAINLEVEL_OUTOF10: 10

## 2021-02-23 ASSESSMENT — PAIN - FUNCTIONAL ASSESSMENT: PAIN_FUNCTIONAL_ASSESSMENT: PREVENTS OR INTERFERES SOME ACTIVE ACTIVITIES AND ADLS

## 2021-02-23 ASSESSMENT — PAIN DESCRIPTION - DESCRIPTORS: DESCRIPTORS: SORE

## 2021-02-23 ASSESSMENT — PAIN DESCRIPTION - ORIENTATION: ORIENTATION: MID;LOWER

## 2021-02-23 ASSESSMENT — PAIN SCALES - WONG BAKER: WONGBAKER_NUMERICALRESPONSE: 6

## 2021-02-23 ASSESSMENT — PAIN DESCRIPTION - PROGRESSION: CLINICAL_PROGRESSION: NOT CHANGED

## 2021-02-23 NOTE — PROGRESS NOTES
Hospitalist Progress Note      Patient:  Awa Lyles    Unit/Bed:4A-19/019-A  YOB: 1940  MRN: [id number]   Acct: [de-identified]   PCP: Samir Zarco MD  Date of Admission: 2/17/2021    Assessment/Plan:    1. 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 difficile. There was no bowel perforation or ileus. CT scan was ordered on 2/20, which showed toxic megacolon. Gen Surg was consulted. Started on Vanc/Flagyl 2/17, continue Flagyl. Gen surg following, okay to start tube feeds. Dietitian consulted. 2. Septic shock secondary to fulminant C. Diff colitis, resolving: with noted hypotension, s/p Levophed, weaned off and currently stable. ID consulted. Continue with current abx therapy. BP has been stable, continue to monitor. 3. Acute metabolic encephalopathy: related to above with hypotension/hypoperfusion along with uremia. Continue to monitor. Pt alert to self, unable to assess further secondary to current mentation. 4. CARMEN, improving: secondary to septic ATN. Pt nonoliguric. Nephrology on consult. Continue with IVF fluids. No need for RRT at this time  5. Complicated UTI: UA shows bacteria, yeast, blood, and WBC. Blood culture currently negative, awaiting results of urine culture. Patient currently on vancomycin and Flagyl. Holding treatment of Candida infection due to CARMEN. 6. Hypernatremia, resolved: discontinue IVFs and started on D5W per Nephro. Na 144.   7. Acute postoperative hypoxic respiratory failure, resolved: pt currently saturating 94% on RA. Continue to monitor for respiratory needs. 8. Incidental pulmonary nodules: Seen incidentally on CT scan in January. PET scan completed in 2/12. Nodule noted in right lower lobe, with primary rule out for metastatic process.   The nodule could also be infectious or inflammatory, monitor for now. CT of the chest should be repeated in 3 months. 9. Acute on chronic anemia: Hgb on arrival was 10.2, subsequent decrease in 2/18 to 8.7 following reports from the nursing staff of blood in his stools and his NG tube-> now down to 7.7. There is a component of blood loss anemia on chronic macrocytic anemia. Continue to trend hemoglobin, will transfuse if <7.0. 10. Hypocalcemia: Calcium has remained low since arrival with a secondary low iCal.  Replacement protocol in place, replace as necessary. Chief Complaint: Hypotension    Initial H and P:-    \" Guzman loera a 71-year-old male was transferred from [facility] [place] on 2/17 for acute kidney injury and C. difficile colitis. Patient was recently admitted to [facility] on 2/26 for altered mental status and sepsis secondary to right leg cellulitis. He was treated with Zosyn and Rocephin, and also discharged with antibiotics to the nursing home at the time. During the course of hospital treatment, they also noted a pulmonary nodule on CT scan that measured 3 x 2 x 2.6 cm. It was a concern for malignancy at the time, the requested follow-up however patient does not have any further scans or PET scans. Blood cultures grew E. coli at the time. During this hospital course, the patient was originally sent from the SNF to [facility] [place] on 2/14 when he was found to be diaphoretic and hypotensive with a fever of 102 °F.  Patient was evaluated in the emergency room and diagnosed to have sepsis but the source was unknown at the time and he was given fluid resuscitation and started on a Levophed drip. Patient was started on Zosyn at time. During his hospital stay he was found to have worsening renal function and nephrology was consulted. On 2/16 the patient was seen by Dr. Daphne Monae from nephrology. Further work-up did show C. difficile colitis. Patient was started on vancomycin.   He had a noted mental change and worsening creatinine on 2/16 so he was transferred to [facility] ICU for further management and care per Dr. Sylvia Burdick.     2/18: Patient this morning remains on pressors as needed to maintain his blood pressure. He has been receiving fluid throughout the night. His hemoglobin did drop from 10 to about 8, however it is stayed stable since then, continue to monitor. His WBC has decreased along with his creatinine. However his BUN increased overnight. The patient seems more confused today with an inability to answer where or when he is. The nephrologist stated that if his BUN increases over 100 dialysis would be warranted at that time. The surgeon also states that at this current point if surgery is warranted it would be a total colectomy. Both of these options were discussed with the family who stated that they would be okay starting dialysis if needed. They with prefer to hold off on surgery right now.     2/19: Patient remains on pressors to maintain his blood pressure however the dose has been weaned significantly. He still currently receiving IVF. Hgb has remained stable. WBC did increase overnight. His creatinine has decreased to 3.7. BUN has stated 95. The nephrologist had seen the patient earlier in the morning, and stated that dialysis is not needed at this time. Due to the rising WBC, infectious disease has been consulted.     2/20: Patient remains on pressors, cortisol was checked on 2/17 which was normal for a.m. Infectious diseases did recommend a loop ileostomy with vancomycin irrigation believes the patient would benefit from it. Obtaining surgery recommendations. His creatinine continues to decrease, and no dialysis is needed at this point, BUN decreasing as well.   WBC irvin again overnight, and patient still complains of stomach pains.     2/21:  In spite of the aggressive treatment for C. difficile colitis patient is still complaining of severe abdominal pain with severe abdominal distention , discussion  with the medical resident and also with nursing team  the patient is not improving over the last few days even the nursing did mention the patient is worsening over the last 24 hours with more distention of the abdomen with leukocytosis so plan to obtain CT scan abdomen pelvis with p.o. contrast to reassess his C. difficile colitis of the abdomen to see if there is any progression or improvement, CT scan abdomen pelvis done with contrast did show progression of the ulcerative cecal area of severe cecal distention and possibility for pneumatosis so general surgery consulted stat and he discussed the options with the patient and the patient family and the plan was done to go ahead for OR that was done last night, total colectomy, patient did after surgery have a smooth postop course still intubated in the weaning trial currently, will continue IV antibiotics fluids support hemodynamics pain control and in collaboration with the ID and general surgery team.     2/22: Patient remains on pressors this morning, propofol from surgery stopped. Patient was intubated for the surgery, however extubated same day. Doing well on 3 L nasal cannula. Patient states that he feels he has to defecate, however his ostomy bag is draining properly. His wound is clean and dry. He still states some diffuse abdominal pain. His creatinine is trending back towards baseline, as well as his BUN. His Hgb remained stable. WBC has decreased mildly from yesterday. We will continue to monitor. \"    Subjective (past 24 hours):   2/23-> discussed with Nephrology, okay with PICC line. Pt just had Norco prior to exam, not very interactive. Will open eyes and respond to yes no questions but is moaning continually in pain. Discussed with Gen Surg who are okay to start tube feeds as pt has been without nutrition, will consult dietician. Past medical history, family history, social history and allergies reviewed again and is unchanged since admission.     ROS light. Conjunctivae/corneas clear. Neck: Supple, with full range of motion. No jugular venous distention. Trachea midline. Respiratory:  Normal respiratory effort. Diminished to auscultation anteriorly, bilaterally without Rales/Wheezes/Rhonchi. Cardiovascular: Regular rate and rhythm with normal S1/S2 without murmurs, rubs or gallops. Abdomen: Soft, colostomy bag, TTP, non-distended with normal bowel sounds. Musculoskeletal: passive and active ROM x 4 extremities. Skin: Skin color, texture, turgor normal.  No rashes or lesions. Neurologic:  Unable to clinically assess  Psychiatric: Alert and oriented x1,   Capillary Refill: Brisk,< 3 seconds   Peripheral Pulses: +2 palpable, equal bilaterally     Labs:   Recent Labs     02/21/21  0434 02/22/21  0512 02/23/21  0402   WBC 28.5* 18.8* 17.6*   HGB 8.1* 7.3* 7.7*   HCT 27.1* 24.4* 26.5*    216 238     Recent Labs     02/21/21 0434 02/22/21 0512 02/22/21 2058 02/23/21  0353    148* 143 144   K 3.9 3.5  --  3.6    115*  --  116*   CO2 21* 23  --  21*   BUN 76* 52*  --  41*   CREATININE 2.3* 1.7*  --  1.4*   CALCIUM 7.5* 7.5*  --  7.9*   PHOS  --   --   --  3.1     No results for input(s): AST, ALT, BILIDIR, BILITOT, ALKPHOS in the last 72 hours. No results for input(s): INR in the last 72 hours. No results for input(s): Tenna Pinta in the last 72 hours. Microbiology:    Blood culture #1:   Lab Results   Component Value Date    BC No growth-preliminary No growth  02/17/2021       Blood culture #2:No results found for: Isma Bare    Organism:  Lab Results   Component Value Date    ORG Candida albicans 02/17/2021         Lab Results   Component Value Date    LABGRAM  06/10/2020     Rare segmented neutrophils observed. Rare epithelial cells observed. Many gram positive cocci occurring singly and in pairs.           MRSA culture only:No results found for: [facility]    Urine culture:   Lab Results   Component Value Date    LABURIN [place] count: 50,000-90,000 CFU/mL  02/17/2021       Respiratory culture: No results found for: CULTRESP    Aerobic and Anaerobic :  Lab Results   Component Value Date    LABAERO  06/10/2020     moderate growth In the treatment of gram positive infections, GENTAMICIN should be CONSIDERED a SYNERGYSTIC agent ONLY. Ciprofloxacin and Levofloxacin, regardless of in vitro sensitivity, should not be used for staphylococcal infections other than uncomplicated lower UTIs. Moxifloxacin, regardless of in vitro sensitivity, shouldnot be used for staphylococcal infections. Lab Results   Component Value Date    LABANAE  06/10/2020     Culture yielded heavy mixed growth which included anaerobic gram positive cocci. If a true mixed aerobic and anaerobic infection is suspected, then broad spectrum empiric antibiotic therapy is indicated and should include coverage for anaerobic organisms. Urinalysis:      Lab Results   Component Value Date    NITRU NEGATIVE 02/17/2021    WBCUA 50-75 02/17/2021    BACTERIA MODERATE 02/17/2021    RBCUA 10-15 02/17/2021    BLOODU SMALL 02/17/2021    SPECGRAV 1.017 02/17/2021       Radiology:  XR ABDOMEN FOR NG/OG/NE TUBE PLACEMENT   Final Result   Impression:      NG tube tip proximal stomach. This document has been electronically signed by: Yina Mckeon MD on    02/22/2021 11:21 PM      XR CHEST PORTABLE   Final Result   1. Appropriately positioned lines and tubes. 2. Mild bibasilar atelectasis. 3. Small left-sided pleural effusion. **This report has been created using voice recognition software. It may contain minor errors which are inherent in voice recognition technology. **      Final report electronically signed by Dr. Rosa Geller on 2/21/2021 11:27 AM      XR ABDOMEN (2 VIEWS)   Final Result   Impression:   1. Nonobstructing bowel gas pattern. 2.  Oral contrast is seen in the proximal small bowel cannot exclude    extravasation left midabdomen.   Correlation with CT abdomen and pelvis is    recommended. 3.  Post total colectomy. Overlying skin staples. NG tube in stomach. Pleural-parenchymal disease both lung bases. This document has been electronically signed by: Murtaza Tovar MD on    02/21/2021 08:51 AM      CT ABDOMEN PELVIS WO CONTRAST Additional Contrast? Oral   Final Result       1. Worsening appearance to the colon. This is now diffusely distended. There is worsening thickening of the distal sigmoid colon and rectum. There is new thickening of the cecum with possible pneumatosis. 2. Worsening free fluid in the abdomen. No free air is noted. 3. Small layering bilateral pleural effusions with dependent bibasilar atelectasis. 4. Distended gallbladder. **This report has been created using voice recognition software. It may contain minor errors which are inherent in voice recognition technology. **      Final report electronically signed by Dr. Marianna Mendoza on 2/20/2021 2:38 PM      XR ABDOMEN FOR NG/OG/NE TUBE PLACEMENT   Final Result   1. Esophageal route tube tip in the stomach. **This report has been created using voice recognition software. It may contain minor errors which are inherent in voice recognition technology. **      Final report electronically signed by Dr. Marianna Mendoza on 2/20/2021 11:15 AM      XR ABDOMEN FOR NG/OG/NE TUBE PLACEMENT   Final Result   Impression:   Limited exam   1. NG tube in the stomach. Nonspecific bowel gas pattern      This document has been electronically signed by: Georgina Grace MD on    02/19/2021 01:59 AM      US GALLBLADDER RUQ   Final Result   Distended appearance to the gallbladder which has a slightly thickened wall. Sludge is also present within its lumen. Sonographic Hamilton's sign is positive per the technologist. These findings could be on the basis of acute cholecystitis.  If clinically    warranted, further evaluation with a nuclear medicine HIDA scan would be beneficial for further characterization. **This report has been created using voice recognition software. It may contain minor errors which are inherent in voice recognition technology. **      Final report electronically signed by Dr Jose Tee on 2/17/2021 9:37 AM      CT ABDOMEN PELVIS WO CONTRAST Additional Contrast? None   Final Result   Impression:   1. Pancolitis consistent with the clinical history of C. difficile    colitis. No bowel perforation. 2.  Probable cholelithiasis. 3.  Bibasilar pulmonary consolidation which may be on the basis of    atelectasis or pneumonia. 4.  Small bilateral nonobstructing renal calculi. 5.  Small right middle lobe pulmonary nodule. This document has been electronically signed by: Nhung Mcmillan MD on    02/17/2021 05:51 AM      All CTs at this facility use dose modulation techniques and iterative    reconstructions, and/or weight-based dosing   when appropriate to reduce radiation to a low as reasonably achievable. XR CHEST PORTABLE   Final Result   Impression:      Right central line tip proximal SVC. Stable bibasilar consolidation and atelectasis. This document has been electronically signed by: Veronica Rios MD on    02/17/2021 04:20 AM      XR CHEST PORTABLE   Final Result   Impression:      Stable bilateral basilar consolidation and atelectasis      This document has been electronically signed by: Veronica Rios MD on    02/17/2021 03:20 AM      XR ABDOMEN FOR NG/OG/NE TUBE PLACEMENT   Final Result   Impression:   1. Distal NG tube is not well seen but is likely in the distal stomach. This document has been electronically signed by: Cathy Wilkinson MD on    02/17/2021 02:30 AM      XR CHEST PORTABLE   Final Result   Impression:   1. Right lower lobe airspace disease may represent atelectasis or    pneumonia.       This document has been electronically signed by: Cathy Wilkinson MD on    02/17/2021 02:28 AM FL MODIFIED BARIUM SWALLOW W VIDEO    (Results Pending)     Ct Abdomen Pelvis Wo Contrast Additional Contrast? None    Result Date: 2/17/2021  CT scan of the abdomen and pelvis without contrast Comparison: None Findings: The images are degraded by artifact related to patient body habitus. The left lateral abdominal wall is incompletely imaged. There is a 4 mm nodule within the right middle lobe. There is patchy consolidation within both lower lobes which may be on the basis of atelectasis or pneumonia. A nasogastric tube is in place and extends to the proximal duodenum. Evaluation of the parenchymal organs is limited by the lack of intravenous contrast, however, the liver, spleen, pancreas and visualized portions of the adrenal glands are normal in appearance. There is a layering high density material within the lumen of the gallbladder consistent with small gallstones versus high density sludge. There is a small amount of intra-abdominal ascites. Small bilateral nonobstructing renal calculi are present. The kidneys are otherwise normal. There is diffuse thickening of the wall of the colon associated with inflammatory changes throughout the pericolonic fat. Findings are consistent with a pancolitis and with the clinical history of C. difficile colitis. There is no bowel perforation. The abdominal aorta is normal in course and caliber. There is no abdominal, pelvic or retroperitoneal adenopathy. The seminal vesicles and prostate gland are normal in appearance. A Velez catheter and rectal tube are in place. There are no acute bony abnormalities. No soft tissue abnormalities are present. Impression: 1. Pancolitis consistent with the clinical history of C. difficile colitis. No bowel perforation. 2.  Probable cholelithiasis. 3.  Bibasilar pulmonary consolidation which may be on the basis of atelectasis or pneumonia. 4.  Small bilateral nonobstructing renal calculi.  5.  Small right middle lobe pulmonary nodule. This document has been electronically signed by: Rocco Villafana MD on 02/17/2021 05:51 AM All CTs at this facility use dose modulation techniques and iterative reconstructions, and/or weight-based dosing when appropriate to reduce radiation to a low as reasonably achievable. Us Gallbladder Ruq    Result Date: 2/17/2021  PROCEDURE: US GALLBLADDER RUQ CLINICAL INFORMATION: 69-year-old female with cholelithiasis. Follow-up exam. COMPARISON: CT scan 2/17/2021. TECHNIQUE: Multiplanar sonographic images were obtained of the structures in the right upper quadrant. FINDINGS: Gallbladder - 12.3 x 4.9 x 4.7 cm Gallbladder Wall - 0.5 cm Common Duct - 0.5 cm Hamilton's Sign: positive The liver is of normal echogenicity. No masses are noted. The pancreatic head and body are within normal limits. The tail is not well seen due to overlying bowel gas. The gallbladder is distended. There is wall thickening. There is a large amount of sludge which is seen in the dependent portion of the gallbladder. The common bile duct is normal and measures 5 mm. There is no hydronephrosis in the visualized aspects of the right kidney. Distended appearance to the gallbladder which has a slightly thickened wall. Sludge is also present within its lumen. Sonographic Hamilton's sign is positive per the technologist. These findings could be on the basis of acute cholecystitis. If clinically warranted, further evaluation with a nuclear medicine HIDA scan would be beneficial for further characterization. **This report has been created using voice recognition software. It may contain minor errors which are inherent in voice recognition technology. ** Final report electronically signed by Dr Iza Mohan on 2/17/2021 9:37 AM    Xr Chest Portable    Result Date: 2/17/2021  1 view chest x-ray. Comparison: 2/17/2021 Findings: Right central line, tip proximal SVC. NG tube unchanged. Cardiomegaly. Prior sternotomy.  Stable bilateral basilar consolidation and atelectasis. Impression: Right central line tip proximal SVC. Stable bibasilar consolidation and atelectasis. This document has been electronically signed by: Myrna Saenz MD on 02/17/2021 04:20 AM    Xr Chest Portable    Result Date: 2/17/2021  1 view chest x-ray. Comparison: 2/17/2021 Findings: NG tube is unchanged. Bilateral basilar consolidation and atelectasis are stable. Cardiomegaly. No bony abnormality. Impression: Stable bilateral basilar consolidation and atelectasis This document has been electronically signed by: Myrna Saenz MD on 02/17/2021 03:20 AM    Xr Chest Portable    Result Date: 2/17/2021  Exam: One View of the chest Comparison: None Clinical history: Hypotension Findings: NG tube is off the bottom of the image but is at least within the stomach Prior median sternotomy Cardiac silhouette is enlarged, without CHF No pneumothorax. No pleural fluid collection. Right lower lobe airspace disease may represent atelectasis or pneumonia. Impression: 1. Right lower lobe airspace disease may represent atelectasis or pneumonia. This document has been electronically signed by: Jack Lopez MD on 02/17/2021 02:28 AM    Xr Abdomen For Ng/og/ne Tube Placement    Result Date: 2/17/2021  Exam: One view the abdomen Comparison: None Clinical history: NG placement Findings: NG tube tip is not well seen but is likely in the distal stomach. No free intraperitoneal air identified. No obstructive bowel gas pattern. Impression: 1. Distal NG tube is not well seen but is likely in the distal stomach.  This document has been electronically signed by: Jack Lopez MD on 02/17/2021 02:30 AM      Electronically signed by Yen Mauro PA-C on 2/23/2021 at 1:34 PM

## 2021-02-23 NOTE — PROGRESS NOTES
[address] [address]  INPATIENT SPEECH THERAPY  [facility] NEUROSCIENCES 4A  DAILY NOTE    TIME   SLP Individual Minutes  Time In: 7674  Time Out: 0156  Minutes: 11  Timed Code Treatment Minutes: 0 Minutes       Date: 2021  Patient Name: Jaime Bocanegra      CSN: [id number]   : 1940  (79 y.o.)  Gender: male   Referring Physician: SYLVESTER Rajan CNP  Diagnosis: Hypotension  Secondary Diagnosis: Dysphagia  Precautions: Fall risk, aspiration precautions, contact isolation  Current Diet: NPO; NGT  *approval for ice chips post oral care and crushed medications in purees (as pharmacy permits)  Swallowing Strategies: Not Applicable  Date of Last MBS: Not Applicable    Pain:  No pain reported. Subjective:  Pt resting in bed upon arrival, requiring mod cues to improve wakefulness. Slightly improved mentation at date in comparison to initial clinical swallow evaluation, however, confusion maintains. RN Santino reporting clearance from surgery to proceed with completion of PO trials at date (no restrictions). Short-Term Goals:  SHORT TERM GOAL #1:  Goal 1: Pt will remain NPO to assist in reducing risk for potential airway invasion events. INTERVENTIONS: Ongoing NPO diet level with NGT in place; see STG2 below for further details within dysphagia interventions. SHORT TERM GOAL #2:  Goal 2: Skilled ST ONLY will implement advanced PO trials (conservative: ice chips, thin via spoon/cup/straw, ?puree-need GI clearance) to determine appropriateness for potential diet level initiation and/or need for instrumental swallow evaluation (ie: MBS, FEES). INTERVENTIONS: PO trials administered in attempts to determine readiness for formal instrumentation Prior to intake, head of bed elevated to approximate high fowlers positioning; bilateral wrist restraints in place with needs for direct 1:1 assistance for intake.  The following was consumed:  Ice chips: x5 trials, slight delays evidenced for mastication, no overt s/s aspiration  Thin H20 via spoon: x5 trials, 1/5 trials yielding slight throat clear; episodic audible swallow produced, concerning for potential delayed onset of pharyngeal swallow trigger  Thin H20 via cup: x2 trials, 2/2 trials yielding immediate cough reflex correlating to potential airway invasion event  Applesauce: x5 trials, relatively appropriate manipulation with no overt s/s aspiration     Improved performance noted at date for endurance as it r/t PO intake. However, given inability to exclude totality of airway invasion events at bedside alone as well potential presence of pharyngeal phase deficits, recommendations maintain for NPO diet level, NGT with plans for formal instrumentation via MBS on 2/24. Exceptions at this time for administration of conservative ice chips post oral care (with nursing only) as well critical/crucial medications crushed in puree (as pharmacy permits)    SHORT TERM GOAL #3:  Goal 3: Staff will exhibit returned demonstration of comprehensive oral care procedural analysis to improve oral integrity and reduce risk for potential bacterial colonization. INTERVENTIONS: Brief comprehensive oral care procedural analysis completed with oral cavity appearing dry. Continued needs for aggressive oral care to maximize integrity and to reduce potential bacteria colonization. Long-Term Goals:  No LTG established given short ELOS       EDUCATION:  Learner: Patient  Education:  Reviewed diet and strategies, Reviewed ST goals and Plan of Care and Reviewed recommendations for follow-up  Evaluation of Education: Demonstrates with assistance, Needs further instruction and Family not present    ASSESSMENT/PLAN:  Activity Tolerance:  Patient tolerance of  treatment: good. Emerging gains for endurance. Assessment/Plan: Patient progressing toward established goals.   Continues to require skilled care of licensed speech pathologist to progress toward achievement of established goals and plan of care. .     Plan for Next Session: Yovanny Munroe M.A., [address] [place] [address]

## 2021-02-23 NOTE — PROGRESS NOTES
[facility]  INPATIENT [facility]CCUPATIONAL THERAPY  [facility] NEUROSCIENCES 4A  EVALUATION    Time:   Time In: 08  Time Out: 6404  Timed Code Treatment Minutes: 39 Minutes  Minutes: 60          Date: 2021  Patient Name: Keyshawn Hannon,   Gender: male      MRN: [id number]  : 1940  (79 y.o.)  Referring Practitioner: Dr. Kamilla Cm DO  Diagnosis: Hypotension  Additional Pertinent Hx: Pt was transferred from [facility] on  for acute kidney injury and C. difficile colitis. Patient was recently admitted to [facility] on  for altered mental status and sepsis secondary to right leg cellulitis. He was treated with Zosyn and Rocephin, and also discharged with antibiotics to the nursing home at the time. During this hospital course, the patient was originally sent from the SNF to [facility] on  when he was found to be diaphoretic and hypotensive with a fever of 102 °F.  Patient was evaluated in the emergency room and diagnosed to have sepsis but the source was unknown at the time and he was given fluid resuscitation and started on a Levophed drip. Patient was started on Zosyn at time. During his hospital stay he was found to have worsening renal function and nephrology was consulted. On  the patient was seen by Dr. Jai Shafer from nephrology. Further work-up did show C. difficile colitis. Patient was started on vancomycin. He had a noted mental change and worsening creatinine on . 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     Restrictions/Precautions:  Restrictions/Precautions: Fall Risk, Isolation  Position Activity Restriction  Other position/activity restrictions: NG tube, ideostomy; C-diff isolation    Vitals: Blood Pressure:  111/56 per nursing following session (dropped from baseline)    Subjective  Chart Reviewed: Yes, Orders, History and Physical, Other (comment)(PT evaluation)  Patient assessed for rehabilitation services?: Yes    Subjective: Pleasant and cooperative  Comments: RN approved session. Pt agreed to try getting up. He had the urge to use the commode. He had some urination while sitting on commode. Pt had multiple complaints: dry mouth, painful bottom; itchy nose. Pt has an NG tube taped to his nose. Pain:  Pain Assessment  Patient Currently in Pain: Yes  Pain Assessment: 0-10  Pain Level: 8  Pain Type: Acute pain  Pain Location: Buttocks  Pain Orientation: Lower  Pain Descriptors: Sore  Pain Frequency: Continuous  Clinical Progression: Not changed  Patient's Stated Pain Goal: No pain  Response to Pain Intervention: Patient Satisfied  Multiple Pain Sites: Yes(sore throat)    Social/Functional History:  Lives With: Alone  Type of Home: Assisted living  Home Layout: One level  Home Access: Level entry  Home Equipment: Rolling walker   Bathroom Shower/Tub: Walk-in shower  Bathroom Toilet: Standard  Bathroom Equipment: Grab bars in shower, Grab bars around toilet  Bathroom Accessibility: Accessible       ADL Assistance: Needs assistance  Homemaking Assistance: Needs assistance  Homemaking Responsibilities: No  Ambulation Assistance: Independent  Transfer Assistance: Independent    Active : No  Occupation: Retired  Type of occupation:  in [address] [place] [address] Avenue: [place]  Additional Comments: Pt was not able to provide reliable information about his level of function prior to admission secondary to confusion about recent events. Cognition/Orientation:  Overall Orientation Status: Impaired  Orientation Level: Oriented to person, Disoriented to place, Disoriented to time, Disoriented to situation  Overall Cognitive Status: Exceptions  Cognition Comment: Pt shows difficulty with focusing attention, anxiety, poor recall of how he came to hospital or where he was. Cues for problem solving and safety awareness. Pt repeatedly stating \"needs help\" but unable to specify how he can be helped.     ADL's:  Feeding: NPO  Grooming: Minimal assistance(wiping his face and mouth with a wet washcloth)  LE Dressing: Maximum assistance(donning slippers)  Toileting: Maximum assistance(Help needed for wiping his bottom after having some liquid discharge- did not seem like bowel.)  Additional Comments: Pt had colectomy 3 days ago. Pt used a swab to moisten his mouth with verbal cues for safety and to remember to swallow. Vision - Basic Assessment  Prior Vision: Wears glasses all the time    Functional Mobility:  Bed mobility  Rolling to Left: Moderate assistance  Supine to Sit: Maximum assistance(extra time needed and cues to help with pushing off)  Scooting: Moderate assistance(cues for bringing his hips forward so both feet were touching the floor)    Functional Mobility  Functional - Mobility Device: Rolling Walker  Activity: Other(3 ft and 4 ft)  Assist Level: Moderate assistance  Functional Mobility Comments: Assist of 2- for managing his IV line and steering the walker and for balance. Forward posture noted.      Balance:  Balance  Sitting Balance: Contact guard assistance(tactile cue for sitting in midline and verbal cue for sitting tall at the edge of bed)  Standing Balance: Minimal assistance(having help with his toileting; preparing to walk; having help getting his seat ready with an air cushion)  Standing Balance  Time: 1.5 minutes; 30 seconds and 15 seconds  Activity: Preparing to walk or having help with his toileting and with preparing a seat to sit comfortably    Transfers:  Sit to stand: 2 Person assistance, Moderate assistance(from the edge of bed or the recliner chair with cues to push off of the armrests or the bedrail)  Stand to sit: Moderate assistance(with cues for hand placement)  Toilet Transfers  Toilet - Technique: Ambulating  Equipment Used: Extra wide bedside commode  Toilet Transfer: Minimal assistance, 2 Person assistance    Upper Extremity Assessment:Hand Dominance: Right  LUE General AROM: Shoulder each standing trial or after he had come up to the edge of bed. Performance deficits / Impairments: Decreased functional mobility , Decreased ADL status, Decreased endurance, Decreased strength, Decreased cognition, Decreased safe awareness, Decreased balance, Decreased posture  Prognosis: Good  REQUIRES OT FOLLOW UP: Yes  Decision Making: High Complexity    Treatment Initiated: Treatment and education initiated within context of evaluation. Evaluation time included review of current medical information, gathering information related to past medical, social and functional history, completion of standardized testing, formal and informal observation of tasks, assessment of data and development of plan of care and goals. Treatment time included skilled education and facilitation of tasks to increase safety and independence with ADL's for improved functional independence and quality of life. Pt had help with getting repositioned in the chair. He could scoot back in the chair with MIN A x 2 and verbal cues provided. He had stood while having help getting a waffle cushion placed. Cues provided to reorient to place, situation and date. Pt needed cues for relaxed breathing during walking and after having sat down. After 2 minutes he was breathing more easily and responsive to questions.   Discharge Recommendations:  Patient would benefit from continued therapy after discharge, [address]    Patient Education:  OT Education: OT Role, Plan of Care, Orientation, Transfer Training, Precautions  Patient Education: Importance of being safe with any activities, avoiding pulling at his NG tube    Equipment Recommendations:  Equipment Needed: Yes  Other: May benefit from [facility] or elevated toilet seat with armrests    Plan:  Times per week: 3-5x  Current Treatment Recommendations: Self-Care / ADL, Cognitive Reorientation, Functional Mobility Training, Endurance Training, Balance Training, Safety Education & Training  Plan Comment: Pt would benefit from continues skilled OT services when medically stable and discharged from Acute. OT recommended while at [employer]. Specific instructions for Next Treatment: Functional mobility; ADLs and sequencing, safety awareness; reorientation    Goals:  Patient goals : \"Get to feeling better. \" pt states. Short term goals  Time Frame for Short term goals: By discharge  Short term goal 1: Pt will demonstrate functional mobility with Min A x 1 using a rolling walker with cues for posture and steady breathing to prepare for doing self care while out of bed. Short term goal 2: Pt will complete transfers to/from various surfaces with SBA to increase his independence with toileting routine. Short term goal 3: Pt will complete upper body ADLs with MIN A and cues for sequencing and safety to increase his independence with self care. Short term goal 4: Pt will tolerate standing for over 2 minute duration with MIN A to increase his endurance for ease of dressing or bathing. Short term goal 5: Pt will be oriented to place/situation/day and  his goals for safety each day with cues as needed to increase his safety and improve functioning. See long-term goal time frame for expected duration of plan of care. If no long-term goals established, a short length of stay is anticipated. Following session, patient left in safe position with all fall risk precautions in place.

## 2021-02-23 NOTE — PROGRESS NOTES
Comprehensive Nutrition Assessment    Type and Reason for Visit:  Reassess, Consult(tubefeeding ordering and management)    Nutrition Recommendations/Plan:   -Recommend tubefeeding start Vital 1.2 (semi-elemental, to make sure GI status tolerates with recent GI surgery) at 15 ml/hr, increase 10 ml/hr every 8 hours as tolerated to 45 ml/hr, if tolerated transition to Nepro formula (more long-term formula for DM, CARMEN/CKD) at 15 ml/hr, increase 10 ml/hr every 4 hours as tolerated to goal of 45 ml/hr.  Free water flush per MD.  -Further diet recommendations per SLP/MD.    Nutrition Assessment:    Pt improving from a nutritional standpoint AEB plan for EN initiation today while NPO due to dysphagia concerns. 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 Nutrition recommendations/interventions as per above. Malnutrition Assessment:  Malnutrition Status: At risk for malnutrition (Comment)    Context:  Acute Illness     Findings of the 6 clinical characteristics of malnutrition:  Energy Intake:  7 - 50% or less of estimated energy requirements for 5 or more days  Weight Loss:  (none noted pta; -2.8% in last week (? r/t edema, fluid))     Body Fat Loss:  No significant body fat loss     Muscle Mass Loss:  No significant muscle mass loss    Fluid Accumulation:  Unable to assess     Strength:  Not Performed    Estimated Daily Nutrient Needs:  Energy (kcal):  7679-3217 kcals (12-15); Weight Used for Energy Requirements:  (131 kgm 2/22)     Protein (g):  53-60 gm (0.7-0.8) - CKD or more pending renal and wound status.  Weight Used for Protein Requirements:  Ideal(75 kgm)        Fluid (ml/day):  being managed by nephrology; Method Used for Fluid Requirements:  Other (Comment)      Nutrition Related Findings:  Pt seen, sleeping soundly, NGT in place. IVF's D5 at 50 ml/hr, managed by nephrologist.  SLP saw today, recommends continue NPO, plan MBS tommorrow. TF's to be started today via NGT, discussed with his nurse, Santino.  2/23/21: Sodium 144, Potassium 3.6, BUN 41, Creatinne 1.4, Glucose 134, Hgb 7.7. Rx: pepcid, synthroid, remeron, vitamin C. Ileostomy output x 4, 45 mls. Wounds:  (s/p 2/20/21: totoal colectomy with end ileostomy, unstageable coccyx, penis stage II)       Current Nutrition Therapies:    DIET TUBE FEED CONTINUOUS/CYCLIC NPO; Other Tube Feeding (must specify product in comment); Nasogastric; Continuous  Current Tube Feeding (TF) Orders:  · Feeding Route: Nasogastric(placed 2/22/21)  · Formula: (Start with Vital 1.2 (semi-elemental) if tolerated plan to transition to Nepro)  · Schedule: Continuous  · Additives/Modulars: (None)  · Water Flushes: per MD  · Current TF & Flush Orders Provides: Vital 1.2 at 15 ml/hr, increase 10 ml/hr every 8 hours as tolerated to goal of 45 ml/hr~ 1080 mls, 1296 kcals, 81 grams protein, 119 grams CHO, 6 grams fiber, 876 mls water per 24 hours  · Goal TF & Flush Orders Provides: Nepro at 45 ml/hr goal~ 1080 mls, 1944 kcals, 87 grams protein, 174 grams CHO, 14 grams fiber, 785 mls water per 24 hours.       Anthropometric Measures:  · Height: 5' 10\" (177.8 cm)  · Current Body Weight: 275 lb 14.4 oz (125.1 kg)(2/23/21 trace to +1 edema in extremitites)   · Admission Body Weight: 296 lb 4.8 oz (134.4 kg)(2/17 +1 edema)    · Usual Body Weight: (pt u/a state; per EMR: 6/28/20: 277#)     · Ideal Body Weight: 166 lbs;   · BMI: 39.6  · Adjusted Body Weight:  ; No Adjustment   · BMI Categories: Obese Class 2 (BMI 35.0 -39.9)       Nutrition Diagnosis:   · Inadequate oral intake related to cognitive or neurological impairment as evidenced by NPO or clear liquid status due to medical condition, nutrition support - enteral nutrition      Nutrition Interventions:   Food and/or Nutrient Delivery:  Continue NPO, Start Tube Feeding  Nutrition Education/Counseling:  Education not appropriate   Coordination of Nutrition Care:  Continue to monitor while inpatient    Goals:  Patient will receive EN to provide 75% or more of estimated nutrient needs until able to transition to oral diet. Nutrition Monitoring and Evaluation:   Behavioral-Environmental Outcomes:  None Identified   Food/Nutrient Intake Outcomes:  Diet Advancement/Tolerance, Enteral Nutrition Intake/Tolerance, IVF Intake  Physical Signs/Symptoms Outcomes:  Biochemical Data, Chewing or Swallowing, GI Status, Fluid Status or Edema, Nutrition Focused Physical Findings, Skin, Weight     Discharge Planning:     Too soon to determine     Electronically signed by Joaquin Rosales RD, LD on 2/23/21 at 1:35 PM EST    Contact: [phone]

## 2021-02-23 NOTE — PROGRESS NOTES
[address] [address]  INPATIENT PHYSICAL THERAPY  RE-ASSESSMENT NOTE  [facility] 4A - [id number]    Time In: 1006  Time Out: 1044  Timed Code Treatment Minutes: 45 Minutes  Minutes: 38          Date: 2021  Patient Name: Vanesa Ferrer,  Gender:  male        MRN: [id number]  : 1940  (79 y.o.)     Referring Practitioner: Dr. Netty Gottron  Diagnosis: hypotension  Additional Pertinent Hx: admit with above diagnosis, +Cdiff induced pancolitis, CARMEN. 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 Prior Level of Function:  Lives With: Alone  Type of Home: Assisted living  Home Layout: One level  Home Access: Level entry  Home Equipment: Rolling walker   Bathroom Shower/Tub: Walk-in shower  Bathroom Toilet: Standard  Bathroom Equipment: Grab bars in shower, Grab bars around toilet  Bathroom Accessibility: Accessible    ADL Assistance: Needs assistance  Homemaking Assistance: Needs assistance  Homemaking Responsibilities: No  Ambulation Assistance: Independent  Transfer Assistance: Independent  Active : No  Additional Comments: Pt was not able to provide reliable information about his level of function prior to admission secondary to confusion about recent events. Restrictions/Precautions:  Restrictions/Precautions: Fall Risk, Isolation  Position Activity Restriction  Other position/activity restrictions: NG tube, ileostomy; C-diff isolation     VITALS: Heart Rate: pt tachy in low 100's after mobility    SUBJECTIVE: RN approved session, pt is seated in recliner, requesting to use the bedside commode. Pt confused, oriented to self, [facility] at [facility], does not know why he's in the hospital.  Pt moaning throughout, c/o abdominal pain. Pt in B wrist restraints to start and end the session. PAIN: \"a lot\" abdomen    OBJECTIVE:  Bed Mobility:  Sit to Supine: Maximum Assistance, X 2     Transfers:  Sit to Stand:  Moderate Assistance, X 2, pt performs a total of 4 sit to stand transfers, from recliner and bedside commode, cues for hand placement and for upright posture  Stand to Sit:Moderate Assistance, X 2    Ambulation:  Moderate Assistance, X 2  Distance: 2 feet to bedside commode, couple steps back to bed  Surface: Level Tile  Device:Rolling Walker  Gait Deviations: Forward Flexed Posture, Slow Dunia, Decreased Step Length Bilaterally, Decreased Gait Speed and Unsteady Gait  **Pt with increased trunk, hip and knee flexion, constant cues for posture and sequencing, cues for direction, manual assist to guide walker, pt unsteady, fatigued, asking to sit down prior to being lined up with chair    Balance:  Static Sitting Balance:  Contact Guard Assistance, Minimal Assistance  Static Standing Balance: Moderate Assistance, X 2  Dynamic Standing Balance: Moderate Assistance, X 2    Functional Outcome Measures: Completed  AM-PAC Inpatient Mobility Raw Score : 6  AM-PAC Inpatient T-Scale Score : 23.55    ASSESSMENT:  Assessment: Patient progressing toward established goals. Activity Tolerance:  Patient tolerance of  treatment: fair. Limited by pain, weakness, fatigue. Equipment Recommendations: Other: cont to assess needs  Discharge Recommendations:  Subacute/Skilled Nursing Facility    Plan: Times per week: 3-5X GM  Times per day: Daily  Current Treatment Recommendations: Strengthening, Neuromuscular Re-education, Home Exercise Program, Balance Training, Endurance Training, Patient/Caregiver Education & Training, Safety Education & Training, Functional Mobility Training, Transfer Training, Gait Training    Patient Education  Patient Education: Transfers, Gait    Goals:  Patient goals : not stated  Short term goals  Time Frame for Short term goals: by discharge  Short term goal 1: roll L/R with Chuy for pressure relief  Short term goal 2: sidelying to/from sit with modAx1 to get in/out of bed  Short term goal 3: sit to std with minAx2 to get in/out of chairs  Short term goal 4: tolerate >15 min sitting balance activity with </=SBA to demonstrate inc strength for progression with home mobility  Short term goal 5: Pt to ambulate >10 feet with RW CGA for room ambulation. Long term goals  Time Frame for Long term goals : no LTGs set secondary to short ELOS    Following session, patient left in safe position with all fall risk precautions in place.

## 2021-02-23 NOTE — CARE COORDINATION
2/23/21, 7:39 AM EST    DISCHARGE BARRIERS        Patient transferred to 4A 19. Report given to unit SW, jeff kwan, regarding discharge plan for this patient.

## 2021-02-23 NOTE — PROGRESS NOTES
Renal Adjustment Follow-up    Recent Labs     02/22/21  0512 02/23/21  0353   BUN 52* 41*   CREATININE 1.7* 1.4*     Estimated Creatinine Clearance: 57 mL/min (A) (based on SCr of 1.4 mg/dL (H)). Plan: Renal function continues to improve. Since CrCl is now > 50 mL/min, will increase famotidine to 20 mg BID at this time. Will continue to monitor.       Aurora Roque, PharmD  2/23/2021  9:04 AM

## 2021-02-23 NOTE — PROGRESS NOTES
[facility]   Nephrology progress note  2/23/2021, 10:52 AM      Pt Name:    Nick Alvarado  MRN:     [id number]     YOB: 1940  Admit Date:    2/17/2021 12:35 AM  Primary Care Physician: Gigi Mensah MD   Room number  [id number]    Chief Complaint: Nephrology following for CARMEN    Subjective:  Patient seen and examined  Seen and examined earlier today  Patient is sitting out of bed to chair  Working with physical therapist in the room  No acute distress  More awake and alert        Objective:  24HR INTAKE/OUTPUT:      Intake/Output Summary (Last 24 hours) at 2/23/2021 1052  Last data filed at 2/23/2021 0911  Gross per 24 hour   Intake 927 ml   Output 2230 ml   Net -1303 ml     I/O last 3 completed shifts: In: 1109 [I.V.:827; NG/GT:220]  Out: 1990 [Urine:1000; Drains:945; Stool:45]  I/O this shift:  In: -   Out: 350 [Drains:350]  Admission weight: 296 lb 4.8 oz (134.4 kg)  Wt Readings from Last 3 Encounters:   02/23/21 275 lb 14.4 oz (125.1 kg)   11/25/20 283 lb 3.2 oz (128.5 kg)   10/16/20 273 lb 12.8 oz (124.2 kg)     Body mass index is 39.59 kg/m².     Physical examination  VITALS:     Vitals:    02/23/21 0200 02/23/21 0400 02/23/21 0913 02/23/21 0934   BP: (!) 125/52 (!) 146/64 122/63 (!) 111/56   Pulse: 93 103 110    Resp: 18 18 18    Temp: 97.6 °F (36.4 °C) 97.9 °F (36.6 °C) 98.8 °F (37.1 °C)    TempSrc: Oral Oral Oral    SpO2: 95% 98% 95%    Weight:  275 lb 14.4 oz (125.1 kg)     Height:         General Appearance: Overall improved  No acute distress  Neck: No JVD  Lungs: Air entry B/L, no rales, no use of accessory muscles  Heart:  S1, S2 heard  GI: + Ostomy present  Extremities: trace LE edema      Lab Data  CBC:   Recent Labs     02/21/21  0434 02/22/21  0512 02/23/21  0402   WBC 28.5* 18.8* 17.6*   HGB 8.1* 7.3* 7.7*   HCT 27.1* 24.4* 26.5*    216 238     BMP:  Recent Labs     02/21/21  0434 02/22/21  0512 02/22/21 2058 02/23/21  0353    148* 143 144   K 3.9 3.5  --  3.6    115*  --  116*   CO2 21* 23  --  21*   BUN 76* 52*  --  41*   CREATININE 2.3* 1.7*  --  1.4*   GLUCOSE 168* 131*  --  134*   CALCIUM 7.5* 7.5*  --  7.9*   MG  --   --   --  1.9   PHOS  --   --   --  3.1     Hepatic:   No results for input(s): LABALBU, AST, ALT, ALB, BILITOT, ALKPHOS in the last 72 hours. Meds:  Infusion:    dextrose 50 mL/hr at 02/22/21 1216    propofol Stopped (02/21/21 1124)    norepinephrine Stopped (02/22/21 0400)     Meds:    lidocaine 1 % injection  5 mL Intradermal Once    sodium chloride flush  10 mL Intravenous 2 times per day    famotidine  20 mg Per NG tube BID    ARIPiprazole  10 mg Oral Daily    busPIRone  15 mg Oral TID    doxepin  10 mg Oral Nightly    QUEtiapine  100 mg Oral Nightly    mirtazapine  45 mg Oral Nightly    buPROPion  200 mg Oral Daily    sertraline  200 mg Oral Daily    piperacillin-tazobactam  3,375 mg Intravenous Once    heparin (porcine)  5,000 Units Subcutaneous Q8H    atorvastatin  40 mg Oral Daily    levothyroxine  75 mcg Oral Daily    metoprolol tartrate  25 mg Oral BID    [Held by provider] tamsulosin  0.4 mg Oral Daily    metroNIDAZOLE  500 mg Intravenous Q8H    vancomycin  500 mg Per NG tube 4 times per day    calcium replacement protocol   Other RX Placeholder    miconazole   Topical BID     Meds prn: sodium chloride flush, HYDROcodone 5 mg - acetaminophen **OR** HYDROcodone 5 mg - acetaminophen, morphine, promethazine **OR** ondansetron, polyethylene glycol, acetaminophen **OR** acetaminophen       Impression and Plan:  1. Acute kidney injury secondary to septic ATN  Nonoliguric at this time, serum creatinine is improving  Down to 1.4  Continue with D5W for now    2. Hypernatremia. Sodium improving  Continue with D5W  3.  C. difficile colitis with toxic megacolon. Patient status post total colectomy with end ileostomy  3. Azotemia: Improving signs of uremia  4. Pulmonary nodule  5. History of CAD  6. Anemia  7.   Hypotension: Improved    Preeti Alvarez MD  [facility]

## 2021-02-23 NOTE — PROGRESS NOTES
Unsuccessful attempt made to insert PICC line in the left upper arm. 2 attempts made to access vessel. Patient confused, unable to follow commands, tolerated poorly. Primary RN notified.

## 2021-02-23 NOTE — PROGRESS NOTES
[facility] - Dr. Willie Walker  Postoperative Progress Note    Pt Name: Jabier Burnett  Medical Record Number: [id number]  Date of Birth 1940   Today's Date: 2/23/2021    ASSESSMENT   1. POD # 3 status post total colectomy with end ileostomy secondary to C. difficile colitis/toxic megacolon  2. Hypotension secondary to shock - improving   3. Acute on chronic kidney disease  4. Postoperative respiratory failure - status post extubation   5. Incidental pulmonary nodules  6. Complicated UTI  7. Metabolic encephalopathy  8. Leukocytosis  9. Acute on chronic anemia   has a past medical history of Arthritis, Chronic kidney disease, Depression, Diabetes mellitus ([address] Hyperlipidemia, Hypertension, and Thyroid disease. PLAN   1. NG tube clamped. Okay for liquids when passes swallow eval. Otherwise okay for tube feeds. 2. ANTONIETTA drain care - serous fluid. Monitor outputs. Had 945 mls out yesterday and 350 mls out today. 3. Wound & ostomy care  4. Pain control  5. Therapy as tolerated   6. Velez out and urinating spontaneously   7. GI & DVT prophylaxis   8. C. difficile treatment per admitting/ID. 9. Nephrology following. Okay to try and place PICC line. 10. Some bowel movements expected from rectal stump. Continue to monitor color/consistency. 11. Trend labs. Hemoglobin stable in the 7s. Leukocytosis slowly trending down. 12. Abdomen benign. Stable but overall prognosis guarded. Supportive care. SUBJECTIVE   Moved to 4A. Remains stable. Patient alert to name but otherwise not much interaction. Moaning continuously although he denies much pain. No fevers. NG tube in place and clamped. Using for medications. Denies much abdominal pain. ANTONIETTA drain serous. Ostomy functioning. Midline incision well approximated with no breakdown or bleeding. Having liquid incontinence from rectal stump. Drainage lighter in color.   CURRENT MEDICATIONS   Scheduled Meds:   lidocaine 1 % injection  5 mL Intradermal Once    sodium chloride flush  10 mL Intravenous 2 times per day    famotidine  20 mg Per NG tube BID    [START ON 2021] buPROPion  300 mg Oral Daily    ARIPiprazole  10 mg Oral Daily    busPIRone  15 mg Oral TID    doxepin  10 mg Oral Nightly    QUEtiapine  100 mg Oral Nightly    mirtazapine  45 mg Oral Nightly    sertraline  200 mg Oral Daily    piperacillin-tazobactam  3,375 mg Intravenous Once    heparin (porcine)  5,000 Units Subcutaneous Q8H    atorvastatin  40 mg Oral Daily    levothyroxine  75 mcg Oral Daily    metoprolol tartrate  25 mg Oral BID    [Held by provider] tamsulosin  0.4 mg Oral Daily    metroNIDAZOLE  500 mg Intravenous Q8H    vancomycin  500 mg Per NG tube 4 times per day    calcium replacement protocol   Other RX Placeholder    miconazole   Topical BID     Continuous Infusions:   dextrose 50 mL/hr at 21 1216     PRN Meds:.sodium chloride flush, LORazepam, HYDROcodone 5 mg - acetaminophen **OR** HYDROcodone 5 mg - acetaminophen, morphine, promethazine **OR** ondansetron, polyethylene glycol, acetaminophen **OR** acetaminophen  OBJECTIVE   CURRENT VITALS:  height is 5' 10\" (1.778 m) and weight is 275 lb 14.4 oz (125.1 kg). His oral temperature is 98.4 °F (36.9 °C). His blood pressure is 133/67 and his pulse is 108. His respiration is 18 and oxygen saturation is 96%.    Temperature Range (24h):Temp: 98.4 °F (36.9 °C) Temp  Av.2 °F (36.8 °C)  Min: 96.8 °F (36 °C)  Max: 99 °F (37.2 °C)  BP Range (10W): Systolic (17OBK), ANI:800 , Min:111 , TQE:566     Diastolic (73PLU), PTN:11, Min:50, Max:67    Pulse Range (24h): Pulse  Av.5  Min: 80  Max: 110  Respiration Range (24h): Resp  Av.6  Min: 16  Max: 18  Current Pulse Ox (24h):  SpO2: 96 %  Pulse Ox Range (24h):  SpO2  Av %  Min: 92 %  Max: 99 %  Oxygen Amount and Delivery: O2 Flow Rate (L/min): 1 L/min(O2 removed)  Incentive Spirometry Tx:            GENERAL: Alert to name but drowsy, answers some questions  LUNGS: Decreased breath sounds at bases  HEART: normal rate and regular rhythm  ABDOMEN: obese, non-distended, soft, incisional tenderness, minimal bowel sounds present. ANTONIETTA serous. INCISION: Midline incision with staples well approximated, no bleeding   EXTERMITY: no cyanosis, clubbing. Generalized edema. In: 480.5 [I.V.:480.5]  Out: 620 [Urine:50; Drains:570]  Date 02/23/21 0000 - 02/23/21 2359   Shift [id number] 1887-7147 1600-2359 24 Hour Total   INTAKE   P.O.  0  0   I. V.(mL/kg/hr)  480.5  480.5   Shift Total(mL/kg)  480.5(3.8)  480.5(3.8)   OUTPUT   Urine(mL/kg/hr) 50(0)   50   Drains 110 350  460   Stool 0   0   Shift Total(mL/kg) 160(1.3) 350(2.8)  510(4.1)   Weight (kg) 125.1 125.1 125.1 125.1     LABS     Recent Labs     02/21/21  0434 02/22/21  0512 02/22/21  2058 02/23/21  0353 02/23/21  0402   WBC 28.5* 18.8*  --   --  17.6*   HGB 8.1* 7.3*  --   --  7.7*   HCT 27.1* 24.4*  --   --  26.5*    216  --   --  238    148* 143 144  --    K 3.9 3.5  --  3.6  --     115*  --  116*  --    CO2 21* 23  --  21*  --    BUN 76* 52*  --  41*  --    CREATININE 2.3* 1.7*  --  1.4*  --    MG  --   --   --  1.9  --    PHOS  --   --   --  3.1  --    CALCIUM 7.5* 7.5*  --  7.9*  --       RADIOLOGY   No new imaging    Electronically signed by SYLVESTER Grullon CNP on 2/23/2021 at 3:59 PM    Patient seen and examined independently by me early this AM. Above discussed and I agree with King Titus CNP. See my additional comments below for updated orders and plan. Labs, cultures, and radiographs where available were reviewed. I discussed patient concerns with the patient's nurse and instructions were given. Please see our orders for the updated patient care plan. -NG tube clamped. Liquids when okay with swallow evaluation. Tube feeds if swallow evaluation fails. ANTONIETTA drain serous. Velez catheter out and urinating spontaneously.   C. difficile treatment per admitting/infectious disease. Nephrology following. Ostomy functioning. Expect some drainage out of rectal stump. Monitor rectal stump drainage closely as far as consistency and quantity. No signs of active bleeding. Leukocytosis better. Abdomen benign. Overall patient looking still critically ill but better. Continue current care.     Electronically signed by Pavel Sutton MD on 2/23/21 at 7:14 PM EST

## 2021-02-24 ENCOUNTER — APPOINTMENT (OUTPATIENT)
Dept: GENERAL RADIOLOGY | Age: 81
DRG: 853 | End: 2021-02-24
Attending: INTERNAL MEDICINE
Payer: MEDICARE

## 2021-02-24 ENCOUNTER — APPOINTMENT (OUTPATIENT)
Dept: INTERVENTIONAL RADIOLOGY/VASCULAR | Age: 81
DRG: 853 | End: 2021-02-24
Attending: INTERNAL MEDICINE
Payer: MEDICARE

## 2021-02-24 LAB
ANION GAP SERPL CALCULATED.3IONS-SCNC: 8 MEQ/L (ref 8–16)
BUN BLDV-MCNC: 36 MG/DL (ref 7–22)
CALCIUM SERPL-MCNC: 7.5 MG/DL (ref 8.5–10.5)
CHLORIDE BLD-SCNC: 117 MEQ/L (ref 98–111)
CO2: 21 MEQ/L (ref 23–33)
CREAT SERPL-MCNC: 1.5 MG/DL (ref 0.4–1.2)
ERYTHROCYTE [DISTWIDTH] IN BLOOD BY AUTOMATED COUNT: 16 % (ref 11.5–14.5)
ERYTHROCYTE [DISTWIDTH] IN BLOOD BY AUTOMATED COUNT: 58.3 FL (ref 35–45)
GFR SERPL CREATININE-BSD FRML MDRD: 45 ML/MIN/1.73M2
GLUCOSE BLD-MCNC: 135 MG/DL (ref 70–108)
HCT VFR BLD CALC: 24.4 % (ref 42–52)
HEMOGLOBIN: 7.1 GM/DL (ref 14–18)
MAGNESIUM: 1.8 MG/DL (ref 1.6–2.4)
MCH RBC QN AUTO: 29.6 PG (ref 26–33)
MCHC RBC AUTO-ENTMCNC: 29.1 GM/DL (ref 32.2–35.5)
MCV RBC AUTO: 101.7 FL (ref 80–94)
PHOSPHORUS: 2.7 MG/DL (ref 2.4–4.7)
PLATELET # BLD: 240 THOU/MM3 (ref 130–400)
PMV BLD AUTO: 10.9 FL (ref 9.4–12.4)
POTASSIUM SERPL-SCNC: 3.7 MEQ/L (ref 3.5–5.2)
RBC # BLD: 2.4 MILL/MM3 (ref 4.7–6.1)
SODIUM BLD-SCNC: 146 MEQ/L (ref 135–145)
WBC # BLD: 15.8 THOU/MM3 (ref 4.8–10.8)

## 2021-02-24 PROCEDURE — 84100 ASSAY OF PHOSPHORUS: CPT

## 2021-02-24 PROCEDURE — 71045 X-RAY EXAM CHEST 1 VIEW: CPT

## 2021-02-24 PROCEDURE — 2500000003 HC RX 250 WO HCPCS: Performed by: NURSE PRACTITIONER

## 2021-02-24 PROCEDURE — 36415 COLL VENOUS BLD VENIPUNCTURE: CPT

## 2021-02-24 PROCEDURE — 76937 US GUIDE VASCULAR ACCESS: CPT | Performed by: RADIOLOGY

## 2021-02-24 PROCEDURE — 6370000000 HC RX 637 (ALT 250 FOR IP): Performed by: PHYSICIAN ASSISTANT

## 2021-02-24 PROCEDURE — 99232 SBSQ HOSP IP/OBS MODERATE 35: CPT | Performed by: INTERNAL MEDICINE

## 2021-02-24 PROCEDURE — 99233 SBSQ HOSP IP/OBS HIGH 50: CPT | Performed by: NURSE PRACTITIONER

## 2021-02-24 PROCEDURE — 6370000000 HC RX 637 (ALT 250 FOR IP): Performed by: NURSE PRACTITIONER

## 2021-02-24 PROCEDURE — 77001 FLUOROGUIDE FOR VEIN DEVICE: CPT | Performed by: RADIOLOGY

## 2021-02-24 PROCEDURE — 2060000000 HC ICU INTERMEDIATE R&B

## 2021-02-24 PROCEDURE — 92611 MOTION FLUOROSCOPY/SWALLOW: CPT

## 2021-02-24 PROCEDURE — 6370000000 HC RX 637 (ALT 250 FOR IP): Performed by: INTERNAL MEDICINE

## 2021-02-24 PROCEDURE — 02HV33Z INSERTION OF INFUSION DEVICE INTO SUPERIOR VENA CAVA, PERCUTANEOUS APPROACH: ICD-10-PCS | Performed by: INTERNAL MEDICINE

## 2021-02-24 PROCEDURE — 74230 X-RAY XM SWLNG FUNCJ C+: CPT

## 2021-02-24 PROCEDURE — 97530 THERAPEUTIC ACTIVITIES: CPT

## 2021-02-24 PROCEDURE — 2709999900 HC NON-CHARGEABLE SUPPLY

## 2021-02-24 PROCEDURE — 80048 BASIC METABOLIC PNL TOTAL CA: CPT

## 2021-02-24 PROCEDURE — 36556 INSERT NON-TUNNEL CV CATH: CPT | Performed by: RADIOLOGY

## 2021-02-24 PROCEDURE — 99024 POSTOP FOLLOW-UP VISIT: CPT | Performed by: NURSE PRACTITIONER

## 2021-02-24 PROCEDURE — 2500000003 HC RX 250 WO HCPCS: Performed by: PHYSICIAN ASSISTANT

## 2021-02-24 PROCEDURE — 85027 COMPLETE CBC AUTOMATED: CPT

## 2021-02-24 PROCEDURE — 2580000003 HC RX 258: Performed by: PHYSICIAN ASSISTANT

## 2021-02-24 PROCEDURE — C1751 CATH, INF, PER/CENT/MIDLINE: HCPCS

## 2021-02-24 PROCEDURE — 2500000003 HC RX 250 WO HCPCS

## 2021-02-24 PROCEDURE — 83735 ASSAY OF MAGNESIUM: CPT

## 2021-02-24 PROCEDURE — 6360000002 HC RX W HCPCS: Performed by: NURSE PRACTITIONER

## 2021-02-24 PROCEDURE — 2580000003 HC RX 258: Performed by: INTERNAL MEDICINE

## 2021-02-24 PROCEDURE — 6360000002 HC RX W HCPCS

## 2021-02-24 PROCEDURE — APPSS30 APP SPLIT SHARED TIME 16-30 MINUTES: Performed by: NURSE PRACTITIONER

## 2021-02-24 RX ADMIN — BARIUM SULFATE 20 ML: 400 SUSPENSION ORAL at 09:17

## 2021-02-24 RX ADMIN — LEVOTHYROXINE SODIUM 75 MCG: 75 TABLET ORAL at 05:29

## 2021-02-24 RX ADMIN — FAMOTIDINE 20 MG: 20 TABLET, FILM COATED ORAL at 11:06

## 2021-02-24 RX ADMIN — Medication 500 MG: at 00:42

## 2021-02-24 RX ADMIN — METRONIDAZOLE 500 MG: 500 INJECTION, SOLUTION INTRAVENOUS at 00:42

## 2021-02-24 RX ADMIN — MIRTAZAPINE 45 MG: 45 TABLET, FILM COATED ORAL at 20:55

## 2021-02-24 RX ADMIN — FAMOTIDINE 20 MG: 20 TABLET, FILM COATED ORAL at 20:55

## 2021-02-24 RX ADMIN — BUSPIRONE HYDROCHLORIDE 15 MG: 7.5 TABLET ORAL at 20:56

## 2021-02-24 RX ADMIN — SERTRALINE 200 MG: 100 TABLET, FILM COATED ORAL at 11:06

## 2021-02-24 RX ADMIN — HEPARIN SODIUM 5000 UNITS: 5000 INJECTION INTRAVENOUS; SUBCUTANEOUS at 15:47

## 2021-02-24 RX ADMIN — BUSPIRONE HYDROCHLORIDE 15 MG: 7.5 TABLET ORAL at 15:56

## 2021-02-24 RX ADMIN — Medication 500 MG: at 17:28

## 2021-02-24 RX ADMIN — MICONAZOLE NITRATE: 20 POWDER TOPICAL at 15:55

## 2021-02-24 RX ADMIN — SODIUM CHLORIDE, PRESERVATIVE FREE 10 ML: 5 INJECTION INTRAVENOUS at 15:55

## 2021-02-24 RX ADMIN — METRONIDAZOLE 500 MG: 500 INJECTION, SOLUTION INTRAVENOUS at 23:20

## 2021-02-24 RX ADMIN — HEPARIN SODIUM 5000 UNITS: 5000 INJECTION INTRAVENOUS; SUBCUTANEOUS at 23:20

## 2021-02-24 RX ADMIN — METOPROLOL TARTRATE 25 MG: 25 TABLET ORAL at 20:55

## 2021-02-24 RX ADMIN — DOXEPIN HYDROCHLORIDE 10 MG: 10 CAPSULE ORAL at 20:56

## 2021-02-24 RX ADMIN — HEPARIN SODIUM 5000 UNITS: 5000 INJECTION INTRAVENOUS; SUBCUTANEOUS at 05:29

## 2021-02-24 RX ADMIN — Medication 500 MG: at 11:06

## 2021-02-24 RX ADMIN — HYDROCODONE BITARTRATE AND ACETAMINOPHEN 1 TABLET: 5; 325 TABLET ORAL at 05:29

## 2021-02-24 RX ADMIN — METRONIDAZOLE 500 MG: 500 INJECTION, SOLUTION INTRAVENOUS at 15:54

## 2021-02-24 RX ADMIN — BARIUM SULFATE 10 ML: 400 PASTE ORAL at 09:16

## 2021-02-24 RX ADMIN — METRONIDAZOLE 500 MG: 500 INJECTION, SOLUTION INTRAVENOUS at 10:43

## 2021-02-24 RX ADMIN — Medication 500 MG: at 06:23

## 2021-02-24 RX ADMIN — QUETIAPINE FUMARATE 100 MG: 100 TABLET ORAL at 20:55

## 2021-02-24 RX ADMIN — BUSPIRONE HYDROCHLORIDE 15 MG: 7.5 TABLET ORAL at 11:07

## 2021-02-24 RX ADMIN — DEXTROSE MONOHYDRATE: 50 INJECTION, SOLUTION INTRAVENOUS at 10:50

## 2021-02-24 RX ADMIN — ARIPIPRAZOLE 10 MG: 10 TABLET ORAL at 23:20

## 2021-02-24 RX ADMIN — MICONAZOLE NITRATE: 20 POWDER TOPICAL at 20:56

## 2021-02-24 RX ADMIN — Medication 500 MG: at 23:20

## 2021-02-24 RX ADMIN — BARIUM SULFATE 20 ML: 0.81 POWDER, FOR SUSPENSION ORAL at 09:17

## 2021-02-24 RX ADMIN — ATORVASTATIN CALCIUM 40 MG: 40 TABLET, FILM COATED ORAL at 11:07

## 2021-02-24 RX ADMIN — BUPROPION HYDROCHLORIDE 300 MG: 150 TABLET, EXTENDED RELEASE ORAL at 11:06

## 2021-02-24 ASSESSMENT — PAIN SCALES - GENERAL
PAINLEVEL_OUTOF10: 0
PAINLEVEL_OUTOF10: 0

## 2021-02-24 NOTE — PROGRESS NOTES
[facility]   Nephrology progress note  2/24/2021, 11:25 AM      Pt Name:    Drew Becerril  MRN:     [id number]     YOB: 1940  Admit Date:    2/17/2021 12:35 AM  Primary Care Physician: Ruddy Muniz MD   Room number  [id number]    Chief Complaint: Nephrology following for CARMEN    Subjective:  Patient seen and examined  Seen and examined earlier today  No acute distress  No chest pain  No shortness of breath reported      Objective:  24HR INTAKE/OUTPUT:      Intake/Output Summary (Last 24 hours) at 2/24/2021 1125  Last data filed at 2/24/2021 0506  Gross per 24 hour   Intake 1928.03 ml   Output 1160 ml   Net 768.03 ml     I/O last 3 completed shifts: In: 1928 [I.V.:1879; NG/GT:49]  Out: 1510 [Drains:1070; Stool:440]  No intake/output data recorded. Admission weight: 296 lb 4.8 oz (134.4 kg)  Wt Readings from Last 3 Encounters:   02/24/21 278 lb 4.8 oz (126.2 kg)   11/25/20 283 lb 3.2 oz (128.5 kg)   10/16/20 273 lb 12.8 oz (124.2 kg)     Body mass index is 39.93 kg/m².     Physical examination  VITALS:     Vitals:    02/24/21 0200 02/24/21 0400 02/24/21 0600 02/24/21 0949   BP: 134/60 135/63 (!) 140/63 (!) 139/50   Pulse: 86 84 79 88   Resp: 18 18 18 16   Temp:  98.5 °F (36.9 °C)  98.2 °F (36.8 °C)   TempSrc:  Oral  Oral   SpO2: 96% 96% 96% 96%   Weight:  278 lb 4.8 oz (126.2 kg)     Height:         General Appearance: Overall improved  No acute distress  Neck: No JVD  Lungs: Air entry B/L, no rales, no use of accessory muscles  Heart:  S1, S2 heard  GI: + Ostomy present  Extremities: trace LE edema      Lab Data  CBC:   Recent Labs     02/22/21  0512 02/23/21  0402 02/24/21  0422   WBC 18.8* 17.6* 15.8*   HGB 7.3* 7.7* 7.1*   HCT 24.4* 26.5* 24.4*    238 240     BMP:  Recent Labs     02/22/21  0512 02/22/21 2058 02/23/21 0353 02/24/21 0422   * 143 144 146*   K 3.5  --  3.6 3.7   *  --  116* 117*   CO2 23  --  21* 21*   BUN 52*  --  41* 36*   CREATININE 1.7* --  1.4* 1.5*   GLUCOSE 131*  --  134* 135*   CALCIUM 7.5*  --  7.9* 7.5*   MG  --   --  1.9 1.8   PHOS  --   --  3.1 2.7     Hepatic:   No results for input(s): LABALBU, AST, ALT, ALB, BILITOT, ALKPHOS in the last 72 hours. Meds:  Infusion:    dextrose 50 mL/hr at 02/24/21 1050     Meds:    lidocaine 1 % injection  5 mL Intradermal Once    sodium chloride flush  10 mL Intravenous 2 times per day    famotidine  20 mg Per NG tube BID    buPROPion  300 mg Oral Daily    ARIPiprazole  10 mg Oral Daily    busPIRone  15 mg Oral TID    doxepin  10 mg Oral Nightly    QUEtiapine  100 mg Oral Nightly    mirtazapine  45 mg Oral Nightly    sertraline  200 mg Oral Daily    piperacillin-tazobactam  3,375 mg Intravenous Once    heparin (porcine)  5,000 Units Subcutaneous Q8H    atorvastatin  40 mg Oral Daily    levothyroxine  75 mcg Oral Daily    metoprolol tartrate  25 mg Oral BID    [Held by provider] tamsulosin  0.4 mg Oral Daily    metroNIDAZOLE  500 mg Intravenous Q8H    vancomycin  500 mg Per NG tube 4 times per day    calcium replacement protocol   Other RX Placeholder    miconazole   Topical BID     Meds prn: sodium chloride flush, LORazepam, HYDROcodone 5 mg - acetaminophen **OR** HYDROcodone 5 mg - acetaminophen, morphine, promethazine **OR** ondansetron, polyethylene glycol, acetaminophen **OR** acetaminophen       Impression and Plan:  1. Acute kidney injury secondary to septic ATN  Nonoliguric at this time  Serum creatinine is 1.5  Overall stable over last 48 hours    2. Hypernatremia. Sodium remains high, will increase D5W to 75 mL/h  3.  C. difficile colitis with toxic megacolon. Patient status post total colectomy with end ileostomy  4. Azotemia: Improving signs of uremia  5. Pulmonary nodule  6. History of CAD  7. Anemia  8.   Hypotension: Improved    Wilbert Quintanilla MD  [facility]

## 2021-02-24 NOTE — PROGRESS NOTES
5 Pt arrived in IR for PICC placement  1325 Pt's son Cassandra Sargent contacted by phone, Dr. Bakari Lainez explained procedure, consent obtained. 1348 Procedure started with Dr. Bakari Lainez. 1350 Attempted right arm.  1405 Right IJ prepped. 1407 Procedure started with Dr. Bakari Lainez. 1417 Procedure complete. Pt tolerated fairly well.    1420 report called to   1440 Pt transported back to  via bed

## 2021-02-24 NOTE — PROGRESS NOTES
[address] [address]  INPATIENT PHYSICAL THERAPY  DAILY NOTE  [id number]    Time In: 4204  Time Out: 1025  Timed Code Treatment Minutes: 30 Minutes  Minutes: 30          Date: 2021  Patient Name: Francisco Nicole,  Gender:  male        MRN: [id number]  : 1940  (79 y.o.)     Referring Practitioner: Dr. Lion Lisa  Diagnosis: hypotension  Additional Pertinent Hx: admit with above diagnosis, +Cdiff induced pancolitis, CARMEN. 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 Prior Level of Function:  Lives With: Alone  Type of Home: Assisted living  Home Layout: One level  Home Access: Level entry  Home Equipment: Rolling walker   Bathroom Shower/Tub: Walk-in shower  Bathroom Toilet: Standard  Bathroom Equipment: Grab bars in shower, Grab bars around toilet  Bathroom Accessibility: Accessible    ADL Assistance: Needs assistance  Homemaking Assistance: Needs assistance  Homemaking Responsibilities: No  Ambulation Assistance: Independent  Transfer Assistance: Independent  Active : No  Additional Comments: Pt was not able to provide reliable information about his level of function prior to admission secondary to confusion about recent events. Restrictions/Precautions:  Restrictions/Precautions: Fall Risk, Isolation  Position Activity Restriction  Other position/activity restrictions: ileostomy, C-diff isolation, pureed diet with honey thick liquids     SUBJECTIVE: RN approved session, in to pull NG tube, diet upgraded to pureed with honey thick liquids. Pt continues to be confused, does not know where he is or why he's here. However, is more alert today.     PAIN: denies    OBJECTIVE:  Bed Mobility:  Rolling to Left: Maximum Assistance, multiple cues for hand placement to assist in rolling   Supine to Sit: Maximum Assistance  Scooting: Maximum Assistance    Transfers:  Sit to Stand: Minimal Assistance, X 2, performed x 2 trials, cues for hand placement, slow to ascend, cues for posture  Stand to Sit:Minimal Assistance, X 2    Ambulation:  Minimal Assistance, X 2  Distance: 2 feet to the chair  Surface: Level Tile  Device:Rolling Walker  Gait Deviations: Forward Flexed Posture, Slow Dunia, Decreased Step Length Bilaterally, Decreased Gait Speed and Unsteady Gait  **Constant cues for sequencing and walker management, cues for posture, slightly anxious when up, SOB    Balance:  Static Sitting Balance:  Stand By Assistance, Contact Guard Assistance  Static Standing Balance: Minimal Assistance, X 2  Dynamic Standing Balance: Minimal Assistance, X 2    Functional Outcome Measures: Completed  AM-PAC Inpatient Mobility Raw Score : 10  AM-PAC Inpatient T-Scale Score : 32.29    ASSESSMENT:  Assessment: Patient progressing toward established goals. Activity Tolerance:  Patient tolerance of  treatment: good. Equipment Recommendations: Other: cont to assess needs  Discharge Recommendations:  Subacute/Skilled Nursing Facility    Plan: Times per week: 3-5X GM  Times per day: Daily  Current Treatment Recommendations: Strengthening, Neuromuscular Re-education, Home Exercise Program, Balance Training, Endurance Training, Patient/Caregiver Education & Training, Safety Education & Training, Functional Mobility Training, Transfer Training, Gait Training    Patient Education  Patient Education: [employer] Mobility, Transfers, Gait    Goals:  Patient goals : not stated  Short term goals  Time Frame for Short term goals: by discharge  Short term goal 1: roll L/R with Chuy for pressure relief  Short term goal 2: sidelying to/from sit with modAx1 to get in/out of bed  Short term goal 3: sit to std with minAx2 to get in/out of chairs  Short term goal 4: tolerate >15 min sitting balance activity with </=SBA to demonstrate inc strength for progression with home mobility  Short term goal 5: Pt to ambulate >10 feet with RW CGA for room ambulation.   Long term goals  Time Frame for Long term goals : no LTGs set secondary to short ELOS    Following session, patient left in safe position with all fall risk precautions in place.

## 2021-02-24 NOTE — PROGRESS NOTES
[facility] - Dr. 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 PLAN   NG tube removed. On thickened liquids with pureed diet. ANTONIETTA drain care - serous fluid. Monitor outputs. Still putting out significant amounts. Wound & ostomy care  Pain control  Therapy as tolerated   Pulmonary toileting   GI & DVT prophylaxis   C. difficile treatment per admitting/ID  Nephrology following. Creat stable. Some mucous drainage but slowing down. Continue to monitor color/consistency. Trend labs. Hemoglobin stable in the 7s. Leukocytosis slowly trending down. Much more alert today. Stable but still extremely deconditioned. SUBJECTIVE   Remains stable on 4A. Patient alert to name and much more talkative today. Denies any pain today. NG tube was removed and he is tolerating liquids thickened. Asking for a pepsi. ANTONIETTA drain serous. Ostomy functioning well with light green/tan stool. Midline incision well approximated with no breakdown or bleeding. Having urinary incontinence. Has had a couple mucous stools that were lighter in color.   CURRENT MEDICATIONS   Scheduled Meds:   lidocaine 1 % injection  5 mL Intradermal Once    sodium chloride flush  10 mL Intravenous 2 times per day    famotidine  20 mg Per NG tube BID    buPROPion  300 mg Oral Daily    ARIPiprazole  10 mg Oral Daily    busPIRone  15 mg Oral TID    doxepin  10 mg Oral Nightly    QUEtiapine  100 mg Oral Nightly    mirtazapine  45 mg Oral Nightly    sertraline  200 mg Oral Daily    piperacillin-tazobactam  3,375 mg Intravenous Once    heparin (porcine)  5,000 Units Subcutaneous Q8H    atorvastatin  40 mg Oral Daily    levothyroxine  75 mcg Oral Daily    metoprolol tartrate  25 mg Oral BID    [Held by provider] tamsulosin  0.4 mg Oral Daily    metroNIDAZOLE  500 mg Intravenous Q8H    vancomycin  500 mg Per NG tube 4 times per day    calcium replacement protocol   Other RX Placeholder    miconazole   Topical BID     Continuous Infusions:   dextrose 50 mL/hr at 21 1216     PRN Meds:.sodium chloride flush, LORazepam, HYDROcodone 5 mg - acetaminophen **OR** HYDROcodone 5 mg - acetaminophen, morphine, promethazine **OR** ondansetron, polyethylene glycol, acetaminophen **OR** acetaminophen  OBJECTIVE   CURRENT VITALS:  height is 5' 10\" (1.778 m) and weight is 278 lb 4.8 oz (126.2 kg). His oral temperature is 98.5 °F (36.9 °C). His blood pressure is 140/63 (abnormal) and his pulse is 79. His respiration is 18 and oxygen saturation is 96%.    Temperature Range (24h):Temp: 98.5 °F (36.9 °C) Temp  Av.5 °F (36.9 °C)  Min: 98.2 °F (36.8 °C)  Max: 98.8 °F (37.1 °C)  BP Range (69H): Systolic (56IAN), FMF:194 , Min:111 , TD     Diastolic (29NDR), IHT:12, Min:56, Max:67    Pulse Range (24h): Pulse  Av  Min: 79  Max: 110  Respiration Range (24h): Resp  Av  Min: 18  Max: 18  Current Pulse Ox (24h):  SpO2: 96 %  Pulse Ox Range (24h):  SpO2  Av.9 %  Min: 94 %  Max: 97 %  Oxygen Amount and Delivery: O2 Flow Rate (L/min): 1 L/min(O2 removed)  Incentive Spirometry Tx:            GENERAL: Alert to name, answers some questions, intermittent confusion  LUNGS: Decreased breath sounds at bases  HEART: normal rate and regular rhythm  ABDOMEN: obese, non-distended, soft, incisional tenderness, minimal bowel sounds present. ANTONIETTA serous. INCISION: Midline incision with staples well approximated, no bleeding   EXTERMITY: no cyanosis, clubbing. Generalized edema. In: 1447.5 [I.V.:1398.5; NG/GT:49]  Out: 1160 [Drains:720]  Date 02/24/21 0000 - 02/24/21 2359   Shift 0974-0855 9673-9858 1879-6709 24 Hour Total   INTAKE   I.V.(mL/kg/hr) 1398.5   1398.5   NG/GT 49   49   Shift Total(mL/kg) 1447.5(11.5)   1447.5(11.5)   OUTPUT   Drains 180   180   Stool 150   150   Shift Total(mL/kg) 330(2.6)   330(2.6)   Weight (kg) 126.2 126.2 126.2 126.2     LABS     Recent Labs     02/22/21  0512 02/22/21  2058 02/23/21  0353 02/23/21  0402 02/24/21  0422   WBC 18.8*  --   --  17.6* 15.8*   HGB 7.3*  --   --  7.7* 7.1*   HCT 24.4*  --   --  26.5* 24.4*     --   --  238 240   * 143 144  --  146*   K 3.5  --  3.6  --  3.7   *  --  116*  --  117*   CO2 23  --  21*  --  21*   BUN 52*  --  41*  --  36*   CREATININE 1.7*  --  1.4*  --  1.5*   MG  --   --  1.9  --  1.8   PHOS  --   --  3.1  --  2.7   CALCIUM 7.5*  --  7.9*  --  7.5*      RADIOLOGY     PROCEDURE: XR CHEST PORTABLE       CLINICAL INFORMATION: shortness of breath .       COMPARISON: 2/23/2021       TECHNIQUE: Portable upright       FINDINGS: Heart size is within normal limits. Mediastinum is not widened. Haziness of the left base may indicate atelectasis or infiltrate. Right lung is clear. Pulmonary vessels are not congested. Median sternotomy. Central venous catheter right chest    tube is not visualized. EKG leads overlie the chest. Contrast is present in the stomach.           Impression   Left lower lobe atelectasis and/or infiltrate.               **This report has been created using voice recognition software.  It may contain minor errors which are inherent in voice recognition technology. **       Final report electronically signed by Dr. Aiden Roger on 2/24/2021 3:21 PM     PROCEDURE: FL MODIFIED BARIUM SWALLOW W VIDEO       CLINICAL INFORMATION: Dysphasia       TECHNIQUE: Fluoroscopy was provided for modified barium swallowing study performed by speech therapy. With the patient in the lateral projection, swallowing mechanism was evaluated using barium of various consistencies. The radiologist was available for    the entire examination. 15 clips were obtained. Total fluoroscopy time 1 minute 53 seconds.       Speech therapist: Achilles Sicard       COMPARISON: None.       FINDINGS: Oral, pharyngeal and esophageal structures appear normal. There is laryngeal penetration of nectar thick and thin barium with aspiration of thin barium.           Impression   1.  . Laryngeal penetration of nectar thick and thin barium with aspiration of thin barium   2. Additional recommendations from the speech therapist will follow.               **This report has been created using voice recognition software. It may contain minor errors which are inherent in voice recognition technology. **       Final report electronically signed by Dr. Donovan Jonas on 2/24/2021 10:22 AM       Electronically signed by SYLVESTER Can CNP on 2/24/2021 at 7:36 AM    Patient seen and examined independently by me early this AM. Above discussed and I agree with Anali Rogers CNP. See my additional comments below for updated orders and plan. Labs, cultures, and radiographs where available were reviewed. I discussed patient concerns with the patient's nurse and instructions were given. Please see our orders for the updated patient care plan. -NG tube removed. ANTONIETTA drain serous. Less out rectal stump. C. difficile treatment per admitting. Pulmonary toileting. Swallow evaluation reviewed. Recommendations per speech therapy. Seems to be doing okay so far. No signs of active bleeding. Leukocytosis improving. Slow but steady improvement.     Electronically signed by Pavel Sutton MD on 2/24/21 at 4:40 PM EST

## 2021-02-24 NOTE — PROGRESS NOTES
Comprehensive Nutrition Assessment    Type and Reason for Visit:  Reassess(TF/diet)    Nutrition Recommendations/Plan:   Continue current diet per SLP recommendations. ONS started: Magic cup TID. Recommend stop tubefeedings. Consider renal MVI, folbee plus. Nutrition Assessment:    Pt improving from a nutritional standpoint AEB diet initiated today per SLP recommendation s/p MBS. Remains at risk for further nutritional compromise r/t previous NPO status d/t altered GI function and dysphagia, admit with hypotension, c-diff with toxic megacolon, s/p total colectomy with ileostomy 2/20/21, CARMEN on CKD, complicated UTI, metabolic encephalopathy, increased nutrient needs to support wound healing, and underlying medical condition (hx: DM, CKD, and thyroid disease). Nutrition recommendations/interventions as per above. Malnutrition Assessment:  Malnutrition Status: At risk for malnutrition (Comment)    Context:  Acute Illness     Findings of the 6 clinical characteristics of malnutrition:  Energy Intake:  7 - 50% or less of estimated energy requirements for 5 or more days  Weight Loss:  (none noted pta; -2.8% in last week (? r/t edema, fluid))     Body Fat Loss:  No significant body fat loss     Muscle Mass Loss:  No significant muscle mass loss    Fluid Accumulation:  Unable to assess     Strength:  Not Performed    Estimated Daily Nutrient Needs:  Energy (kcal):  5753-5262 kcals (12-15); Weight Used for Energy Requirements:  (131 kgm 2/22)     Protein (g):  53-60 gm (0.7-0.8) - CKD; Weight Used for Protein Requirements:  Ideal(75 kgm)        Fluid (ml/day):  being managed by nephrology; Method Used for Fluid Requirements:  Other (Comment)      Nutrition Related Findings:  Patient seen earlier this morning, said hi to writer but then could not carry on any meanful conversation. s/p MBS-passed, diet intiated. Will start ONS. TF's had been running via NGT and tolerating, Vital 1.2 at 25 ml/hr. 2/24/21: Sodium 146, BUN 36, Creatinine 1.5, Glucose 135, Hgb 7.1. Rx: synthroid, remeron, vanc, D5 at 50 ml/hr. 440 mls ileostomy output. Wounds:  (s/p 2/20/21: totoal colectomy with end ileostomy, unstageable coccyx, penis stage II)       Current Nutrition Therapies:    DIET DYSPHAGIA PUREED; Moderately Thick (Honey)  Dietary Nutrition Supplements: Frozen Oral Supplement  Previous Tube Feeding (TF) Orders:  Stopped 2/24/21 d/t passing MBS  · Feeding Route: Nasogastric(placed 2/22/21)  · Formula: (Start with Vital 1.2 (semi-elemental) if tolerated plan to transition to Nepro)  · Schedule: Continuous  · Additives/Modulars: (None)  · Water Flushes: per MD  · Current TF & Flush Orders Provides: Vital 1.2 at 15 ml/hr, increase 10 ml/hr every 8 hours as tolerated to goal of 45 ml/hr~ 1080 mls, 1296 kcals, 81 grams protein, 119 grams CHO, 6 grams fiber, 876 mls water per 24 hours  · Previous Goal TF & Flush Orders Provides: Nepro at 45 ml/hr goal~ 1080 mls, 1944 kcals, 87 grams protein, 174 grams CHO, 14 grams fiber, 785 mls water per 24 hours.       Anthropometric Measures:  · Height: 5' 10\" (177.8 cm)  · Current Body Weight: 278 lb 4.8 oz (126.2 kg)(2/24/21 trace to +1 edema extremitites)   · Admission Body Weight: 296 lb 4.8 oz (134.4 kg)(2/17 +1 edema)    · Usual Body Weight: (pt u/a state; per EMR: 6/28/20: 277#)     · Ideal Body Weight: 166 lbs;   · BMI: 39.9  · Adjusted Body Weight:  ; No Adjustment   · BMI Categories: Obese Class 2 (BMI 35.0 -39.9)       Nutrition Diagnosis:   · Inadequate oral intake related to cognitive or neurological impairment as evidenced by swallow study results(previous NPO status)      Nutrition Interventions:   Food and/or Nutrient Delivery:  Continue Current Diet, Start Oral Nutrition Supplement(Stop tubefeeding)  Nutrition Education/Counseling:  Education not appropriate   Coordination of Nutrition Care:  Continue to monitor while inpatient    Goals:  Patient will tolerate and consume 75% or more of meals during LOS. Nutrition Monitoring and Evaluation:   Behavioral-Environmental Outcomes:  None Identified   Food/Nutrient Intake Outcomes:  Diet Advancement/Tolerance, Food and Nutrient Intake, Supplement Intake, IVF Intake  Physical Signs/Symptoms Outcomes:  Biochemical Data, Chewing or Swallowing, GI Status, Fluid Status or Edema, Nutrition Focused Physical Findings, Skin, Weight     Discharge Planning:     Too soon to determine     Electronically signed by Jorge Paz RD, LD on 2/24/21 at 12:29 PM EST    Contact: [phone]

## 2021-02-24 NOTE — PROGRESS NOTES
[address] [place] [address]  Modified Barium Swallow    SLP Individual Minutes  Time In: 7849  Time Out:   Minutes: 8  Timed Code Treatment Minutes: 0 Minutes       Date: 2021  Patient Name: Eldon Armas      CSN: [id number]   : 1940  (79 y.o.)  Gender: male   Referring Physician: Iza Dubose, SYLVESTER Wong CNP; Andrew Hawk PA-C  Diagnosis: Hypotension  Secondary Diagnosis: Dysphagia  Precautions: Fall risk, aspiration precautions, contact isolation  History of Present Illness/Injury: Pt is a 79 y.o. male admitted to [facility] ICU on  as a transfer from [facility]. Pt with previous medical hx significant of obstructive sleep apnea, ASHD s/p CABG, hypothyroidism, dysphagia, CKD stage 3 (baseline Crea 1.1), anemia, pulmonary nodule, cellulitis, pneumonia, osteoarthritis, Covid-19, depression. Pt admitted to to [facility] on 2021 from joint [facility] after admission on 2021 for altered mental status and noted sepsis secondary to right leg cellulitis. Other work-up and management was noted to have a pulmonary nodule which was an incidental finding on a CT scan of the chest.  This nodule is 3 x 2 x 2.6 cm and the impression is noted to be malignancy as a diagnosis of exclusion. Pt received COVID-19 vaccine . Pt presented to ED with sepsis of unknown source and worsening renal function. 2021 patient was seen by Dr. Julianne Majano. Further workup did reveal C. difficile colitis.  noted change in mental status and worsening in creatinine patient was transferred to [facility] ICU for further management and care. Pt intubated for total colectomy surgery  and extubated same day on 3L marco antonio cannula. Pt currently receiving nutrition via NG tube. MBS warranted this date to r/o pharyngeal phase dysfunction. *See H&P for further details. Current Diet: NPO    Pain: Pt reported pain in elbow.     SUBJECTIVE:  Pt arrived to fluoroscopy suite in bed via transportation. Pt alert and cooperative throughout study; bilateral wrist restraints in place. No family present. OBJECTIVE:    Respiratory Status:  Independent    Behavioral Observation:  Alert and Confused    PATIENT WAS EVALUATED USING:  BARIUM: Thin liquids via spoon/cup, mildly thick liquids via cup, moderately thick liquids via cup, puree    ORAL PREPARATION PHASE:  Impaired:  Uncoordinated Mastication, Decreased Bolus Control, Decreased Bolus Formation and Loss of Bolus from Lips/Anterior Spillage    ORAL PHASE: Impaired:  Uncontrolled Bolus/Diffuse Fall Over Tongue Base     ORAL PHASE RONNY SCORE: (Dysphagia outcome and severity scale)  4 = Mild-Moderate Dysphagia - May have one or two diet consistencies restricted - Oral residue clears with cue - Intermittent supervision or cueing    PHARYNGEAL PHASE:  Impaired: Delayed Swallow, Decreased Airway Protection, Decreased Epiglottic Inversion, Decreased Hyolaryngeal Elevation, Decreased Tongue Based Retraction, Residue in the Valleculae and Residue Along the Posterior Pharyngeal Wall     PHARYNGEAL PHASE RONNY SCORE: (Dysphagia outcome and severity scale)  3 = Moderate Dysphagia - Two or more diet consistencies restricted - May exhibit one or more of the following: Moderate residue clears with cue, Airway penetration to the level of the vocal folds wihtout cough with tow or more consistencies, Aspiration with two consistencies with weak or no reflexive cough, Aspiration of one consistency, no cough and airway penetration with one consistency, no cough    EVIDENCE FOR LARYNGEAL PENETRATION AND/OR ASPIRATION:  Laryngeal penetration evident with thin via spoon/cup, mildly thick via cup  Silent aspiration evident with thin via cup following swallow    PENETRATION-ASPIRATION SCALE (PAS):   Thin Liquids: 8 = Material enters the airway, passes below the vocal folds, and no effort is made to eject  Mildly Thick Liquids:  5 = Material enters the airway, contacts the vocal folds, and is not ejected from the airway  Moderately Thick Liquids: 1 = Material does not enter the airway  Puree:  1 = Material does not enter the airway    ESOPHAGEAL PHASE:   No significant findings    ATTEMPTED TECHNIQUES:  Small Bolus Size Effective    Straw Not Attempted    Cup Effective    Chin Tuck Not Attempted    Head Turn Not Attempted    Spoon Presentations Effective    Volitional Cough Ineffective    Spontaneous Cough Not Attempted           DIAGNOSTIC IMPRESSIONS:  Pt presents with mild oral dysphagia and moderate pharyngeal dysphagia as evidenced by above skilled level findings; current level of dysphagia severity certainly compounded d/t compromised mentation with poor awareness to immediate surroundings. Pt with minimal anterior labial spillage d/t weakened labial seal. Pt also with poor bolus formation resulting in mild oral residue and poor bolus control resulting in premature spillage into pharynx. Pt demonstrated poor base of tongue retraction causing minimal residue on the base of the tongue and moderate residue in the valleculae (after completion of the swallow trigger). Pt with limited to NO hyolaryngeal elevation, decreased hyolaryngeal excursion, poor thyrohyoid approximation, and decreased epiglottic inversion. Pt with minimal residue throughout the pharynx d/t diminished pharyngeal constriction. Pt with TRANSIENT penetration during swallow of thin liquids via spoon. Pt exhibited GROSS, DEEP laryngeal penetration with thin liquids via cup during swallow which was unable to be cleared, leading to MICRO aspiration following swallow. Volitional cough unsuccessful in expelling any barium liquids. Pt demonstrated DEEP TRANSIENT penetration during swallow of mildly thick liquids via cup. Notable gains within completed trials of moderately thick barium with no further airway invasion events observed.  Not able to trial positional strategies within controlled study [place]  Intern  Jorge Scherer M.A., [address] [place] [address]

## 2021-02-24 NOTE — PROGRESS NOTES
[facility] THERAPY MISSED TREATMENT NOTE  [facility] 4A  4A-19/019-A      Date: 2021  Patient Name: Awa Lyles        CSN: [id number]   : 1940  (79 y.o.)  Gender: male   Referring Practitioner: Dr. Nicolette Frost DO  Diagnosis: Hypotension         REASON FOR MISSED TREATMENT: Pt witih nsg upon attempt, will try back later as time allows.

## 2021-02-24 NOTE — PROGRESS NOTES
Hospitalist Progress Note      Patient:  Arcelia Page    Unit/Bed:4A-19/019-A  YOB: 1940  MRN: [id number]   Acct: [de-identified]   PCP: Marcellus Thakkar MD  Date of Admission: 2/17/2021    Assessment/Plan:    1. 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 difficile. There was no bowel perforation or ileus. CT scan was ordered on 2/20, which showed toxic megacolon. Gen Surg was consulted. Started on Vanc/Flagyl 2/17, continue. Gen surg following, Dietitian also evaluated. 2. Septic shock secondary to fulminant C. Diff colitis, resolving: with noted hypotension, s/p Levophed, weaned off and currently stable. ID evaluted. Continue with current abx therapy. BP has been stable, continue to monitor. 3. Acute metabolic encephalopathy: Improved, related to above with hypotension/hypoperfusion along with uremia. Continue to monitor. Patient alert and oriented, engaged in conversation. 4. CARMEN, improving: secondary to septic ATN. Pt nonoliguric. Nephrology following. Continue with IVF fluids. No need for RRT at this time  5. Complicated UTI: UA shows bacteria, yeast, blood, and WBC.  Blood culture currently negative, awaiting results of urine culture.  Patient currently on vancomycin and Flagyl.  Holding treatment of Candida infection due to CARMEN. 6. Hypernatremia:  Sodium remains high, continue D5W per Nephro recommendations. 7. Acute postoperative hypoxic respiratory failure, resolved: Current saturations 94% on RA. Continue to monitor for respiratory needs. 8. Incidental pulmonary nodules: Seen incidentally on CT scan in January.   PET scan completed in 2/12.  Nodule noted in right lower lobe, with primary rule out for metastatic process.  The nodule could also be infectious or inflammatory, monitor for now.  CT of the chest should be repeated in 3 months. 9. Acute on chronic anemia: Hgb on arrival was 10.2, subsequent decrease in 2/18 to 8.7 following reports from the nursing staff of blood in his stools and his NG tube-> now down to 7.7. There is a component of blood loss anemia on chronic macrocytic anemia.  Continue to trend hemoglobin, will transfuse if <7.0. 10. Hypocalcemia: Calcium has remained low since arrival with a secondary low iCal.  Replacement protocol in place, replace as necessary. Chief Complaint: Hypotension    Initial H and P:-    \"Boo Ng is a 71-year-old male was transferred from [facility] [employer] [employer] on 2/17 for acute kidney injury and C. difficile colitis. Bettina Rivera was recently admitted to [facility] on 2/26 for altered mental status and sepsis secondary to right leg cellulitis. Inna Kellogg was treated with Zosyn and Rocephin, and also discharged with antibiotics to the nursing home at the time.  During the course of hospital treatment, they also noted a pulmonary nodule on CT scan that measured 3 x 2 x 2.6 cm.  It was a concern for malignancy at the time, the requested follow-up however patient does not have any further scans or PET scans.  Blood cultures grew E. coli at the time.   During this hospital course, the patient was originally sent from the SNF to [facility] [employer] [employer] on 2/14 when he was found to be diaphoretic and hypotensive with a fever of 102 °F.  Patient was evaluated in the emergency room and diagnosed to have sepsis but the source was unknown at the time and he was given fluid resuscitation and started on a Levophed drip.  Patient was started on Zosyn at time. Tenny Dilling his hospital stay he was found to have worsening renal function and nephrology was consulted. Guerda Cota 2/16 the patient was seen by Dr. Duncan Llanos from nephrology. [facility] work-up did show C. difficile colitis. Bettina Rivera was started on vancomycin. Inna Kellogg had a noted mental change and worsening creatinine on 2/16 so he was transferred to [facility] ICU for further management and care per Dr. David Callejas"     2/18: Patient this morning remains on pressors as needed to maintain his blood pressure.  He has been receiving fluid throughout the night.  His hemoglobin did drop from 10 to about 8, however it is stayed stable since then, continue to monitor.  His WBC has decreased along with his creatinine.  However his BUN increased overnight.  The patient seems more confused today with an inability to answer where or when he is.  The nephrologist stated that if his BUN increases over 100 dialysis would be warranted at that time.  The surgeon also states that at this current point if surgery is warranted it would be a total colectomy.  Both of these options were discussed with the family who stated that they would be okay starting dialysis if needed.  They with prefer to hold off on surgery right now.     2/19: Patient remains on pressors to maintain his blood pressure however the dose has been weaned significantly. [facility] still currently receiving IVF.  Hgb has remained stable.  WBC did increase overnight.  His creatinine has decreased to 3.7. Render Rings has stated 95.  The nephrologist had seen the patient earlier in the morning, and stated that dialysis is not needed at this time.  Due to the rising WBC, infectious disease has been consulted.     2/20: Patient remains on pressors, cortisol was checked on 2/17 which was normal for a.m.  Infectious diseases did recommend a loop ileostomy with vancomycin irrigation believes the patient would benefit from it.  Obtaining surgery recommendations.  His creatinine continues to decrease, and no dialysis is needed at this point, BUN decreasing as well.  WBC irvin again overnight, and patient still complains of stomach pains.     2/21:  In spite of the aggressive treatment for C. difficile colitis patient is still complaining of severe abdominal pain with severe abdominal distention , discussion  with the medical resident and also with nursing team  the patient is not improving over the last few days even the nursing did mention the patient is worsening over the last 24 hours with more distention of the abdomen with leukocytosis so plan to obtain CT scan abdomen pelvis with p.o. contrast to reassess his C. difficile colitis of the abdomen to see if there is any progression or improvement, CT scan abdomen pelvis done with contrast did show progression of the ulcerative cecal area of severe cecal distention and possibility for pneumatosis so general surgery consulted stat and he discussed the options with the patient and the patient family and the plan was done to go ahead for OR that was done last night, total colectomy, patient did after surgery have a smooth postop course still intubated in the weaning trial currently, will continue IV antibiotics fluids support hemodynamics pain control and in collaboration with the ID and general surgery team.     2/22: Patient remains on pressors this morning, propofol from surgery stopped. Patient was intubated for the surgery, however extubated same day. Doing well on 3 L nasal cannula. Patient states that he feels he has to defecate, however his ostomy bag is draining properly. His wound is clean and dry. He still states some diffuse abdominal pain. His creatinine is trending back towards baseline, as well as his BUN. His Hgb remained stable. WBC has decreased mildly from yesterday. We will continue to monitor. \"    2/23-> discussed with Nephrology, okay with PICC line. Pt just had Norco prior to exam, not very interactive. Will open eyes and respond to yes no questions but is moaning continually in pain. Discussed with Gen Surg who are okay to start tube feeds as pt has been without nutrition, will consult dietician.     2/24-> patient sitting up on side of the bed with therapy at the bedside. Patient continues to have large quantities of output from ANTONIETTA drain as well as from ostomy site. Surgery aware.     Subjective (past 24 hours):   Patient sitting up bedside at the time of the interview. Currently denies any physical complaints and denied any issues overnight and into the morning. He was updated on the current plan of care, verbalizes understanding, and had no other needs or questions at this time. Past medical history, family history, social history and allergies reviewed again and is unchanged since admission. ROS (14 point review of systems completed. Pertinent positives noted. Otherwise ROS is negative) :  GENERAL: No fever,chills, or night sweats. SKIN: No lesions or rashes. HEAD: No headaches or recent injury. EYES: No acute changes in vision, no diplopia or blurred vision. EARS: No hearing loss, no tinnitus. NOSE/THROAT: No rhinorrhea or pharyngitis, no nasal drainage. NECK: No lumps or unusual neck stiffness. PULMONARY: Respirations easy and non-labored, no acute distress. CARDIAC: No chest pain, pressure, Negative for lower leg edema. GI: Abdomen is soft and non-tender, non-distended. PERIPHERAL VASCULAR: No intermittent claudication or unusual leg cramps. MUSCULOSKELETAL: Occasional arthralgias, myalgias. NEUROLOGICAL: Denies any headache, near syncope, seizures or syncope. HEMATOLOGIC:  No unusual bruising or bleeding. PSYCH: Denies any homicidal or suicidial ideations.     Medications:  Reviewed    Infusion Medications    dextrose 50 mL/hr at 02/24/21 1050     Scheduled Medications    lidocaine 1 % injection  5 mL Intradermal Once    sodium chloride flush  10 mL Intravenous 2 times per day    famotidine  20 mg Per NG tube BID    buPROPion  300 mg Oral Daily    ARIPiprazole  10 mg Oral Daily    busPIRone  15 mg Oral TID    doxepin  10 mg Oral Nightly    QUEtiapine  100 mg Oral Nightly    mirtazapine  45 mg Oral Nightly    sertraline  200 mg Oral Daily    piperacillin-tazobactam  3,375 mg Intravenous Once    heparin (porcine)  5,000 Units Subcutaneous Q8H    atorvastatin  40 mg Oral Daily    levothyroxine  75 mcg Oral Daily    metoprolol tartrate  25 mg Oral BID    [Held by provider] tamsulosin  0.4 mg Oral Daily    metroNIDAZOLE  500 mg Intravenous Q8H    vancomycin  500 mg Per NG tube 4 times per day    calcium replacement protocol   Other RX Placeholder    miconazole   Topical BID     PRN Meds: sodium chloride flush, LORazepam, HYDROcodone 5 mg - acetaminophen **OR** HYDROcodone 5 mg - acetaminophen, morphine, promethazine **OR** ondansetron, polyethylene glycol, acetaminophen **OR** acetaminophen      Intake/Output Summary (Last 24 hours) at 2/24/2021 1251  Last data filed at 2/24/2021 0506  Gross per 24 hour   Intake 1928.03 ml   Output 1160 ml   Net 768.03 ml       Diet:  DIET DYSPHAGIA PUREED; Moderately Thick (Honey)  Dietary Nutrition Supplements: Frozen Oral Supplement    Exam:  BP (!) 139/50   Pulse 88   Temp 98.2 °F (36.8 °C) (Oral)   Resp 16   Ht 5' 10\" (1.778 m)   Wt 278 lb 4.8 oz (126.2 kg)   SpO2 96%   BMI 39.93 kg/m²     General appearance: Alert and appropriate, pleasant geriatric male. No apparent distress, appears stated age and cooperative. HEENT: Pupils equal, round, and reactive to light. Conjunctivae/corneas clear. Neck: Supple, with full range of motion. No jugular venous distention. Trachea midline. Respiratory:  Normal respiratory effort. Clear to auscultation, bilaterally without Rales/Wheezes/Rhonchi. Cardiovascular: Regular rate and rhythm with normal S1/S2 without murmurs, rubs or gallops. Abdomen: Soft, non-tender, non-distended with normal bowel sounds. Ileostomy noted with dark output, ANTONIETTA drain intact with serosanguineous output. Musculoskeletal: Passive and active ROM x 4 extremities. Skin: Skin color, texture, turgor normal.  No rashes or lesions. Neurologic:  Neurovascularly intact without any focal sensory/motor deficits.  Cranial nerves: II-XII intact, grossly non-focal.  Psychiatric: Alert and oriented to person, place, time, and situation. Thought content appropriate, normal insight  Capillary Refill: Brisk,< 3 seconds   Peripheral Pulses: +2 palpable, equal bilaterally     Labs:   Recent Labs     02/22/21  0512 02/23/21  0402 02/24/21  0422   WBC 18.8* 17.6* 15.8*   HGB 7.3* 7.7* 7.1*   HCT 24.4* 26.5* 24.4*    238 240     Recent Labs     02/22/21  0512 02/22/21 2058 02/23/21  0353 02/24/21  0422   * 143 144 146*   K 3.5  --  3.6 3.7   *  --  116* 117*   CO2 23  --  21* 21*   BUN 52*  --  41* 36*   CREATININE 1.7*  --  1.4* 1.5*   CALCIUM 7.5*  --  7.9* 7.5*   PHOS  --   --  3.1 2.7     No results for input(s): AST, ALT, BILIDIR, BILITOT, ALKPHOS in the last 72 hours. No results for input(s): INR in the last 72 hours. No results for input(s): Alf Ed in the last 72 hours. Microbiology:    Blood culture #1:   Lab Results   Component Value Date    BC No growth-preliminary No growth  02/17/2021       Blood culture #2:No results found for: Nilam Vasquez    Organism:  Lab Results   Component Value Date    ORG Candida albicans 02/17/2021         Lab Results   Component Value Date    LABGRAM  06/10/2020     Rare segmented neutrophils observed. Rare epithelial cells observed. Many gram positive cocci occurring singly and in pairs. MRSA culture only:No results found for: [address] [place] [address]    Urine culture:   Lab Results   Component Value Date    LABURIN [place] count: 50,000-90,000 CFU/mL  02/17/2021       Respiratory culture: No results found for: CULTRESP    Aerobic and Anaerobic :  Lab Results   Component Value Date    LABAERO  06/10/2020     moderate growth In the treatment of gram positive infections, GENTAMICIN should be CONSIDERED a SYNERGYSTIC agent ONLY. Ciprofloxacin and Levofloxacin, regardless of in vitro sensitivity, should not be used for staphylococcal infections other than uncomplicated lower UTIs. Moxifloxacin, regardless of in vitro sensitivity, shouldnot be used for staphylococcal infections.      Lab Results   Component Value Date    LABANAE  06/10/2020     Culture yielded heavy mixed growth which included anaerobic gram positive cocci. If a true mixed aerobic and anaerobic infection is suspected, then broad spectrum empiric antibiotic therapy is indicated and should include coverage for anaerobic organisms. Urinalysis:      Lab Results   Component Value Date    NITRU NEGATIVE 02/17/2021    WBCUA 50-75 02/17/2021    BACTERIA MODERATE 02/17/2021    RBCUA 10-15 02/17/2021    BLOODU SMALL 02/17/2021    SPECGRAV 1.017 02/17/2021       Radiology:  FL MODIFIED BARIUM SWALLOW W VIDEO   Final Result   1.  . Laryngeal penetration of nectar thick and thin barium with aspiration of thin barium   2. Additional recommendations from the speech therapist will follow. **This report has been created using voice recognition software. It may contain minor errors which are inherent in voice recognition technology. **      Final report electronically signed by Dr. Natalia Cuadra on 2/24/2021 10:22 AM      XR CHEST PORTABLE   Final Result   Normally positioned enteric tube. Unchanged left lower lung consolidation and left pleural effusion. Unchanged right lower lung airspace disease or atelectasis. This document has been electronically signed by: Bryanna Perez MD on    02/23/2021 11:52 PM      XR CHEST PORTABLE   Final Result   Malposition of the NG tube. **This report has been created using voice recognition software. It may contain minor errors which are inherent in voice recognition technology. **      Final report electronically signed by Dr. Iza Cheatham on 2/23/2021 8:21 PM      XR ABDOMEN FOR NG/OG/NE TUBE PLACEMENT   Final Result   Impression:      NG tube tip proximal stomach. This document has been electronically signed by: Gracy Lambert MD on    02/22/2021 11:21 PM      XR CHEST PORTABLE   Final Result   1. Appropriately positioned lines and tubes.    2. Mild bibasilar atelectasis. 3. Small left-sided pleural effusion. **This report has been created using voice recognition software. It may contain minor errors which are inherent in voice recognition technology. **      Final report electronically signed by Dr. Adelia Cohen on 2/21/2021 11:27 AM      XR ABDOMEN (2 VIEWS)   Final Result   Impression:   1. Nonobstructing bowel gas pattern. 2.  Oral contrast is seen in the proximal small bowel cannot exclude    extravasation left midabdomen. Correlation with CT abdomen and pelvis is    recommended. 3.  Post total colectomy. Overlying skin staples. NG tube in stomach. Pleural-parenchymal disease both lung bases. This document has been electronically signed by: Buckley Najjar, MD on    02/21/2021 08:51 AM      CT ABDOMEN PELVIS WO CONTRAST Additional Contrast? Oral   Final Result       1. Worsening appearance to the colon. This is now diffusely distended. There is worsening thickening of the distal sigmoid colon and rectum. There is new thickening of the cecum with possible pneumatosis. 2. Worsening free fluid in the abdomen. No free air is noted. 3. Small layering bilateral pleural effusions with dependent bibasilar atelectasis. 4. Distended gallbladder. **This report has been created using voice recognition software. It may contain minor errors which are inherent in voice recognition technology. **      Final report electronically signed by Dr. Adelia Cohen on 2/20/2021 2:38 PM      XR ABDOMEN FOR NG/OG/NE TUBE PLACEMENT   Final Result   1. Esophageal route tube tip in the stomach. **This report has been created using voice recognition software. It may contain minor errors which are inherent in voice recognition technology. **      Final report electronically signed by Dr. Adelia Cohen on 2/20/2021 11:15 AM      XR ABDOMEN FOR NG/OG/NE TUBE PLACEMENT   Final Result   Impression:   Limited exam   1.   NG tube in the stomach. Nonspecific bowel gas pattern      This document has been electronically signed by: Yvon Luna MD on    02/19/2021 01:59 AM      US GALLBLADDER RUQ   Final Result   Distended appearance to the gallbladder which has a slightly thickened wall. Sludge is also present within its lumen. Sonographic Hamilton's sign is positive per the technologist. These findings could be on the basis of acute cholecystitis. If clinically    warranted, further evaluation with a nuclear medicine HIDA scan would be beneficial for further characterization. **This report has been created using voice recognition software. It may contain minor errors which are inherent in voice recognition technology. **      Final report electronically signed by Dr Cristopher Shelton on 2/17/2021 9:37 AM      CT ABDOMEN PELVIS WO CONTRAST Additional Contrast? None   Final Result   Impression:   1. Pancolitis consistent with the clinical history of C. difficile    colitis. No bowel perforation. 2.  Probable cholelithiasis. 3.  Bibasilar pulmonary consolidation which may be on the basis of    atelectasis or pneumonia. 4.  Small bilateral nonobstructing renal calculi. 5.  Small right middle lobe pulmonary nodule. This document has been electronically signed by: Dhruv Suazo MD on    02/17/2021 05:51 AM      All CTs at this facility use dose modulation techniques and iterative    reconstructions, and/or weight-based dosing   when appropriate to reduce radiation to a low as reasonably achievable. XR CHEST PORTABLE   Final Result   Impression:      Right central line tip proximal SVC. Stable bibasilar consolidation and atelectasis.       This document has been electronically signed by: Vladimir Rowell MD on    02/17/2021 04:20 AM      XR CHEST PORTABLE   Final Result   Impression:      Stable bilateral basilar consolidation and atelectasis      This document has been electronically signed by: Vladimir Rowell MD on 02/17/2021 03:20 AM      XR ABDOMEN FOR NG/OG/NE TUBE PLACEMENT   Final Result   Impression:   1. Distal NG tube is not well seen but is likely in the distal stomach. This document has been electronically signed by: Leopoldo Cummings MD on    02/17/2021 02:30 AM      XR CHEST PORTABLE   Final Result   Impression:   1. Right lower lobe airspace disease may represent atelectasis or    pneumonia. This document has been electronically signed by: Leopoldo Cummings MD on    02/17/2021 02:28 AM      IR FLUORO GUIDED CVA DEVICE PLMT/REPLACE/REMOVAL    (Results Pending)     Ct Abdomen Pelvis Wo Contrast Additional Contrast? None    Result Date: 2/17/2021  CT scan of the abdomen and pelvis without contrast Comparison: None Findings: The images are degraded by artifact related to patient body habitus. The left lateral abdominal wall is incompletely imaged. There is a 4 mm nodule within the right middle lobe. There is patchy consolidation within both lower lobes which may be on the basis of atelectasis or pneumonia. A nasogastric tube is in place and extends to the proximal duodenum. Evaluation of the parenchymal organs is limited by the lack of intravenous contrast, however, the liver, spleen, pancreas and visualized portions of the adrenal glands are normal in appearance. There is a layering high density material within the lumen of the gallbladder consistent with small gallstones versus high density sludge. There is a small amount of intra-abdominal ascites. Small bilateral nonobstructing renal calculi are present. The kidneys are otherwise normal. There is diffuse thickening of the wall of the colon associated with inflammatory changes throughout the pericolonic fat. Findings are consistent with a pancolitis and with the clinical history of C. difficile colitis. There is no bowel perforation. The abdominal aorta is normal in course and caliber. There is no abdominal, pelvic or retroperitoneal adenopathy. The seminal vesicles and prostate gland are normal in appearance. A Velez catheter and rectal tube are in place. There are no acute bony abnormalities. No soft tissue abnormalities are present. Impression: 1. Pancolitis consistent with the clinical history of C. difficile colitis. No bowel perforation. 2.  Probable cholelithiasis. 3.  Bibasilar pulmonary consolidation which may be on the basis of atelectasis or pneumonia. 4.  Small bilateral nonobstructing renal calculi. 5.  Small right middle lobe pulmonary nodule. This document has been electronically signed by: Nhung Mcmillan MD on 02/17/2021 05:51 AM All CTs at this facility use dose modulation techniques and iterative reconstructions, and/or weight-based dosing when appropriate to reduce radiation to a low as reasonably achievable. Us Gallbladder Ruq    Result Date: 2/17/2021  PROCEDURE: US GALLBLADDER RUQ CLINICAL INFORMATION: 69-year-old female with cholelithiasis. Follow-up exam. COMPARISON: CT scan 2/17/2021. TECHNIQUE: Multiplanar sonographic images were obtained of the structures in the right upper quadrant. FINDINGS: Gallbladder - 12.3 x 4.9 x 4.7 cm Gallbladder Wall - 0.5 cm Common Duct - 0.5 cm Hamilton's Sign: positive The liver is of normal echogenicity. No masses are noted. The pancreatic head and body are within normal limits. The tail is not well seen due to overlying bowel gas. The gallbladder is distended. There is wall thickening. There is a large amount of sludge which is seen in the dependent portion of the gallbladder. The common bile duct is normal and measures 5 mm. There is no hydronephrosis in the visualized aspects of the right kidney. Distended appearance to the gallbladder which has a slightly thickened wall. Sludge is also present within its lumen. Sonographic Hamilton's sign is positive per the technologist. These findings could be on the basis of acute cholecystitis.  If clinically warranted, further evaluation with a nuclear medicine HIDA scan would be beneficial for further characterization. **This report has been created using voice recognition software. It may contain minor errors which are inherent in voice recognition technology. ** Final report electronically signed by Dr Manuela Harmon on 2/17/2021 9:37 AM    Xr Chest Portable    Result Date: 2/17/2021  1 view chest x-ray. Comparison: 2/17/2021 Findings: Right central line, tip proximal SVC. NG tube unchanged. Cardiomegaly. Prior sternotomy. Stable bilateral basilar consolidation and atelectasis. Impression: Right central line tip proximal SVC. Stable bibasilar consolidation and atelectasis. This document has been electronically signed by: Supriya Samson MD on 02/17/2021 04:20 AM    Xr Chest Portable    Result Date: 2/17/2021  1 view chest x-ray. Comparison: 2/17/2021 Findings: NG tube is unchanged. Bilateral basilar consolidation and atelectasis are stable. Cardiomegaly. No bony abnormality. Impression: Stable bilateral basilar consolidation and atelectasis This document has been electronically signed by: Supriya Samson MD on 02/17/2021 03:20 AM    Xr Chest Portable    Result Date: 2/17/2021  Exam: One View of the chest Comparison: None Clinical history: Hypotension Findings: NG tube is off the bottom of the image but is at least within the stomach Prior median sternotomy Cardiac silhouette is enlarged, without CHF No pneumothorax. No pleural fluid collection. Right lower lobe airspace disease may represent atelectasis or pneumonia. Impression: 1. Right lower lobe airspace disease may represent atelectasis or pneumonia. This document has been electronically signed by: Hue Ward MD on 02/17/2021 02:28 AM    Xr Abdomen For Ng/og/ne Tube Placement    Result Date: 2/17/2021  Exam: One view the abdomen Comparison: None Clinical history: NG placement Findings: NG tube tip is not well seen but is likely in the distal stomach.   No free intraperitoneal air identified. No obstructive bowel gas pattern. Impression: 1. Distal NG tube is not well seen but is likely in the distal stomach. This document has been electronically signed by: Yvon Lnua MD on 02/17/2021 02:30 AM      **This report has been created using voice recognition software. It may contain minor errors which are inherent in voice recognition technology. **  Electronically signed by SYLVESTER Denton CNP on 2/24/2021 at 12:51 PM

## 2021-02-24 NOTE — PLAN OF CARE
Problem: Nutrition  Goal: Optimal nutrition therapy  Outcome: Ongoing   Nutrition Problem #1: Inadequate oral intake  Intervention: Food and/or Nutrient Delivery: Continue Current Diet, Start Oral Nutrition Supplement(Stop tubefeeding)  Nutritional Goals: Patient will tolerate and consume 75% or more of meals during LOS.

## 2021-02-25 LAB
ABO CHECK: NORMAL
ANION GAP SERPL CALCULATED.3IONS-SCNC: 8 MEQ/L (ref 8–16)
BUN BLDV-MCNC: 32 MG/DL (ref 7–22)
CALCIUM IONIZED: 1.09 MMOL/L (ref 1.12–1.32)
CALCIUM SERPL-MCNC: 7.4 MG/DL (ref 8.5–10.5)
CHLORIDE BLD-SCNC: 118 MEQ/L (ref 98–111)
CO2: 22 MEQ/L (ref 23–33)
CREAT SERPL-MCNC: 1.6 MG/DL (ref 0.4–1.2)
ERYTHROCYTE [DISTWIDTH] IN BLOOD BY AUTOMATED COUNT: 16.2 % (ref 11.5–14.5)
ERYTHROCYTE [DISTWIDTH] IN BLOOD BY AUTOMATED COUNT: 58 FL (ref 35–45)
GFR SERPL CREATININE-BSD FRML MDRD: 42 ML/MIN/1.73M2
GLUCOSE BLD-MCNC: 148 MG/DL (ref 70–108)
HCT VFR BLD CALC: 22.5 % (ref 42–52)
HCT VFR BLD CALC: 23.8 % (ref 42–52)
HEMOGLOBIN: 6.8 GM/DL (ref 14–18)
HEMOGLOBIN: 7.3 GM/DL (ref 14–18)
MCH RBC QN AUTO: 30 PG (ref 26–33)
MCHC RBC AUTO-ENTMCNC: 30.2 GM/DL (ref 32.2–35.5)
MCV RBC AUTO: 99.1 FL (ref 80–94)
PATHOLOGIST REVIEW: ABNORMAL
PLATELET # BLD: 277 THOU/MM3 (ref 130–400)
PMV BLD AUTO: 11.2 FL (ref 9.4–12.4)
POTASSIUM SERPL-SCNC: 3.3 MEQ/L (ref 3.5–5.2)
RBC # BLD: 2.27 MILL/MM3 (ref 4.7–6.1)
RH FACTOR: NORMAL
SELECTED GEL ANTIBODY SCREEN: NORMAL
SODIUM BLD-SCNC: 148 MEQ/L (ref 135–145)
WBC # BLD: 16.8 THOU/MM3 (ref 4.8–10.8)

## 2021-02-25 PROCEDURE — 6370000000 HC RX 637 (ALT 250 FOR IP): Performed by: INTERNAL MEDICINE

## 2021-02-25 PROCEDURE — 85014 HEMATOCRIT: CPT

## 2021-02-25 PROCEDURE — 2580000003 HC RX 258: Performed by: PHYSICIAN ASSISTANT

## 2021-02-25 PROCEDURE — 6370000000 HC RX 637 (ALT 250 FOR IP): Performed by: NURSE PRACTITIONER

## 2021-02-25 PROCEDURE — 86900 BLOOD TYPING SEROLOGIC ABO: CPT

## 2021-02-25 PROCEDURE — 2580000003 HC RX 258: Performed by: INTERNAL MEDICINE

## 2021-02-25 PROCEDURE — 99024 POSTOP FOLLOW-UP VISIT: CPT | Performed by: NURSE PRACTITIONER

## 2021-02-25 PROCEDURE — 85027 COMPLETE CBC AUTOMATED: CPT

## 2021-02-25 PROCEDURE — 2580000003 HC RX 258: Performed by: SURGERY

## 2021-02-25 PROCEDURE — 2500000003 HC RX 250 WO HCPCS: Performed by: SURGERY

## 2021-02-25 PROCEDURE — 86922 COMPATIBILITY TEST ANTIGLOB: CPT

## 2021-02-25 PROCEDURE — 92526 ORAL FUNCTION THERAPY: CPT

## 2021-02-25 PROCEDURE — 99233 SBSQ HOSP IP/OBS HIGH 50: CPT | Performed by: NURSE PRACTITIONER

## 2021-02-25 PROCEDURE — 86885 COOMBS TEST INDIRECT QUAL: CPT

## 2021-02-25 PROCEDURE — 85018 HEMOGLOBIN: CPT

## 2021-02-25 PROCEDURE — APPSS30 APP SPLIT SHARED TIME 16-30 MINUTES: Performed by: NURSE PRACTITIONER

## 2021-02-25 PROCEDURE — 80048 BASIC METABOLIC PNL TOTAL CA: CPT

## 2021-02-25 PROCEDURE — 99232 SBSQ HOSP IP/OBS MODERATE 35: CPT | Performed by: INTERNAL MEDICINE

## 2021-02-25 PROCEDURE — 86901 BLOOD TYPING SEROLOGIC RH(D): CPT

## 2021-02-25 PROCEDURE — 6360000002 HC RX W HCPCS: Performed by: NURSE PRACTITIONER

## 2021-02-25 PROCEDURE — 6370000000 HC RX 637 (ALT 250 FOR IP): Performed by: PHYSICIAN ASSISTANT

## 2021-02-25 PROCEDURE — P9016 RBC LEUKOCYTES REDUCED: HCPCS

## 2021-02-25 PROCEDURE — 36415 COLL VENOUS BLD VENIPUNCTURE: CPT

## 2021-02-25 PROCEDURE — 82330 ASSAY OF CALCIUM: CPT

## 2021-02-25 PROCEDURE — 2060000000 HC ICU INTERMEDIATE R&B

## 2021-02-25 PROCEDURE — 2500000003 HC RX 250 WO HCPCS: Performed by: NURSE PRACTITIONER

## 2021-02-25 PROCEDURE — 36430 TRANSFUSION BLD/BLD COMPNT: CPT

## 2021-02-25 RX ORDER — SODIUM CHLORIDE 0.9 % (FLUSH) 0.9 %
10 SYRINGE (ML) INJECTION PRN
Status: DISCONTINUED | OUTPATIENT
Start: 2021-02-25 | End: 2021-03-05 | Stop reason: HOSPADM

## 2021-02-25 RX ORDER — FAMOTIDINE 20 MG/1
20 TABLET, FILM COATED ORAL 2 TIMES DAILY
Status: DISCONTINUED | OUTPATIENT
Start: 2021-02-25 | End: 2021-03-05 | Stop reason: HOSPADM

## 2021-02-25 RX ORDER — POTASSIUM CHLORIDE 20 MEQ/1
40 TABLET, EXTENDED RELEASE ORAL PRN
Status: DISCONTINUED | OUTPATIENT
Start: 2021-02-25 | End: 2021-03-05 | Stop reason: HOSPADM

## 2021-02-25 RX ORDER — SODIUM CHLORIDE 0.9 % (FLUSH) 0.9 %
10 SYRINGE (ML) INJECTION EVERY 12 HOURS SCHEDULED
Status: DISCONTINUED | OUTPATIENT
Start: 2021-02-25 | End: 2021-03-05 | Stop reason: HOSPADM

## 2021-02-25 RX ORDER — HEPARIN SODIUM 5000 [USP'U]/ML
5000 INJECTION, SOLUTION INTRAVENOUS; SUBCUTANEOUS EVERY 8 HOURS SCHEDULED
Status: DISCONTINUED | OUTPATIENT
Start: 2021-02-25 | End: 2021-02-25

## 2021-02-25 RX ORDER — SODIUM CHLORIDE 9 MG/ML
INJECTION, SOLUTION INTRAVENOUS CONTINUOUS
Status: DISCONTINUED | OUTPATIENT
Start: 2021-02-25 | End: 2021-02-27

## 2021-02-25 RX ORDER — SODIUM CHLORIDE 9 MG/ML
INJECTION, SOLUTION INTRAVENOUS PRN
Status: DISCONTINUED | OUTPATIENT
Start: 2021-02-25 | End: 2021-03-05 | Stop reason: HOSPADM

## 2021-02-25 RX ORDER — ONDANSETRON 2 MG/ML
4 INJECTION INTRAMUSCULAR; INTRAVENOUS EVERY 6 HOURS PRN
Status: DISCONTINUED | OUTPATIENT
Start: 2021-02-25 | End: 2021-03-05 | Stop reason: HOSPADM

## 2021-02-25 RX ORDER — MORPHINE SULFATE 4 MG/ML
4 INJECTION, SOLUTION INTRAMUSCULAR; INTRAVENOUS
Status: DISCONTINUED | OUTPATIENT
Start: 2021-02-25 | End: 2021-03-05 | Stop reason: HOSPADM

## 2021-02-25 RX ORDER — MORPHINE SULFATE 2 MG/ML
2 INJECTION, SOLUTION INTRAMUSCULAR; INTRAVENOUS
Status: DISCONTINUED | OUTPATIENT
Start: 2021-02-25 | End: 2021-03-05 | Stop reason: HOSPADM

## 2021-02-25 RX ORDER — PROMETHAZINE HYDROCHLORIDE 25 MG/1
12.5 TABLET ORAL EVERY 6 HOURS PRN
Status: DISCONTINUED | OUTPATIENT
Start: 2021-02-25 | End: 2021-03-05 | Stop reason: HOSPADM

## 2021-02-25 RX ORDER — POTASSIUM CHLORIDE 7.45 MG/ML
10 INJECTION INTRAVENOUS PRN
Status: DISCONTINUED | OUTPATIENT
Start: 2021-02-25 | End: 2021-03-05 | Stop reason: HOSPADM

## 2021-02-25 RX ADMIN — DEXTROSE MONOHYDRATE: 50 INJECTION, SOLUTION INTRAVENOUS at 04:49

## 2021-02-25 RX ADMIN — MICONAZOLE NITRATE: 20 POWDER TOPICAL at 22:07

## 2021-02-25 RX ADMIN — SODIUM CHLORIDE, PRESERVATIVE FREE 10 ML: 5 INJECTION INTRAVENOUS at 08:58

## 2021-02-25 RX ADMIN — DOXEPIN HYDROCHLORIDE 10 MG: 10 CAPSULE ORAL at 22:07

## 2021-02-25 RX ADMIN — Medication 500 MG: at 12:41

## 2021-02-25 RX ADMIN — METRONIDAZOLE 500 MG: 500 INJECTION, SOLUTION INTRAVENOUS at 23:59

## 2021-02-25 RX ADMIN — BUSPIRONE HYDROCHLORIDE 15 MG: 7.5 TABLET ORAL at 12:46

## 2021-02-25 RX ADMIN — METRONIDAZOLE 500 MG: 500 INJECTION, SOLUTION INTRAVENOUS at 18:54

## 2021-02-25 RX ADMIN — METRONIDAZOLE 500 MG: 500 INJECTION, SOLUTION INTRAVENOUS at 10:30

## 2021-02-25 RX ADMIN — LEVOTHYROXINE SODIUM 75 MCG: 75 TABLET ORAL at 05:59

## 2021-02-25 RX ADMIN — FAMOTIDINE 20 MG: 20 TABLET, FILM COATED ORAL at 22:07

## 2021-02-25 RX ADMIN — Medication 500 MG: at 23:59

## 2021-02-25 RX ADMIN — HEPARIN SODIUM 5000 UNITS: 5000 INJECTION INTRAVENOUS; SUBCUTANEOUS at 05:59

## 2021-02-25 RX ADMIN — SODIUM CHLORIDE, PRESERVATIVE FREE 10 ML: 5 INJECTION INTRAVENOUS at 11:30

## 2021-02-25 RX ADMIN — BUPROPION HYDROCHLORIDE 300 MG: 150 TABLET, EXTENDED RELEASE ORAL at 08:56

## 2021-02-25 RX ADMIN — ATORVASTATIN CALCIUM 40 MG: 40 TABLET, FILM COATED ORAL at 08:57

## 2021-02-25 RX ADMIN — Medication 500 MG: at 05:59

## 2021-02-25 RX ADMIN — Medication 500 MG: at 18:55

## 2021-02-25 RX ADMIN — BUSPIRONE HYDROCHLORIDE 15 MG: 7.5 TABLET ORAL at 08:55

## 2021-02-25 RX ADMIN — SODIUM CHLORIDE, PRESERVATIVE FREE 10 ML: 5 INJECTION INTRAVENOUS at 22:07

## 2021-02-25 RX ADMIN — METOPROLOL TARTRATE 25 MG: 25 TABLET ORAL at 08:56

## 2021-02-25 RX ADMIN — MIRTAZAPINE 45 MG: 45 TABLET, FILM COATED ORAL at 22:07

## 2021-02-25 RX ADMIN — SERTRALINE 200 MG: 100 TABLET, FILM COATED ORAL at 08:56

## 2021-02-25 RX ADMIN — FAMOTIDINE 20 MG: 20 TABLET, FILM COATED ORAL at 08:54

## 2021-02-25 RX ADMIN — ARIPIPRAZOLE 10 MG: 10 TABLET ORAL at 22:07

## 2021-02-25 RX ADMIN — BUSPIRONE HYDROCHLORIDE 15 MG: 7.5 TABLET ORAL at 22:07

## 2021-02-25 RX ADMIN — ACETAMINOPHEN 650 MG: 325 TABLET ORAL at 08:54

## 2021-02-25 RX ADMIN — QUETIAPINE FUMARATE 100 MG: 100 TABLET ORAL at 22:07

## 2021-02-25 RX ADMIN — MICONAZOLE NITRATE: 20 POWDER TOPICAL at 08:57

## 2021-02-25 ASSESSMENT — PAIN SCALES - GENERAL
PAINLEVEL_OUTOF10: 0
PAINLEVEL_OUTOF10: 0
PAINLEVEL_OUTOF10: 2
PAINLEVEL_OUTOF10: 0
PAINLEVEL_OUTOF10: 0

## 2021-02-25 NOTE — PLAN OF CARE
Problem: Bowel Function - Altered:  Goal: Bowel elimination is within specified parameters  Description: Bowel elimination is within specified parameters  Outcome: Ongoing  Note: Patient has ileostomy in place with Green liquid output. Problem: Pain:  Goal: Pain level will decrease  Description: Pain level will decrease  Outcome: Ongoing  Note: Patient voices pain 0/10. Patients pain goal is 0/10. PRN pain medications given as ordered. Patients pain goal is a 0. Non-pharmacological interventions include: patient denies any pain at this time. Problem: Skin Integrity - Impaired:  Goal: Will show no infection signs and symptoms  Description: Will show no infection signs and symptoms  Outcome: Ongoing  Note: Patient showing no new signs of infection at this time. Antibiotics given per order. Problem: Skin Integrity - Impaired:  Goal: Absence of new skin breakdown  Description: Absence of new skin breakdown  Outcome: Ongoing  Note: Patient has no signs of new skin breakdown at this time. Patient turned and repositioned every two hours and as needed. Problem: Falls - Risk of:  Goal: Will remain free from falls  Description: Will remain free from falls  Outcome: Ongoing  Note: Patient absent of falls this shift, fall band intact, bed alarm set, falling star magnet in place. Problem: Falls - Risk of:  Goal: Absence of physical injury  Description: Absence of physical injury  Outcome: Ongoing  Note: Patient absent of physical injury this shift. Problem: Pain:  Goal: Pain level will decrease  Description: Pain level will decrease  Outcome: Ongoing  Note: Patient voices pain 0/10. Patients pain goal is 0/10. PRN pain medications given as ordered. Patients pain goal is a 0. Non-pharmacological interventions include: patient denies any pain at this time. Care plan reviewed with patient. Patient verbalizes understanding of the care plan and contributed to goal setting.

## 2021-02-25 NOTE — CARE COORDINATION
2/25/21, 2:57 PM EST    DISCHARGE PLANNING EVALUATION  Called and left a message for admissions at [facility]. KARL wanted to touch base regarding discharge plan. Update 3:41pm: Spoke with Soumya at [facility]. She told SW that they will be able to take Verl Terry on a 30 day convalescent stay. KARL faxed updated chart information. She is aware he will not be discharged until early next week.

## 2021-02-25 NOTE — PROGRESS NOTES
[facility]   Nephrology progress note  2/25/2021, 11:38 AM      Pt Name:    Shefali Lyons  MRN:     [id number]     YOB: 1940  Admit Date:    2/17/2021 12:35 AM  Primary Care Physician: Yaritza Stewart MD   Room number  [id number]    Chief Complaint: Nephrology following for CARMEN and hypernatremia    Subjective:  Patient seen and examined  Seen and examined earlier today  Poor historian  Does not offer much  On D5W  Denies any chest pain or any shortness of breath      Objective:  24HR INTAKE/OUTPUT:      Intake/Output Summary (Last 24 hours) at 2/25/2021 1138  Last data filed at 2/25/2021 0908  Gross per 24 hour   Intake 1617 ml   Output 1760 ml   Net -143 ml     I/O last 3 completed shifts: In: 2492 [P.O.:200; I.V.:1417]  Out: 1560 [Drains:835; Stool:725]  I/O this shift:  In: -   Out: 200 [Stool:200]  Admission weight: 296 lb 4.8 oz (134.4 kg)  Wt Readings from Last 3 Encounters:   02/24/21 278 lb 4.8 oz (126.2 kg)   11/25/20 283 lb 3.2 oz (128.5 kg)   10/16/20 273 lb 12.8 oz (124.2 kg)     Body mass index is 39.93 kg/m².     Physical examination  VITALS:     Vitals:    02/24/21 2313 02/25/21 0337 02/25/21 0851 02/25/21 1115   BP: (!) 113/54 (!) 119/56 (!) 133/46 (!) 131/42   Pulse: 84 80 80 67   Resp: 18 18 18 20   Temp: 98 °F (36.7 °C) 98.2 °F (36.8 °C) 98.7 °F (37.1 °C) 98.7 °F (37.1 °C)   TempSrc: Oral Oral Oral Oral   SpO2: 95% 97% 97% 100%   Weight:       Height:         General Appearance: Overall improved  No acute distress  Neck: No JVD  Lungs: Air entry B/L, no rales, no use of accessory muscles  Heart:  S1, S2 heard  GI: + Ostomy present  Extremities: trace LE edema      Lab Data  CBC:   Recent Labs     02/23/21  0402 02/24/21  0422 02/25/21  0610   WBC 17.6* 15.8* 16.8*   HGB 7.7* 7.1* 6.8*   HCT 26.5* 24.4* 22.5*    240 277     BMP:  Recent Labs     02/23/21  0353 02/24/21  0422 02/25/21  0610    146* 148*   K 3.6 3.7 3.3*   * 117* 118*   CO2 21* 21* 22*   BUN 41* 36* 32*   CREATININE 1.4* 1.5* 1.6*   GLUCOSE 134* 135* 148*   CALCIUM 7.9* 7.5* 7.4*   MG 1.9 1.8  --    PHOS 3.1 2.7  --      Hepatic:   No results for input(s): LABALBU, AST, ALT, ALB, BILITOT, ALKPHOS in the last 72 hours. Meds:  Infusion:    sodium chloride      sodium chloride      dextrose 75 mL/hr at 02/25/21 0449     Meds:    sodium chloride flush  10 mL Intravenous 2 times per day    famotidine  20 mg Oral BID    vancomycin  500 mg Oral 4 times per day    lidocaine 1 % injection  5 mL Intradermal Once    buPROPion  300 mg Oral Daily    ARIPiprazole  10 mg Oral Daily    busPIRone  15 mg Oral TID    doxepin  10 mg Oral Nightly    QUEtiapine  100 mg Oral Nightly    mirtazapine  45 mg Oral Nightly    sertraline  200 mg Oral Daily    piperacillin-tazobactam  3,375 mg Intravenous Once    [Held by provider] heparin (porcine)  5,000 Units Subcutaneous Q8H    atorvastatin  40 mg Oral Daily    levothyroxine  75 mcg Oral Daily    metoprolol tartrate  25 mg Oral BID    [Held by provider] tamsulosin  0.4 mg Oral Daily    metroNIDAZOLE  500 mg Intravenous Q8H    calcium replacement protocol   Other RX Placeholder    miconazole   Topical BID     Meds prn: sodium chloride flush, promethazine **OR** ondansetron, morphine **OR** morphine, sodium chloride, potassium chloride **OR** potassium alternative oral replacement **OR** potassium chloride, LORazepam, HYDROcodone 5 mg - acetaminophen **OR** HYDROcodone 5 mg - acetaminophen, polyethylene glycol, acetaminophen **OR** acetaminophen       Impression and Plan:  1. Acute kidney injury secondary to septic ATN  Nonoliguric at this time  Serum creatinine overall stable at 1.6  Continue with IV fluids    2. Hypernatremia. Secondary to hypotonic losses from GI tract. Sodium remains high, will increase D5W to 100 mL/h  3.  C. difficile colitis with toxic megacolon. Patient status post total colectomy with end ileostomy  4. Azotemia: Improving signs of uremia  5. Pulmonary nodule  6. History of CAD  7. Anemia  8. Hypotension: Improved  9. Hypokalemia.   Will replace with 20 mEq potassium IV x1 dose    Plan of care reviewed with patient and discussed with staff    Francois Sanchez MD  [facility]

## 2021-02-25 NOTE — PROGRESS NOTES
Hospitalist Progress Note      Patient:  Brandon Butler    Unit/Bed:4A-19/019-A  YOB: 1940  MRN: [id number]   Acct: [de-identified]   PCP: Asa Oneil MD  Date of Admission: 2/17/2021    Assessment/Plan:    1. 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 CT of the abdomen pelvis on 2/17 shows pancolitis that was consistent with C. difficile. There was no bowel perforation or ileus. CT scan was ordered on 2/20, which showed toxic megacolon. Gen Surg was consulted. Started on Vanc/Flagyl 2/17, continue. Gen surg following, Dietitian also evaluated. 2. Septic shock secondary to fulminant C. Diff colitis, resolving: with noted hypotension, s/p Levophed, weaned off and currently stable. ID evaluted. Continue with current abx therapy. BP has been stable, continue to monitor. 3. Acute metabolic encephalopathy: Improved, related to above with hypotension/hypoperfusion along with uremia. Continue to monitor. Patient alert and oriented, engaged in conversation. 4. CARMEN, improving: secondary to septic ATN. Pt nonoliguric. Nephrology following. Continue with IVF fluids. No need for RRT at this time  5. Complicated UTI: UA shows bacteria, yeast, blood, and WBC.  Blood culture currently negative, awaiting results of urine culture.  Patient currently on vancomycin and Flagyl.  Holding treatment of Candida infection due to CARMEN. 6. Hypernatremia:  Sodium remains high, secondary to hypotonic losses from GI tract. Nephrology increased D5W to 100 mL/hr. 7. Acute postoperative hypoxic respiratory failure, resolved: Current saturations 100% on RA. Continue to monitor for respiratory needs. 8. Incidental pulmonary nodules: Seen incidentally on CT scan in January.   PET scan completed in 2/12.  Nodule noted in right lower lobe, with primary rule out for metastatic process.  The nodule could also be infectious or up on side of the bed with therapy at the bedside. Patient continues to have large quantities of output from ANTONIETTA drain as well as from ostomy site. Surgery aware. Subjective (past 24 hours):   Patient sitting up bedside at the time of the interview. Currently denies any physical complaints and denied any issues overnight and into the morning. He was updated on the current plan of care, verbalizes understanding, and had no other needs or questions at this time. Past medical history, family history, social history and allergies reviewed again and is unchanged since admission. ROS (14 point review of systems completed. Pertinent positives noted. Otherwise ROS is negative) :  GENERAL: No fever,chills, or night sweats. SKIN: No lesions or rashes. HEAD: No headaches or recent injury. EYES: No acute changes in vision, no diplopia or blurred vision. EARS: No hearing loss, no tinnitus. NOSE/THROAT: No rhinorrhea or pharyngitis, no nasal drainage. NECK: No lumps or unusual neck stiffness. PULMONARY: Respirations easy and non-labored, no acute distress. CARDIAC: No chest pain, pressure, Negative for lower leg edema. GI: Abdomen is soft and non-tender, non-distended. PERIPHERAL VASCULAR: No intermittent claudication or unusual leg cramps. MUSCULOSKELETAL: Occasional arthralgias, myalgias. NEUROLOGICAL: Denies any headache, near syncope, seizures or syncope. HEMATOLOGIC:  No unusual bruising or bleeding. PSYCH: Denies any homicidal or suicidial ideations.     Medications:  Reviewed    Infusion Medications    sodium chloride      sodium chloride      dextrose 75 mL/hr at 02/25/21 0449     Scheduled Medications    sodium chloride flush  10 mL Intravenous 2 times per day    famotidine  20 mg Oral BID    vancomycin  500 mg Oral 4 times per day    lidocaine 1 % injection  5 mL Intradermal Once    buPROPion  300 mg Oral Daily    ARIPiprazole  10 mg Oral Daily    busPIRone  15 mg Oral TID    doxepin extremities. Skin: Skin color, texture, turgor normal.  No rashes or lesions. Neurologic:  Neurovascularly intact without any focal sensory/motor deficits. Cranial nerves: II-XII intact, grossly non-focal.  Psychiatric: Alert and oriented to person, place, time, and situation. Thought content appropriate, normal insight  Capillary Refill: Brisk,< 3 seconds   Peripheral Pulses: +2 palpable, equal bilaterally     Labs:   Recent Labs     02/23/21  0402 02/24/21  0422 02/25/21  0610   WBC 17.6* 15.8* 16.8*   HGB 7.7* 7.1* 6.8*   HCT 26.5* 24.4* 22.5*    240 277     Recent Labs     02/23/21  0353 02/24/21  0422 02/25/21  0610    146* 148*   K 3.6 3.7 3.3*   * 117* 118*   CO2 21* 21* 22*   BUN 41* 36* 32*   CREATININE 1.4* 1.5* 1.6*   CALCIUM 7.9* 7.5* 7.4*   PHOS 3.1 2.7  --      No results for input(s): AST, ALT, BILIDIR, BILITOT, ALKPHOS in the last 72 hours. No results for input(s): INR in the last 72 hours. No results for input(s): Adri Journey in the last 72 hours. Microbiology:    Blood culture #1:   Lab Results   Component Value Date    BC No growth-preliminary No growth  02/17/2021       Blood culture #2:No results found for: Jaye Austin    Organism:  Lab Results   Component Value Date    ORG Candida albicans 02/17/2021         Lab Results   Component Value Date    LABGRAM  06/10/2020     Rare segmented neutrophils observed. Rare epithelial cells observed. Many gram positive cocci occurring singly and in pairs. MRSA culture only:No results found for: [facility]    Urine culture:   Lab Results   Component Value Date    LABURIN [place] count: 50,000-90,000 CFU/mL  02/17/2021       Respiratory culture: No results found for: CULTRESP    Aerobic and Anaerobic :  Lab Results   Component Value Date    LABAERO  06/10/2020     moderate growth In the treatment of gram positive infections, GENTAMICIN should be CONSIDERED a SYNERGYSTIC agent ONLY.  Ciprofloxacin and Levofloxacin, regardless of in vitro sensitivity, should not be used for staphylococcal infections other than uncomplicated lower UTIs. Moxifloxacin, regardless of in vitro sensitivity, shouldnot be used for staphylococcal infections. Lab Results   Component Value Date    LABANAE  06/10/2020     Culture yielded heavy mixed growth which included anaerobic gram positive cocci. If a true mixed aerobic and anaerobic infection is suspected, then broad spectrum empiric antibiotic therapy is indicated and should include coverage for anaerobic organisms. Urinalysis:      Lab Results   Component Value Date    NITRU NEGATIVE 02/17/2021    WBCUA 50-75 02/17/2021    BACTERIA MODERATE 02/17/2021    RBCUA 10-15 02/17/2021    BLOODU SMALL 02/17/2021    SPECGRAV 1.017 02/17/2021       Radiology:  XR CHEST PORTABLE   Final Result   Left lower lobe atelectasis and/or infiltrate. **This report has been created using voice recognition software. It may contain minor errors which are inherent in voice recognition technology. **      Final report electronically signed by Dr. Shayy Quinones on 2/24/2021 3:21 PM      IR FLUORO GUIDED CVA DEVICE PLMT/REPLACE/REMOVAL   Final Result      1. Status post successful PICC line insertion. 2. Of note, an attempt was made to access the right brachial vein initially. However this was unsuccessful due to patient's body habitus. Therefore the right IJ approach was utilized. **This report has been created using voice recognition software. It may contain minor errors which are inherent in voice recognition technology. **      Final report electronically signed by Dr. Alissa Kaur on 2/24/2021 6:26 PM      FL MODIFIED BARIUM SWALLOW W VIDEO   Final Result   1.  . Laryngeal penetration of nectar thick and thin barium with aspiration of thin barium   2. Additional recommendations from the speech therapist will follow. **This report has been created using voice recognition software.  It may contain minor errors which are inherent in voice recognition technology. **      Final report electronically signed by Dr. Beronica Beard on 2/24/2021 10:22 AM      XR CHEST PORTABLE   Final Result   Normally positioned enteric tube. Unchanged left lower lung consolidation and left pleural effusion. Unchanged right lower lung airspace disease or atelectasis. This document has been electronically signed by: Anna Osullivan MD on    02/23/2021 11:52 PM      XR CHEST PORTABLE   Final Result   Malposition of the NG tube. **This report has been created using voice recognition software. It may contain minor errors which are inherent in voice recognition technology. **      Final report electronically signed by Dr. Evelyn Zmaudio on 2/23/2021 8:21 PM      XR ABDOMEN FOR NG/OG/NE TUBE PLACEMENT   Final Result   Impression:      NG tube tip proximal stomach. This document has been electronically signed by: Jairon Graham MD on    02/22/2021 11:21 PM      XR CHEST PORTABLE   Final Result   1. Appropriately positioned lines and tubes. 2. Mild bibasilar atelectasis. 3. Small left-sided pleural effusion. **This report has been created using voice recognition software. It may contain minor errors which are inherent in voice recognition technology. **      Final report electronically signed by Dr. April Camacho on 2/21/2021 11:27 AM      XR ABDOMEN (2 VIEWS)   Final Result   Impression:   1. Nonobstructing bowel gas pattern. 2.  Oral contrast is seen in the proximal small bowel cannot exclude    extravasation left midabdomen. Correlation with CT abdomen and pelvis is    recommended. 3.  Post total colectomy. Overlying skin staples. NG tube in stomach. Pleural-parenchymal disease both lung bases. This document has been electronically signed by: Skylar Denis MD on    02/21/2021 08:51 AM      CT ABDOMEN PELVIS WO CONTRAST Additional Contrast? Oral   Final Result       1. Worsening appearance to the colon. This is now diffusely distended. There is worsening thickening of the distal sigmoid colon and rectum. There is new thickening of the cecum with possible pneumatosis. 2. Worsening free fluid in the abdomen. No free air is noted. 3. Small layering bilateral pleural effusions with dependent bibasilar atelectasis. 4. Distended gallbladder. **This report has been created using voice recognition software. It may contain minor errors which are inherent in voice recognition technology. **      Final report electronically signed by Dr. Anusha Boyd on 2/20/2021 2:38 PM      XR ABDOMEN FOR NG/OG/NE TUBE PLACEMENT   Final Result   1. Esophageal route tube tip in the stomach. **This report has been created using voice recognition software. It may contain minor errors which are inherent in voice recognition technology. **      Final report electronically signed by Dr. Anusha Boyd on 2/20/2021 11:15 AM      XR ABDOMEN FOR NG/OG/NE TUBE PLACEMENT   Final Result   Impression:   Limited exam   1. NG tube in the stomach. Nonspecific bowel gas pattern      This document has been electronically signed by: Saba Jones MD on    02/19/2021 01:59 AM      US GALLBLADDER RUQ   Final Result   Distended appearance to the gallbladder which has a slightly thickened wall. Sludge is also present within its lumen. Sonographic Hamilton's sign is positive per the technologist. These findings could be on the basis of acute cholecystitis. If clinically    warranted, further evaluation with a nuclear medicine HIDA scan would be beneficial for further characterization. **This report has been created using voice recognition software. It may contain minor errors which are inherent in voice recognition technology. **      Final report electronically signed by Dr Lalo Guy on 2/17/2021 9:37 AM      CT ABDOMEN PELVIS WO CONTRAST Additional Contrast? None   Final Result Impression:   1. Pancolitis consistent with the clinical history of C. difficile    colitis. No bowel perforation. 2.  Probable cholelithiasis. 3.  Bibasilar pulmonary consolidation which may be on the basis of    atelectasis or pneumonia. 4.  Small bilateral nonobstructing renal calculi. 5.  Small right middle lobe pulmonary nodule. This document has been electronically signed by: Ren Sparrow MD on    02/17/2021 05:51 AM      All CTs at this facility use dose modulation techniques and iterative    reconstructions, and/or weight-based dosing   when appropriate to reduce radiation to a low as reasonably achievable. XR CHEST PORTABLE   Final Result   Impression:      Right central line tip proximal SVC. Stable bibasilar consolidation and atelectasis. This document has been electronically signed by: Crissy Nunez MD on    02/17/2021 04:20 AM      XR CHEST PORTABLE   Final Result   Impression:      Stable bilateral basilar consolidation and atelectasis      This document has been electronically signed by: Crissy Nunez MD on    02/17/2021 03:20 AM      XR ABDOMEN FOR NG/OG/NE TUBE PLACEMENT   Final Result   Impression:   1. Distal NG tube is not well seen but is likely in the distal stomach. This document has been electronically signed by: Daron Castleman, MD on    02/17/2021 02:30 AM      XR CHEST PORTABLE   Final Result   Impression:   1. Right lower lobe airspace disease may represent atelectasis or    pneumonia. This document has been electronically signed by: Daron Castleman, MD on    02/17/2021 02:28 AM        Ct Abdomen Pelvis Wo Contrast Additional Contrast? None    Result Date: 2/17/2021  CT scan of the abdomen and pelvis without contrast Comparison: None Findings: The images are degraded by artifact related to patient body habitus. The left lateral abdominal wall is incompletely imaged. There is a 4 mm nodule within the right middle lobe.   There is patchy consolidation within both lower lobes which may be on the basis of atelectasis or pneumonia. A nasogastric tube is in place and extends to the proximal duodenum. Evaluation of the parenchymal organs is limited by the lack of intravenous contrast, however, the liver, spleen, pancreas and visualized portions of the adrenal glands are normal in appearance. There is a layering high density material within the lumen of the gallbladder consistent with small gallstones versus high density sludge. There is a small amount of intra-abdominal ascites. Small bilateral nonobstructing renal calculi are present. The kidneys are otherwise normal. There is diffuse thickening of the wall of the colon associated with inflammatory changes throughout the pericolonic fat. Findings are consistent with a pancolitis and with the clinical history of C. difficile colitis. There is no bowel perforation. The abdominal aorta is normal in course and caliber. There is no abdominal, pelvic or retroperitoneal adenopathy. The seminal vesicles and prostate gland are normal in appearance. A Velez catheter and rectal tube are in place. There are no acute bony abnormalities. No soft tissue abnormalities are present. Impression: 1. Pancolitis consistent with the clinical history of C. difficile colitis. No bowel perforation. 2.  Probable cholelithiasis. 3.  Bibasilar pulmonary consolidation which may be on the basis of atelectasis or pneumonia. 4.  Small bilateral nonobstructing renal calculi. 5.  Small right middle lobe pulmonary nodule. This document has been electronically signed by: Vane Peacock MD on 02/17/2021 05:51 AM All CTs at this facility use dose modulation techniques and iterative reconstructions, and/or weight-based dosing when appropriate to reduce radiation to a low as reasonably achievable.     Us Gallbladder Ruq    Result Date: 2/17/2021  PROCEDURE: US GALLBLADDER RUQ CLINICAL INFORMATION: 71-year-old female with

## 2021-02-25 NOTE — PLAN OF CARE
Problem:  Bowel Function - Altered:  Goal: Bowel elimination is within specified parameters  Description: Bowel elimination is within specified parameters  Outcome: Ongoing     Problem: Urinary Elimination:  Goal: Signs and symptoms of infection will decrease  Description: Signs and symptoms of infection will decrease  Outcome: Ongoing     Problem: Physical Regulation:  Goal: Signs and symptoms of infection will decrease  Description: Signs and symptoms of infection will decrease  Outcome: Ongoing

## 2021-02-25 NOTE — PROGRESS NOTES
[facility] - Dr. Madhu Werner  Postoperative Progress Note    Pt Name: Jaime Bocanegra  Medical Record Number: [id number]  Date of Birth 1940   Today's Date: 2/25/2021    ASSESSMENT   1. 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 Hypotension secondary to shock - improving   3. Acute on chronic kidney disease  4. Postoperative respiratory failure - status post extubation   5. Incidental pulmonary nodules  6. Complicated UTI  7. Metabolic encephalopathy  8. Leukocytosis  9. Acute on chronic anemia   has a past medical history of Arthritis, Chronic kidney disease, Depression, Diabetes mellitus ([address] Hyperlipidemia, Hypertension, and Thyroid disease. PLAN   1. Tolerating thickened liquids with pureed diet  2. ANTONIETTA drain care - serous fluid. Monitor outputs. Slowly down a bit. 3. Wound & ostomy care  4. Pain control  5. Therapy as tolerated   6. Pulmonary toileting   7. GI & DVT prophylaxis - hold heparin injections secondary to hem 6.8  8. C. difficile treatment per admitting/ID  9. Nephrology following. Creat stable. 10. Labs reviewed. Hem 6.8 and receiving 1 unit PRBC this morning. No signs of active bleeding. WBC 16.8 - continue to trend. 11. Discharge planning   12. Clinically, looks pretty good. Supportive care. SUBJECTIVE   Remains stable on 4A. Patient alert to name and much more talkative today. Denies any pain today. ANTONIETTA drain serous. Ostomy functioning well with light green liquid stool. Midline incision well approximated with no breakdown or bleeding. Having urinary incontinence. Nurse states only small amount of mucous from rectum.    CURRENT MEDICATIONS   Scheduled Meds:   lidocaine 1 % injection  5 mL Intradermal Once    sodium chloride flush  10 mL Intravenous 2 times per day    famotidine  20 mg Per NG tube BID    buPROPion  300 mg Oral Daily    ARIPiprazole  10 mg Oral Daily    busPIRone  15 mg Oral TID    doxepin  10 mg Oral Nightly    QUEtiapine  100 mg Oral Nightly    mirtazapine  45 mg Oral Nightly    sertraline  200 mg Oral Daily    piperacillin-tazobactam  3,375 mg Intravenous Once    heparin (porcine)  5,000 Units Subcutaneous Q8H    atorvastatin  40 mg Oral Daily    levothyroxine  75 mcg Oral Daily    metoprolol tartrate  25 mg Oral BID    [Held by provider] tamsulosin  0.4 mg Oral Daily    metroNIDAZOLE  500 mg Intravenous Q8H    vancomycin  500 mg Per NG tube 4 times per day    calcium replacement protocol   Other RX Placeholder    miconazole   Topical BID     Continuous Infusions:   sodium chloride      dextrose 75 mL/hr at 21 1483     PRN Meds:.sodium chloride, sodium chloride flush, LORazepam, HYDROcodone 5 mg - acetaminophen **OR** HYDROcodone 5 mg - acetaminophen, morphine, promethazine **OR** ondansetron, polyethylene glycol, acetaminophen **OR** acetaminophen  OBJECTIVE   CURRENT VITALS:  height is 5' 10\" (1.778 m) and weight is 278 lb 4.8 oz (126.2 kg). His oral temperature is 98.2 °F (36.8 °C). His blood pressure is 119/56 (abnormal) and his pulse is 80. His respiration is 18 and oxygen saturation is 97%.    Temperature Range (24h):Temp: 98.2 °F (36.8 °C) Temp  Av.2 °F (36.8 °C)  Min: 98 °F (36.7 °C)  Max: 98.4 °F (36.9 °C)  BP Range (14S): Systolic (30FKK), UBR:802 , Min:113 , LVY:838     Diastolic (35BWV), PUW:57, Min:50, Max:66    Pulse Range (24h): Pulse  Av.6  Min: 80  Max: 91  Respiration Range (24h): Resp  Av.6  Min: 16  Max: 18  Current Pulse Ox (24h):  SpO2: 97 %  Pulse Ox Range (24h):  SpO2  Av.5 %  Min: 94 %  Max: 97 %  Oxygen Amount and Delivery: O2 Flow Rate (L/min): 1 L/min(O2 removed)  Incentive Spirometry Tx:            GENERAL: Alert to name, answers some questions, intermittent confusion  LUNGS: Decreased breath sounds at bases  HEART: normal rate and regular rhythm  ABDOMEN: obese, non-distended, soft, incisional tenderness, bowel sounds present. ANTONIETTA serous. INCISION: Midline incision with staples well approximated, no bleeding   EXTERMITY: no cyanosis, clubbing. Edema better. In: 5020 [P.O.:200; I.V.:1417]  Out: 1130 [Drains:405]  Date 02/25/21 0000 - 02/25/21 2359   Shift 8362-2087 6397-2318 1243-0138 24 Hour Total   INTAKE   P.O. 100   100   I. V.(mL/kg/hr) 617   617   Shift Total(mL/kg) 717(5.7)   717(5.7)   OUTPUT   Drains 165   165   Stool 125   125   Shift Total(mL/kg) 290(2.3)   290(2.3)   Weight (kg) 126.2 126.2 126.2 126.2     LABS     Recent Labs     02/23/21  0353 02/23/21  0402 02/24/21  0422 02/25/21  0610   WBC  --  17.6* 15.8* 16.8*   HGB  --  7.7* 7.1* 6.8*   HCT  --  26.5* 24.4* 22.5*   PLT  --  238 240 277     --  146* 148*   K 3.6  --  3.7 3.3*   *  --  117* 118*   CO2 21*  --  21* 22*   BUN 41*  --  36* 32*   CREATININE 1.4*  --  1.5* 1.6*   MG 1.9  --  1.8  --    PHOS 3.1  --  2.7  --    CALCIUM 7.9*  --  7.5* 7.4*      RADIOLOGY   No new imaging    Electronically signed by SYLVESTER Grullon CNP on 2/25/2021 at 7:29 AM    Patient seen and examined independently by me early this AM. Above discussed and I agree with King Titus CNP. See my additional comments below for updated orders and plan. Labs, cultures, and radiographs where available were reviewed. I discussed patient concerns with the patient's nurse and instructions were given. Please see our orders for the updated patient care plan. -Tolerating puréed diet. ANTONIETTA drain serous. Output slightly better. Incisional/wound care. Ostomy care and teaching. Ostomy functioning well. Much less amount of rectal stump. Nephrology following. DVT prophylaxis. C. difficile treatment per admitting. Receiving 1 unit packed RBC this morning secondary to hemoglobin 6.8. No active signs of bleeding on clinical exam this morning. PT/OT as needed. Social service for discharge planning.     Electronically signed by Shyanne Denton MD on

## 2021-02-25 NOTE — PROGRESS NOTES
[address] [address]  INPATIENT SPEECH THERAPY  [facility] NEUROSCIENCES 4A  DAILY NOTE    TIME   SLP Individual Minutes  Time In: 1034  Time Out: 1054  Minutes: 20  Timed Code Treatment Minutes: 0 Minutes       Date: 2021  Patient Name: Catalina Vickers      CSN: [id number]   : 1940  (79 y.o.)  Gender: male   Referring Physician:  SYLVESTER Bustillo CNP  Diagnosis: Hypotension  Secondary Diagnosis: Dysphagia  Precautions: Aspiration   Current Diet: Puree diet with moderately thick liquids   Swallowing Strategies: Small bites/sips, upright position, direct 1:1 feeding  Date of Last MBS: 2021    Pain:  No pain reported. Subjective:  JOSLYN Odell reporting, \"patient refusing to consume puree diet and moderatly thick liquids. \" Patient seen at bedside, alert and cooperative for dysphagia treatment. Patient receptive and compliant with all ST recommendations. Short-Term Goals:  SHORT TERM GOAL #1:  Goal 1: Pt will consume puree diet with moderately thick liquids while utilizing compensatory strategies (upright position, small bites/sips, alternate liquids/solids) without overt s/s of aspiration to assist in nutrition/hydration. INTERVENTIONS: Patient consumed PO trials of 4oz moderately thick liquids via cup; patient demonstrated with consecutive cup drinks of 2oz, timely swallow, no s/s of aspiration. Provided verbal cues to \"slow down\" patient then placed cup down. Patient consumed the remainder 2oz of moderately thick liquids via cup, again consecutive cup drinks, intermittent throat clearing noted. Patient consumed x2 PO trials of puree; appropriate oral mastication/bolus control, timely swallow, no s/s of aspiration     SHORT TERM GOAL #2:  Goal 2: Pt will complete conservative advanced PO trials (mildly thick liquids, ?minced and moist if clinically indicated) with skilled ST only to determine potential advancement of diet level.   INTERVENTIONS: Advanced PO trials completed with 4oz mildly thick liquids; patient demonstrated with impulsive, consecutive cup drinks of mildly thick liquids; increasing throat clearing and intermittent wet vocal quality to follow, no overt coughing. Patient eventually achieved dry vocal quality provided verbal cues to initiate \"additional dry swallows. \"     Advanced PO trials completed with hard solid cracker dipped into puree x3; patient with LARGE bite size of trials, slow oral mastication with diffuse min residue noted throughout oral cavity, cleared utilizing liquid wash provided by ST. Notable throat clearing noted following 2/3 cracker trials. Patient with increasing fatigue following advanced trials; not appropriate for diet advancement at this time. Recommend continued puree diet with moderately thick liquids, direct 1:1 assistance to provide encouragement to consume PO intake      SHORT TERM GOAL #3:  Goal 3: Pt will complete pharyngeal strengthening exercises (with/without NMES - explore candidacy) x10 each with good sucess to improve pharyngeal integrity to assist in safe swallowing. INTERVENTIONS: ST reviewed MBS study completed 02/24 with supportive rational for reccommended diet of puree and moderately thick liquids. Patient agreeable to trials; reporting 'liking to cold drinks.' ST explained, \"unable to utilize ice d/t melting to thinner consistency.' Patient verbalized understanding    ST explained NMES therapy; patient agreeable to treatment. Will need shaved prior to implementation of treatment; patient agreeable to shaving neck. Patient reporting \"I usually am shaved there. \"   *Perfect serve message sent to V[facility] Freeman to obtain orders. **NMES orders received 02/25 per [facility] Still    Effortful swallows withOUT PO intake; x20 fair-good success.  Patient benefits from verbal encouragement    HEP: x10 additional \"dry\" swallows throughout the day and continue to consume puree diet with moderately thick liquids; patient

## 2021-02-25 NOTE — CARE COORDINATION
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  . Laryngeal penetration of nectar thick and thin barium with aspiration of thin barium   2. Additional recommendations from the speech therapist will follow.       2/24 PICC line Placement    Barriers to Discharge: Hgb 6.8, transfuse PRBC's, telemetry, wound drain management, wound ostomy care, WBC 16.8, IV fluids, pain and nausea control, IV Flagyl, IV Zosyn, electrolyte replacement protocols, oral Vancomycin. PT/OT/ST. Creatinine 1.6, Nephrology following. PCP: Moni Del Rosario MD  Readmission Risk Score: 22%  Patient Goals/Plan/Treatment Preferences: From [address] Plan is skilled at discharge. SW following.

## 2021-02-26 LAB
ANION GAP SERPL CALCULATED.3IONS-SCNC: 6 MEQ/L (ref 8–16)
BUN BLDV-MCNC: 26 MG/DL (ref 7–22)
CALCIUM IONIZED: 1.14 MMOL/L (ref 1.12–1.32)
CALCIUM SERPL-MCNC: 7.5 MG/DL (ref 8.5–10.5)
CHLORIDE BLD-SCNC: 116 MEQ/L (ref 98–111)
CO2: 21 MEQ/L (ref 23–33)
CREAT SERPL-MCNC: 1.3 MG/DL (ref 0.4–1.2)
ERYTHROCYTE [DISTWIDTH] IN BLOOD BY AUTOMATED COUNT: 17.9 % (ref 11.5–14.5)
ERYTHROCYTE [DISTWIDTH] IN BLOOD BY AUTOMATED COUNT: 59.3 FL (ref 35–45)
GFR SERPL CREATININE-BSD FRML MDRD: 53 ML/MIN/1.73M2
GLUCOSE BLD-MCNC: 142 MG/DL (ref 70–108)
HCT VFR BLD CALC: 27 % (ref 42–52)
HEMOGLOBIN: 8.3 GM/DL (ref 14–18)
MAGNESIUM: 1.8 MG/DL (ref 1.6–2.4)
MCH RBC QN AUTO: 29.7 PG (ref 26–33)
MCHC RBC AUTO-ENTMCNC: 30.7 GM/DL (ref 32.2–35.5)
MCV RBC AUTO: 96.8 FL (ref 80–94)
PHOSPHORUS: 3.9 MG/DL (ref 2.4–4.7)
PLATELET # BLD: 292 THOU/MM3 (ref 130–400)
PMV BLD AUTO: 10.9 FL (ref 9.4–12.4)
POTASSIUM REFLEX MAGNESIUM: 3.5 MEQ/L (ref 3.5–5.2)
RBC # BLD: 2.79 MILL/MM3 (ref 4.7–6.1)
SODIUM BLD-SCNC: 143 MEQ/L (ref 135–145)
WBC # BLD: 19.2 THOU/MM3 (ref 4.8–10.8)

## 2021-02-26 PROCEDURE — 6370000000 HC RX 637 (ALT 250 FOR IP): Performed by: INTERNAL MEDICINE

## 2021-02-26 PROCEDURE — 2500000003 HC RX 250 WO HCPCS: Performed by: SURGERY

## 2021-02-26 PROCEDURE — 6370000000 HC RX 637 (ALT 250 FOR IP): Performed by: SURGERY

## 2021-02-26 PROCEDURE — 6370000000 HC RX 637 (ALT 250 FOR IP): Performed by: PHYSICIAN ASSISTANT

## 2021-02-26 PROCEDURE — 97530 THERAPEUTIC ACTIVITIES: CPT

## 2021-02-26 PROCEDURE — 2060000000 HC ICU INTERMEDIATE R&B

## 2021-02-26 PROCEDURE — 6370000000 HC RX 637 (ALT 250 FOR IP): Performed by: NURSE PRACTITIONER

## 2021-02-26 PROCEDURE — 36415 COLL VENOUS BLD VENIPUNCTURE: CPT

## 2021-02-26 PROCEDURE — 80048 BASIC METABOLIC PNL TOTAL CA: CPT

## 2021-02-26 PROCEDURE — 99233 SBSQ HOSP IP/OBS HIGH 50: CPT | Performed by: NURSE PRACTITIONER

## 2021-02-26 PROCEDURE — 97535 SELF CARE MNGMENT TRAINING: CPT

## 2021-02-26 PROCEDURE — 83735 ASSAY OF MAGNESIUM: CPT

## 2021-02-26 PROCEDURE — APPSS30 APP SPLIT SHARED TIME 16-30 MINUTES: Performed by: NURSE PRACTITIONER

## 2021-02-26 PROCEDURE — 82330 ASSAY OF CALCIUM: CPT

## 2021-02-26 PROCEDURE — 97110 THERAPEUTIC EXERCISES: CPT

## 2021-02-26 PROCEDURE — 99024 POSTOP FOLLOW-UP VISIT: CPT | Performed by: NURSE PRACTITIONER

## 2021-02-26 PROCEDURE — 99232 SBSQ HOSP IP/OBS MODERATE 35: CPT | Performed by: INTERNAL MEDICINE

## 2021-02-26 PROCEDURE — 2580000003 HC RX 258: Performed by: SURGERY

## 2021-02-26 PROCEDURE — 2580000003 HC RX 258: Performed by: INTERNAL MEDICINE

## 2021-02-26 PROCEDURE — 84100 ASSAY OF PHOSPHORUS: CPT

## 2021-02-26 PROCEDURE — 85027 COMPLETE CBC AUTOMATED: CPT

## 2021-02-26 RX ORDER — LACTOBACILLUS RHAMNOSUS GG 10B CELL
1 CAPSULE ORAL
Status: DISCONTINUED | OUTPATIENT
Start: 2021-02-26 | End: 2021-03-05 | Stop reason: HOSPADM

## 2021-02-26 RX ORDER — POTASSIUM CHLORIDE 20 MEQ/1
40 TABLET, EXTENDED RELEASE ORAL ONCE
Status: COMPLETED | OUTPATIENT
Start: 2021-02-26 | End: 2021-02-26

## 2021-02-26 RX ADMIN — METOPROLOL TARTRATE 25 MG: 25 TABLET ORAL at 20:28

## 2021-02-26 RX ADMIN — FAMOTIDINE 20 MG: 20 TABLET, FILM COATED ORAL at 09:24

## 2021-02-26 RX ADMIN — SODIUM CHLORIDE, PRESERVATIVE FREE 10 ML: 5 INJECTION INTRAVENOUS at 09:25

## 2021-02-26 RX ADMIN — DEXTROSE MONOHYDRATE: 50 INJECTION, SOLUTION INTRAVENOUS at 03:43

## 2021-02-26 RX ADMIN — ATORVASTATIN CALCIUM 40 MG: 40 TABLET, FILM COATED ORAL at 09:24

## 2021-02-26 RX ADMIN — MICONAZOLE NITRATE: 20 POWDER TOPICAL at 20:32

## 2021-02-26 RX ADMIN — MIRTAZAPINE 45 MG: 45 TABLET, FILM COATED ORAL at 20:28

## 2021-02-26 RX ADMIN — DOXEPIN HYDROCHLORIDE 10 MG: 10 CAPSULE ORAL at 23:38

## 2021-02-26 RX ADMIN — Medication 500 MG: at 16:52

## 2021-02-26 RX ADMIN — Medication 500 MG: at 12:49

## 2021-02-26 RX ADMIN — BUPROPION HYDROCHLORIDE 300 MG: 150 TABLET, EXTENDED RELEASE ORAL at 09:24

## 2021-02-26 RX ADMIN — BUSPIRONE HYDROCHLORIDE 15 MG: 7.5 TABLET ORAL at 20:28

## 2021-02-26 RX ADMIN — POTASSIUM CHLORIDE 40 MEQ: 1500 TABLET, EXTENDED RELEASE ORAL at 16:44

## 2021-02-26 RX ADMIN — MICONAZOLE NITRATE: 20 POWDER TOPICAL at 09:24

## 2021-02-26 RX ADMIN — FAMOTIDINE 20 MG: 20 TABLET, FILM COATED ORAL at 20:28

## 2021-02-26 RX ADMIN — Medication 500 MG: at 23:39

## 2021-02-26 RX ADMIN — METRONIDAZOLE 500 MG: 500 INJECTION, SOLUTION INTRAVENOUS at 09:23

## 2021-02-26 RX ADMIN — BUSPIRONE HYDROCHLORIDE 15 MG: 7.5 TABLET ORAL at 12:50

## 2021-02-26 RX ADMIN — METRONIDAZOLE 500 MG: 500 INJECTION, SOLUTION INTRAVENOUS at 23:38

## 2021-02-26 RX ADMIN — QUETIAPINE FUMARATE 100 MG: 100 TABLET ORAL at 20:28

## 2021-02-26 RX ADMIN — ARIPIPRAZOLE 10 MG: 10 TABLET ORAL at 20:28

## 2021-02-26 RX ADMIN — Medication 500 MG: at 05:58

## 2021-02-26 RX ADMIN — SERTRALINE 200 MG: 100 TABLET, FILM COATED ORAL at 09:23

## 2021-02-26 RX ADMIN — BUSPIRONE HYDROCHLORIDE 15 MG: 7.5 TABLET ORAL at 09:24

## 2021-02-26 RX ADMIN — ACETAMINOPHEN 650 MG: 325 TABLET ORAL at 09:24

## 2021-02-26 RX ADMIN — SODIUM CHLORIDE, PRESERVATIVE FREE 10 ML: 5 INJECTION INTRAVENOUS at 20:28

## 2021-02-26 RX ADMIN — DEXTROSE MONOHYDRATE: 50 INJECTION, SOLUTION INTRAVENOUS at 15:56

## 2021-02-26 RX ADMIN — METRONIDAZOLE 500 MG: 500 INJECTION, SOLUTION INTRAVENOUS at 16:44

## 2021-02-26 RX ADMIN — METOPROLOL TARTRATE 25 MG: 25 TABLET ORAL at 09:24

## 2021-02-26 RX ADMIN — LEVOTHYROXINE SODIUM 75 MCG: 75 TABLET ORAL at 05:58

## 2021-02-26 RX ADMIN — Medication 1 CAPSULE: at 12:49

## 2021-02-26 ASSESSMENT — PAIN SCALES - GENERAL
PAINLEVEL_OUTOF10: 0
PAINLEVEL_OUTOF10: 0
PAINLEVEL_OUTOF10: 5

## 2021-02-26 NOTE — PLAN OF CARE
Problem: Bowel Function - Altered:  Goal: Bowel elimination is within specified parameters  Description: Bowel elimination is within specified parameters  2/26/2021 0044 by Catarino Cueva RN  Outcome: Ongoing  Note: Patient has colostomy bag that his stool is collected in. Patient's stool is a greenish/brown in color. Patient has had a medium amount of output through the colostomy. Will continue to monitor bowel elimination. Problem: Cardiac Output - Decreased:  Goal: Hemodynamic stability will improve  Description: Hemodynamic stability will improve  Outcome: Ongoing  Note:   Vitals:    02/25/21 2352   BP: (!) 154/53   Pulse: 84   Resp: 16   Temp: 98.8 °F (37.1 °C)   SpO2: 100%    Patient's VSS this shift. Will continue to monitor vital signs every 4 hours. Problem: Pain:  Goal: Pain level will decrease  Description: Pain level will decrease  Outcome: Ongoing  Note: Patient denied any pain this shift. Patient's stated pain goal is a 3/10. Non-pharmacologic pain measures in place such as environmental changes, rest, relaxation, etc. Will continue to monitor pain level. Problem: Skin Integrity - Impaired:  Goal: Will show no infection signs and symptoms  Description: Will show no infection signs and symptoms  Outcome: Ongoing  Note: Patient afebrile. WBC WNL. IV antibiotics per MAR. Will continue to monitor for s/s of infection. Problem: Skin Integrity - Impaired:  Goal: Absence of new skin breakdown  Description: Absence of new skin breakdown  Outcome: Ongoing  Note: Patient remained free from new skin breakdown this shift. Patient turns self in bed, but turned PRN. Pillow support provided. Low-intermittent air loss mattress provided. Will continue to monitor skin integrity and encourage repositioning Q2H and PRN. Problem: Falls - Risk of:  Goal: Will remain free from falls  Description: Will remain free from falls  Outcome: Ongoing  Note: Patient remained free from falls this shift. Used call light appropriately. Wore nonskid socks when ambulating with assistance. Bed alarm on. Call light in reach. Purposeful hourly rounding completed this shift. Problem: Falls - Risk of:  Goal: Absence of physical injury  Description: Absence of physical injury  Outcome: Ongoing  Note: Patient remained free from physical injury this shift. Used call light appropriately. Wore nonskid socks when ambulating with assistance. Bed alarm on. Call light in reach. Pathway clear. Purposeful hourly rounding completed. Problem: Bowel/Gastric:  Goal: Occurrences of diarrhea will decrease  Description: Occurrences of diarrhea will decrease  Outcome: Ongoing  Note: Unknown how many occurrences of diarrhea patient has due to patient having a colostomy. Will continue to monitor output. Problem: Pain:  Goal: Pain level will decrease  Description: Pain level will decrease  Outcome: Ongoing  Note: Patient denied any pain this shift. Patient's stated pain goal is a 3/10. Non-pharmacologic pain measures in place such as environmental changes, rest, relaxation, etc. Will continue to monitor pain level. Care plan reviewed with patient. Patient verbalizes understanding of the care plan and contributed to goal setting.

## 2021-02-26 NOTE — FLOWSHEET NOTE
02/26/21 1422   Encounter Summary   Services provided to: Patient   Referral/Consult From: Rounding   Continue Visiting Yes  (2/26)   Complexity of Encounter Low   Length of Encounter 15 minutes   Routine   Type Initial   Assessment Approachable; Hopeful   Intervention Prayer

## 2021-02-26 NOTE — CARE COORDINATION
2/26/21, 3:40 PM EST    DISCHARGE PLANNING EVALUATION  Received a call from [place] at [facility]. They want a PASARR completed. They will not take a 30 day. KARL completed screen. Due to patients his of depression and ongoing treatment he tripped the screen. KARL faxed supporting documentation to [facility]. Document ID for the screen is [id number]. Will need to fax PT/OT notes after he is seen so they are able to start the insurance precert.

## 2021-02-26 NOTE — DISCHARGE INSTR - COC
Continuity of Care Form    Patient Name: Arcelia Page   :  1940  MRN:  [id number]    Admit date:  2021  Discharge date:  3/5/21    Code Status Order: DNR-CCA   Advance Directives:   [facility] Documentation       Date/Time Healthcare Directive Type of Healthcare Directive Copy in [address] Agent's Name Healthcare Agent's Phone Number    21  Yes, patient has an advance directive for healthcare treatment  Durable power of  for health care  Yes, copy in chart  [facility]ult [address], [address]  [phone], [phone]            Admitting Physician:  Alka Marquis MD  PCP: Marcellus Thakkar MD    Discharging Nurse: [facility] Unit/Room#: 5K-17/017-A  Discharging Unit Phone Number: [phone]    Emergency Contact:   Extended Emergency Contact Information  Primary Emergency Contact: [address] Phone: [phone]  Relation: Child  Secondary Emergency Contact: Andrew Ruiz, [address] [place] [address] Phone: [phone]  Relation: Child   needed? No    Past Surgical History:  Past Surgical History:   Procedure Laterality Date    COLOSTOMY N/A 2021    TOTAL COLECTOMY AND ILLEOSTOMY PLACEMENT performed by Fior Mead MD at [address] [facility]        Immunization History: There is no immunization history on file for this patient.     Active Problems:  Patient Active Problem List   Diagnosis Code    Essential hypertension I10    ASHD (arteriosclerotic heart disease) I25.10    Arthritis M19.90    Severe episode of recurrent major depressive disorder, without psychotic features ([address] F33.2    Acquired hypothyroidism E03.9    Type 2 diabetes mellitus without complication, without long-term current use of insulin ([address] E11.9    COVID-19 with multiple comorbidities U07.1    History of recent hospitalization Z92.89    Cellulitis of right lower extremity L03.115    Pulmonary nodule R91.1    Hypotension I95.9    Septic shock (HCC) A41.9, R65.21    Hypovolemic shock (HCC) R57.1    Colitis, Clostridium difficile A04.72       Isolation/Infection:   Isolation            C Diff Contact          Patient Infection Status       Infection Onset Added Last Indicated Last Indicated By Review Planned Expiration Resolved Resolved By    C-diff (Clostridium difficile)  02/18/21 02/18/21 Shani Harden RN 02/25/21       Positive at outlying facility    Resolved    COVID-19 Rule Out 02/20/21 02/20/21 02/20/21 COVID-19, Rapid (Ordered)   02/20/21 Rule-Out Test Resulted    COVID-19 Rule Out 06/28/20 06/28/20 06/28/20 COVID-19 (Ordered)   06/28/20 Rule-Out Test Resulted            Nurse Assessment:  Last Vital Signs: BP (!) 118/54   Pulse 87   Temp 98.4 °F (36.9 °C) (Oral)   Resp 16   Ht 5' 10\" (1.778 m)   Wt 266 lb 6.4 oz (120.8 kg)   SpO2 97%   BMI 38.22 kg/m²     Last documented pain score (0-10 scale): Pain Level: 7  Last Weight:   Wt Readings from Last 1 Encounters:   02/28/21 266 lb 6.4 oz (120.8 kg)     Mental Status:  oriented and alert    IV Access:  - None    Nursing Mobility/ADLs:  Walking   Assisted  Transfer  Assisted  Bathing  Assisted  Dressing  Assisted  Toileting  Assisted  Feeding  Independent  Med Admin  Assisted  Med Delivery   crushed and prefers mixed with pudding    Wound Care Documentation and Therapy:  Wound 02/17/21 Coccyx DTI  purple areas (Active)   Wound Etiology Deep tissue/Injury 03/05/21 0912   Dressing/Treatment Protective barrier 03/05/21 0912   Wound Assessment Dry;Non-blanchable erythema;Purple/maroon 03/05/21 0912   Drainage Amount None 03/05/21 0912   Odor None 03/05/21 0912   Rosy-wound Assessment Dry/flaky 03/05/21 0912   Number of days: 16       Wound 02/22/21 Penis Left Ulcerated spot (Active)   Wound Etiology Pressure Stage  2 03/05/21 0912   Wound Cleansed Soap and water 02/24/21 2039   Dressing/Treatment Moisture barrier 03/05/21 0912   Wound Assessment Pink/red 03/05/21 0912   Drainage Amount None 03/05/21 0912   Odor None 03/05/21 0912   Rosy-wound Assessment Excoriated 03/05/21 0912   Number of days: 10        Elimination:  Continence: Bowel: Yes  Bladder: No  Urinary Catheter: None   Colostomy/Ileostomy/Ileal Conduit: Yes  Colostomy LLQ-Stomal Appliance: 1 piece  Colostomy LLQ-Stoma  Assessment: Pink, Moist  Colostomy LLQ-Peristomal Assessment: Clean, Intact  [REMOVED] Rectal Tube With balloon-Stool Appearance: Watery  Colostomy LLQ-Stool Appearance: Loose  [REMOVED] Rectal Tube With balloon-Stool Color: Brown, Red  Colostomy LLQ-Stool Color: Tamsen Demark  [REMOVED] Rectal Tube With balloon-Stool Amount: Medium  Colostomy LLQ-Stool Amount: Medium  Colostomy LLQ-Output (mL): 150 ml    Date of Last BM: 3/5/21    Intake/Output Summary (Last 24 hours) at 3/5/2021 1407  Last data filed at 3/5/2021 1210  Gross per 24 hour   Intake 460 ml   Output 1025 ml   Net -565 ml     I/O last 3 completed shifts: In: 26 [P.O.:400; I.V.:60]  Out: 790 [Urine:325; Drains:140; Stool:325]    Safety Concerns: At Risk for Falls    Impairments/Disabilities:      None    Nutrition Therapy:  Current Nutrition Therapy:   - Oral Diet:  Dental Soft and no straw. bite sized     Routes of Feeding: Oral  Liquids: No Restrictions  Daily Fluid Restriction: no  Last Modified Barium Swallow with Video (Video Swallowing Test): yes 3/2/21    Treatments at the Time of Hospital Discharge:   Respiratory Treatments: see MAR   Oxygen Therapy:  is not on home oxygen therapy.   Ventilator:    - No ventilator support    Rehab Therapies: Physical Therapy, Occupational Therapy and Speech/Language Therapy  Weight Bearing Status/Restrictions: No weight bearing restirctions  Other Medical Equipment (for information only, NOT a DME order):  walker  Other Treatments:     Patient's personal belongings (please select all that are sent with patient):  Glasses    RN SIGNATURE:  Electronically signed by Tammy Brantley, RN on 3/5/21 at 2:06 PM EST    CASE MANAGEMENT/SOCIAL WORK SECTION    Inpatient Status Date: 2/17/2021    Readmission Risk Assessment Score:  Readmission Risk              Risk of Unplanned Readmission:        28           Discharging to Facility/ Agency   Name: [facility] [address]  OYDFZ:[phone]  PYO:[phone]    Dialysis Facility (if applicable)   Name:  Address:  Dialysis Schedule:  Phone:  Fax:    / signature: Electronically signed by JAKE Hardy on 2/26/21 at 3:50 PM EST    PHYSICIAN SECTION    Prognosis: Fair    Condition at Discharge: Stable    Rehab Potential (if transferring to Rehab): Fair    Recommended Labs or Other Treatments After Discharge: None at this time     Physician Certification: I certify the above information and transfer of Gorge Fee  is necessary for the continuing treatment of the diagnosis listed and that he requires [place] for greater 30 days. Update Admission H&P: Changes in H&P as follows - Pt had C. Diff & Toxic Megacolon. Pt underwent total colectomy. ANTONIETTA drain removed on 3/5. Pt will follow up with General Surgery.      PHYSICIAN SIGNATURE:  Electronically signed by HAY Salcedo on 3/5/21 at 2:13 PM EST - Collaborating Physician: Lois Angulo MD

## 2021-02-26 NOTE — PROGRESS NOTES
Hospitalist Progress Note      Patient:  Zoila Caballero    Unit/Bed:4A-19/019-A  YOB: 1940  MRN: [id number]   Acct: [de-identified]   PCP: Prabha Sanchez MD  Date of Admission: 2/17/2021    Assessment/Plan:    1. 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 CT of the abdomen pelvis on 2/17 shows pancolitis that was consistent with C. difficile. There was no bowel perforation or ileus. CT scan was ordered on 2/20, which showed toxic megacolon. Gen Surg was brought on board, ANTONIETTA drain continues to have 800-1000 mL output every day, plan to keep drain in through the weekend. Continue vancomycin and Flagyl (Day #10), consider 14 days of therapy. Dietitian also evaluated, recommendations for thickened liquids with puréed diet. 2. Septic shock secondary to fulminant C. Diff colitis, resolving: with noted hypotension, s/p Levophed, weaned off and currently stable. ID evaluted. Continue with current abx therapy. BP has been stable, continue to monitor. 3. Acute metabolic encephalopathy: Improved, related to above with hypotension/hypoperfusion along with uremia. Continue to monitor. Patient alert and oriented, engaged in conversation. 4. CARMEN, improving: Secondary to septic ATN. Creatinine 1.3 this morning. Nephrology following. Continue with gentle IVF fluids. No need for RRT at this time  5. Complicated UTI: UA shows bacteria, yeast, blood, and WBC.  Blood culture currently negative, awaiting results of urine culture.  Patient currently on vancomycin and Flagyl.  Holding treatment of Candida infection due to CARMEN. 6. Hypernatremia:  Resolved, secondary to hypotonic losses from GI tract. Nephrology recommending D5W to 100 mL/hr. Reassess chemistry in the morning. 7. Acute postoperative hypoxic respiratory failure, resolved: Current saturations 99% on RA. Continue to monitor for respiratory needs. 8. Incidental pulmonary nodules: Seen incidentally on CT scan in January.  PET scan completed in 2/12.  Nodule noted in right lower lobe, with primary rule out for metastatic process.  The nodule could also be infectious or inflammatory, monitor for now.  CT of the chest should be repeated in 3 months. 9. Acute on chronic anemia: Hgb on arrival was 10.2, subsequent decrease in 2/18 to 8.7 following reports from the nursing staff of blood in his stools and his NG tube-> H&H stable this morning at 8.3/27, 1 unit of PRBC was given on 2/25. There is a component of blood loss anemia on chronic macrocytic anemia.  Continue to trend hemoglobin, will transfuse if <7.0. 10. Hypocalcemia: Resolved. Ionized calcium 1.14 this morning.  Replacement protocol in place if needed. 11. Obesity: BMI 38.35. Chief Complaint: Hypotension    Initial H and P:-    \"Boo Angulo is a 30-year-old male was transferred from [facility] [place] on 2/17 for acute kidney injury and C. difficile colitis. Carlee Lipoma was recently admitted to [facility] on 2/26 for altered mental status and sepsis secondary to right leg cellulitis. Mitch Lipscomb was treated with Zosyn and Rocephin, and also discharged with antibiotics to the nursing home at the time.  During the course of hospital treatment, they also noted a pulmonary nodule on CT scan that measured 3 x 2 x 2.6 cm.  It was a concern for malignancy at the time, the requested follow-up however patient does not have any further scans or PET scans.  Blood cultures grew E. coli at the time.   During this hospital course, the patient was originally sent from the SNF to [facility] [place] on 2/14 when he was found to be diaphoretic and hypotensive with a fever of 102 °F.  Patient was evaluated in the emergency room and diagnosed to have sepsis but the source was unknown at the time and he was given fluid resuscitation and started on a Levophed drip.  Patient was started on Zosyn at time. Ita Ozuna his hospital stay he was found to have worsening renal function and nephrology was consulted. Orlin Gordon 2/16 the patient was seen by Dr. Jai Shafer from nephrology. Ferdinand Hughes work-up did show C. difficile colitis. Sandra Domínguez was started on vancomycin. Lily Antonio had a noted mental change and worsening creatinine on 2/16 so he was transferred to [facility] ICU for further management and care per Dr. Kvng Watkins"     2/18: Patient this morning remains on pressors as needed to maintain his blood pressure.  He has been receiving fluid throughout the night.  His hemoglobin did drop from 10 to about 8, however it is stayed stable since then, continue to monitor.  His WBC has decreased along with his creatinine.  However his BUN increased overnight.  The patient seems more confused today with an inability to answer where or when he is.  The nephrologist stated that if his BUN increases over 100 dialysis would be warranted at that time.  The surgeon also states that at this current point if surgery is warranted it would be a total colectomy.  Both of these options were discussed with the family who stated that they would be okay starting dialysis if needed.  They with prefer to hold off on surgery right now.     2/19: Patient remains on pressors to maintain his blood pressure however the dose has been weaned significantly. Lily Antonio still currently receiving IVF.  Hgb has remained stable.  WBC did increase overnight.  His creatinine has decreased to 3.7. Sandip Weber has stated 95.  The nephrologist had seen the patient earlier in the morning, and stated that dialysis is not needed at this time.  Due to the rising WBC, infectious disease has been consulted.     2/20: Patient remains on pressors, cortisol was checked on 2/17 which was normal for a.m.  Infectious diseases did recommend a loop ileostomy with vancomycin irrigation believes the patient would benefit from it.  Obtaining surgery recommendations.  His creatinine continues to decrease, and no dialysis is needed at this point, BUN decreasing as well.  WBC irvin again overnight, and patient still complains of stomach pains.     2/21:  In spite of the aggressive treatment for C. difficile colitis patient is still complaining of severe abdominal pain with severe abdominal distention , discussion  with the medical resident and also with nursing team  the patient is not improving over the last few days even the nursing did mention the patient is worsening over the last 24 hours with more distention of the abdomen with leukocytosis so plan to obtain CT scan abdomen pelvis with p.o. contrast to reassess his C. difficile colitis of the abdomen to see if there is any progression or improvement, CT scan abdomen pelvis done with contrast did show progression of the ulcerative cecal area of severe cecal distention and possibility for pneumatosis so general surgery consulted stat and he discussed the options with the patient and the patient family and the plan was done to go ahead for OR that was done last night, total colectomy, patient did after surgery have a smooth postop course still intubated in the weaning trial currently, will continue IV antibiotics fluids support hemodynamics pain control and in collaboration with the ID and general surgery team.     2/22: Patient remains on pressors this morning, propofol from surgery stopped. Patient was intubated for the surgery, however extubated same day. Doing well on 3 L nasal cannula. Patient states that he feels he has to defecate, however his ostomy bag is draining properly. His wound is clean and dry. He still states some diffuse abdominal pain. His creatinine is trending back towards baseline, as well as his BUN. His Hgb remained stable. WBC has decreased mildly from yesterday. We will continue to monitor. \"    2/23-> discussed with Nephrology, okay with PICC line. Pt just had Norco prior to exam, not very interactive.  Will open eyes and respond to yes no questions but is moaning continually in pain. Discussed with Gen Surg who are okay to start tube feeds as pt has been without nutrition, will consult dietician.     2/24-> patient sitting up on side of the bed with therapy at the bedside. Patient continues to have large quantities of output from ANTONIETTA drain as well as from ostomy site. Surgery aware. 2/25-> Patient sitting up bedside at the time of the interview. Currently denies any physical complaints and denied any issues overnight and into the morning. He was updated on the current plan of care, verbalizes understanding, and had no other needs or questions at this time. Subjective (past 24 hours):   Patient resting in bed during the interview, denies any physical complaints or issues into the morning. ANTONIETTA drain continues to put out 800-100 mL, plan to leave in over the weekend. Ostomy output improving, no longer dark, currently green in color. Patient was updated on the current plan of care, he verbalized his understanding, and had no other needs or questions at this time. Past medical history, family history, social history and allergies reviewed again and is unchanged since admission. ROS (14 point review of systems completed. Pertinent positives noted. Otherwise ROS is negative) :  GENERAL: No fever,chills, or night sweats. SKIN: No lesions or rashes. HEAD: No headaches or recent injury. EYES: No acute changes in vision, no diplopia or blurred vision. EARS: No hearing loss, no tinnitus. NOSE/THROAT: No rhinorrhea or pharyngitis, no nasal drainage. NECK: No lumps or unusual neck stiffness. PULMONARY: Respirations easy and non-labored, no acute distress. CARDIAC: No chest pain, pressure, Negative for lower leg edema. GI: Abdomen is soft and non-tender, non-distended. PERIPHERAL VASCULAR: No intermittent claudication or unusual leg cramps. MUSCULOSKELETAL: Occasional arthralgias, myalgias. NEUROLOGICAL: Denies any headache, near syncope, seizures or syncope.   HEMATOLOGIC: No unusual bruising or bleeding. PSYCH: Denies any homicidal or suicidial ideations. Medications:  Reviewed    Infusion Medications    sodium chloride      sodium chloride      dextrose 100 mL/hr at 02/26/21 0343     Scheduled Medications    sodium chloride flush  10 mL Intravenous 2 times per day    famotidine  20 mg Oral BID    vancomycin  500 mg Oral 4 times per day    lidocaine 1 % injection  5 mL Intradermal Once    buPROPion  300 mg Oral Daily    ARIPiprazole  10 mg Oral Daily    busPIRone  15 mg Oral TID    doxepin  10 mg Oral Nightly    QUEtiapine  100 mg Oral Nightly    mirtazapine  45 mg Oral Nightly    sertraline  200 mg Oral Daily    piperacillin-tazobactam  3,375 mg Intravenous Once    [Held by provider] heparin (porcine)  5,000 Units Subcutaneous Q8H    atorvastatin  40 mg Oral Daily    levothyroxine  75 mcg Oral Daily    metoprolol tartrate  25 mg Oral BID    [Held by provider] tamsulosin  0.4 mg Oral Daily    metroNIDAZOLE  500 mg Intravenous Q8H    calcium replacement protocol   Other RX Placeholder    miconazole   Topical BID     PRN Meds: sodium chloride flush, promethazine **OR** ondansetron, morphine **OR** morphine, sodium chloride, potassium chloride **OR** potassium alternative oral replacement **OR** potassium chloride, LORazepam, HYDROcodone 5 mg - acetaminophen **OR** HYDROcodone 5 mg - acetaminophen, polyethylene glycol, acetaminophen **OR** acetaminophen      Intake/Output Summary (Last 24 hours) at 2/26/2021 0949  Last data filed at 2/26/2021 9698  Gross per 24 hour   Intake 3296.21 ml   Output 2330 ml   Net 966.21 ml       Diet:  DIET DYSPHAGIA PUREED;  Moderately Thick (Honey)  Dietary Nutrition Supplements: Frozen Oral Supplement  Dietary Nutrition Supplements: Other Oral Supplement (see comment)    Exam:  BP (!) 158/57   Pulse 93   Temp 98.2 °F (36.8 °C) (Oral)   Resp 16   Ht 5' 10\" (1.778 m)   Wt 267 lb 4.8 oz (121.2 kg)   SpO2 99%   BMI 38.35 kg/m² General appearance: Alert and appropriate, pleasant geriatric male. No apparent distress, appears stated age and cooperative. HEENT: Pupils equal, round, and reactive to light. Conjunctivae/corneas clear. Neck: Supple, with full range of motion. No jugular venous distention. Trachea midline. Respiratory:  Normal respiratory effort. Clear to auscultation, bilaterally without Rales/Wheezes/Rhonchi. Cardiovascular: Regular rate and rhythm with normal S1/S2 without murmurs, rubs or gallops. Abdomen: Soft, non-tender, obese with normal bowel sounds. Ileostomy noted with green output, ANTONIETTA drain intact with serosanguineous output. Musculoskeletal: Passive and active ROM x 4 extremities. Skin: Skin color, texture, turgor normal.  No rashes or lesions. Neurologic:  Neurovascularly intact without any focal sensory/motor deficits. Cranial nerves: II-XII intact, grossly non-focal.  Psychiatric: Alert and oriented to person, place, time, and situation. Thought content appropriate, normal insight  Capillary Refill: Brisk,< 3 seconds   Peripheral Pulses: +2 palpable, equal bilaterally     Labs:   Recent Labs     02/24/21  0422 02/25/21  0610 02/25/21  1855 02/26/21  0615   WBC 15.8* 16.8*  --  19.2*   HGB 7.1* 6.8* 7.3* 8.3*   HCT 24.4* 22.5* 23.8* 27.0*    277  --  292     Recent Labs     02/24/21  0422 02/25/21  0610 02/26/21  0615   * 148* 143   K 3.7 3.3* 3.5   * 118* 116*   CO2 21* 22* 21*   BUN 36* 32* 26*   CREATININE 1.5* 1.6* 1.3*   CALCIUM 7.5* 7.4* 7.5*   PHOS 2.7  --  3.9     No results for input(s): AST, ALT, BILIDIR, BILITOT, ALKPHOS in the last 72 hours. No results for input(s): INR in the last 72 hours. No results for input(s): Cesar Fling in the last 72 hours.     Microbiology:    Blood culture #1:   Lab Results   Component Value Date    BC No growth-preliminary No growth  02/17/2021       Blood culture #2:No results found for: BLOODCULT2    Organism:  Lab Results Component Value Date    ORG Candida albicans 02/17/2021         Lab Results   Component Value Date    LABGRAM  06/10/2020     Rare segmented neutrophils observed. Rare epithelial cells observed. Many gram positive cocci occurring singly and in pairs. MRSA culture only:No results found for: [facility]    Urine culture:   Lab Results   Component Value Date    LABURIN [place] count: 50,000-90,000 CFU/mL  02/17/2021       Respiratory culture: No results found for: CULTRESP    Aerobic and Anaerobic :  Lab Results   Component Value Date    LABAERO  06/10/2020     moderate growth In the treatment of gram positive infections, GENTAMICIN should be CONSIDERED a SYNERGYSTIC agent ONLY. Ciprofloxacin and Levofloxacin, regardless of in vitro sensitivity, should not be used for staphylococcal infections other than uncomplicated lower UTIs. Moxifloxacin, regardless of in vitro sensitivity, shouldnot be used for staphylococcal infections. Lab Results   Component Value Date    LABANAE  06/10/2020     Culture yielded heavy mixed growth which included anaerobic gram positive cocci. If a true mixed aerobic and anaerobic infection is suspected, then broad spectrum empiric antibiotic therapy is indicated and should include coverage for anaerobic organisms. Urinalysis:      Lab Results   Component Value Date    NITRU NEGATIVE 02/17/2021    WBCUA 50-75 02/17/2021    BACTERIA MODERATE 02/17/2021    RBCUA 10-15 02/17/2021    BLOODU SMALL 02/17/2021    SPECGRAV 1.017 02/17/2021       Radiology:  XR CHEST PORTABLE   Final Result   Left lower lobe atelectasis and/or infiltrate. **This report has been created using voice recognition software. It may contain minor errors which are inherent in voice recognition technology. **      Final report electronically signed by Dr. Forrest Rojas on 2/24/2021 3:21 PM      IR FLUORO GUIDED CVA DEVICE PLMT/REPLACE/REMOVAL   Final Result      1.  Status post successful PICC line insertion. 2. Of note, an attempt was made to access the right brachial vein initially. However this was unsuccessful due to patient's body habitus. Therefore the right IJ approach was utilized. **This report has been created using voice recognition software. It may contain minor errors which are inherent in voice recognition technology. **      Final report electronically signed by Dr. Kristal Hale on 2/24/2021 6:26 PM      FL MODIFIED BARIUM SWALLOW W VIDEO   Final Result   1.  . Laryngeal penetration of nectar thick and thin barium with aspiration of thin barium   2. Additional recommendations from the speech therapist will follow. **This report has been created using voice recognition software. It may contain minor errors which are inherent in voice recognition technology. **      Final report electronically signed by Dr. Jimenez Ritter on 2/24/2021 10:22 AM      XR CHEST PORTABLE   Final Result   Normally positioned enteric tube. Unchanged left lower lung consolidation and left pleural effusion. Unchanged right lower lung airspace disease or atelectasis. This document has been electronically signed by: Antonieta Guardado MD on    02/23/2021 11:52 PM      XR CHEST PORTABLE   Final Result   Malposition of the NG tube. **This report has been created using voice recognition software. It may contain minor errors which are inherent in voice recognition technology. **      Final report electronically signed by Dr. Inna Flor on 2/23/2021 8:21 PM      XR ABDOMEN FOR NG/OG/NE TUBE PLACEMENT   Final Result   Impression:      NG tube tip proximal stomach. This document has been electronically signed by: Cierra Jacobsen MD on    02/22/2021 11:21 PM      XR CHEST PORTABLE   Final Result   1. Appropriately positioned lines and tubes. 2. Mild bibasilar atelectasis. 3. Small left-sided pleural effusion.                **This report has been created using voice recognition software. It may contain minor errors which are inherent in voice recognition technology. **      Final report electronically signed by Dr. Wallace Crabtree on 2/21/2021 11:27 AM      XR ABDOMEN (2 VIEWS)   Final Result   Impression:   1. Nonobstructing bowel gas pattern. 2.  Oral contrast is seen in the proximal small bowel cannot exclude    extravasation left midabdomen. Correlation with CT abdomen and pelvis is    recommended. 3.  Post total colectomy. Overlying skin staples. NG tube in stomach. Pleural-parenchymal disease both lung bases. This document has been electronically signed by: Elva Hodgkins, MD on    02/21/2021 08:51 AM      CT ABDOMEN PELVIS WO CONTRAST Additional Contrast? Oral   Final Result       1. Worsening appearance to the colon. This is now diffusely distended. There is worsening thickening of the distal sigmoid colon and rectum. There is new thickening of the cecum with possible pneumatosis. 2. Worsening free fluid in the abdomen. No free air is noted. 3. Small layering bilateral pleural effusions with dependent bibasilar atelectasis. 4. Distended gallbladder. **This report has been created using voice recognition software. It may contain minor errors which are inherent in voice recognition technology. **      Final report electronically signed by Dr. Wallace Crabtree on 2/20/2021 2:38 PM      XR ABDOMEN FOR NG/OG/NE TUBE PLACEMENT   Final Result   1. Esophageal route tube tip in the stomach. **This report has been created using voice recognition software. It may contain minor errors which are inherent in voice recognition technology. **      Final report electronically signed by Dr. Wallace Crabtree on 2/20/2021 11:15 AM      XR ABDOMEN FOR NG/OG/NE TUBE PLACEMENT   Final Result   Impression:   Limited exam   1. NG tube in the stomach.   Nonspecific bowel gas pattern      This document has been electronically signed by: Leopoldo Cummings MD on 02/19/2021 01:59 AM      US GALLBLADDER RUQ   Final Result   Distended appearance to the gallbladder which has a slightly thickened wall. Sludge is also present within its lumen. Sonographic Hamilton's sign is positive per the technologist. These findings could be on the basis of acute cholecystitis. If clinically    warranted, further evaluation with a nuclear medicine HIDA scan would be beneficial for further characterization. **This report has been created using voice recognition software. It may contain minor errors which are inherent in voice recognition technology. **      Final report electronically signed by Dr Evelia Sotomayor on 2/17/2021 9:37 AM      CT ABDOMEN PELVIS WO CONTRAST Additional Contrast? None   Final Result   Impression:   1. Pancolitis consistent with the clinical history of C. difficile    colitis. No bowel perforation. 2.  Probable cholelithiasis. 3.  Bibasilar pulmonary consolidation which may be on the basis of    atelectasis or pneumonia. 4.  Small bilateral nonobstructing renal calculi. 5.  Small right middle lobe pulmonary nodule. This document has been electronically signed by: Rowan Lorenzo MD on    02/17/2021 05:51 AM      All CTs at this facility use dose modulation techniques and iterative    reconstructions, and/or weight-based dosing   when appropriate to reduce radiation to a low as reasonably achievable. XR CHEST PORTABLE   Final Result   Impression:      Right central line tip proximal SVC. Stable bibasilar consolidation and atelectasis. This document has been electronically signed by: Omar Mackey MD on    02/17/2021 04:20 AM      XR CHEST PORTABLE   Final Result   Impression:      Stable bilateral basilar consolidation and atelectasis      This document has been electronically signed by: Omar Mackey MD on    02/17/2021 03:20 AM      XR ABDOMEN FOR NG/OG/NE TUBE PLACEMENT   Final Result   Impression:   1.   Distal NG tube is not well seen but is likely in the distal stomach. This document has been electronically signed by: Valerie Dailey MD on    02/17/2021 02:30 AM      XR CHEST PORTABLE   Final Result   Impression:   1. Right lower lobe airspace disease may represent atelectasis or    pneumonia. This document has been electronically signed by: Valerie Dailey MD on    02/17/2021 02:28 AM        Ct Abdomen Pelvis Wo Contrast Additional Contrast? None    Result Date: 2/17/2021  CT scan of the abdomen and pelvis without contrast Comparison: None Findings: The images are degraded by artifact related to patient body habitus. The left lateral abdominal wall is incompletely imaged. There is a 4 mm nodule within the right middle lobe. There is patchy consolidation within both lower lobes which may be on the basis of atelectasis or pneumonia. A nasogastric tube is in place and extends to the proximal duodenum. Evaluation of the parenchymal organs is limited by the lack of intravenous contrast, however, the liver, spleen, pancreas and visualized portions of the adrenal glands are normal in appearance. There is a layering high density material within the lumen of the gallbladder consistent with small gallstones versus high density sludge. There is a small amount of intra-abdominal ascites. Small bilateral nonobstructing renal calculi are present. The kidneys are otherwise normal. There is diffuse thickening of the wall of the colon associated with inflammatory changes throughout the pericolonic fat. Findings are consistent with a pancolitis and with the clinical history of C. difficile colitis. There is no bowel perforation. The abdominal aorta is normal in course and caliber. There is no abdominal, pelvic or retroperitoneal adenopathy. The seminal vesicles and prostate gland are normal in appearance. A Velez catheter and rectal tube are in place. There are no acute bony abnormalities.   No soft tissue abnormalities are present. Impression: 1. Pancolitis consistent with the clinical history of C. difficile colitis. No bowel perforation. 2.  Probable cholelithiasis. 3.  Bibasilar pulmonary consolidation which may be on the basis of atelectasis or pneumonia. 4.  Small bilateral nonobstructing renal calculi. 5.  Small right middle lobe pulmonary nodule. This document has been electronically signed by: Ren Sparrow MD on 02/17/2021 05:51 AM All CTs at this facility use dose modulation techniques and iterative reconstructions, and/or weight-based dosing when appropriate to reduce radiation to a low as reasonably achievable. Us Gallbladder Ruq    Result Date: 2/17/2021  PROCEDURE: US GALLBLADDER RUQ CLINICAL INFORMATION: 67-year-old female with cholelithiasis. Follow-up exam. COMPARISON: CT scan 2/17/2021. TECHNIQUE: Multiplanar sonographic images were obtained of the structures in the right upper quadrant. FINDINGS: Gallbladder - 12.3 x 4.9 x 4.7 cm Gallbladder Wall - 0.5 cm Common Duct - 0.5 cm Hamilton's Sign: positive The liver is of normal echogenicity. No masses are noted. The pancreatic head and body are within normal limits. The tail is not well seen due to overlying bowel gas. The gallbladder is distended. There is wall thickening. There is a large amount of sludge which is seen in the dependent portion of the gallbladder. The common bile duct is normal and measures 5 mm. There is no hydronephrosis in the visualized aspects of the right kidney. Distended appearance to the gallbladder which has a slightly thickened wall. Sludge is also present within its lumen. Sonographic Hamilton's sign is positive per the technologist. These findings could be on the basis of acute cholecystitis. If clinically warranted, further evaluation with a nuclear medicine HIDA scan would be beneficial for further characterization. **This report has been created using voice recognition software.  It may contain minor errors which are inherent in voice recognition technology. ** Final report electronically signed by Dr Malia Bullock on 2/17/2021 9:37 AM    Xr Chest Portable    Result Date: 2/17/2021  1 view chest x-ray. Comparison: 2/17/2021 Findings: Right central line, tip proximal SVC. NG tube unchanged. Cardiomegaly. Prior sternotomy. Stable bilateral basilar consolidation and atelectasis. Impression: Right central line tip proximal SVC. Stable bibasilar consolidation and atelectasis. This document has been electronically signed by: Tanya Orr MD on 02/17/2021 04:20 AM    Xr Chest Portable    Result Date: 2/17/2021  1 view chest x-ray. Comparison: 2/17/2021 Findings: NG tube is unchanged. Bilateral basilar consolidation and atelectasis are stable. Cardiomegaly. No bony abnormality. Impression: Stable bilateral basilar consolidation and atelectasis This document has been electronically signed by: Tanya Orr MD on 02/17/2021 03:20 AM    Xr Chest Portable    Result Date: 2/17/2021  Exam: One View of the chest Comparison: None Clinical history: Hypotension Findings: NG tube is off the bottom of the image but is at least within the stomach Prior median sternotomy Cardiac silhouette is enlarged, without CHF No pneumothorax. No pleural fluid collection. Right lower lobe airspace disease may represent atelectasis or pneumonia. Impression: 1. Right lower lobe airspace disease may represent atelectasis or pneumonia. This document has been electronically signed by: Malcolm Rubin MD on 02/17/2021 02:28 AM    Xr Abdomen For Ng/og/ne Tube Placement    Result Date: 2/17/2021  Exam: One view the abdomen Comparison: None Clinical history: NG placement Findings: NG tube tip is not well seen but is likely in the distal stomach. No free intraperitoneal air identified. No obstructive bowel gas pattern. Impression: 1. Distal NG tube is not well seen but is likely in the distal stomach.  This document has been electronically signed by: Antoni Daly MD on 02/17/2021 02:30 AM      **This report has been created using voice recognition software. It may contain minor errors which are inherent in voice recognition technology. **  Electronically signed by SYLVESTER Husain CNP on 2/26/2021 at 9:49 AM

## 2021-02-26 NOTE — PLAN OF CARE
Problem: Nutrition  Goal: Optimal nutrition therapy  Outcome: Ongoing   Nutrition Problem #1: Inadequate oral intake  Intervention: Food and/or Nutrient Delivery: Continue Current Diet, Modify Oral Nutrition Supplement  Nutritional Goals: Patient will tolerate and consume 75% or more of meals during LOS.

## 2021-02-26 NOTE — PROGRESS NOTES
[facility] - Dr. Tuan Lozoya  Postoperative Progress Note    Pt Name: Eryn Caraballo  Medical Record Number: [id number]  Date of Birth 1940   Today's Date: 2/26/2021    ASSESSMENT   1. 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 Hypotension secondary to shock - improving   3. Acute on chronic kidney disease  4. Postoperative respiratory failure - status post extubation   5. Incidental pulmonary nodules  6. Complicated UTI  7. Metabolic encephalopathy  8. Leukocytosis  9. Acute on chronic anemia   has a past medical history of Arthritis, Chronic kidney disease, Depression, Diabetes mellitus ([address] Hyperlipidemia, Hypertension, and Thyroid disease. PLAN   1. Tolerating thickened liquids with pureed diet. Patient does not like it. Appetite decreased. 2. ANTONIETTA drain care - serous fluid. Monitor outputs. Still right around 800-1000 mls out everyday. Will keep the drain through the weekend. 3. Wound & ostomy care - watch outputs. May need Imodium. 4. Pain control  5. Therapy as tolerated   6. Pulmonary toileting   7. GI & DVT prophylaxis - okay to resume heparin injections again   8. C. difficile treatment per admitting/ID  9. Nephrology following. Creat improved today. 10. Labs reviewed. Received 1 unit PRBC. Hem stable today. No signs of active bleeding. WBC increasing but clinically looks good. Continue to monitor. 11. Discharge planning   SUBJECTIVE   Remains stable on 4A. Patient alert to name and much more talkative today. Afebrile. Denies any pain today. ANTONIETTA drain serous. Ostomy functioning well with light green liquid stool. Midline incision well approximated with no breakdown or bleeding. Having urinary incontinence. Nurse states small amount of mucous from rectum.    CURRENT MEDICATIONS   Scheduled Meds:   sodium chloride flush  10 mL Intravenous 2 times per day    famotidine  20 mg Oral BID    vancomycin  500 mg Oral 4 times per day    lidocaine 1 % injection  5 mL Intradermal Once    buPROPion  300 mg Oral Daily    ARIPiprazole  10 mg Oral Daily    busPIRone  15 mg Oral TID    doxepin  10 mg Oral Nightly    QUEtiapine  100 mg Oral Nightly    mirtazapine  45 mg Oral Nightly    sertraline  200 mg Oral Daily    piperacillin-tazobactam  3,375 mg Intravenous Once    [Held by provider] heparin (porcine)  5,000 Units Subcutaneous Q8H    atorvastatin  40 mg Oral Daily    levothyroxine  75 mcg Oral Daily    metoprolol tartrate  25 mg Oral BID    [Held by provider] tamsulosin  0.4 mg Oral Daily    metroNIDAZOLE  500 mg Intravenous Q8H    calcium replacement protocol   Other RX Placeholder    miconazole   Topical BID     Continuous Infusions:   sodium chloride      sodium chloride      dextrose 100 mL/hr at 21 0343     PRN Meds:.sodium chloride flush, promethazine **OR** ondansetron, morphine **OR** morphine, sodium chloride, potassium chloride **OR** potassium alternative oral replacement **OR** potassium chloride, LORazepam, HYDROcodone 5 mg - acetaminophen **OR** HYDROcodone 5 mg - acetaminophen, polyethylene glycol, acetaminophen **OR** acetaminophen  OBJECTIVE   CURRENT VITALS:  height is 5' 10\" (1.778 m) and weight is 267 lb 4.8 oz (121.2 kg). His oral temperature is 98.8 °F (37.1 °C). His blood pressure is 142/56 (abnormal) and his pulse is 84. His respiration is 14 and oxygen saturation is 98%.    Temperature Range (24h):Temp: 98.8 °F (37.1 °C) Temp  Av.7 °F (37.1 °C)  Min: 97.9 °F (36.6 °C)  Max: 99.2 °F (37.3 °C)  BP Range (40O): Systolic (66MIC), LWM:169 , Min:112 , IXF:581     Diastolic (61OYY), TW, Min:37, Max:56    Pulse Range (24h): Pulse  Av  Min: 63  Max: 84  Respiration Range (24h): Resp  Av.1  Min: 14  Max: 20  Current Pulse Ox (24h):  SpO2: 98 %  Pulse Ox Range (24h):  SpO2  Av.7 %  Min: 97 %  Max: 100 %  Oxygen Amount and Delivery: O2 Flow Rate (L/min): 1 L/min(O2 removed)  Incentive Spirometry Tx:            GENERAL: Alert to name, answers some questions, intermittent confusion  LUNGS: Decreased breath sounds at bases  HEART: normal rate and regular rhythm  ABDOMEN: obese, non-distended, soft, incisional tenderness, bowel sounds present. ANTONIETTA serous. INCISION: Midline incision with staples well approximated, no bleeding   EXTERMITY: no cyanosis, clubbing. Edema better. In: 2315.7 [P.O.:250; I.V.:1444.7; Blood:621]  Out: 1370 [Urine:375; Drains:695]  Date 02/26/21 0000 - 02/26/21 2359   Shift 1996-1320 0810-2088 6782-7379 24 Hour Total   INTAKE   Shift Total(mL/kg)       OUTPUT   Urine(mL/kg/hr) 275   275   Drains 230   230   Shift Total(mL/kg) 505(4.2)   505(4.2)   Weight (kg) 121.2 121.2 121.2 121.2     LABS     Recent Labs     02/24/21  0422 02/25/21  0610 02/25/21  1855 02/26/21  0615   WBC 15.8* 16.8*  --  19.2*   HGB 7.1* 6.8* 7.3* 8.3*   HCT 24.4* 22.5* 23.8* 27.0*    277  --  292   * 148*  --  143   K 3.7 3.3*  --  3.5   * 118*  --  116*   CO2 21* 22*  --  21*   BUN 36* 32*  --  26*   CREATININE 1.5* 1.6*  --  1.3*   MG 1.8  --   --  1.8   PHOS 2.7  --   --  3.9   CALCIUM 7.5* 7.4*  --  7.5*      RADIOLOGY   No new imaging    Electronically signed by SYLVESTER Menjivar CNP on 2/26/2021 at 7:22 AM    Patient seen and examined independently by me earlier today. Above discussed and I agree with Maco Chang CNP. See my additional comments below for updated orders and plan. Labs, cultures, and radiographs where available were reviewed. I discussed patient concerns with the patient's nurse and instructions were given. Please see our orders for the updated patient care plan. -Ileostomy functioning. ANTONIETTA serous. Minimal out rectal stump. Pulmonary toileting. PT/OT. C. difficile treatment per admitting/infectious disease. DVT prophylaxis. No signs of active bleeding. Incisional/wound care. Tolerating puréed diet. Slowly improving.     Electronically signed by Ricki Rojas MD on 2/26/21 at 3:38 PM EST

## 2021-02-26 NOTE — CARE COORDINATION
2/26/21, 2:50 PM EST    DISCHARGE ON GOING EVALUATION    [facility] day: 9  Location: -19/019-A Reason for admit: Hypotension [I95.9]   Procedure: POD # 6 status post total colectomy with end ileostomy secondary to C. difficile colitis/toxic megacolon  Barriers to Discharge: pain and nausea control, ANTONIETTA wound drain care. Creatinine 1.3 (1.6 yesterday), Nephrology following, Dietitian following for supplementation. PT/OT/ST, IV Flagyl, IV Zosyn, oral Vancomycin, electrolyte replacement protocols. PCP: Adrianne Dudley MD  Readmission Risk Score: 23%  Patient Goals/Plan/Treatment Preferences: From [address] Plan is skilled at discharge.  SW following

## 2021-02-26 NOTE — PROGRESS NOTES
[facility]  INPATIENT PHYSICAL THERAPY  DAILY NOTE  [facility] 4A - [id number]     Time In: 7868  Time Out: 1450  Timed Code Treatment Minutes: 24 Minutes  Minutes: 24          Date: 2021  Patient Name: Eryn Caraballo,  Gender:  male        MRN: [id number]  : 1940  (79 y.o.)     Referring Practitioner: Dr. Israel White  Diagnosis: hypotension  Additional Pertinent Hx: admit with above diagnosis, +Cdiff induced pancolitis, CARMEN. 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 Prior Level of Function:  Lives With: Alone  Type of Home: Assisted living  Home Layout: One level  Home Access: Level entry  Home Equipment: Rolling walker   Bathroom Shower/Tub: Walk-in shower  Bathroom Toilet: Standard  Bathroom Equipment: Grab bars in shower, Grab bars around toilet  Bathroom Accessibility: Accessible    ADL Assistance: Needs assistance  Homemaking Assistance: Needs assistance  Homemaking Responsibilities: No  Ambulation Assistance: Independent  Transfer Assistance: Independent  Active : No  Additional Comments: Pt was not able to provide reliable information about his level of function prior to admission secondary to confusion about recent events. Restrictions/Precautions:  Restrictions/Precautions: Fall Risk, Isolation  Position Activity Restriction  Other position/activity restrictions: ileostomy, C-diff isolation, pureed diet with honey thick liquids      VITALS: not tested    SUBJECTIVE: Pt sitting up in recliner and is ready to return to bed. PAIN: not reported      OBJECTIVE:  Bed Mobility:  Sit to Supine: Contact Guard Assistance     Transfers:  Sit to Stand: Moderate Assistance  Stand to Sit:Contact Guard Assistance    Ambulation:  Contact Guard Assistance, Minimal Assistance  Distance: 4 ft  Surface: Level Tile  Device:Rolling Walker  Gait Deviations:   Forward Flexed Posture, Decreased Step Length Bilaterally, Decreased Weight Shift Bilaterally and Unsteady Gait    Balance:  Static Sitting Balance:  Stand By Assistance  Dynamic Sitting Balance: Stand By Assistance  Static Standing Balance: Contact Guard Assistance  Dynamic Standing Balance: Contact Guard Assistance, Minimal Assistance    Exercise:  Patient was guided in 1 set(s) 10 reps of exercise to both lower extremities. Ankle pumps, Glut sets, Quad sets, Heelslides, Hip abduction/adduction, Seated marches and Long arc quads. Exercises were completed for increased independence with functional mobility. Functional Outcome Measures: Completed  AM-PAC Inpatient Mobility Raw Score : 13  AM-PAC Inpatient T-Scale Score : 36.74    ASSESSMENT:  Assessment: Patient progressing toward established goals. Pt continues to require much assist for safety with transfers and very limited ambulation. Pt is not safe to return home at this time and would benefit from continued skilled PT. Activity Tolerance:  Patient tolerance of  treatment: good. Minus. Pt fatigued at end of session. Equipment Recommendations: Other: cont to assess needs  Discharge Recommendations:   Subacute/Skilled Nursing Facility    Plan: Times per week: 3-5X GM  Times per day: Daily  Current Treatment Recommendations: Strengthening, Neuromuscular Re-education, Home Exercise Program, Balance Training, Endurance Training, Patient/Caregiver Education & Training, Safety Education & Training, Functional Mobility Training, Transfer Training, Gait Training    Patient Education  Patient Education: Plan of Care, Home Exercise Program    Goals:  Patient goals : not stated  Short term goals  Time Frame for Short term goals: by discharge  Short term goal 1: roll L/R with Chuy for pressure relief  Short term goal 2: sidelying to/from sit with modAx1 to get in/out of bed  Short term goal 3: sit to std with minAx2 to get in/out of chairs  Short term goal 4: tolerate >15 min sitting balance activity with </=SBA to demonstrate inc strength for progression with home mobility  Short term goal 5: Pt to ambulate >10 feet with RW CGA for room ambulation. Long term goals  Time Frame for Long term goals : no LTGs set secondary to short ELOS    Following session, patient left in safe position with all fall risk precautions in place. Ced Fong.  Elizabeth Porras, [place] [address]

## 2021-02-26 NOTE — PROGRESS NOTES
[facility]   Nephrology progress note  2/26/2021, 10:34 AM      Pt Name:    Brandon Butler  MRN:     [id number]     YOB: 1940  Admit Date:    2/17/2021 12:35 AM  Primary Care Physician: Asa Oneil MD   Room number  [id number]    Chief Complaint: Nephrology following for CARMEN and hypernatremia    Subjective:  Patient seen and examined  Seen and examined earlier today  No acute distress  Poor historian  On D5W      Objective:  24HR INTAKE/OUTPUT:      Intake/Output Summary (Last 24 hours) at 2/26/2021 1034  Last data filed at 2/26/2021 0625  Gross per 24 hour   Intake 3296.21 ml   Output 2330 ml   Net 966.21 ml     I/O last 3 completed shifts: In: 3296.2 [P.O.:370; I.V.:2305.2; Blood:621]  Out: 7298 [Urine:475; Drains:995; Stool:1150]  No intake/output data recorded. Admission weight: 296 lb 4.8 oz (134.4 kg)  Wt Readings from Last 3 Encounters:   02/26/21 267 lb 4.8 oz (121.2 kg)   11/25/20 283 lb 3.2 oz (128.5 kg)   10/16/20 273 lb 12.8 oz (124.2 kg)     Body mass index is 38.35 kg/m².     Physical examination  VITALS:     Vitals:    02/25/21 2352 02/26/21 0344 02/26/21 0600 02/26/21 0916   BP: (!) 154/53 (!) 142/56  (!) 158/57   Pulse: 84 84  93   Resp: 16 14  16   Temp: 98.8 °F (37.1 °C) 98.8 °F (37.1 °C)  98.2 °F (36.8 °C)   TempSrc: Oral Oral  Oral   SpO2: 100% 98%  99%   Weight:   267 lb 4.8 oz (121.2 kg)    Height:         General Appearance: Overall improved  No acute distress  Neck: No JVD  Lungs: no use of accessory muscles  Heart:  S1, S2 heard  GI: + Ostomy present  Extremities: trace LE edema      Lab Data  CBC:   Recent Labs     02/24/21  0422 02/25/21  0610 02/25/21  1855 02/26/21  0615   WBC 15.8* 16.8*  --  19.2*   HGB 7.1* 6.8* 7.3* 8.3*   HCT 24.4* 22.5* 23.8* 27.0*    277  --  292     BMP:  Recent Labs     02/24/21  0422 02/25/21  0610 02/26/21  0615   * 148* 143   K 3.7 3.3* 3.5   * 118* 116*   CO2 21* 22* 21*   BUN 36* 32* 26* CREATININE 1.5* 1.6* 1.3*   GLUCOSE 135* 148* 142*   CALCIUM 7.5* 7.4* 7.5*   MG 1.8  --  1.8   PHOS 2.7  --  3.9     Hepatic:   No results for input(s): LABALBU, AST, ALT, ALB, BILITOT, ALKPHOS in the last 72 hours. Meds:  Infusion:    sodium chloride      sodium chloride      dextrose 100 mL/hr at 02/26/21 8760     Meds:    lactobacillus  1 capsule Oral Daily with breakfast    sodium chloride flush  10 mL Intravenous 2 times per day    famotidine  20 mg Oral BID    vancomycin  500 mg Oral 4 times per day    lidocaine 1 % injection  5 mL Intradermal Once    buPROPion  300 mg Oral Daily    ARIPiprazole  10 mg Oral Daily    busPIRone  15 mg Oral TID    doxepin  10 mg Oral Nightly    QUEtiapine  100 mg Oral Nightly    mirtazapine  45 mg Oral Nightly    sertraline  200 mg Oral Daily    piperacillin-tazobactam  3,375 mg Intravenous Once    [Held by provider] heparin (porcine)  5,000 Units Subcutaneous Q8H    atorvastatin  40 mg Oral Daily    levothyroxine  75 mcg Oral Daily    metoprolol tartrate  25 mg Oral BID    [Held by provider] tamsulosin  0.4 mg Oral Daily    metroNIDAZOLE  500 mg Intravenous Q8H    calcium replacement protocol   Other RX Placeholder    miconazole   Topical BID     Meds prn: sodium chloride flush, promethazine **OR** ondansetron, morphine **OR** morphine, sodium chloride, potassium chloride **OR** potassium alternative oral replacement **OR** potassium chloride, LORazepam, HYDROcodone 5 mg - acetaminophen **OR** HYDROcodone 5 mg - acetaminophen, polyethylene glycol, acetaminophen **OR** acetaminophen       Impression and Plan:  1. Acute kidney injury secondary to septic ATN  Nonoliguric at this time, creatinine slowly improving  1.3 today, continue with IV fluids    2. Hypernatremia. Secondary to hypotonic losses from GI tract. Improving, continue with D5W  3.  C. difficile colitis with toxic megacolon. Patient status post total colectomy with end ileostomy  4. Azotemia: Significantly improved  5. Pulmonary nodule  6. History of CAD  7. Anemia  8. Hypotension: Improved  9. Hypokalemia:  We will replace with 40 mEq x 1 dose    Plan of care reviewed with patient and discussed with staff    Yuridia Leal MD  [facility]

## 2021-02-26 NOTE — PROGRESS NOTES
Comprehensive Nutrition Assessment    Type and Reason for Visit:  Reassess(appetite/supplement follow-up)    Nutrition Recommendations/Plan:   Further diet recommendations per SLP. Continue magic cup TID. Added greek yogurt/pureed fruit each meal.  Consider MVI. Nutrition Assessment:    Pt. with minimal improvement from a nutritional standpoint AEB diet initiated 2/24/21 however pt with very minimal oral intake, 1-25% of meals, dislikes dysphagia diet. Remains at risk for further nutritional compromise r/t continued poor appetite, dysphagia, admit with hypotension, c-diff with toxic megacolon, s/p total colectomy with ileostomy 2/20/21, CARMEN on CKD, complicated UTI,  metabolic encephalopathy, increased nutrient needs to support wound healing, and underlying medical condition (hx: DM, CKD, and thyroid disease). Nutrition recommendations/interventions as per above. Malnutrition Assessment:  Malnutrition Status: At risk for malnutrition (Comment)    Context:  Acute Illness     Findings of the 6 clinical characteristics of malnutrition:  Energy Intake:  7 - 50% or less of estimated energy requirements for 5 or more days  Weight Loss:  (none noted pta; -2.8% in last week (? r/t edema, fluid))     Body Fat Loss:  No significant body fat loss     Muscle Mass Loss:  No significant muscle mass loss    Fluid Accumulation:  Unable to assess     Strength:  Not Performed    Estimated Daily Nutrient Needs:  Energy (kcal):  1245-9564 kcals (12-15); Weight Used for Energy Requirements:  (131 kgm 2/22)     Protein (g):  53-60 gm (0.7-0.8) - CKD; Weight Used for Protein Requirements:  Ideal(75 kgm)        Fluid (ml/day):  being managed by nephrology; Method Used for Fluid Requirements:  Other (Comment)      Nutrition Related Findings:  Patient seen, alert, able to answer questions, sleepy this morning. Spoke with his nurse as well.   Pt dislikes dysphagia diet and thickened liquids, RN reports he needs assistance with feeding. She was able to get in a magic cup yesterday. Consuming 1-25% of meals. RN reports SLP planned to see today to see if diet could be advanced. 995 mls drain output, 1150 ml ileostomy output. 2/26/21: Sodium 143, Potassium 3.5, BUN 26, Creatinine 1.3, Glucose 142, Hgb 8.3. Rx: vanc, pepcid, flagyl, remeron, zosyn, D5 at 100 ml/hr. Wounds:  (s/p 2/20/21: totoal colectomy with end ileostomy, unstageable coccyx, penis stage II)       Current Nutrition Therapies:    DIET DYSPHAGIA PUREED; Moderately Thick (Honey)  Dietary Nutrition Supplements: Frozen Oral Supplement  Dietary Nutrition Supplements: Other Oral Supplement (see comment)    Anthropometric Measures:  · Height: 5' 10\" (177.8 cm)  · Current Body Weight: 267 lb 4.8 oz (121.2 kg)(2/26/21 trace to +1 edema)   · Admission Body Weight: 296 lb 4.8 oz (134.4 kg)(2/17 +1 edema)    · Usual Body Weight: (pt u/a state; per EMR: 6/28/20: 277#)     · Ideal Body Weight: 166 lbs;   · BMI: 38.4  · Adjusted Body Weight:  ; No Adjustment   · BMI Categories: Obese Class 2 (BMI 35.0 -39.9)       Nutrition Diagnosis:   · Inadequate oral intake related to (poor appetite and dislike of dysphagia diet) as evidenced by intake 0-25%      Nutrition Interventions:   Food and/or Nutrient Delivery:  Continue Current Diet, Modify Oral Nutrition Supplement  Nutrition Education/Counseling:  Education initiated(Encouraged oral intake, ONS use, greek yogurt with pureed fruit mixed in it.)   Coordination of Nutrition Care:  Continue to monitor while inpatient    Goals:  Patient will tolerate and consume 75% or more of meals during LOS.        Nutrition Monitoring and Evaluation:   Behavioral-Environmental Outcomes:  None Identified   Food/Nutrient Intake Outcomes:  Diet Advancement/Tolerance, Food and Nutrient Intake, Supplement Intake, IVF Intake  Physical Signs/Symptoms Outcomes:  Biochemical Data, Chewing or Swallowing, GI Status, Fluid Status or Edema, Nutrition Focused Physical Findings, Skin, Weight     Discharge Planning:     Too soon to determine     Electronically signed by Reggy Lennox, RD, LD on 2/26/21 at 10:37 AM EST    Contact: [phone]

## 2021-02-26 NOTE — PROGRESS NOTES
[address]  Occupational Therapy  Daily Note  Time:   Time In: 8598  Time Out: 1100  Timed Code Treatment Minutes: 48 Minutes  Minutes: 48          Date: 2021  Patient Name: Brian Sutton,   Gender: male      Room: -19/019-A  MRN: [id number]  : 1940  (79 y.o.)  Referring Practitioner: Dr. Naveen Camarillo DO  Diagnosis: Hypotension  Additional Pertinent Hx: Pt was transferred from [facility] on  for acute kidney injury and C. difficile colitis. Patient was recently admitted to [facility] on  for altered mental status and sepsis secondary to right leg cellulitis. He was treated with Zosyn and Rocephin, and also discharged with antibiotics to the nursing home at the time. During this hospital course, the patient was originally sent from the SNF to [facility] on  when he was found to be diaphoretic and hypotensive with a fever of 102 °F.  Patient was evaluated in the emergency room and diagnosed to have sepsis but the source was unknown at the time and he was given fluid resuscitation and started on a Levophed drip. Patient was started on Zosyn at time. During his hospital stay he was found to have worsening renal function and nephrology was consulted. On  the patient was seen by Dr. Rip Mcclure from nephrology. Further work-up did show C. difficile colitis. Patient was started on vancomycin. He had a noted mental change and worsening creatinine on . 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 Restrictions/Precautions:  Restrictions/Precautions: Fall Risk, Isolation  Position Activity Restriction  Other position/activity restrictions: ileostomy, C-diff isolation, pureed diet with honey thick liquids     VITALS: Pt's vitals were not checked this session. SUBJECTIVE:  Pleasant. Pt had a flat affect. He agreed to get up and to try shaving himself. He remained in the chair following session with his legs elevated.     PAIN: Subacute/Skilled Nursing Facility    Equipment Recommendations: Equipment Needed: Yes  Other: May benefit from [facility] or elevated toilet seat with armrests  Plan: Times per week: 3-5x  Specific instructions for Next Treatment: Functional mobility; ADLs and sequencing, safety awareness; reorientation  Current Treatment Recommendations: Self-Care / ADL, Cognitive Reorientation, Functional Mobility Training, Endurance Training, Balance Training, Safety Education & Training  Plan Comment: Pt would benefit from continues skilled OT services when medically stable and discharged from Acute. OT recommended while at [facility]. Patient Education  Patient Education: Plan of Care, ADL's, Orientation and Reviewed Prior Education    Goals  Short term goals  Time Frame for Short term goals: By discharge  Short term goal 1: Pt will demonstrate functional mobility with Min A x 1 using a rolling walker with cues for posture and steady breathing to prepare for doing self care while out of bed. Short term goal 2: Pt will complete transfers to/from various surfaces with SBA to increase his independence with toileting routine. Short term goal 3: Pt will complete upper body ADLs with MIN A and cues for sequencing and safety to increase his independence with self care. Short term goal 4: Pt will tolerate standing for over 2 minute duration with MIN A to increase his endurance for ease of dressing or bathing. Short term goal 5: Pt will be oriented to place/situation/day and  his goals for safety each day with cues as needed to increase his safety and improve functioning. Following session, patient left in safe position with all fall risk precautions in place.

## 2021-02-27 LAB
ANION GAP SERPL CALCULATED.3IONS-SCNC: 7 MEQ/L (ref 8–16)
BUN BLDV-MCNC: 20 MG/DL (ref 7–22)
CALCIUM IONIZED: 1.13 MMOL/L (ref 1.12–1.32)
CALCIUM SERPL-MCNC: 7.6 MG/DL (ref 8.5–10.5)
CHLORIDE BLD-SCNC: 113 MEQ/L (ref 98–111)
CO2: 19 MEQ/L (ref 23–33)
CREAT SERPL-MCNC: 1.3 MG/DL (ref 0.4–1.2)
ERYTHROCYTE [DISTWIDTH] IN BLOOD BY AUTOMATED COUNT: 18 % (ref 11.5–14.5)
ERYTHROCYTE [DISTWIDTH] IN BLOOD BY AUTOMATED COUNT: 59.7 FL (ref 35–45)
GFR SERPL CREATININE-BSD FRML MDRD: 53 ML/MIN/1.73M2
GLUCOSE BLD-MCNC: 130 MG/DL (ref 70–108)
HCT VFR BLD CALC: 25.9 % (ref 42–52)
HEMOGLOBIN: 7.9 GM/DL (ref 14–18)
MCH RBC QN AUTO: 29.8 PG (ref 26–33)
MCHC RBC AUTO-ENTMCNC: 30.5 GM/DL (ref 32.2–35.5)
MCV RBC AUTO: 97.7 FL (ref 80–94)
PLATELET # BLD: 273 THOU/MM3 (ref 130–400)
PMV BLD AUTO: 10.8 FL (ref 9.4–12.4)
POTASSIUM SERPL-SCNC: 3.6 MEQ/L (ref 3.5–5.2)
RBC # BLD: 2.65 MILL/MM3 (ref 4.7–6.1)
SODIUM BLD-SCNC: 139 MEQ/L (ref 135–145)
WBC # BLD: 18 THOU/MM3 (ref 4.8–10.8)

## 2021-02-27 PROCEDURE — 6370000000 HC RX 637 (ALT 250 FOR IP): Performed by: NURSE PRACTITIONER

## 2021-02-27 PROCEDURE — 85027 COMPLETE CBC AUTOMATED: CPT

## 2021-02-27 PROCEDURE — 36415 COLL VENOUS BLD VENIPUNCTURE: CPT

## 2021-02-27 PROCEDURE — 99232 SBSQ HOSP IP/OBS MODERATE 35: CPT | Performed by: INTERNAL MEDICINE

## 2021-02-27 PROCEDURE — 2500000003 HC RX 250 WO HCPCS: Performed by: SURGERY

## 2021-02-27 PROCEDURE — 2580000003 HC RX 258: Performed by: SURGERY

## 2021-02-27 PROCEDURE — 6370000000 HC RX 637 (ALT 250 FOR IP): Performed by: PHYSICIAN ASSISTANT

## 2021-02-27 PROCEDURE — 80048 BASIC METABOLIC PNL TOTAL CA: CPT

## 2021-02-27 PROCEDURE — 99233 SBSQ HOSP IP/OBS HIGH 50: CPT | Performed by: INTERNAL MEDICINE

## 2021-02-27 PROCEDURE — 6370000000 HC RX 637 (ALT 250 FOR IP): Performed by: SURGERY

## 2021-02-27 PROCEDURE — 82330 ASSAY OF CALCIUM: CPT

## 2021-02-27 PROCEDURE — 2580000003 HC RX 258: Performed by: INTERNAL MEDICINE

## 2021-02-27 PROCEDURE — 2060000000 HC ICU INTERMEDIATE R&B

## 2021-02-27 PROCEDURE — 6370000000 HC RX 637 (ALT 250 FOR IP): Performed by: INTERNAL MEDICINE

## 2021-02-27 PROCEDURE — 99024 POSTOP FOLLOW-UP VISIT: CPT | Performed by: SURGERY

## 2021-02-27 RX ADMIN — METOPROLOL TARTRATE 25 MG: 25 TABLET ORAL at 20:19

## 2021-02-27 RX ADMIN — DOXEPIN HYDROCHLORIDE 10 MG: 10 CAPSULE ORAL at 20:19

## 2021-02-27 RX ADMIN — BUSPIRONE HYDROCHLORIDE 15 MG: 7.5 TABLET ORAL at 20:19

## 2021-02-27 RX ADMIN — BUSPIRONE HYDROCHLORIDE 15 MG: 7.5 TABLET ORAL at 12:50

## 2021-02-27 RX ADMIN — DEXTROSE MONOHYDRATE: 50 INJECTION, SOLUTION INTRAVENOUS at 04:00

## 2021-02-27 RX ADMIN — MICONAZOLE NITRATE: 20 POWDER TOPICAL at 20:19

## 2021-02-27 RX ADMIN — Medication 500 MG: at 06:11

## 2021-02-27 RX ADMIN — Medication 1 CAPSULE: at 09:21

## 2021-02-27 RX ADMIN — FAMOTIDINE 20 MG: 20 TABLET, FILM COATED ORAL at 20:19

## 2021-02-27 RX ADMIN — BUSPIRONE HYDROCHLORIDE 15 MG: 7.5 TABLET ORAL at 09:21

## 2021-02-27 RX ADMIN — LEVOTHYROXINE SODIUM 75 MCG: 75 TABLET ORAL at 06:11

## 2021-02-27 RX ADMIN — Medication 500 MG: at 23:44

## 2021-02-27 RX ADMIN — QUETIAPINE FUMARATE 100 MG: 100 TABLET ORAL at 20:19

## 2021-02-27 RX ADMIN — Medication 500 MG: at 18:43

## 2021-02-27 RX ADMIN — ARIPIPRAZOLE 10 MG: 10 TABLET ORAL at 20:19

## 2021-02-27 RX ADMIN — SERTRALINE 200 MG: 100 TABLET, FILM COATED ORAL at 09:20

## 2021-02-27 RX ADMIN — METRONIDAZOLE 500 MG: 500 INJECTION, SOLUTION INTRAVENOUS at 09:21

## 2021-02-27 RX ADMIN — Medication 500 MG: at 12:47

## 2021-02-27 RX ADMIN — DEXTROSE MONOHYDRATE: 50 INJECTION, SOLUTION INTRAVENOUS at 15:14

## 2021-02-27 RX ADMIN — MIRTAZAPINE 45 MG: 45 TABLET, FILM COATED ORAL at 20:19

## 2021-02-27 RX ADMIN — MICONAZOLE NITRATE: 20 POWDER TOPICAL at 09:22

## 2021-02-27 RX ADMIN — METOPROLOL TARTRATE 25 MG: 25 TABLET ORAL at 09:20

## 2021-02-27 RX ADMIN — SODIUM CHLORIDE, PRESERVATIVE FREE 10 ML: 5 INJECTION INTRAVENOUS at 09:21

## 2021-02-27 RX ADMIN — SODIUM CHLORIDE, PRESERVATIVE FREE 10 ML: 5 INJECTION INTRAVENOUS at 20:19

## 2021-02-27 RX ADMIN — ATORVASTATIN CALCIUM 40 MG: 40 TABLET, FILM COATED ORAL at 09:21

## 2021-02-27 RX ADMIN — BUPROPION HYDROCHLORIDE 300 MG: 150 TABLET, EXTENDED RELEASE ORAL at 09:21

## 2021-02-27 RX ADMIN — FAMOTIDINE 20 MG: 20 TABLET, FILM COATED ORAL at 09:21

## 2021-02-27 ASSESSMENT — PAIN SCALES - GENERAL
PAINLEVEL_OUTOF10: 0

## 2021-02-27 NOTE — PLAN OF CARE
Problem: Discharge Planning:  Goal: Discharged to appropriate level of care  Description: Discharged to appropriate level of care  Outcome: Ongoing  Note: Discharge planning in process and discussed with patient/family. Social work consulted for any additional needs. Care manager aware of discharge needs. Problem: Cardiac Output - Decreased:  Goal: Hemodynamic stability will improve  Description: Hemodynamic stability will improve  Outcome: Ongoing  Note:   Vitals:    02/26/21 0916 02/26/21 1247 02/26/21 1600 02/26/21 2025   BP: (!) 158/57 (!) 121/50 (!) 137/52 (!) 121/52   Pulse: 93 72 76 80   Resp: 16 16 16 16   Temp: 98.2 °F (36.8 °C) 98.1 °F (36.7 °C) 98 °F (36.7 °C) 98.7 °F (37.1 °C)   TempSrc: Oral Oral Oral Oral   SpO2: 99% 98% 100% 97%   Weight:       Height:              Problem: Pain:  Goal: Control of acute pain  Description: Control of acute pain  Outcome: Ongoing  Note: Patient complained of no pain rating it a 0 on the 0-10 scale. Pain medication given as ordered and needed. Patient voiced satisfaction with this. Also instructed patient on distraction, repositioning, and ambulation as non-pharmacological methods of pain relief. Patient voiced understanding. Problem: Skin Integrity - Impaired:  Goal: Absence of new skin breakdown  Description: Absence of new skin breakdown  Outcome: Ongoing  Note: No signs of new skin breakdown noted with each assessment this shift. Skin warm, dry, and intact except where otherwise noted in head-to-toe assessment. Mucous membranes pink and moist. Assistance with turns/ambulation given as needed. Problem: Falls - Risk of:  Goal: Will remain free from falls  Description: Will remain free from falls  Outcome: Ongoing  Note: Patient remained free from falls this shift. Bed is in low position with alarm on and siderails up x2. Education given on use of call light and patient voiced understanding. Call light and beside table within reach.  Arm band and falling star in place. Hourly rounds completed. Will continue to monitor. Problem: Skin Integrity:  Goal: Absence of new skin breakdown  Description: Absence of new skin breakdown  Outcome: Ongoing  Note: No signs of new skin breakdown noted with each assessment this shift. Skin warm, dry, and intact except where otherwise noted in head-to-toe assessment. Mucous membranes pink and moist. Assistance with turns/ambulation given as needed. Problem: Nutrition  Goal: Optimal nutrition therapy  2/26/2021 2222 by Almita Jerry RN  Outcome: Ongoing  Note: Pt is currently on a pureed diet with honey thick liquids. Oral intake encouraged this shift. Plan of care discussed with pt and family. Pt verbalizes understand.

## 2021-02-27 NOTE — PLAN OF CARE
Problem: Discharge Planning:  Goal: Participates in care planning  Outcome: Ongoing  Note: Patient and family participates in care planning. Problem: Discharge Planning:  Goal: Discharged to appropriate level of care  Description: Discharged to appropriate level of care  Outcome: Ongoing  Note: No orders for d/c at this time. Problem: Bowel Function - Altered:  Goal: Bowel elimination is within specified parameters  Description: Bowel elimination is within specified parameters  Outcome: Ongoing  Note: Colostomy in place with adequate output. Problem: Cardiac Output - Decreased:  Goal: Hemodynamic stability will improve  Description: Hemodynamic stability will improve  Outcome: Ongoing  Note:   Vitals:    02/27/21 1156   BP: (!) 119/56   Pulse: 67   Resp: 16   Temp: 98 °F (36.7 °C)   SpO2: 100%          Problem: Fluid Volume - Imbalance:  Goal: Absence of imbalanced fluid volume signs and symptoms  Description: Absence of imbalanced fluid volume signs and symptoms  Outcome: Ongoing  Note: Adequate urine output noted. IV fluids infusing. Problem: Pain:  Goal: Pain level will decrease  Description: Pain level will decrease  Outcome: Met This Shift  Note: Denies pain. Problem: Pain:  Goal: Recognizes and communicates pain  Description: Recognizes and communicates pain  Outcome: Met This Shift  Note: Able to recognize and communicate pain. Denies pain this shift, at this time. Problem: Pain:  Goal: Control of acute pain  Description: Control of acute pain  Outcome: Met This Shift  Note: Denies pain this shift. Problem: Pain:  Goal: Control of chronic pain  Description: Control of chronic pain  Outcome: Met This Shift  Note: Denies. Problem: Skin Integrity - Impaired:  Goal: Will show no infection signs and symptoms  Description: Will show no infection signs and symptoms  Outcome: Ongoing  Note: Pt afebrile. Oral antibiotics administered.       Problem: Skin Integrity - Impaired:  Goal: Absence of new skin breakdown  Description: Absence of new skin breakdown  Outcome: Ongoing  Note: No signs of new skin breakdown noted with each assessment. Assistance with turning and repositioning provided every two hours and as needed. Problem: Urinary Elimination:  Goal: Signs and symptoms of infection will decrease  Description: Signs and symptoms of infection will decrease  Outcome: Met This Shift  Note: Pt afebrile. Pt is incontinent. Adequate urine output noted. Problem: Urinary Elimination:  Goal: Complications related to the disease process, condition or treatment will be avoided or minimized  Description: Complications related to the disease process, condition or treatment will be avoided or minimized  Outcome: Met This Shift     Problem: Falls - Risk of:  Goal: Will remain free from falls  Description: Will remain free from falls  Outcome: Met This Shift  Note: Free from falls this shift, at this time. Call light and bedside table within reach. Bed alarm on. Bed wheels locked and bed in lowest position. Pt oriented to own abilities and is encouraged to use call light for needs. Problem: Falls - Risk of:  Goal: Absence of physical injury  Description: Absence of physical injury  Outcome: Met This Shift  Note: Free from physical injury this shift. Problem: Skin Integrity:  Goal: Will show no infection signs and symptoms  Description: Will show no infection signs and symptoms  Outcome: Completed     Problem: Skin Integrity:  Goal: Absence of new skin breakdown  Description: Absence of new skin breakdown  Outcome: Completed     Problem: Bowel/Gastric:  Goal: Occurrences of diarrhea will decrease  Description: Occurrences of diarrhea will decrease  Outcome: Ongoing     Problem: Physical Regulation:  Goal: Signs and symptoms of infection will decrease  Description: Signs and symptoms of infection will decrease  Outcome: Met This Shift  Note: Pt afebrile.  Pt is incontinent. Adequate urine output noted. Problem: Physical Regulation:  Goal: Prevent transmision of infection  Description: Prevent transmision of infection  Outcome: Met This Shift     Problem: Physical Regulation:  Goal: Ability to avoid or minimize complications of infection will improve  Description: Ability to avoid or minimize complications of infection will improve  Outcome: Met This Shift     Problem: Musculor/Skeletal Functional Status  Goal: Highest potential functional level  Outcome: Ongoing     Problem: Nutrition  Goal: Optimal nutrition therapy  Outcome: Ongoing     Problem: Pain:  Goal: Pain level will decrease  Description: Pain level will decrease  Outcome: Met This Shift  Note: Denies pain. Problem: Pain:  Goal: Control of acute pain  Description: Control of acute pain  Outcome: Met This Shift     Problem: Pain:  Goal: Control of chronic pain  Description: Control of chronic pain  Outcome: Met This Shift     Problem: Non-Violent Restraints  Goal: Removal from restraints as soon as assessed to be safe  Outcome: Completed     Problem: Non-Violent Restraints  Goal: No harm/injury to patient while restraints in use  Outcome: Completed     Problem: Non-Violent Restraints  Goal: Patient's dignity will be maintained  Outcome: Completed    Care plan reviewed with patient's son. Patient's son verbalizes understanding of the plan of care and contributed to goal setting.

## 2021-02-27 NOTE — PROGRESS NOTES
Hospitalist Progress Note      Patient:  Francisco Nicole    Unit/Bed:4A-19/019-A  YOB: 1940  MRN: [id number]   Acct: [de-identified]   PCP: Yefri Aguilar MD  Date of Admission: 2/17/2021    Assessment/Plan:    1. 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 CT of the abdomen pelvis on 2/17 shows pancolitis that was consistent with C. difficile. There was no bowel perforation or ileus. CT scan was ordered on 2/20, which showed toxic megacolon. Gen Surg was brought on board, ANTONIETTA drain continues to have 800-1000 mL output every day, plan to keep drain in through the weekend. Continue vancomycin and  (Day #10), consider 14 days of therapy. Dietitian also evaluated, recommendations for thickened liquids with puréed diet. Stopped IV flagyl today, and continue        With PO vancomycin. 2. Septic shock secondary to fulminant C. Diff colitis, resolving: with noted hypotension, s/p Levophed, weaned off and currently stable. ID evaluted. Continue with current abx therapy. BP has been stable, continue to monitor. 3. Acute metabolic encephalopathy: Improved, related to above with hypotension/hypoperfusion along with uremia. Continue to monitor. Patient alert and oriented, engaged in conversation. 4. CARMEN, improving: Secondary to septic ATN. Creatinine 1.3 this morning. Nephrology following. Continue with gentle IVF fluids. No need for RRT at this time  5. Complicated UTI: UA shows bacteria, yeast, blood, and WBC.  Blood culture currently negative, awaiting results of urine culture.  Patient currently on vancomycin and Flagyl.  Holding treatment of Candida infection due to CARMEN. 6. Hypernatremia:  Resolved, secondary to hypotonic losses from GI tract. Nephrology recommending D5W to 100 mL/hr. Reassess chemistry in the morning.   7. Acute postoperative hypoxic respiratory failure, resolved: Current saturations 99% on RA. Continue to monitor for respiratory needs. 8. Incidental pulmonary nodules: Seen incidentally on CT scan in January.  PET scan completed in 2/12.  Nodule noted in right lower lobe, with primary rule out for metastatic process.  The nodule could also be infectious or inflammatory, monitor for now.  CT of the chest should be repeated in 3 months. 9. Acute on chronic anemia: Hgb on arrival was 10.2, subsequent decrease in 2/18 to 8.7 following reports from the nursing staff of blood in his stools and his NG tube-> H&H stable this morning at 8.3/27, 1 unit of PRBC was given on 2/25. There is a component of blood loss anemia on chronic macrocytic anemia.  Continue to trend hemoglobin, will transfuse if <7.0. 10. Hypocalcemia: Resolved. Ionized calcium 1.14 this morning.  Replacement protocol in place if needed. 11. Obesity: BMI 38.35. Chief Complaint: Hypotension    Initial H and P:-    \"Boo Platt is a 81-year-old male was transferred from [facility] on 2/17 for acute kidney injury and C. difficile colitis. Yohan Blankenship was recently admitted to [facility] on 2/26 for altered mental status and sepsis secondary to right leg cellulitis. [facility] was treated with Zosyn and Rocephin, and also discharged with antibiotics to the nursing home at the time.  During the course of hospital treatment, they also noted a pulmonary nodule on CT scan that measured 3 x 2 x 2.6 cm.  It was a concern for malignancy at the time, the requested follow-up however patient does not have any further scans or PET scans.  Blood cultures grew E. coli at the time.   During this hospital course, the patient was originally sent from the SNF to [facility] on 2/14 when he was found to be diaphoretic and hypotensive with a fever of 102 °F.  Patient was evaluated in the emergency room and diagnosed to have sepsis but the source was unknown at the time and he was given fluid resuscitation and started on a Levophed drip.  Patient was started on Lulu at time. Leena Overtonist his hospital stay he was found to have worsening renal function and nephrology was consulted. Ricki Pepe 2/16 the patient was seen by Dr. Zia Hernandez from nephrology. Morelia Louis work-up did show C. difficile colitis. Abdelrahman Puckett was started on vancomycin. Zuleima Amaro had a noted mental change and worsening creatinine on 2/16 so he was transferred to [facility] ICU for further management and care per Dr. Blossom Lamar"     2/18: Patient this morning remains on pressors as needed to maintain his blood pressure.  He has been receiving fluid throughout the night.  His hemoglobin did drop from 10 to about 8, however it is stayed stable since then, continue to monitor.  His WBC has decreased along with his creatinine.  However his BUN increased overnight.  The patient seems more confused today with an inability to answer where or when he is.  The nephrologist stated that if his BUN increases over 100 dialysis would be warranted at that time.  The surgeon also states that at this current point if surgery is warranted it would be a total colectomy.  Both of these options were discussed with the family who stated that they would be okay starting dialysis if needed.  They with prefer to hold off on surgery right now.     2/19: Patient remains on pressors to maintain his blood pressure however the dose has been weaned significantly. Zuleima Amaro still currently receiving IVF.  Hgb has remained stable.  WBC did increase overnight.  His creatinine has decreased to 3.7. Miguel Baca has stated 95.  The nephrologist had seen the patient earlier in the morning, and stated that dialysis is not needed at this time.  Due to the rising WBC, infectious disease has been consulted.     2/20: Patient remains on pressors, cortisol was checked on 2/17 which was normal for a.m.  Infectious diseases did recommend a loop ileostomy with vancomycin irrigation believes the patient would benefit from it.  Obtaining surgery recommendations.  His creatinine continues to decrease, and no dialysis is needed at this point, BUN decreasing as well.  WBC irvin again overnight, and patient still complains of stomach pains.     2/21:  In spite of the aggressive treatment for C. difficile colitis patient is still complaining of severe abdominal pain with severe abdominal distention , discussion  with the medical resident and also with nursing team  the patient is not improving over the last few days even the nursing did mention the patient is worsening over the last 24 hours with more distention of the abdomen with leukocytosis so plan to obtain CT scan abdomen pelvis with p.o. contrast to reassess his C. difficile colitis of the abdomen to see if there is any progression or improvement, CT scan abdomen pelvis done with contrast did show progression of the ulcerative cecal area of severe cecal distention and possibility for pneumatosis so general surgery consulted stat and he discussed the options with the patient and the patient family and the plan was done to go ahead for OR that was done last night, total colectomy, patient did after surgery have a smooth postop course still intubated in the weaning trial currently, will continue IV antibiotics fluids support hemodynamics pain control and in collaboration with the ID and general surgery team.     2/22: Patient remains on pressors this morning, propofol from surgery stopped. Patient was intubated for the surgery, however extubated same day. Doing well on 3 L nasal cannula. Patient states that he feels he has to defecate, however his ostomy bag is draining properly. His wound is clean and dry. He still states some diffuse abdominal pain. His creatinine is trending back towards baseline, as well as his BUN. His Hgb remained stable. WBC has decreased mildly from yesterday. We will continue to monitor. \"    2/23-> discussed with Nephrology, okay with PICC line. Pt just had Norco prior to exam, not very interactive.  Will open eyes and respond to yes no questions but is moaning continually in pain. Discussed with Gen Surg who are okay to start tube feeds as pt has been without nutrition, will consult dietician.     2/24-> patient sitting up on side of the bed with therapy at the bedside. Patient continues to have large quantities of output from ANTONIETTA drain as well as from ostomy site. Surgery aware. 2/25-> Patient sitting up bedside at the time of the interview. Currently denies any physical complaints and denied any issues overnight and into the morning. He was updated on the current plan of care, verbalizes understanding, and had no other needs or questions at this time. 2/27 -patient sitting in the chair not in acute distress denies any chest pain headache visual disturbances complains about food just not tasting well, patient has C. difficile colitis, without any nausea vomiting. On puréed diet. Subjective (past 24 hours):   Patient resting in bed during the interview, denies any physical complaints or issues into the morning. ANTONIETTA drain continues to put out 800-100 mL, plan to leave in over the weekend. Ostomy output improving, no longer dark, currently green in color. C/o  about the food just not tasting well, no nausea vomiting    Past medical history, family history, social history and allergies reviewed again and is unchanged since admission. ROS (14 point review of systems completed. Pertinent positives noted. Otherwise ROS is negative) :  GENERAL: No fever,chills, or night sweats. SKIN: No lesions or rashes. HEAD: No headaches or recent injury. EYES: No acute changes in vision, no diplopia or blurred vision. EARS: No hearing loss, no tinnitus. NOSE/THROAT: No rhinorrhea or pharyngitis, no nasal drainage. NECK: No lumps or unusual neck stiffness. PULMONARY: Respirations easy and non-labored, no acute distress. CARDIAC: No chest pain, pressure, Negative for lower leg edema.   GI: Abdomen is soft and non-tender, non-distended. PERIPHERAL VASCULAR: No intermittent claudication or unusual leg cramps. MUSCULOSKELETAL: Occasional arthralgias, myalgias. NEUROLOGICAL: Denies any headache, near syncope, seizures or syncope. HEMATOLOGIC:  No unusual bruising or bleeding. PSYCH: Denies any homicidal or suicidial ideations. Medications:  Reviewed    Infusion Medications    sodium chloride      dextrose 100 mL/hr at 02/27/21 0400     Scheduled Medications    lactobacillus  1 capsule Oral Daily with breakfast    sodium chloride flush  10 mL Intravenous 2 times per day    famotidine  20 mg Oral BID    vancomycin  500 mg Oral 4 times per day    lidocaine 1 % injection  5 mL Intradermal Once    buPROPion  300 mg Oral Daily    ARIPiprazole  10 mg Oral Daily    busPIRone  15 mg Oral TID    doxepin  10 mg Oral Nightly    QUEtiapine  100 mg Oral Nightly    mirtazapine  45 mg Oral Nightly    sertraline  200 mg Oral Daily    piperacillin-tazobactam  3,375 mg Intravenous Once    [Held by provider] heparin (porcine)  5,000 Units Subcutaneous Q8H    atorvastatin  40 mg Oral Daily    levothyroxine  75 mcg Oral Daily    metoprolol tartrate  25 mg Oral BID    [Held by provider] tamsulosin  0.4 mg Oral Daily    calcium replacement protocol   Other RX Placeholder    miconazole   Topical BID     PRN Meds: sodium chloride flush, promethazine **OR** ondansetron, morphine **OR** morphine, sodium chloride, potassium chloride **OR** potassium alternative oral replacement **OR** potassium chloride, LORazepam, HYDROcodone 5 mg - acetaminophen **OR** HYDROcodone 5 mg - acetaminophen, polyethylene glycol, acetaminophen **OR** acetaminophen      Intake/Output Summary (Last 24 hours) at 2/27/2021 1325  Last data filed at 2/27/2021 1031  Gross per 24 hour   Intake 2982.97 ml   Output 1970 ml   Net 1012.97 ml       Diet:  DIET DYSPHAGIA PUREED;  Moderately Thick (Honey)  Dietary Nutrition Supplements: Frozen Oral Supplement  Dietary Nutrition Supplements: Other Oral Supplement (see comment)    Exam:  BP (!) 119/56   Pulse 67   Temp 98 °F (36.7 °C) (Oral)   Resp 16   Ht 5' 10\" (1.778 m)   Wt 271 lb (122.9 kg)   SpO2 100%   BMI 38.88 kg/m²     General appearance: Alert and appropriate, pleasant geriatric male. No apparent distress, appears stated age and cooperative. HEENT: Pupils equal, round, and reactive to light. Conjunctivae/corneas clear. Neck: Supple, with full range of motion. No jugular venous distention. Trachea midline. Respiratory:  Normal respiratory effort. Clear to auscultation, bilaterally without Rales/Wheezes/Rhonchi. Cardiovascular: Regular rate and rhythm with normal S1/S2 without murmurs, rubs or gallops. Abdomen: Soft, non-tender, obese with normal bowel sounds. Ileostomy noted with green output, ANTONIETTA drain intact with serosanguineous output. Musculoskeletal: Passive and active ROM x 4 extremities. Skin: Skin color, texture, turgor normal.  No rashes or lesions. Neurologic:  Neurovascularly intact without any focal sensory/motor deficits. Cranial nerves: II-XII intact, grossly non-focal.  Psychiatric: Alert and oriented to person, place, time, and situation. Thought content appropriate, normal insight  Capillary Refill: Brisk,< 3 seconds   Peripheral Pulses: +2 palpable, equal bilaterally     Labs:   Recent Labs     02/25/21  0610 02/25/21  1855 02/26/21  0615 02/27/21  0353   WBC 16.8*  --  19.2* 18.0*   HGB 6.8* 7.3* 8.3* 7.9*   HCT 22.5* 23.8* 27.0* 25.9*     --  292 273     Recent Labs     02/25/21  0610 02/26/21  0615 02/27/21  1055   * 143 139   K 3.3* 3.5 3.6   * 116* 113*   CO2 22* 21* 19*   BUN 32* 26* 20   CREATININE 1.6* 1.3* 1.3*   CALCIUM 7.4* 7.5* 7.6*   PHOS  --  3.9  --      No results for input(s): AST, ALT, BILIDIR, BILITOT, ALKPHOS in the last 72 hours. No results for input(s): INR in the last 72 hours.   No results for input(s): Sandrita Blackmon in the last 72 hours.    Microbiology:    Blood culture #1:   Lab Results   Component Value Date    BC No growth-preliminary No growth  02/17/2021       Blood culture #2:No results found for: Jaye Asutin    Organism:  Lab Results   Component Value Date    ORG Candida albicans 02/17/2021         Lab Results   Component Value Date    LABGRAM  06/10/2020     Rare segmented neutrophils observed. Rare epithelial cells observed. Many gram positive cocci occurring singly and in pairs. MRSA culture only:No results found for: [facility]    Urine culture:   Lab Results   Component Value Date    LABURIN [place] count: 50,000-90,000 CFU/mL  02/17/2021       Respiratory culture: No results found for: CULTRESP    Aerobic and Anaerobic :  Lab Results   Component Value Date    LABAERO  06/10/2020     moderate growth In the treatment of gram positive infections, GENTAMICIN should be CONSIDERED a SYNERGYSTIC agent ONLY. Ciprofloxacin and Levofloxacin, regardless of in vitro sensitivity, should not be used for staphylococcal infections other than uncomplicated lower UTIs. Moxifloxacin, regardless of in vitro sensitivity, shouldnot be used for staphylococcal infections. Lab Results   Component Value Date    LABANAE  06/10/2020     Culture yielded heavy mixed growth which included anaerobic gram positive cocci. If a true mixed aerobic and anaerobic infection is suspected, then broad spectrum empiric antibiotic therapy is indicated and should include coverage for anaerobic organisms. Urinalysis:      Lab Results   Component Value Date    NITRU NEGATIVE 02/17/2021    WBCUA 50-75 02/17/2021    BACTERIA MODERATE 02/17/2021    RBCUA 10-15 02/17/2021    BLOODU SMALL 02/17/2021    SPECGRAV 1.017 02/17/2021       Radiology:  XR CHEST PORTABLE   Final Result   Left lower lobe atelectasis and/or infiltrate. **This report has been created using voice recognition software.   It may contain minor errors which are inherent in voice recognition technology. **      Final report electronically signed by Dr. Nayeli Villa on 2/24/2021 3:21 PM      IR FLUORO GUIDED CVA DEVICE PLMT/REPLACE/REMOVAL   Final Result      1. Status post successful PICC line insertion. 2. Of note, an attempt was made to access the right brachial vein initially. However this was unsuccessful due to patient's body habitus. Therefore the right IJ approach was utilized. **This report has been created using voice recognition software. It may contain minor errors which are inherent in voice recognition technology. **      Final report electronically signed by Dr. Casimiro Llamas on 2/24/2021 6:26 PM      FL MODIFIED BARIUM SWALLOW W VIDEO   Final Result   1.  . Laryngeal penetration of nectar thick and thin barium with aspiration of thin barium   2. Additional recommendations from the speech therapist will follow. **This report has been created using voice recognition software. It may contain minor errors which are inherent in voice recognition technology. **      Final report electronically signed by Dr. Brittany Parrish on 2/24/2021 10:22 AM      XR CHEST PORTABLE   Final Result   Normally positioned enteric tube. Unchanged left lower lung consolidation and left pleural effusion. Unchanged right lower lung airspace disease or atelectasis. This document has been electronically signed by: Angel Cheatham MD on    02/23/2021 11:52 PM      XR CHEST PORTABLE   Final Result   Malposition of the NG tube. **This report has been created using voice recognition software. It may contain minor errors which are inherent in voice recognition technology. **      Final report electronically signed by Dr. Nayeli Villa on 2/23/2021 8:21 PM      XR ABDOMEN FOR NG/OG/NE TUBE PLACEMENT   Final Result   Impression:      NG tube tip proximal stomach.       This document has been electronically signed by: Millie Ramos MD on    02/22/2021 11:21 PM      XR CHEST PORTABLE   Final Result   1. Appropriately positioned lines and tubes. 2. Mild bibasilar atelectasis. 3. Small left-sided pleural effusion. **This report has been created using voice recognition software. It may contain minor errors which are inherent in voice recognition technology. **      Final report electronically signed by Dr. Tea Orantes on 2/21/2021 11:27 AM      XR ABDOMEN (2 VIEWS)   Final Result   Impression:   1. Nonobstructing bowel gas pattern. 2.  Oral contrast is seen in the proximal small bowel cannot exclude    extravasation left midabdomen. Correlation with CT abdomen and pelvis is    recommended. 3.  Post total colectomy. Overlying skin staples. NG tube in stomach. Pleural-parenchymal disease both lung bases. This document has been electronically signed by: Guerda Rondon MD on    02/21/2021 08:51 AM      CT ABDOMEN PELVIS WO CONTRAST Additional Contrast? Oral   Final Result       1. Worsening appearance to the colon. This is now diffusely distended. There is worsening thickening of the distal sigmoid colon and rectum. There is new thickening of the cecum with possible pneumatosis. 2. Worsening free fluid in the abdomen. No free air is noted. 3. Small layering bilateral pleural effusions with dependent bibasilar atelectasis. 4. Distended gallbladder. **This report has been created using voice recognition software. It may contain minor errors which are inherent in voice recognition technology. **      Final report electronically signed by Dr. Tea Orantes on 2/20/2021 2:38 PM      XR ABDOMEN FOR NG/OG/NE TUBE PLACEMENT   Final Result   1. Esophageal route tube tip in the stomach. **This report has been created using voice recognition software. It may contain minor errors which are inherent in voice recognition technology. **      Final report electronically signed by Dr. Tea Orantes on 2/20/2021 11:15 AM      XR ABDOMEN FOR NG/OG/NE TUBE PLACEMENT   Final Result   Impression:   Limited exam   1. NG tube in the stomach. Nonspecific bowel gas pattern      This document has been electronically signed by: Cathy Wilkinson MD on    02/19/2021 01:59 AM      US GALLBLADDER RUQ   Final Result   Distended appearance to the gallbladder which has a slightly thickened wall. Sludge is also present within its lumen. Sonographic Hamilton's sign is positive per the technologist. These findings could be on the basis of acute cholecystitis. If clinically    warranted, further evaluation with a nuclear medicine HIDA scan would be beneficial for further characterization. **This report has been created using voice recognition software. It may contain minor errors which are inherent in voice recognition technology. **      Final report electronically signed by Dr Jose Tee on 2/17/2021 9:37 AM      CT ABDOMEN PELVIS WO CONTRAST Additional Contrast? None   Final Result   Impression:   1. Pancolitis consistent with the clinical history of C. difficile    colitis. No bowel perforation. 2.  Probable cholelithiasis. 3.  Bibasilar pulmonary consolidation which may be on the basis of    atelectasis or pneumonia. 4.  Small bilateral nonobstructing renal calculi. 5.  Small right middle lobe pulmonary nodule. This document has been electronically signed by: Nhung Mcmillan MD on    02/17/2021 05:51 AM      All CTs at this facility use dose modulation techniques and iterative    reconstructions, and/or weight-based dosing   when appropriate to reduce radiation to a low as reasonably achievable. XR CHEST PORTABLE   Final Result   Impression:      Right central line tip proximal SVC. Stable bibasilar consolidation and atelectasis.       This document has been electronically signed by: Veronica Rios MD on    02/17/2021 04:20 AM      XR CHEST PORTABLE   Final Result   Impression:      Stable bilateral basilar consolidation and atelectasis      This document has been electronically signed by: Carlos Olson MD on    02/17/2021 03:20 AM      XR ABDOMEN FOR NG/OG/NE TUBE PLACEMENT   Final Result   Impression:   1. Distal NG tube is not well seen but is likely in the distal stomach. This document has been electronically signed by: Renetta Johnson MD on    02/17/2021 02:30 AM      XR CHEST PORTABLE   Final Result   Impression:   1. Right lower lobe airspace disease may represent atelectasis or    pneumonia. This document has been electronically signed by: Renetta Johnson MD on    02/17/2021 02:28 AM        Ct Abdomen Pelvis Wo Contrast Additional Contrast? None    Result Date: 2/17/2021  CT scan of the abdomen and pelvis without contrast Comparison: None Findings: The images are degraded by artifact related to patient body habitus. The left lateral abdominal wall is incompletely imaged. There is a 4 mm nodule within the right middle lobe. There is patchy consolidation within both lower lobes which may be on the basis of atelectasis or pneumonia. A nasogastric tube is in place and extends to the proximal duodenum. Evaluation of the parenchymal organs is limited by the lack of intravenous contrast, however, the liver, spleen, pancreas and visualized portions of the adrenal glands are normal in appearance. There is a layering high density material within the lumen of the gallbladder consistent with small gallstones versus high density sludge. There is a small amount of intra-abdominal ascites. Small bilateral nonobstructing renal calculi are present. The kidneys are otherwise normal. There is diffuse thickening of the wall of the colon associated with inflammatory changes throughout the pericolonic fat. Findings are consistent with a pancolitis and with the clinical history of C. difficile colitis. There is no bowel perforation. The abdominal aorta is normal in course and caliber.  There is no abdominal, pelvic or retroperitoneal adenopathy. The seminal vesicles and prostate gland are normal in appearance. A Velez catheter and rectal tube are in place. There are no acute bony abnormalities. No soft tissue abnormalities are present. Impression: 1. Pancolitis consistent with the clinical history of C. difficile colitis. No bowel perforation. 2.  Probable cholelithiasis. 3.  Bibasilar pulmonary consolidation which may be on the basis of atelectasis or pneumonia. 4.  Small bilateral nonobstructing renal calculi. 5.  Small right middle lobe pulmonary nodule. This document has been electronically signed by: Nhung Mcmillan MD on 02/17/2021 05:51 AM All CTs at this facility use dose modulation techniques and iterative reconstructions, and/or weight-based dosing when appropriate to reduce radiation to a low as reasonably achievable. Us Gallbladder Ruq    Result Date: 2/17/2021  PROCEDURE: US GALLBLADDER RUQ CLINICAL INFORMATION: 43-year-old female with cholelithiasis. Follow-up exam. COMPARISON: CT scan 2/17/2021. TECHNIQUE: Multiplanar sonographic images were obtained of the structures in the right upper quadrant. FINDINGS: Gallbladder - 12.3 x 4.9 x 4.7 cm Gallbladder Wall - 0.5 cm Common Duct - 0.5 cm Hamilton's Sign: positive The liver is of normal echogenicity. No masses are noted. The pancreatic head and body are within normal limits. The tail is not well seen due to overlying bowel gas. The gallbladder is distended. There is wall thickening. There is a large amount of sludge which is seen in the dependent portion of the gallbladder. The common bile duct is normal and measures 5 mm. There is no hydronephrosis in the visualized aspects of the right kidney. Distended appearance to the gallbladder which has a slightly thickened wall. Sludge is also present within its lumen. Sonographic Hamilton's sign is positive per the technologist. These findings could be on the basis of acute cholecystitis.  If clinically warranted, further evaluation with a nuclear medicine HIDA scan would be beneficial for further characterization. **This report has been created using voice recognition software. It may contain minor errors which are inherent in voice recognition technology. ** Final report electronically signed by Dr Luisito Rivera on 2/17/2021 9:37 AM    Xr Chest Portable    Result Date: 2/17/2021  1 view chest x-ray. Comparison: 2/17/2021 Findings: Right central line, tip proximal SVC. NG tube unchanged. Cardiomegaly. Prior sternotomy. Stable bilateral basilar consolidation and atelectasis. Impression: Right central line tip proximal SVC. Stable bibasilar consolidation and atelectasis. This document has been electronically signed by: David Lilly MD on 02/17/2021 04:20 AM    Xr Chest Portable    Result Date: 2/17/2021  1 view chest x-ray. Comparison: 2/17/2021 Findings: NG tube is unchanged. Bilateral basilar consolidation and atelectasis are stable. Cardiomegaly. No bony abnormality. Impression: Stable bilateral basilar consolidation and atelectasis This document has been electronically signed by: David Lilly MD on 02/17/2021 03:20 AM    Xr Chest Portable    Result Date: 2/17/2021  Exam: One View of the chest Comparison: None Clinical history: Hypotension Findings: NG tube is off the bottom of the image but is at least within the stomach Prior median sternotomy Cardiac silhouette is enlarged, without CHF No pneumothorax. No pleural fluid collection. Right lower lobe airspace disease may represent atelectasis or pneumonia. Impression: 1. Right lower lobe airspace disease may represent atelectasis or pneumonia.  This document has been electronically signed by: Rj Seals MD on 02/17/2021 02:28 AM    Xr Abdomen For Ng/og/ne Tube Placement    Result Date: 2/17/2021  Exam: One view the abdomen Comparison: None Clinical history: NG placement Findings: NG tube tip is not well seen but is likely in the distal stomach. No free intraperitoneal air identified. No obstructive bowel gas pattern. Impression: 1. Distal NG tube is not well seen but is likely in the distal stomach. This document has been electronically signed by: Nishant Kumar MD on 02/17/2021 02:30 AM      **This report has been created using voice recognition software. It may contain minor errors which are inherent in voice recognition technology. **  Electronically signed by Remington Otero MD on 2/27/2021 at 1:25 PM

## 2021-02-27 NOTE — PROGRESS NOTES
[facility] for Dr. Karina Rodrigez  Postoperative Progress Note    Pt Name: Candy Redmond Record Number: [id number]  Date of Birth 1940   Today's Date: 2/27/2021    ASSESSMENT   1. 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 Hypotension secondary to shock - improving   3. Acute on chronic kidney disease  4. Postoperative respiratory failure - status post extubation   5. Incidental pulmonary nodules  6. Complicated UTI  7. Metabolic encephalopathy  8. Leukocytosis but WBC slightly lower today  9. Acute on chronic anemia   has a past medical history of Arthritis, Chronic kidney disease, Depression, Diabetes mellitus ([facility] [address] Hyperlipidemia, Hypertension, and Thyroid disease. PLAN   1. Tolerating thickened liquids with pureed diet. Patient does not like it. Appetite decreased. 2. ANTONIETTA drain care - serous fluid. Monitor outputs. 820 past 24 hrs. Still right around 800-1000 mls out everyday. Will keep the drain through the weekend. 3. Wound & ostomy care - watch outputs. May need Imodium. 4. Pain control  5. Therapy as tolerated   6. Pulmonary toileting   7. GI & DVT prophylaxis - okay to resume heparin injections again   8. C. difficile treatment per admitting/ID  9. Nephrology following. Creat improved today. 10. Labs reviewed. Received 1 unit PRBC. Hem stable today. No signs of active bleeding. WBC increasing but clinically looks good. Continue to monitor. 11. Discharge planning   SUBJECTIVE   Remains stable on 4A. Patient alert to nameAfebrile. Denies any pain today. ANTONIETTA drain serous. Ostomy functioning well with light green liquid stool. Midline incision well approximated with no breakdown or bleeding. Having urinary incontinence.    CURRENT MEDICATIONS   Scheduled Meds:   lactobacillus  1 capsule Oral Daily with breakfast    sodium chloride flush  10 mL Intravenous 2 times per day    Delivery: O2 Flow Rate (L/min): 1 L/min(O2 removed)  Incentive Spirometry Tx:            GENERAL: Alert to name, answers some questions, intermittent confusion  LUNGS: Decreased breath sounds at bases  HEART: normal rate and regular rhythm  ABDOMEN: obese, non-distended, soft, incisional tenderness, bowel sounds present. ANTONIETTA serous. INCISION: Midline incision with staples well approximated, no bleeding   EXTERMITY: no cyanosis, clubbing. Edema better. In: 2983 [I.V.:2983]  Out: 1920 [Urine:350; Drains:820]  Date 02/27/21 0000 - 02/27/21 2359   Shift 7359-7971 8166-9019 8584-1272 24 Hour Total   INTAKE   P.O.(mL/kg/hr) 0   0   I. V.(mL/kg) 7767(61.2)   1822(01.2)   Shift Total(mL/kg) 7908(79.0)   6359(80.4)   OUTPUT   Drains(mL/kg) 390(3.2)   390(3.2)   Shift Total(mL/kg) 390(3.2)   390(3.2)   Weight (kg) 122.9 122.9 122.9 122.9     LABS     Recent Labs     02/25/21  0610 02/25/21  1855 02/26/21  0615 02/27/21  0353   WBC 16.8*  --  19.2* 18.0*   HGB 6.8* 7.3* 8.3* 7.9*   HCT 22.5* 23.8* 27.0* 25.9*     --  292 273   *  --  143  --    K 3.3*  --  3.5  --    *  --  116*  --    CO2 22*  --  21*  --    BUN 32*  --  26*  --    CREATININE 1.6*  --  1.3*  --    MG  --   --  1.8  --    PHOS  --   --  3.9  --    CALCIUM 7.4*  --  7.5*  --       RADIOLOGY   No new imaging    Electronically signed by Lisseth Acuña MD on 2/27/2021 at 6:37 AM

## 2021-02-28 LAB
ANION GAP SERPL CALCULATED.3IONS-SCNC: 6 MEQ/L (ref 8–16)
BUN BLDV-MCNC: 18 MG/DL (ref 7–22)
CALCIUM IONIZED: 1.15 MMOL/L (ref 1.12–1.32)
CALCIUM SERPL-MCNC: 7.4 MG/DL (ref 8.5–10.5)
CHLORIDE BLD-SCNC: 114 MEQ/L (ref 98–111)
CO2: 20 MEQ/L (ref 23–33)
CREAT SERPL-MCNC: 1.5 MG/DL (ref 0.4–1.2)
ERYTHROCYTE [DISTWIDTH] IN BLOOD BY AUTOMATED COUNT: 17.6 % (ref 11.5–14.5)
ERYTHROCYTE [DISTWIDTH] IN BLOOD BY AUTOMATED COUNT: 59.9 FL (ref 35–45)
GFR SERPL CREATININE-BSD FRML MDRD: 45 ML/MIN/1.73M2
GLUCOSE BLD-MCNC: 117 MG/DL (ref 70–108)
HCT VFR BLD CALC: 25.2 % (ref 42–52)
HEMOGLOBIN: 7.6 GM/DL (ref 14–18)
MCH RBC QN AUTO: 29.5 PG (ref 26–33)
MCHC RBC AUTO-ENTMCNC: 30.2 GM/DL (ref 32.2–35.5)
MCV RBC AUTO: 97.7 FL (ref 80–94)
PLATELET # BLD: 275 THOU/MM3 (ref 130–400)
PMV BLD AUTO: 10.9 FL (ref 9.4–12.4)
POTASSIUM SERPL-SCNC: 3.6 MEQ/L (ref 3.5–5.2)
RBC # BLD: 2.58 MILL/MM3 (ref 4.7–6.1)
SODIUM BLD-SCNC: 140 MEQ/L (ref 135–145)
WBC # BLD: 15.7 THOU/MM3 (ref 4.8–10.8)

## 2021-02-28 PROCEDURE — 6370000000 HC RX 637 (ALT 250 FOR IP): Performed by: SURGERY

## 2021-02-28 PROCEDURE — 36415 COLL VENOUS BLD VENIPUNCTURE: CPT

## 2021-02-28 PROCEDURE — 85027 COMPLETE CBC AUTOMATED: CPT

## 2021-02-28 PROCEDURE — 6370000000 HC RX 637 (ALT 250 FOR IP): Performed by: INTERNAL MEDICINE

## 2021-02-28 PROCEDURE — 2580000003 HC RX 258: Performed by: INTERNAL MEDICINE

## 2021-02-28 PROCEDURE — 99024 POSTOP FOLLOW-UP VISIT: CPT | Performed by: SURGERY

## 2021-02-28 PROCEDURE — 6370000000 HC RX 637 (ALT 250 FOR IP): Performed by: NURSE PRACTITIONER

## 2021-02-28 PROCEDURE — 97530 THERAPEUTIC ACTIVITIES: CPT

## 2021-02-28 PROCEDURE — 80048 BASIC METABOLIC PNL TOTAL CA: CPT

## 2021-02-28 PROCEDURE — 2060000000 HC ICU INTERMEDIATE R&B

## 2021-02-28 PROCEDURE — 6370000000 HC RX 637 (ALT 250 FOR IP): Performed by: PHYSICIAN ASSISTANT

## 2021-02-28 PROCEDURE — 99232 SBSQ HOSP IP/OBS MODERATE 35: CPT | Performed by: INTERNAL MEDICINE

## 2021-02-28 PROCEDURE — 82330 ASSAY OF CALCIUM: CPT

## 2021-02-28 PROCEDURE — 2580000003 HC RX 258: Performed by: SURGERY

## 2021-02-28 PROCEDURE — 97110 THERAPEUTIC EXERCISES: CPT

## 2021-02-28 RX ORDER — HYDRALAZINE HYDROCHLORIDE 25 MG/1
25 TABLET, FILM COATED ORAL EVERY 8 HOURS SCHEDULED
Status: DISCONTINUED | OUTPATIENT
Start: 2021-02-28 | End: 2021-02-28

## 2021-02-28 RX ORDER — HYDRALAZINE HYDROCHLORIDE 25 MG/1
25 TABLET, FILM COATED ORAL 3 TIMES DAILY PRN
Status: DISCONTINUED | OUTPATIENT
Start: 2021-02-28 | End: 2021-03-05 | Stop reason: HOSPADM

## 2021-02-28 RX ADMIN — BUPROPION HYDROCHLORIDE 300 MG: 150 TABLET, EXTENDED RELEASE ORAL at 08:18

## 2021-02-28 RX ADMIN — LEVOTHYROXINE SODIUM 75 MCG: 75 TABLET ORAL at 05:56

## 2021-02-28 RX ADMIN — Medication 500 MG: at 17:30

## 2021-02-28 RX ADMIN — BUSPIRONE HYDROCHLORIDE 15 MG: 7.5 TABLET ORAL at 08:18

## 2021-02-28 RX ADMIN — MIRTAZAPINE 45 MG: 45 TABLET, FILM COATED ORAL at 20:56

## 2021-02-28 RX ADMIN — DOXEPIN HYDROCHLORIDE 10 MG: 10 CAPSULE ORAL at 20:56

## 2021-02-28 RX ADMIN — Medication 500 MG: at 12:43

## 2021-02-28 RX ADMIN — BUSPIRONE HYDROCHLORIDE 15 MG: 7.5 TABLET ORAL at 20:57

## 2021-02-28 RX ADMIN — SERTRALINE 200 MG: 100 TABLET, FILM COATED ORAL at 08:23

## 2021-02-28 RX ADMIN — HYDROCODONE BITARTRATE AND ACETAMINOPHEN 2 TABLET: 5; 325 TABLET ORAL at 17:29

## 2021-02-28 RX ADMIN — DEXTROSE MONOHYDRATE: 50 INJECTION, SOLUTION INTRAVENOUS at 08:00

## 2021-02-28 RX ADMIN — FAMOTIDINE 20 MG: 20 TABLET, FILM COATED ORAL at 08:18

## 2021-02-28 RX ADMIN — ATORVASTATIN CALCIUM 40 MG: 40 TABLET, FILM COATED ORAL at 08:18

## 2021-02-28 RX ADMIN — FAMOTIDINE 20 MG: 20 TABLET, FILM COATED ORAL at 21:00

## 2021-02-28 RX ADMIN — Medication 500 MG: at 05:56

## 2021-02-28 RX ADMIN — QUETIAPINE FUMARATE 100 MG: 100 TABLET ORAL at 20:56

## 2021-02-28 RX ADMIN — BUSPIRONE HYDROCHLORIDE 15 MG: 7.5 TABLET ORAL at 14:32

## 2021-02-28 RX ADMIN — SODIUM CHLORIDE, PRESERVATIVE FREE 10 ML: 5 INJECTION INTRAVENOUS at 21:00

## 2021-02-28 RX ADMIN — SODIUM CHLORIDE, PRESERVATIVE FREE 10 ML: 5 INJECTION INTRAVENOUS at 08:25

## 2021-02-28 RX ADMIN — Medication 1 CAPSULE: at 08:18

## 2021-02-28 RX ADMIN — MICONAZOLE NITRATE: 20 POWDER TOPICAL at 08:18

## 2021-02-28 RX ADMIN — METOPROLOL TARTRATE 25 MG: 25 TABLET ORAL at 20:56

## 2021-02-28 RX ADMIN — METOPROLOL TARTRATE 25 MG: 25 TABLET ORAL at 08:17

## 2021-02-28 RX ADMIN — MICONAZOLE NITRATE: 20 POWDER TOPICAL at 21:01

## 2021-02-28 RX ADMIN — ARIPIPRAZOLE 10 MG: 10 TABLET ORAL at 20:57

## 2021-02-28 ASSESSMENT — PAIN DESCRIPTION - FREQUENCY: FREQUENCY: INTERMITTENT

## 2021-02-28 ASSESSMENT — PAIN DESCRIPTION - LOCATION: LOCATION: BACK

## 2021-02-28 ASSESSMENT — PAIN DESCRIPTION - ONSET: ONSET: UNABLE TO TELL

## 2021-02-28 ASSESSMENT — PAIN SCALES - GENERAL
PAINLEVEL_OUTOF10: 0
PAINLEVEL_OUTOF10: 7
PAINLEVEL_OUTOF10: 0

## 2021-02-28 ASSESSMENT — PAIN - FUNCTIONAL ASSESSMENT: PAIN_FUNCTIONAL_ASSESSMENT: PREVENTS OR INTERFERES SOME ACTIVE ACTIVITIES AND ADLS

## 2021-02-28 NOTE — PLAN OF CARE
Problem: Discharge Planning:  Goal: Discharged to appropriate level of care  Description: Discharged to appropriate level of care  Outcome: Ongoing  Note: Discharge planning in process and discussed with patient/family. Social work consulted for any additional needs. Care manager aware of discharge needs. Problem: Cardiac Output - Decreased:  Goal: Hemodynamic stability will improve  Description: Hemodynamic stability will improve  Outcome: Ongoing  Note:   Vitals:    02/27/21 1156 02/27/21 1507 02/27/21 2013 02/27/21 2341   BP: (!) 119/56 (!) 118/53 (!) 118/52 (!) 119/49   Pulse: 67 73 78 72   Resp: 16 16 16 16   Temp: 98 °F (36.7 °C) 98.2 °F (36.8 °C) 98.8 °F (37.1 °C)    TempSrc: Oral Oral Oral    SpO2: 100% 98% 93% 96%   Weight:       Height:               Problem: Pain:  Goal: Control of acute pain  Description: Control of acute pain  Outcome: Ongoing  Note: Patient complained of no pain rating it a 0 on the 0-10 scale. Pain medication given as ordered and needed. Patient voiced satisfaction with this. Also instructed patient on distraction, repositioning, and ambulation as non-pharmacological methods of pain relief. Patient voiced understanding. Problem: Skin Integrity - Impaired:  Goal: Absence of new skin breakdown  Description: Absence of new skin breakdown  Outcome: Ongoing  Note: No signs of new skin breakdown noted with each assessment this shift. Skin warm, dry, and intact except where otherwise noted in head-to-toe assessment. Mucous membranes pink and moist. Assistance with turns/ambulation given as needed. Problem: Falls - Risk of:  Goal: Will remain free from falls  Description: Will remain free from falls  Outcome: Ongoing  Note: Patient remained free from falls this shift. Bed is in low position with alarm on and siderails up x2. Education given on use of call light and patient voiced understanding. Call light and beside table within reach.  Arm band and falling star in place. Hourly rounds completed. Will continue to monitor. Problem: Skin Integrity:  Goal: Absence of new skin breakdown  Description: Absence of new skin breakdown  Outcome: Ongoing  Note: No signs of new skin breakdown noted with each assessment this shift. Skin warm, dry, and intact except where otherwise noted in head-to-toe assessment. Mucous membranes pink and moist. Assistance with turns/ambulation given as needed. Problem: Nutrition  Goal: Optimal nutrition therapy  Outcome: Ongoing  Note: Pt is currently on a pureed diet with honey thick liquids. Oral intake encouraged this shift. Plan of care discussed with pt and family. Pt verbalizes understand.

## 2021-02-28 NOTE — PROGRESS NOTES
[facility]         Renal Inpatient Follow-Up note         2/28/2021 12:29 PM    Pt Name:   Malina Narvaez  YOB: 1940  Attending:   Raya Ballesteros MD    Chief Complaint : Malina Narvaez is a 79 y.o. male being followed by nephrology for acute kidney injury and hypernatremia    Interval History :   Patient seen and examined by me.  No distress  Feels okay denies any complaints  Poor historian no chest pain shortness of breath no diarrhea     Scheduled Medications :    hydrALAZINE  25 mg Oral 3 times per day    lactobacillus  1 capsule Oral Daily with breakfast    sodium chloride flush  10 mL Intravenous 2 times per day    famotidine  20 mg Oral BID    vancomycin  500 mg Oral 4 times per day    lidocaine 1 % injection  5 mL Intradermal Once    buPROPion  300 mg Oral Daily    ARIPiprazole  10 mg Oral Daily    busPIRone  15 mg Oral TID    doxepin  10 mg Oral Nightly    QUEtiapine  100 mg Oral Nightly    mirtazapine  45 mg Oral Nightly    sertraline  200 mg Oral Daily    piperacillin-tazobactam  3,375 mg Intravenous Once    [Held by provider] heparin (porcine)  5,000 Units Subcutaneous Q8H    atorvastatin  40 mg Oral Daily    levothyroxine  75 mcg Oral Daily    metoprolol tartrate  25 mg Oral BID    [Held by provider] tamsulosin  0.4 mg Oral Daily    calcium replacement protocol   Other RX Placeholder    miconazole   Topical BID      sodium chloride      dextrose 40 mL/hr at 02/28/21 0800       Vitals :  BP (!) 165/70   Pulse 73   Temp 99 °F (37.2 °C) (Oral)   Resp 20   Ht 5' 10\" (1.778 m)   Wt 266 lb 6.4 oz (120.8 kg)   SpO2 100%   BMI 38.22 kg/m²     24HR INTAKE/OUTPUT:      Intake/Output Summary (Last 24 hours) at 2/28/2021 1229  Last data filed at 2/28/2021 1012  Gross per 24 hour   Intake 2169.55 ml   Output 2140 ml   Net 29.55 ml     Last 3 weights  Wt Readings from Last 3 Encounters:   02/28/21 266 lb 6.4 oz (120.8 kg)   11/25/20 283 lb 3.2 oz (128.5 kg)   10/16/20 273 lb 12.8 oz (124.2 kg)           Physical Exam :  General Appearance:  Well developed. No distress  Mouth/Throat:  Oral mucosa moist  Neck:  Supple, no JVD  Lungs:  Breath sounds: clear  Heart[de-identified]  S1,S2 heard  Abdomen:  Soft, non - tender  Musculoskeletal:  Edema -minimal         Last 3 CBC   Recent Labs     02/26/21  0615 02/27/21  0353 02/28/21  0400   WBC 19.2* 18.0* 15.7*   RBC 2.79* 2.65* 2.58*   HGB 8.3* 7.9* 7.6*   HCT 27.0* 25.9* 25.2*    273 275     Last 3 CMP  Recent Labs     02/26/21  0615 02/27/21  1055 02/28/21  0400    139 140   K 3.5 3.6 3.6   * 113* 114*   CO2 21* 19* 20*   BUN 26* 20 18   CREATININE 1.3* 1.3* 1.5*   CALCIUM 7.5* 7.6* 7.4*             ASSESSMENT / Plan   1 Renal -acute kidney injury secondary to septic ATN  ? Decent urine output and the creatinine is improving  ? Creatinine was down to 1.3 and now rising to 1.4  ? Volume status reasonable  ? Patient says he is not drinking enough liquids. Stop IV fluids encourage oral intake of fluids  ? Monitor BMP in the morning    2 Electrolytes -hyponatremia resolved stop D5W  3 Mild acidosis  4 C. difficile colitis with toxic megacolon status post colectomy and end ileostomy  5 Hypokalemia borderline follow closely  6 Anemia  7 Meds reviewed and discussed with patient    MARIVEL Nunez D.  [facility].

## 2021-02-28 NOTE — PLAN OF CARE
Problem: Discharge Planning:  Goal: Participates in care planning  Outcome: Ongoing  Note: Plan is for the pt to go to [address]. Pt is agreeable at this time. Will continue to assess. Problem: Discharge Planning:  Goal: Discharged to appropriate level of care  Description: Discharged to appropriate level of care  Outcome: Ongoing  Note: Plan is for the pt to go to [address]. Pt is agreeable at this time. Will continue to assess. Problem: Bowel Function - Altered:  Goal: Bowel elimination is within specified parameters  Description: Bowel elimination is within specified parameters  Outcome: Ongoing  Note: Pt has been having loose, green/brown, stools. Will continue to assess and monitor. Problem: Cardiac Output - Decreased:  Goal: Hemodynamic stability will improve  Description: Hemodynamic stability will improve  Outcome: Ongoing  Note:   Vitals:    02/28/21 0800 02/28/21 1135 02/28/21 1245 02/28/21 1518   BP: (!) 127/47 (!) 165/70 (!) 117/50 (!) 171/60   Pulse: 80 73 73 71   Resp: 18 20  18   Temp: 99 °F (37.2 °C)      TempSrc: Oral      SpO2: 100%   96%   Weight:       Height:            Problem: Fluid Volume - Imbalance:  Goal: Absence of imbalanced fluid volume signs and symptoms  Description: Absence of imbalanced fluid volume signs and symptoms  Outcome: Ongoing  Note: Pt has no signs and symptoms at this time. Will continue to assess and monitor. Problem: Pain:  Goal: Control of acute pain  Description: Control of acute pain  Outcome: Ongoing  Note: Pt rated pain 0/10 at this time. Pt denies pain. Will continue to assess and monitor. Problem: Pain:  Goal: Control of chronic pain  Description: Control of chronic pain  Outcome: Ongoing  Note: Pt rated pain 0/10 at this time. Pt denies pain. Will continue to assess and monitor.      Problem: Skin Integrity - Impaired:  Goal: Will show no infection signs and symptoms  Description: Will show no infection signs and symptoms  Outcome: Ongoing  Note: Pt has no new signs and symptoms of infection at this time. Will continue to assess and monitor. Problem: Skin Integrity - Impaired:  Goal: Absence of new skin breakdown  Description: Absence of new skin breakdown  Outcome: Ongoing  Note: Pt has no new skin breakdowns at this time. Pt is repositioned every two hours and cooperative with this as well. Problem: Urinary Elimination:  Goal: Signs and symptoms of infection will decrease  Description: Signs and symptoms of infection will decrease  Outcome: Ongoing  Note: Pt has no new signs and symptoms of infection at this time. Will continue to assess and monitor. Problem: Urinary Elimination:  Goal: Complications related to the disease process, condition or treatment will be avoided or minimized  Description: Complications related to the disease process, condition or treatment will be avoided or minimized  Outcome: Ongoing  Note: Pt has no new complications at this time. Will continue to monitor. Problem: Falls - Risk of:  Goal: Will remain free from falls  Description: Will remain free from falls  Outcome: Ongoing  Note: Free from falls this shift, at this time. Call light and bedside table within reach. Bed alarm on. Bed wheels locked and bed in lowest position. Pt oriented to own abilities and is encouraged to use call light for needs. Problem: Falls - Risk of:  Goal: Absence of physical injury  Description: Absence of physical injury  Outcome: Ongoing     Problem: Bowel/Gastric:  Goal: Occurrences of diarrhea will decrease  Description: Occurrences of diarrhea will decrease  Outcome: Ongoing  Note: Pt has not had any reoccurring diarrhea so far in this shift. Will continue to assess and monitor.      Problem: Physical Regulation:  Goal: Signs and symptoms of infection will decrease  Description: Signs and symptoms of infection will decrease  Outcome: Ongoing  Note: Pt has no new signs and symptoms of infection

## 2021-02-28 NOTE — PROGRESS NOTES
Assistance. For management of clothing and assist to hold urinal in place seated EOB. BALANCE:  Sitting Balance:  Stand by Assistance seated EOB  Standing Balance: Contact Guard Assistance. With BUE support on walker     BED MOBILITY:  Rolling to Left: Maximum Assistance - using side rail with increased time and vcs for technique  Supine to Sit: Maximum Assistance - increased time, HOB slightly elevated, vcs for technique    TRANSFERS:  Sit to Stand:  Minimal Assistance. From EOB with increased time and vcs for hand placement  Stand to Sit: Minimal Assistance. To bedside chair with increased time and vcs for hand placement    FUNCTIONAL MOBILITY:  Assistive Device: Rolling Walker  Assist Level:  Minimal Assistance. And SBA of another for safety only   Distance: Approx 2 ft from EOB to bedside chair  Slow pace, no LOB noted. Slightly unsteady at times. ADDITIONAL ACTIVITIES:  Patient completed neck exercises with neck flexion/extension, neck rotation side to side, and lateral flexion x5 reps x1 set holding each exercise for approx 2-3 seconds. Required brief rest breaks throughout. Completed to decrease stiffness and increase ROM required for ADL routine     ASSESSMENT:     Activity Tolerance:  Patient tolerance of  treatment: fair. Discharge Recommendations: Patient would benefit from continued therapy after discharge, Subacute/Skilled Nursing Facility   Equipment Recommendations: Equipment Needed: Yes  Other: May benefit from [facility] or elevated toilet seat with armrests  Plan: Times per week: 3-5x  Specific instructions for Next Treatment: Functional mobility; ADLs and sequencing, safety awareness; reorientation  Current Treatment Recommendations: Self-Care / ADL, Cognitive Reorientation, Functional Mobility Training, Endurance Training, Balance Training, Safety Education & Training  Plan Comment: Pt would benefit from continues skilled OT services when medically stable and discharged from Acute.   OT recommended while at [facility]. Patient Education  Patient Education: safety with transfers and mobility, neck exercises    Goals  Short term goals  Time Frame for Short term goals: By discharge  Short term goal 1: Pt will demonstrate functional mobility with Min A x 1 using a rolling walker with cues for posture and steady breathing to prepare for doing self care while out of bed. Short term goal 2: Pt will complete transfers to/from various surfaces with SBA to increase his independence with toileting routine. Short term goal 3: Pt will complete upper body ADLs with MIN A and cues for sequencing and safety to increase his independence with self care. Short term goal 4: Pt will tolerate standing for over 2 minute duration with MIN A to increase his endurance for ease of dressing or bathing. Short term goal 5: Pt will be oriented to place/situation/day and  his goals for safety each day with cues as needed to increase his safety and improve functioning. Following session, patient left in safe position with all fall risk precautions in place.

## 2021-02-28 NOTE — PROGRESS NOTES
[facility] - Dr Mireya Bennett  for Dr. Alcira Carlos  Postoperative Progress Note    Pt Name: Brian Almendarez Record Number: [id number]  Date of Birth 1940   Today's Date: 2/28/2021    ASSESSMENT   1. 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 Hypotension secondary to shock - improving   3. Acute on chronic kidney disease  4. Postoperative respiratory failure - status post extubation   5. Incidental pulmonary nodules  6. Complicated UTI  7. Metabolic encephalopathy  8. Leukocytosis but WBC slightly lower today  9. Acute on chronic anemia   has a past medical history of Arthritis, Chronic kidney disease, Depression, Diabetes mellitus ([facility] [address] Hyperlipidemia, Hypertension, and Thyroid disease. PLAN   1. Tolerating thickened liquids with pureed diet- he wants to eat regular food. He understands that it is not recommended by speech at this time. He states he assumes the risk and wants regular food. 2. ANTONIETTA drain care - serous fluid. Monitor outputs. 940 past 24 hrs. Continue drain over the weekned  3. Wound & ostomy care - watch outputs. May need Imodium. 4. Pain control  5. Therapy as tolerated   6. Pulmonary toileting   7. GI & DVT prophylaxis - okay to resume heparin injections again   8. C. difficile treatment per admitting/ID  9. Patient may sit on commode, may have mucus or small amount of retained stool giving him the sensation he needs to have a bowel movemtn. SUBJECTIVE   Overall only complaint is a sensation that he needs to have a bowel movement, he also is very frustrated with his diet says it is terrible he can eat it. He wants a regular diet.   CURRENT MEDICATIONS   Scheduled Meds:   lactobacillus  1 capsule Oral Daily with breakfast    sodium chloride flush  10 mL Intravenous 2 times per day    famotidine  20 mg Oral BID    vancomycin  500 mg Oral 4 times per day    lidocaine 1 % injection  5 mL Intradermal Once    buPROPion  300 mg Oral Daily    ARIPiprazole  10 mg Oral Daily    busPIRone  15 mg Oral TID    doxepin  10 mg Oral Nightly    QUEtiapine  100 mg Oral Nightly    mirtazapine  45 mg Oral Nightly    sertraline  200 mg Oral Daily    piperacillin-tazobactam  3,375 mg Intravenous Once    [Held by provider] heparin (porcine)  5,000 Units Subcutaneous Q8H    atorvastatin  40 mg Oral Daily    levothyroxine  75 mcg Oral Daily    metoprolol tartrate  25 mg Oral BID    [Held by provider] tamsulosin  0.4 mg Oral Daily    calcium replacement protocol   Other RX Placeholder    miconazole   Topical BID     Continuous Infusions:   sodium chloride      dextrose 40 mL/hr at 21 0800     PRN Meds:.sodium chloride flush, promethazine **OR** ondansetron, morphine **OR** morphine, sodium chloride, potassium chloride **OR** potassium alternative oral replacement **OR** potassium chloride, LORazepam, HYDROcodone 5 mg - acetaminophen **OR** HYDROcodone 5 mg - acetaminophen, polyethylene glycol, acetaminophen **OR** acetaminophen  OBJECTIVE   CURRENT VITALS:  height is 5' 10\" (1.778 m) and weight is 266 lb 6.4 oz (120.8 kg). His oral temperature is 99 °F (37.2 °C). His blood pressure is 165/70 (abnormal) and his pulse is 73. His respiration is 20 and oxygen saturation is 100%.    Temperature Range (24h):Temp: 99 °F (37.2 °C) Temp  Av.6 °F (37 °C)  Min: 98.2 °F (36.8 °C)  Max: 99 °F (37.2 °C)  BP Range (29R): Systolic (74WZS), VTJ:442 , Min:104 , IGV:610     Diastolic (02CEL), OFM:64, Min:47, Max:70    Pulse Range (24h): Pulse  Av.7  Min: 72  Max: 80  Respiration Range (24h): Resp  Av  Min: 16  Max: 20  Current Pulse Ox (24h):  SpO2: 100 %  Pulse Ox Range (24h):  SpO2  Av.4 %  Min: 93 %  Max: 100 %  Oxygen Amount and Delivery: O2 Flow Rate (L/min): 1 L/min(O2 removed)  Incentive Spirometry Tx:            GENERAL: Alert to name, answers some questions, intermittent confusion  LUNGS: Decreased breath sounds at bases  HEART: normal rate and regular rhythm  ABDOMEN: obese, non-distended, soft, incisional tenderness, bowel sounds present. ANTONIETTA serous. Ostomy is pink patent with liquid stool  INCISION: Midline incision with staples well approximated, no bleeding   EXTERMITY: no cyanosis. In: 1074.3 [I.V.:1074.3]  Out: 1800 [Urine:200; Drains:900]  Date 02/28/21 0000 - 02/28/21 2359   Shift 5706-8527 5605-1617 2348-2359 24 Hour Total   INTAKE   P.O.(mL/kg/hr) 0(0)   0   I. V.(mL/kg) 425.3(3.5)   425.3(3.5)   Shift Total(mL/kg) 425.3(3.5)   425.3(3.5)   OUTPUT   Drains(mL/kg) 90(0.7) 180(1.5)  270(2.2)   Stool(mL/kg) 200(1.7) 150(1.2)  350(2.9)   Shift Total(mL/kg) 290(2.4) 330(2.7)  620(5.1)   Weight (kg) 120.8 120.8 120.8 120.8     LABS     Recent Labs     02/26/21  0615 02/27/21  0353 02/27/21  1055 02/28/21  0400   WBC 19.2* 18.0*  --  15.7*   HGB 8.3* 7.9*  --  7.6*   HCT 27.0* 25.9*  --  25.2*    273  --  275     --  139 140   K 3.5  --  3.6 3.6   *  --  113* 114*   CO2 21*  --  19* 20*   BUN 26*  --  20 18   CREATININE 1.3*  --  1.3* 1.5*   MG 1.8  --   --   --    PHOS 3.9  --   --   --    CALCIUM 7.5*  --  7.6* 7.4*      RADIOLOGY   No new imaging    Electronically signed by Nazanin Bronson MD on 2/28/2021 at 11:59 AM

## 2021-02-28 NOTE — PROGRESS NOTES
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 CT of the abdomen pelvis on 2/17 shows pancolitis that was consistent with C. difficile. There was no bowel perforation or ileus. CT scan was ordered on 2/20, which showed toxic megacolon. Gen Surg was brought on board, ANTONIETTA drain continues to have 800-1000 mL output every day, plan to keep drain in through the weekend. Continue vancomycin   (Day #11), consider 14 days of therapy. Dietitian also evaluated, recommendations for thickened liquids with puréed diet. Stopped IV flagyl , continue  with PO vancomycin. 1. Septic shock secondary to fulminant C. Diff colitis, resolving: with noted hypotension, s/p Levophed, weaned off and currently stable. ID evaluted. Continue with current abx therapy. BP has been stable, continue to monitor. 2. Acute metabolic encephalopathy: Improved, related to above with hypotension/hypoperfusion along with uremia. Continue to monitor. Patient alert and oriented, engaged in conversation. 3. CARMEN, improving: Secondary to septic ATN. Creatinine 1.3 this morning. Nephrology following. Continue with gentle IVF fluids. No need for RRT at this time  4. Complicated UTI: UA shows bacteria, yeast, blood, and WBC.  Blood culture currently negative, awaiting results of urine culture.  Patient currently on vancomycin and Flagyl.  Holding treatment of Candida infection due to CARMEN. 5. Hypernatremia:  Resolved, secondary to hypotonic losses from GI tract. Nephrology recommending D5W to 100 mL/hr. Reassess chemistry in the morning. 6. Acute postoperative hypoxic respiratory failure, resolved: Current saturations 99% on RA.  Continue to  During his hospital stay he was found to have worsening renal function and nephrology was consulted. Martin Monge 2/16 the patient was seen by Dr. Cristiana Rosario from nephrology. Caren Mac work-up did show C. difficile colitis. John Gale was started on vancomycin. Eileen Tom had a noted mental change and worsening creatinine on 2/16 so he was transferred to [address] [place] [address] ICU for further management and care per Dr. Jacqueline Hernández"     2/18: Patient this morning remains on pressors as needed to maintain his blood pressure.  He has been receiving fluid throughout the night.  His hemoglobin did drop from 10 to about 8, however it is stayed stable since then, continue to monitor.  His WBC has decreased along with his creatinine.  However his BUN increased overnight.  The patient seems more confused today with an inability to answer where or when he is.  The nephrologist stated that if his BUN increases over 100 dialysis would be warranted at that time.  The surgeon also states that at this current point if surgery is warranted it would be a total colectomy.  Both of these options were discussed with the family who stated that they would be okay starting dialysis if needed.  They with prefer to hold off on surgery right now.     2/19: Patient remains on pressors to maintain his blood pressure however the dose has been weaned significantly. Eileen Tom still currently receiving IVF.  Hgb has remained stable.  WBC did increase overnight.  His creatinine has decreased to 3.7. Shakila  has stated 95.  The nephrologist had seen the patient earlier in the morning, and stated that dialysis is not needed at this time.  Due to the rising WBC, infectious disease has been consulted.     2/20: Patient remains on pressors, cortisol was checked on 2/17 which was normal for a.m.  Infectious diseases did recommend a loop ileostomy with vancomycin irrigation believes the patient would benefit from it.  Obtaining surgery recommendations.  His creatinine continues to decrease, and no dialysis is needed at this point, BUN decreasing as well.  WBC irvin again overnight, and patient still complains of stomach pains.     2/21:  In spite of the aggressive treatment for C. difficile colitis patient is still complaining of severe abdominal pain with severe abdominal distention , discussion  with the medical resident and also with nursing team  the patient is not improving over the last few days even the nursing did mention the patient is worsening over the last 24 hours with more distention of the abdomen with leukocytosis so plan to obtain CT scan abdomen pelvis with p.o. contrast to reassess his C. difficile colitis of the abdomen to see if there is any progression or improvement, CT scan abdomen pelvis done with contrast did show progression of the ulcerative cecal area of severe cecal distention and possibility for pneumatosis so general surgery consulted stat and he discussed the options with the patient and the patient family and the plan was done to go ahead for OR that was done last night, total colectomy, patient did after surgery have a smooth postop course still intubated in the weaning trial currently, will continue IV antibiotics fluids support hemodynamics pain control and in collaboration with the ID and general surgery team.     2/22: Patient remains on pressors this morning, propofol from surgery stopped. Patient was intubated for the surgery, however extubated same day. Doing well on 3 L nasal cannula. Patient states that he feels he has to defecate, however his ostomy bag is draining properly. His wound is clean and dry. He still states some diffuse abdominal pain. His creatinine is trending back towards baseline, as well as his BUN. His Hgb remained stable. WBC has decreased mildly from yesterday. We will continue to monitor. \"    2/23-> discussed with Nephrology, okay with PICC line. Pt just had Norco prior to exam, not very interactive.  Will open eyes and respond to yes no questions Respirations easy and non-labored, no acute distress. CARDIAC: No chest pain, pressure, Negative for lower leg edema. GI: Abdomen is soft and non-tender, non-distended. PERIPHERAL VASCULAR: No intermittent claudication or unusual leg cramps. MUSCULOSKELETAL: Occasional arthralgias, myalgias. NEUROLOGICAL: Denies any headache, near syncope, seizures or syncope. HEMATOLOGIC:  No unusual bruising or bleeding. PSYCH: Denies any homicidal or suicidial ideations.     Medications:  Reviewed    Infusion Medications    sodium chloride       Scheduled Medications    lactobacillus  1 capsule Oral Daily with breakfast    sodium chloride flush  10 mL Intravenous 2 times per day    famotidine  20 mg Oral BID    vancomycin  500 mg Oral 4 times per day    lidocaine 1 % injection  5 mL Intradermal Once    buPROPion  300 mg Oral Daily    ARIPiprazole  10 mg Oral Daily    busPIRone  15 mg Oral TID    doxepin  10 mg Oral Nightly    QUEtiapine  100 mg Oral Nightly    mirtazapine  45 mg Oral Nightly    sertraline  200 mg Oral Daily    piperacillin-tazobactam  3,375 mg Intravenous Once    [Held by provider] heparin (porcine)  5,000 Units Subcutaneous Q8H    atorvastatin  40 mg Oral Daily    levothyroxine  75 mcg Oral Daily    metoprolol tartrate  25 mg Oral BID    [Held by provider] tamsulosin  0.4 mg Oral Daily    calcium replacement protocol   Other RX Placeholder    miconazole   Topical BID     PRN Meds: hydrALAZINE, sodium chloride flush, promethazine **OR** ondansetron, morphine **OR** morphine, sodium chloride, potassium chloride **OR** potassium alternative oral replacement **OR** potassium chloride, LORazepam, HYDROcodone 5 mg - acetaminophen **OR** HYDROcodone 5 mg - acetaminophen, polyethylene glycol, acetaminophen **OR** acetaminophen      Intake/Output Summary (Last 24 hours) at 2/28/2021 1456  Last data filed at 2/28/2021 1012  Gross per 24 hour   Intake 1074.32 ml   Output 1800 ml   Net -725.68 ml Diet:  Dietary Nutrition Supplements: Frozen Oral Supplement  Dietary Nutrition Supplements: Other Oral Supplement (see comment)  DIET GENERAL; Moderately Thick (Honey)    Exam:  BP (!) 117/50   Pulse 73   Temp 99 °F (37.2 °C) (Oral)   Resp 20   Ht 5' 10\" (1.778 m)   Wt 266 lb 6.4 oz (120.8 kg)   SpO2 100%   BMI 38.22 kg/m²     General appearance: Alert and appropriate, pleasant geriatric male. No apparent distress, appears stated age and cooperative. HEENT: Pupils equal, round, and reactive to light. Conjunctivae/corneas clear. Neck: Supple, with full range of motion. No jugular venous distention. Trachea midline. Respiratory:  Normal respiratory effort. Clear to auscultation, bilaterally without Rales/Wheezes/Rhonchi. Cardiovascular: Regular rate and rhythm with normal S1/S2 without murmurs, rubs or gallops. Abdomen: Soft, non-tender, obese with normal bowel sounds. Ileostomy noted with green output, ANTONIETTA drain intact with serosanguineous output. Musculoskeletal: Passive and active ROM x 4 extremities. Skin: Skin color, texture, turgor normal.  No rashes or lesions. Neurologic:  Neurovascularly intact without any focal sensory/motor deficits. Cranial nerves: II-XII intact, grossly non-focal.  Psychiatric: Alert and oriented to person, place, time, and situation. Thought content appropriate, normal insight  Capillary Refill: Brisk,< 3 seconds   Peripheral Pulses: +2 palpable, equal bilaterally     Labs:   Recent Labs     02/26/21  0615 02/27/21  0353 02/28/21  0400   WBC 19.2* 18.0* 15.7*   HGB 8.3* 7.9* 7.6*   HCT 27.0* 25.9* 25.2*    273 275     Recent Labs     02/26/21  0615 02/27/21  1055 02/28/21  0400    139 140   K 3.5 3.6 3.6   * 113* 114*   CO2 21* 19* 20*   BUN 26* 20 18   CREATININE 1.3* 1.3* 1.5*   CALCIUM 7.5* 7.6* 7.4*   PHOS 3.9  --   --      No results for input(s): AST, ALT, BILIDIR, BILITOT, ALKPHOS in the last 72 hours.   No results for input(s): INR in the software. It may contain minor errors which are inherent in voice recognition technology. **      Final report electronically signed by Dr. Chino Juarez on 2/24/2021 3:21 PM      IR FLUORO GUIDED CVA DEVICE PLMT/REPLACE/REMOVAL   Final Result      1. Status post successful PICC line insertion. 2. Of note, an attempt was made to access the right brachial vein initially. However this was unsuccessful due to patient's body habitus. Therefore the right IJ approach was utilized. **This report has been created using voice recognition software. It may contain minor errors which are inherent in voice recognition technology. **      Final report electronically signed by Dr. Nicole Hagen on 2/24/2021 6:26 PM      FL MODIFIED BARIUM SWALLOW W VIDEO   Final Result   1.  . Laryngeal penetration of nectar thick and thin barium with aspiration of thin barium   2. Additional recommendations from the speech therapist will follow. **This report has been created using voice recognition software. It may contain minor errors which are inherent in voice recognition technology. **      Final report electronically signed by Dr. Jose Cruz Mauricio on 2/24/2021 10:22 AM      XR CHEST PORTABLE   Final Result   Normally positioned enteric tube. Unchanged left lower lung consolidation and left pleural effusion. Unchanged right lower lung airspace disease or atelectasis. This document has been electronically signed by: Vicente Morris MD on    02/23/2021 11:52 PM      XR CHEST PORTABLE   Final Result   Malposition of the NG tube. **This report has been created using voice recognition software. It may contain minor errors which are inherent in voice recognition technology. **      Final report electronically signed by Dr. Chino Juarez on 2/23/2021 8:21 PM      XR ABDOMEN FOR NG/OG/NE TUBE PLACEMENT   Final Result   Impression:      NG tube tip proximal stomach.       This document has been electronically signed by: Crissy Nunez MD on    02/22/2021 11:21 PM      XR CHEST PORTABLE   Final Result   1. Appropriately positioned lines and tubes. 2. Mild bibasilar atelectasis. 3. Small left-sided pleural effusion. **This report has been created using voice recognition software. It may contain minor errors which are inherent in voice recognition technology. **      Final report electronically signed by Dr. Loli Guzmán on 2/21/2021 11:27 AM      XR ABDOMEN (2 VIEWS)   Final Result   Impression:   1. Nonobstructing bowel gas pattern. 2.  Oral contrast is seen in the proximal small bowel cannot exclude    extravasation left midabdomen. Correlation with CT abdomen and pelvis is    recommended. 3.  Post total colectomy. Overlying skin staples. NG tube in stomach. Pleural-parenchymal disease both lung bases. This document has been electronically signed by: Robinson Meeks MD on    02/21/2021 08:51 AM      CT ABDOMEN PELVIS WO CONTRAST Additional Contrast? Oral   Final Result       1. Worsening appearance to the colon. This is now diffusely distended. There is worsening thickening of the distal sigmoid colon and rectum. There is new thickening of the cecum with possible pneumatosis. 2. Worsening free fluid in the abdomen. No free air is noted. 3. Small layering bilateral pleural effusions with dependent bibasilar atelectasis. 4. Distended gallbladder. **This report has been created using voice recognition software. It may contain minor errors which are inherent in voice recognition technology. **      Final report electronically signed by Dr. Loli Guzmán on 2/20/2021 2:38 PM      XR ABDOMEN FOR NG/OG/NE TUBE PLACEMENT   Final Result   1. Esophageal route tube tip in the stomach. **This report has been created using voice recognition software. It may contain minor errors which are inherent in voice recognition technology. **      Final report electronically signed by  Lucio Brewer on 2/20/2021 11:15 AM      XR ABDOMEN FOR NG/OG/NE TUBE PLACEMENT   Final Result   Impression:   Limited exam   1. NG tube in the stomach. Nonspecific bowel gas pattern      This document has been electronically signed by: Tino Reeves MD on    02/19/2021 01:59 AM      US GALLBLADDER RUQ   Final Result   Distended appearance to the gallbladder which has a slightly thickened wall. Sludge is also present within its lumen. Sonographic Hamilton's sign is positive per the technologist. These findings could be on the basis of acute cholecystitis. If clinically    warranted, further evaluation with a nuclear medicine HIDA scan would be beneficial for further characterization. **This report has been created using voice recognition software. It may contain minor errors which are inherent in voice recognition technology. **      Final report electronically signed by Dr Rosalinda Browning on 2/17/2021 9:37 AM      CT ABDOMEN PELVIS WO CONTRAST Additional Contrast? None   Final Result   Impression:   1. Pancolitis consistent with the clinical history of C. difficile    colitis. No bowel perforation. 2.  Probable cholelithiasis. 3.  Bibasilar pulmonary consolidation which may be on the basis of    atelectasis or pneumonia. 4.  Small bilateral nonobstructing renal calculi. 5.  Small right middle lobe pulmonary nodule. This document has been electronically signed by: Lolis Branch MD on    02/17/2021 05:51 AM      All CTs at this facility use dose modulation techniques and iterative    reconstructions, and/or weight-based dosing   when appropriate to reduce radiation to a low as reasonably achievable. XR CHEST PORTABLE   Final Result   Impression:      Right central line tip proximal SVC. Stable bibasilar consolidation and atelectasis.       This document has been electronically signed by: Daniel Treadwell MD on    02/17/2021 04:20 AM      XR CHEST PORTABLE   Final Result Impression:      Stable bilateral basilar consolidation and atelectasis      This document has been electronically signed by: Philippe Mendez MD on    02/17/2021 03:20 AM      XR ABDOMEN FOR NG/OG/NE TUBE PLACEMENT   Final Result   Impression:   1. Distal NG tube is not well seen but is likely in the distal stomach. This document has been electronically signed by: Valerie Dailey MD on    02/17/2021 02:30 AM      XR CHEST PORTABLE   Final Result   Impression:   1. Right lower lobe airspace disease may represent atelectasis or    pneumonia. This document has been electronically signed by: Valerie Dailey MD on    02/17/2021 02:28 AM        Ct Abdomen Pelvis Wo Contrast Additional Contrast? None    Result Date: 2/17/2021  CT scan of the abdomen and pelvis without contrast Comparison: None Findings: The images are degraded by artifact related to patient body habitus. The left lateral abdominal wall is incompletely imaged. There is a 4 mm nodule within the right middle lobe. There is patchy consolidation within both lower lobes which may be on the basis of atelectasis or pneumonia. A nasogastric tube is in place and extends to the proximal duodenum. Evaluation of the parenchymal organs is limited by the lack of intravenous contrast, however, the liver, spleen, pancreas and visualized portions of the adrenal glands are normal in appearance. There is a layering high density material within the lumen of the gallbladder consistent with small gallstones versus high density sludge. There is a small amount of intra-abdominal ascites. Small bilateral nonobstructing renal calculi are present. The kidneys are otherwise normal. There is diffuse thickening of the wall of the colon associated with inflammatory changes throughout the pericolonic fat. Findings are consistent with a pancolitis and with the clinical history of C. difficile colitis. There is no bowel perforation.  The abdominal aorta is normal in course and caliber. There is no abdominal, pelvic or retroperitoneal adenopathy. The seminal vesicles and prostate gland are normal in appearance. A Velez catheter and rectal tube are in place. There are no acute bony abnormalities. No soft tissue abnormalities are present. Impression: 1. Pancolitis consistent with the clinical history of C. difficile colitis. No bowel perforation. 2.  Probable cholelithiasis. 3.  Bibasilar pulmonary consolidation which may be on the basis of atelectasis or pneumonia. 4.  Small bilateral nonobstructing renal calculi. 5.  Small right middle lobe pulmonary nodule. This document has been electronically signed by: Marty Espinoza MD on 02/17/2021 05:51 AM All CTs at this facility use dose modulation techniques and iterative reconstructions, and/or weight-based dosing when appropriate to reduce radiation to a low as reasonably achievable. Us Gallbladder Ruq    Result Date: 2/17/2021  PROCEDURE: US GALLBLADDER RUQ CLINICAL INFORMATION: 80-year-old female with cholelithiasis. Follow-up exam. COMPARISON: CT scan 2/17/2021. TECHNIQUE: Multiplanar sonographic images were obtained of the structures in the right upper quadrant. FINDINGS: Gallbladder - 12.3 x 4.9 x 4.7 cm Gallbladder Wall - 0.5 cm Common Duct - 0.5 cm Hamilton's Sign: positive The liver is of normal echogenicity. No masses are noted. The pancreatic head and body are within normal limits. The tail is not well seen due to overlying bowel gas. The gallbladder is distended. There is wall thickening. There is a large amount of sludge which is seen in the dependent portion of the gallbladder. The common bile duct is normal and measures 5 mm. There is no hydronephrosis in the visualized aspects of the right kidney. Distended appearance to the gallbladder which has a slightly thickened wall. Sludge is also present within its lumen.  Sonographic Hamilton's sign is positive per the technologist. These findings could be on the basis of acute cholecystitis. If clinically warranted, further evaluation with a nuclear medicine HIDA scan would be beneficial for further characterization. **This report has been created using voice recognition software. It may contain minor errors which are inherent in voice recognition technology. ** Final report electronically signed by Dr Ji Ruiz on 2/17/2021 9:37 AM    Xr Chest Portable    Result Date: 2/17/2021  1 view chest x-ray. Comparison: 2/17/2021 Findings: Right central line, tip proximal SVC. NG tube unchanged. Cardiomegaly. Prior sternotomy. Stable bilateral basilar consolidation and atelectasis. Impression: Right central line tip proximal SVC. Stable bibasilar consolidation and atelectasis. This document has been electronically signed by: Yina Mckeon MD on 02/17/2021 04:20 AM    Xr Chest Portable    Result Date: 2/17/2021  1 view chest x-ray. Comparison: 2/17/2021 Findings: NG tube is unchanged. Bilateral basilar consolidation and atelectasis are stable. Cardiomegaly. No bony abnormality. Impression: Stable bilateral basilar consolidation and atelectasis This document has been electronically signed by: Yina Mckeon MD on 02/17/2021 03:20 AM    Xr Chest Portable    Result Date: 2/17/2021  Exam: One View of the chest Comparison: None Clinical history: Hypotension Findings: NG tube is off the bottom of the image but is at least within the stomach Prior median sternotomy Cardiac silhouette is enlarged, without CHF No pneumothorax. No pleural fluid collection. Right lower lobe airspace disease may represent atelectasis or pneumonia. Impression: 1. Right lower lobe airspace disease may represent atelectasis or pneumonia.  This document has been electronically signed by: Robinson Nazario MD on 02/17/2021 02:28 AM    Xr Abdomen For Ng/og/ne Tube Placement    Result Date: 2/17/2021  Exam: One view the abdomen Comparison: None Clinical history: NG placement Findings: NG tube tip is not well seen but is likely in the distal stomach. No free intraperitoneal air identified. No obstructive bowel gas pattern. Impression: 1. Distal NG tube is not well seen but is likely in the distal stomach. This document has been electronically signed by: Tino Reeves MD on 02/17/2021 02:30 AM      **This report has been created using voice recognition software. It may contain minor errors which are inherent in voice recognition technology. **  Electronically signed by Skinny De La Cruz MD on 2/28/2021 at 2:56 PM

## 2021-03-01 LAB
ANION GAP SERPL CALCULATED.3IONS-SCNC: 9 MEQ/L (ref 8–16)
BASOPHILS # BLD: 0.9 %
BASOPHILS ABSOLUTE: 0.1 THOU/MM3 (ref 0–0.1)
BUN BLDV-MCNC: 18 MG/DL (ref 7–22)
CALCIUM IONIZED: 1.18 MMOL/L (ref 1.12–1.32)
CALCIUM SERPL-MCNC: 7.5 MG/DL (ref 8.5–10.5)
CHLORIDE BLD-SCNC: 112 MEQ/L (ref 98–111)
CO2: 20 MEQ/L (ref 23–33)
CREAT SERPL-MCNC: 1.5 MG/DL (ref 0.4–1.2)
EOSINOPHIL # BLD: 2.4 %
EOSINOPHILS ABSOLUTE: 0.3 THOU/MM3 (ref 0–0.4)
ERYTHROCYTE [DISTWIDTH] IN BLOOD BY AUTOMATED COUNT: 17.8 % (ref 11.5–14.5)
ERYTHROCYTE [DISTWIDTH] IN BLOOD BY AUTOMATED COUNT: 61 FL (ref 35–45)
GFR SERPL CREATININE-BSD FRML MDRD: 45 ML/MIN/1.73M2
GLUCOSE BLD-MCNC: 109 MG/DL (ref 70–108)
HCT VFR BLD CALC: 25.1 % (ref 42–52)
HEMOGLOBIN: 7.7 GM/DL (ref 14–18)
IMMATURE GRANS (ABS): 0.32 THOU/MM3 (ref 0–0.07)
IMMATURE GRANULOCYTES: 2.4 %
LYMPHOCYTES # BLD: 12.4 %
LYMPHOCYTES ABSOLUTE: 1.7 THOU/MM3 (ref 1–4.8)
MCH RBC QN AUTO: 30.1 PG (ref 26–33)
MCHC RBC AUTO-ENTMCNC: 30.7 GM/DL (ref 32.2–35.5)
MCV RBC AUTO: 98 FL (ref 80–94)
MONOCYTES # BLD: 8.3 %
MONOCYTES ABSOLUTE: 1.1 THOU/MM3 (ref 0.4–1.3)
NUCLEATED RED BLOOD CELLS: 0 /100 WBC
PLATELET # BLD: 260 THOU/MM3 (ref 130–400)
PMV BLD AUTO: 11 FL (ref 9.4–12.4)
POTASSIUM SERPL-SCNC: 3.6 MEQ/L (ref 3.5–5.2)
RBC # BLD: 2.56 MILL/MM3 (ref 4.7–6.1)
SEG NEUTROPHILS: 73.6 %
SEGMENTED NEUTROPHILS ABSOLUTE COUNT: 9.9 THOU/MM3 (ref 1.8–7.7)
SODIUM BLD-SCNC: 141 MEQ/L (ref 135–145)
WBC # BLD: 13.4 THOU/MM3 (ref 4.8–10.8)

## 2021-03-01 PROCEDURE — 2580000003 HC RX 258: Performed by: SURGERY

## 2021-03-01 PROCEDURE — 99024 POSTOP FOLLOW-UP VISIT: CPT | Performed by: SURGERY

## 2021-03-01 PROCEDURE — 82330 ASSAY OF CALCIUM: CPT

## 2021-03-01 PROCEDURE — 92523 SPEECH SOUND LANG COMPREHEN: CPT

## 2021-03-01 PROCEDURE — 6370000000 HC RX 637 (ALT 250 FOR IP): Performed by: PHYSICIAN ASSISTANT

## 2021-03-01 PROCEDURE — 36415 COLL VENOUS BLD VENIPUNCTURE: CPT

## 2021-03-01 PROCEDURE — 99232 SBSQ HOSP IP/OBS MODERATE 35: CPT | Performed by: INTERNAL MEDICINE

## 2021-03-01 PROCEDURE — 6370000000 HC RX 637 (ALT 250 FOR IP): Performed by: INTERNAL MEDICINE

## 2021-03-01 PROCEDURE — 80048 BASIC METABOLIC PNL TOTAL CA: CPT

## 2021-03-01 PROCEDURE — 6370000000 HC RX 637 (ALT 250 FOR IP): Performed by: SURGERY

## 2021-03-01 PROCEDURE — 85025 COMPLETE CBC W/AUTO DIFF WBC: CPT

## 2021-03-01 PROCEDURE — 6360000002 HC RX W HCPCS: Performed by: SURGERY

## 2021-03-01 PROCEDURE — 1200000003 HC TELEMETRY R&B

## 2021-03-01 PROCEDURE — 92526 ORAL FUNCTION THERAPY: CPT

## 2021-03-01 PROCEDURE — 6370000000 HC RX 637 (ALT 250 FOR IP): Performed by: NURSE PRACTITIONER

## 2021-03-01 RX ADMIN — MIRTAZAPINE 45 MG: 45 TABLET, FILM COATED ORAL at 20:26

## 2021-03-01 RX ADMIN — Medication 500 MG: at 05:40

## 2021-03-01 RX ADMIN — BUSPIRONE HYDROCHLORIDE 15 MG: 7.5 TABLET ORAL at 14:56

## 2021-03-01 RX ADMIN — DOXEPIN HYDROCHLORIDE 10 MG: 10 CAPSULE ORAL at 20:26

## 2021-03-01 RX ADMIN — Medication 500 MG: at 20:26

## 2021-03-01 RX ADMIN — QUETIAPINE FUMARATE 100 MG: 100 TABLET ORAL at 20:26

## 2021-03-01 RX ADMIN — Medication 500 MG: at 00:18

## 2021-03-01 RX ADMIN — HEPARIN SODIUM 5000 UNITS: 5000 INJECTION INTRAVENOUS; SUBCUTANEOUS at 14:56

## 2021-03-01 RX ADMIN — Medication 500 MG: at 12:15

## 2021-03-01 RX ADMIN — SODIUM CHLORIDE, PRESERVATIVE FREE 10 ML: 5 INJECTION INTRAVENOUS at 08:43

## 2021-03-01 RX ADMIN — BUSPIRONE HYDROCHLORIDE 15 MG: 7.5 TABLET ORAL at 08:44

## 2021-03-01 RX ADMIN — HEPARIN SODIUM 5000 UNITS: 5000 INJECTION INTRAVENOUS; SUBCUTANEOUS at 23:34

## 2021-03-01 RX ADMIN — ACETAMINOPHEN 650 MG: 325 TABLET ORAL at 08:44

## 2021-03-01 RX ADMIN — Medication 1 CAPSULE: at 08:44

## 2021-03-01 RX ADMIN — SERTRALINE 200 MG: 100 TABLET, FILM COATED ORAL at 08:44

## 2021-03-01 RX ADMIN — ATORVASTATIN CALCIUM 40 MG: 40 TABLET, FILM COATED ORAL at 08:44

## 2021-03-01 RX ADMIN — MICONAZOLE NITRATE: 20 POWDER TOPICAL at 08:45

## 2021-03-01 RX ADMIN — FAMOTIDINE 20 MG: 20 TABLET, FILM COATED ORAL at 20:27

## 2021-03-01 RX ADMIN — SODIUM CHLORIDE, PRESERVATIVE FREE 10 ML: 5 INJECTION INTRAVENOUS at 20:27

## 2021-03-01 RX ADMIN — LEVOTHYROXINE SODIUM 75 MCG: 75 TABLET ORAL at 05:39

## 2021-03-01 RX ADMIN — ARIPIPRAZOLE 10 MG: 10 TABLET ORAL at 20:26

## 2021-03-01 RX ADMIN — ACETAMINOPHEN 650 MG: 325 TABLET ORAL at 20:26

## 2021-03-01 RX ADMIN — BUSPIRONE HYDROCHLORIDE 15 MG: 7.5 TABLET ORAL at 20:26

## 2021-03-01 RX ADMIN — METOPROLOL TARTRATE 25 MG: 25 TABLET ORAL at 20:26

## 2021-03-01 RX ADMIN — FAMOTIDINE 20 MG: 20 TABLET, FILM COATED ORAL at 08:44

## 2021-03-01 RX ADMIN — BUPROPION HYDROCHLORIDE 300 MG: 150 TABLET, EXTENDED RELEASE ORAL at 08:44

## 2021-03-01 RX ADMIN — MICONAZOLE NITRATE: 20 POWDER TOPICAL at 20:27

## 2021-03-01 ASSESSMENT — PAIN DESCRIPTION - PAIN TYPE: TYPE: ACUTE PAIN

## 2021-03-01 ASSESSMENT — PAIN SCALES - GENERAL
PAINLEVEL_OUTOF10: 0
PAINLEVEL_OUTOF10: 1
PAINLEVEL_OUTOF10: 1
PAINLEVEL_OUTOF10: 3

## 2021-03-01 ASSESSMENT — PAIN DESCRIPTION - LOCATION: LOCATION: GENERALIZED

## 2021-03-01 ASSESSMENT — PAIN DESCRIPTION - ORIENTATION: ORIENTATION: LOWER

## 2021-03-01 NOTE — PLAN OF CARE
Problem: Discharge Planning:  Goal: Participates in care planning  2/28/2021 2305 by Sathya Benz RN  Outcome: Ongoing  Note: Discharge planning in process and discussed with patient/family. Social work consulted for any additional needs. Care manager aware of discharge needs. Patient from [address] [place] [address]. Problem: Bowel Function - Altered:  Goal: Bowel elimination is within specified parameters  Description: Bowel elimination is within specified parameters  2/28/2021 2305 by Sathya Benz RN  Outcome: Ongoing  Note: Patient has ostomy bag after total colectomy. Patient has liquid brown stool output. Will continue to monitor output. Problem: Pain:  Goal: Control of acute pain  Description: Control of acute pain  2/28/2021 2305 by Sathya Benz RN  Outcome: Ongoing  Note: Patient able to use 0-10 pain scale. Denies pain at this time. Agreeable to take PRN pain medications. Problem: Skin Integrity - Impaired:  Goal: Will show no infection signs and symptoms  Description: Will show no infection signs and symptoms  2/28/2021 2305 by Sathya Benz RN  Outcome: Ongoing  Note: No signs of new skin breakdown. Patient has documented coccyx wound. Buttocks is red. Patient has abdominal incision open to air. Mucous membranes pink and moist.  Assistance with turns/ambulation provided PRN. Will continue to monitor. Problem: Falls - Risk of:  Goal: Will remain free from falls  Description: Will remain free from falls  2/28/2021 2305 by Sathya Benz RN  Note: Patient remained free from falls this shift. Bed is in low position with alarm on and siderails up x2. Education given on use of call light and patient voiced understanding. Call light and beside table within reach. Arm band and falling star in place. Hourly rounds completed. Will continue to monitor. Care plan reviewed with patient. Patient verbalizes understanding of the plan of care and contributed to goal setting.

## 2021-03-01 NOTE — PROGRESS NOTES
[facility] - Dr Patricia Frias  for Dr. Diego Garcia  Postoperative Progress Note    Pt Name: Macey Gusman Record Number: [id number]  Date of Birth 1940   Today's Date: 3/1/2021    ASSESSMENT   1. 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 Hypotension secondary to shock - resolved   3. Acute on chronic kidney disease  4. Postoperative respiratory failure    5. Incidental pulmonary nodules  6. Complicated UTI  7. Metabolic encephalopathy  8. Leukocytosis but WBC slightly lower today  9. Acute on chronic anemia  10. Malnutrition/deconditioning   has a past medical history of Arthritis, Chronic kidney disease, Depression, Diabetes mellitus ([address] Hyperlipidemia, Hypertension, and Thyroid disease. PLAN   1. Tolerating thickened liquids with pureed diet - he wants to eat regular food. He understands that it is not recommended by speech at this time. He states he assumes the risk and wants regular food. Possible reevaluation by speech today. 2. ANTONIETTA drain care - serous fluid. Monitor outputs. Down to 450 mL over past 24 hrs. Continue drain care. 3. Wound & ostomy care - watch outputs. May need Imodium pending ostomy outputs. 950 mL out over the last 24 hours. 4. Pain control  5. Therapy as tolerated   6. Pulmonary toileting   7. GI & DVT prophylaxis - okay to resume heparin injections again   8. C. difficile treatment per admitting/ID -okay from a surgical standpoint to stop the oral vancomycin  9. Patient may sit on commode, may have mucus or small amount of retained stool giving him the sensation he needs to have a bowel movement. 10. Social service for disposition  SUBJECTIVE   Stable over the weekend. No signs of bleeding. ANTONIETTA drain serous with outputs down to 450 mL over the last 24 hours. Denies nausea or significant abdominal pain. Still having occasional sensation to have a bowel movement.   No significant fevers. Ostomy output 950 mL over 24 hours. No lightheadedness or dizziness. No chest pain or shortness of breath. He states he would still like to have regular food if possible later today. Incision healing well without drainage. Still feels weak overall. CURRENT MEDICATIONS   Scheduled Meds:   lactobacillus  1 capsule Oral Daily with breakfast    sodium chloride flush  10 mL Intravenous 2 times per day    famotidine  20 mg Oral BID    vancomycin  500 mg Oral 4 times per day    lidocaine 1 % injection  5 mL Intradermal Once    buPROPion  300 mg Oral Daily    ARIPiprazole  10 mg Oral Daily    busPIRone  15 mg Oral TID    doxepin  10 mg Oral Nightly    QUEtiapine  100 mg Oral Nightly    mirtazapine  45 mg Oral Nightly    sertraline  200 mg Oral Daily    piperacillin-tazobactam  3,375 mg Intravenous Once    [Held by provider] heparin (porcine)  5,000 Units Subcutaneous Q8H    atorvastatin  40 mg Oral Daily    levothyroxine  75 mcg Oral Daily    metoprolol tartrate  25 mg Oral BID    [Held by provider] tamsulosin  0.4 mg Oral Daily    calcium replacement protocol   Other RX Placeholder    miconazole   Topical BID     Continuous Infusions:   sodium chloride       PRN Meds:.hydrALAZINE, sodium chloride flush, promethazine **OR** ondansetron, morphine **OR** morphine, sodium chloride, potassium chloride **OR** potassium alternative oral replacement **OR** potassium chloride, LORazepam, HYDROcodone 5 mg - acetaminophen **OR** HYDROcodone 5 mg - acetaminophen, polyethylene glycol, acetaminophen **OR** acetaminophen  OBJECTIVE   CURRENT VITALS:  height is 5' 10\" (1.778 m) and weight is 266 lb 6.4 oz (120.8 kg). His oral temperature is 97.7 °F (36.5 °C). His blood pressure is 127/59 (abnormal) and his pulse is 69. His respiration is 18 and oxygen saturation is 95%.    Temperature Range (24h):Temp: 97.7 °F (36.5 °C) Temp  Av.1 °F (36.7 °C)  Min: 97.7 °F (36.5 °C)  Max: 99 °F (37.2 °C)  BP Range (04H): Systolic (64ZOT), QQC:571 , Min:111 , WSX:998     Diastolic (30ANI), CHP:61, Min:43, Max:72    Pulse Range (24h): Pulse  Av.1  Min: 69  Max: 80  Respiration Range (24h): Resp  Av.7  Min: 16  Max: 20  Current Pulse Ox (24h):  SpO2: 95 %  Pulse Ox Range (24h):  SpO2  Av.4 %  Min: 95 %  Max: 100 %  Oxygen Amount and Delivery: O2 Flow Rate (L/min): 1 L/min(O2 removed)  Incentive Spirometry Tx:            GENERAL: Alert to name, answers some questions, intermittent confusion  LUNGS: Decreased breath sounds at bases  HEART: normal rate and regular rhythm  ABDOMEN: obese, non-distended, soft, incisional tenderness, bowel sounds present. ANTONIETTA serous. Ostomy is pink patent with liquid stool  INCISION: Midline incision with staples well approximated, no bleeding   EXTERMITY: no cyanosis.     In: 48 [P.O.:50]  Out: 1850 [Urine:450; Drains:450]  Date 21 0000 - 21 2359   Shift 8849-1866 8019-4902 4838-9052 24 Hour Total   INTAKE   P.O.(mL/kg/hr) 50   50   Shift Total(mL/kg) 50(0.4)   50(0.4)   OUTPUT   Urine(mL/kg/hr) 450   450   Drains(mL/kg) 70(0.6)   70(0.6)   Stool(mL/kg) 600(5)   600(5)   Shift Total(mL/kg) 1120(9.3)   1120(9.3)   Weight (kg) 120.8 120.8 120.8 120.8     LABS     Recent Labs     21  0353 21  1055 21  0400 21  0411   WBC 18.0*  --  15.7* 13.4*   HGB 7.9*  --  7.6* 7.7*   HCT 25.9*  --  25.2* 25.1*     --  275 260   NA  --  139 140 141   K  --  3.6 3.6 3.6   CL  --  113* 114* 112*   CO2  --  19* 20* 20*   BUN  --  20 18 18   CREATININE  --  1.3* 1.5* 1.5*   CALCIUM  --  7.6* 7.4* 7.5*      RADIOLOGY   No new imaging    Electronically signed by Liliana Marino MD on 3/1/2021 at 6:30 AM

## 2021-03-01 NOTE — CARE COORDINATION
3/1/21, 2:56 PM EST    DISCHARGE ON GOING EVALUATION    [facility] day: 12  Location: -19/019-A Reason for admit: Hypotension [I95.9]   Procedure: POD # 9 status post total colectomy with end ileostomy secondary to C. difficile colitis/toxic megacolon  Barriers to Discharge: pain and nausea control. PT/OT/ST, ANTONIETTA drain management, creatinine 1.5, Nephrology following, Hgb 7.7, oral Vancomycin,   PCP: Zoe Sanon MD  Readmission Risk Score: 19%  Patient Goals/Plan/Treatment Preferences: From [address] Plan is skilled at discharge. SW following.

## 2021-03-01 NOTE — PROGRESS NOTES
[facility] NEUROSCIENCES 4A  Speech - Language - Cognitive Evaluation & Dysphagia tx     SLP Individual Minutes  Time In: 8092  Time Out: 5983  Minutes: 38  Timed Code Treatment Minutes: 0 Minutes   Dysphagia tx: 16 minutes  Speech/[place]/Cognitive evaluation: 22 minutes     Date: 3/1/2021  Patient Name: Keyshawn Hannon      CSN: [id number]   : 1940  (79 y.o.)  Gender: male   Referring Physician:  SYLVESTER Adamson CNP  Diagnosis: Hypotension   Secondary Diagnosis: Dysphagia, Cognitive deficits   Precautions: Aspiration, Fall Risk   History of Present Illness/Injury: Patient admitted to [facility] with above dx. See physician H&P for full report. ST currently following patient POC targeting dysphagia; per chart review 'diet recent changed to regular diet (per physician) with moderately thick liquids' d/t patient c/o of dislike to puree with consistent verbalization of 'I understand it is against ST recommendations.' Patient also continues to demonstrate with ongoing slow processing speed and poor carryover. ST to complete ST/cognitive evaluation and determine goals and POC as clinically appropriate. ST also completed dysphagia treatment to follow; see dysphagia tx note below for details. Past Medical History:   Diagnosis Date    Arthritis     Chronic kidney disease     Depression     [address] mellitus [address] [address]     Hyperlipidemia     Hypertension     Thyroid disease        Pain: No pain reported. Subjective:  Patient seen at bedside; alert and cooperative. Patient with flat affect overall. JOSLYN Rivera reporting, \"Yes patient is on general diet with thin liquids but he is refusing to eat overall, poor PO intake. \"     SOCIAL HISTORY:   Living Arrangements:  [address]   Work History: Retired ; 35 years at [employer], retired in   Education Level:  Bachelors Degree   Driving Status: Does not drive  Finance Management: Dependent/Unable - patient's son manages   Medication Management: Dependent/Unable - AL manages   ADL's: Assistance Required. Hobbies: Reading and watching TV   Vision Status: WFL  Hearing: WFL  Type of Home: Assisted living  Home Layout: One level  Home Access: Level entry  Home Equipment: Rolling walker    ORAL MOTOR:  Facial / Labial WFL    Lingual WFL    Dentition WFL    Velum WFL    Vocal Quality WFL    Sensation WFL    Cough WFL      SPEECH / VOICE:  Speech and Voice appear to be grossly intact for basic and complex daily communication    LANGUAGE:  Receptive:  1 Step Commands: 3/3  2 Step Commands: 3/3  Simple Yes/No Questions: 3/3  Complex Yes/No Questions: 3/3    Expressive:  Expressive language skills appear to be grossly intact for basic and complex daily communication. COGNITION:  [facility] Cognitive [facility]) version 7.3 completed. Pt scored 19/30. Normal is greater than or equal to 26/30. Orientation: 5/6  Immediate Recall: 4/5  Short-Term Recall: 1/5  Divergent Naming: 3 members/60 sec  Problem Solvin/2  Reasonin/2  Sequencing: Mildly impaired  Thought Organization: Mildly impaired  Insight: Fair  Attention: x1 error  Math Computation: 1/3  Executive Functionin/5    SWALLOWING:  Current Diet: Minced and moist diet with moderately thick liquids-2021  *see dysphagia tx note below for details        RECOMMENDATIONS/ASSESSMENT:  DIAGNOSTIC IMPRESSIONS:  Patient presents with mild cognitive deficits as evidence by impaired memory, thought organization, higher level problem solving/reasoning/executive functioning. Patient with limited cognitive demand within AL setting per patient report. Expressive/receptive language WFL. No dysarthria or dysphonia. Patient with notable slow processing speed and poor carryover of information. Patient would benefit from skilled ST services to target barriers/deficits.  24/hour supervision is recommended at this time with direct assistance to complete medications and finances. Rehabilitation Potential: good    EDUCATION:  Learner: Patient  Education:  Reviewed results and recommendations of this evaluation, Reviewed ST goals and Plan of Care, Reviewed recommendations for follow-up and Education Related to Potential Risks and Complications Due to Impairment/Illness/Injury  Evaluation of Education: Verbalizes understanding, Demonstrates with assistance, Needs further instruction and Family not present    PLAN:  Skilled SLP intervention on acute care 3-5 x per week or until goals met and/or pt plateaus in function. Specific interventions for next session may include: dysphagia tx, cognitive tx. PATIENT GOAL:    Did not state. Will further assess during treatment. SHORT TERM GOALS:  Short-term Goals  Timeframe for Short-term Goals: 2 weeks  Goal 1: Pt will consume puree diet with moderately thick liquids while utilizing compensatory strategies (upright position, small bites/sips, alternate liquids/solids) without overt s/s of aspiration to assist in nutrition/hydration. GOAL MET. NEW GOAL 1: Pt will consume minced and moist diet with moderately thick liquids while utilizing compensatory strategies (upright position, small bites/sips, alternate liquids/solids) without overt s/s of aspiration to assist in nutrition/hydration. INTERVENTIONS: Skilled dysphagia treatment completed with breakfast tray; consisting of regular texture pancakes, sausage, pureed fruit and moderately thick liquids. Patient consumed x4 PO trials of pureed fruit; appropriate oral intake/control, no s/s of aspiration    Patient consumed x2 PO trials of bite size piece of pancake + syrup; appropriate mastication, timely swallow, immediate coughing/choking resulting in slight SOB and overt discomfort. Patient returned to appropriate breathing pattern, quickly.      Patient consumed x4 PO trials of crushed crackers in puree; appropriate oral mastication, adequate bolus formation, timely swallow, no s/s of aspiration    Patient consumed ~6-8 oz moderately thick liquids; no s/s of aspiration. ST provided skilled level of education with patient discussing rational to support avoidance of regular texture foods d/t ongoing difficulty swallowing (with specific example of difficulty with pancake), high risk of aspiration which could lead to choking and other medical complication if consumed within uncontrolled setting. Patient verbalized understanding, patient agreeable to minced and moist diet with moderately thick liquids. *patient with chronic, dry cough at baseline. *ST also recommending repeat instrumental evaluation d/t ongoing improvements; ST to complete repeat instrumental evaluation 03/02 to determine safety of potential diet advancement. Patient in agreement. JOSLYN Garduno verbalized understanding     Goal 2: Pt will complete conservative advanced PO trials (mildly thick liquids, ?minced and moist if clinically indicated) with skilled ST only to determine potential advancement of diet level. GOAL MET. NEW GOAL 2: Pt will complete conservative advanced PO trials (mildly thick liquids, ?soft and bite size if clinically indicated) with skilled ST only to determine potential advancement of diet level. INTERVENTIONS: DNT secondary to focus on other goals    Goal 3: Pt will complete pharyngeal strengthening exercises (with/without NMES - order obtained 02/25/2021) x10 each with good sucess to improve pharyngeal integrity to assist in safe swallowing.   INTERVENTIONS:    Effortful swallows: x15, good success    Goal 4: Patient will complete recall, short term memory and working memory tasks with 70% accuracy provided mod cues to improve overall recall of daily and medical information  INTERVENTIONS: DNT secondary to focus on other goals    Goal 5: Patient will complete problem solving/reasoning and sequencing tasks with 70% accuracy provided mod cues to improve overall safety, thought organization and processing speed  INTERVENTIONS: DNT secondary to focus on other goals     LONG TERM GOALS:  No LTGs due to [address]

## 2021-03-01 NOTE — PROGRESS NOTES
117* 109*   CALCIUM 7.6* 7.4* 7.5*     Hepatic:   No results for input(s): LABALBU, AST, ALT, ALB, BILITOT, ALKPHOS in the last 72 hours. Meds:  Infusion:    sodium chloride       Meds:    lactobacillus  1 capsule Oral Daily with breakfast    sodium chloride flush  10 mL Intravenous 2 times per day    famotidine  20 mg Oral BID    vancomycin  500 mg Oral 4 times per day    lidocaine 1 % injection  5 mL Intradermal Once    buPROPion  300 mg Oral Daily    ARIPiprazole  10 mg Oral Daily    busPIRone  15 mg Oral TID    doxepin  10 mg Oral Nightly    QUEtiapine  100 mg Oral Nightly    mirtazapine  45 mg Oral Nightly    sertraline  200 mg Oral Daily    heparin (porcine)  5,000 Units Subcutaneous Q8H    atorvastatin  40 mg Oral Daily    levothyroxine  75 mcg Oral Daily    metoprolol tartrate  25 mg Oral BID    [Held by provider] tamsulosin  0.4 mg Oral Daily    calcium replacement protocol   Other RX Placeholder    miconazole   Topical BID     Meds prn: hydrALAZINE, sodium chloride flush, promethazine **OR** ondansetron, morphine **OR** morphine, sodium chloride, potassium chloride **OR** potassium alternative oral replacement **OR** potassium chloride, LORazepam, HYDROcodone 5 mg - acetaminophen **OR** HYDROcodone 5 mg - acetaminophen, polyethylene glycol, acetaminophen **OR** acetaminophen       Impression and Plan:  1. Acute kidney injury secondary to septic ATN  Nonoliguric at this time, creatinine slowly improving/stabilizing    2. Hypernatremia. Secondary to hypotonic losses from GI tract. Improved. 3.  C. difficile colitis with toxic megacolon. Patient status post total colectomy with end ileostomy  4. Azotemia: Significantly improved  5. Pulmonary nodule  6. History of CAD  7. Anemia  8. Hypotension, borderline. Monitor otherwise may need to start midodrine  9. Borderline hypokalemia will monitor.       Ml Ventura MD  [facility]

## 2021-03-01 NOTE — PROGRESS NOTES
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 CT of the abdomen pelvis on 2/17 shows pancolitis that was consistent with C. difficile. There was no bowel perforation or ileus. CT scan was ordered on 2/20, which showed toxic megacolon. Gen Surg was brought on board, ANTONIETTA drain continues to have 800-1000 mL output every day, plan to keep drain in through the weekend. Continue vancomycin   (Day #11), consider 14 days of therapy. Dietitian also evaluated, recommendations for thickened liquids with puréed diet. Stopped IV flagyl , continue  with PO vancomycin. 3/1-tolerating thickened liquids with puréed diet-continue drain care per general surgery-Imodium if okay with ID given the very large ostomy output on oral vancomycin since 2/25    1. Septic shock secondary to fulminant C. Diff colitis, resolved with noted hypotension, s/p Levophed, weaned off and currently stable. ID evaluted. Continue with current abx therapy. BP has been stable, continue to monitor. On oral vancomycin since 2/25  2. Acute metabolic encephalopathy: Improved, related to above with hypotension/hypoperfusion along with uremia. 3. CARMEN, improving: Secondary to septic ATN. Creatinine about the same nephrology following. Continue with gentle IVF fluids. No need for RRT at this time  4. Complicated UTI: UA shows bacteria, yeast, blood, and WBC.  Blood culture currently negative, awaiting results of urine culture.  Patient currently on vancomycin and Flagyl.  Holding treatment of Candida infection due to CARMEN. 5. Hypernatremia:  Resolved, secondary to hypotonic losses from GI tract. Nephrology recommending D5W to 100 mL/hr.  Reassess chemistry in the morning. 6. Acute postoperative hypoxic respiratory failure, resolved: Current saturations 99% on RA. Continue to monitor for respiratory needs. 7. Incidental pulmonary nodules: Seen incidentally on CT scan in January.  PET scan completed in 2/12.  Nodule noted in right lower lobe, with primary rule out for metastatic process.  The nodule could also be infectious or inflammatory, monitor for now.  CT of the chest should be repeated in 3 months. 8. Acute on chronic anemia: Hgb on arrival was 10.2, subsequent decrease in 2/18 to 8.7 following reports from the nursing staff of blood in his stools and his NG tube-> H&H stable this morning at 8.3/27, 1 unit of PRBC was given on 2/25. There is a component of blood loss anemia on chronic macrocytic anemia.  Continue to trend hemoglobin, will transfuse if <7.0.   9. Hypocalcemia: Resolved. Ionized calcium 1.14 this morning.  Replacement protocol in place if needed. 10. Obesity: BMI 38.35. PT OT    Okay to be transferred to [place] [id number] telemetry bed      Chief Complaint: Hypotension    Initial H and P:-      \"Boo Ruiz is a 71-year-old male was transferred from [address] [place] [address] on 2/17 for acute kidney injury and C. difficile colitis. Anna Lawrence was recently admitted to [facility] on 2/26 for altered mental status and sepsis secondary to right leg cellulitis. [facility] was treated with Zosyn and Rocephin, and also discharged with antibiotics to the nursing home at the time.  During the course of hospital treatment, they also noted a pulmonary nodule on CT scan that measured 3 x 2 x 2.6 cm.  It was a concern for malignancy at the time, the requested follow-up however patient does not have any further scans or PET scans.  Blood cultures grew E. coli at the time.   During this hospital course, the patient was originally sent from the SNF to [facility] on 2/14 when he was found to be diaphoretic and hypotensive with a fever of 102 °F.  Patient was evaluated in diseases did recommend a loop ileostomy with vancomycin irrigation believes the patient would benefit from it.  Obtaining surgery recommendations.  His creatinine continues to decrease, and no dialysis is needed at this point, BUN decreasing as well.  WBC irvin again overnight, and patient still complains of stomach pains.     2/21:  In spite of the aggressive treatment for C. difficile colitis patient is still complaining of severe abdominal pain with severe abdominal distention , discussion  with the medical resident and also with nursing team  the patient is not improving over the last few days even the nursing did mention the patient is worsening over the last 24 hours with more distention of the abdomen with leukocytosis so plan to obtain CT scan abdomen pelvis with p.o. contrast to reassess his C. difficile colitis of the abdomen to see if there is any progression or improvement, CT scan abdomen pelvis done with contrast did show progression of the ulcerative cecal area of severe cecal distention and possibility for pneumatosis so general surgery consulted stat and he discussed the options with the patient and the patient family and the plan was done to go ahead for OR that was done last night, total colectomy, patient did after surgery have a smooth postop course still intubated in the weaning trial currently, will continue IV antibiotics fluids support hemodynamics pain control and in collaboration with the ID and general surgery team.     2/22: Patient remains on pressors this morning, propofol from surgery stopped. Patient was intubated for the surgery, however extubated same day. Doing well on 3 L nasal cannula. Patient states that he feels he has to defecate, however his ostomy bag is draining properly. His wound is clean and dry. He still states some diffuse abdominal pain. His creatinine is trending back towards baseline, as well as his BUN. His Hgb remained stable.  WBC has decreased mildly from yesterday. We will continue to monitor. \"    2/23-> discussed with Nephrology, okay with PICC line. Pt just had Norco prior to exam, not very interactive. Will open eyes and respond to yes no questions but is moaning continually in pain. Discussed with Gen Surg who are okay to start tube feeds as pt has been without nutrition, will consult dietician.     2/24-> patient sitting up on side of the bed with therapy at the bedside. Patient continues to have large quantities of output from ANTONIETTA drain as well as from ostomy site. Surgery aware. 2/25-> Patient sitting up bedside at the time of the interview. Currently denies any physical complaints and denied any issues overnight and into the morning. He was updated on the current plan of care, verbalizes understanding, and had no other needs or questions at this time. 2/27 -patient sitting in the chair not in acute distress denies any chest pain headache visual disturbances complains about food just not tasting well, patient has C. difficile colitis, without any nausea vomiting. On puréed diet. 2/28   Patient is a poor historian, denies any active complaints. Even though had a lunch tray in front of him claims, did not get a tray. Reviewed and appreciate consultants  input    Subjective (past 24 hours):   No nausea no vomiting tolerating puréed diet  High output through the ostomy      Past medical history, family history, social history and allergies reviewed again and is unchanged since admission.       Medications:  Reviewed    Infusion Medications    sodium chloride       Scheduled Medications    lactobacillus  1 capsule Oral Daily with breakfast    sodium chloride flush  10 mL Intravenous 2 times per day    famotidine  20 mg Oral BID    vancomycin  500 mg Oral 4 times per day    lidocaine 1 % injection  5 mL Intradermal Once    buPROPion  300 mg Oral Daily    ARIPiprazole  10 mg Oral Daily    busPIRone  15 mg Oral TID    doxepin  10 mg Oral Nightly    QUEtiapine  100 mg Oral Nightly    mirtazapine  45 mg Oral Nightly    sertraline  200 mg Oral Daily    heparin (porcine)  5,000 Units Subcutaneous Q8H    atorvastatin  40 mg Oral Daily    levothyroxine  75 mcg Oral Daily    metoprolol tartrate  25 mg Oral BID    [Held by provider] tamsulosin  0.4 mg Oral Daily    calcium replacement protocol   Other RX Placeholder    miconazole   Topical BID     PRN Meds: hydrALAZINE, sodium chloride flush, promethazine **OR** ondansetron, morphine **OR** morphine, sodium chloride, potassium chloride **OR** potassium alternative oral replacement **OR** potassium chloride, LORazepam, HYDROcodone 5 mg - acetaminophen **OR** HYDROcodone 5 mg - acetaminophen, polyethylene glycol, acetaminophen **OR** acetaminophen      Intake/Output Summary (Last 24 hours) at 3/1/2021 1348  Last data filed at 3/1/2021 1159  Gross per 24 hour   Intake 80 ml   Output 1620 ml   Net -1540 ml       Diet:  Dietary Nutrition Supplements: Frozen Oral Supplement  Dietary Nutrition Supplements: Other Oral Supplement (see comment)  DIET DYSPHAGIA MINCED AND MOIST; Moderately Thick (Honey)    Exam:  BP (!) 113/46   Pulse 73   Temp 97.6 °F (36.4 °C) (Axillary)   Resp 18   Ht 5' 10\" (1.778 m)   Wt 266 lb 6.4 oz (120.8 kg)   SpO2 100%   BMI 38.22 kg/m²     General appearance: Alert and appropriate, pleasant geriatric male. No apparent distress, appears stated age and cooperative. HEENT: Pupils equal, round, and reactive to light. Conjunctivae/corneas clear. Neck: Supple, with full range of motion. No jugular venous distention. Trachea midline. Respiratory:  Normal respiratory effort. Clear to auscultation, bilaterally without Rales/Wheezes/Rhonchi. Cardiovascular: Regular rate and rhythm with normal S1/S2 without murmurs, rubs or gallops. Abdomen: Soft, non-tender, obese with normal bowel sounds.   Ileostomy noted with green output, ANTONIETTA drain intact with serosanguineous Levofloxacin, regardless of in vitro sensitivity, should not be used for staphylococcal infections other than uncomplicated lower UTIs. Moxifloxacin, regardless of in vitro sensitivity, shouldnot be used for staphylococcal infections. Lab Results   Component Value Date    LABANAE  06/10/2020     Culture yielded heavy mixed growth which included anaerobic gram positive cocci. If a true mixed aerobic and anaerobic infection is suspected, then broad spectrum empiric antibiotic therapy is indicated and should include coverage for anaerobic organisms. Urinalysis:      Lab Results   Component Value Date    NITRU NEGATIVE 02/17/2021    WBCUA 50-75 02/17/2021    BACTERIA MODERATE 02/17/2021    RBCUA 10-15 02/17/2021    BLOODU SMALL 02/17/2021    SPECGRAV 1.017 02/17/2021       Radiology:  XR CHEST PORTABLE   Final Result   Left lower lobe atelectasis and/or infiltrate. **This report has been created using voice recognition software. It may contain minor errors which are inherent in voice recognition technology. **      Final report electronically signed by Dr. Aislinn Pineda on 2/24/2021 3:21 PM      IR FLUORO GUIDED CVA DEVICE PLMT/REPLACE/REMOVAL   Final Result      1. Status post successful PICC line insertion. 2. Of note, an attempt was made to access the right brachial vein initially. However this was unsuccessful due to patient's body habitus. Therefore the right IJ approach was utilized. **This report has been created using voice recognition software. It may contain minor errors which are inherent in voice recognition technology. **      Final report electronically signed by Dr. Sy Coto on 2/24/2021 6:26 PM      FL MODIFIED BARIUM SWALLOW W VIDEO   Final Result   1.  . Laryngeal penetration of nectar thick and thin barium with aspiration of thin barium   2. Additional recommendations from the speech therapist will follow.             **This report has been created using voice recognition software. It may contain minor errors which are inherent in voice recognition technology. **      Final report electronically signed by Dr. Raquel Peck on 2/24/2021 10:22 AM      XR CHEST PORTABLE   Final Result   Normally positioned enteric tube. Unchanged left lower lung consolidation and left pleural effusion. Unchanged right lower lung airspace disease or atelectasis. This document has been electronically signed by: Dany Rodrigues MD on    02/23/2021 11:52 PM      XR CHEST PORTABLE   Final Result   Malposition of the NG tube. **This report has been created using voice recognition software. It may contain minor errors which are inherent in voice recognition technology. **      Final report electronically signed by Dr. Aiden Roger on 2/23/2021 8:21 PM      XR ABDOMEN FOR NG/OG/NE TUBE PLACEMENT   Final Result   Impression:      NG tube tip proximal stomach. This document has been electronically signed by: Saritha Mccauley MD on    02/22/2021 11:21 PM      XR CHEST PORTABLE   Final Result   1. Appropriately positioned lines and tubes. 2. Mild bibasilar atelectasis. 3. Small left-sided pleural effusion. **This report has been created using voice recognition software. It may contain minor errors which are inherent in voice recognition technology. **      Final report electronically signed by Dr. Remigio Corrigan on 2/21/2021 11:27 AM      XR ABDOMEN (2 VIEWS)   Final Result   Impression:   1. Nonobstructing bowel gas pattern. 2.  Oral contrast is seen in the proximal small bowel cannot exclude    extravasation left midabdomen. Correlation with CT abdomen and pelvis is    recommended. 3.  Post total colectomy. Overlying skin staples. NG tube in stomach. Pleural-parenchymal disease both lung bases.       This document has been electronically signed by: Bruce Duff MD on    02/21/2021 08:51 AM      CT ABDOMEN PELVIS WO CONTRAST Additional Contrast? Oral Final Result       1. Worsening appearance to the colon. This is now diffusely distended. There is worsening thickening of the distal sigmoid colon and rectum. There is new thickening of the cecum with possible pneumatosis. 2. Worsening free fluid in the abdomen. No free air is noted. 3. Small layering bilateral pleural effusions with dependent bibasilar atelectasis. 4. Distended gallbladder. **This report has been created using voice recognition software. It may contain minor errors which are inherent in voice recognition technology. **      Final report electronically signed by Dr. Tea Orantes on 2/20/2021 2:38 PM      XR ABDOMEN FOR NG/OG/NE TUBE PLACEMENT   Final Result   1. Esophageal route tube tip in the stomach. **This report has been created using voice recognition software. It may contain minor errors which are inherent in voice recognition technology. **      Final report electronically signed by Dr. Tea Orantes on 2/20/2021 11:15 AM      XR ABDOMEN FOR NG/OG/NE TUBE PLACEMENT   Final Result   Impression:   Limited exam   1. NG tube in the stomach. Nonspecific bowel gas pattern      This document has been electronically signed by: Adrián Saez MD on    02/19/2021 01:59 AM      US GALLBLADDER RUQ   Final Result   Distended appearance to the gallbladder which has a slightly thickened wall. Sludge is also present within its lumen. Sonographic Hamilton's sign is positive per the technologist. These findings could be on the basis of acute cholecystitis. If clinically    warranted, further evaluation with a nuclear medicine HIDA scan would be beneficial for further characterization. **This report has been created using voice recognition software. It may contain minor errors which are inherent in voice recognition technology. **      Final report electronically signed by Dr Schwartz on 2/17/2021 9:37 AM      CT ABDOMEN PELVIS WO CONTRAST Additional Contrast? None   Final Result   Impression:   1. Pancolitis consistent with the clinical history of C. difficile    colitis. No bowel perforation. 2.  Probable cholelithiasis. 3.  Bibasilar pulmonary consolidation which may be on the basis of    atelectasis or pneumonia. 4.  Small bilateral nonobstructing renal calculi. 5.  Small right middle lobe pulmonary nodule. This document has been electronically signed by: Sabine Kessler MD on    02/17/2021 05:51 AM      All CTs at this facility use dose modulation techniques and iterative    reconstructions, and/or weight-based dosing   when appropriate to reduce radiation to a low as reasonably achievable. XR CHEST PORTABLE   Final Result   Impression:      Right central line tip proximal SVC. Stable bibasilar consolidation and atelectasis. This document has been electronically signed by: Stephany Fothergill, MD on    02/17/2021 04:20 AM      XR CHEST PORTABLE   Final Result   Impression:      Stable bilateral basilar consolidation and atelectasis      This document has been electronically signed by: Stephany Fothergill, MD on    02/17/2021 03:20 AM      XR ABDOMEN FOR NG/OG/NE TUBE PLACEMENT   Final Result   Impression:   1. Distal NG tube is not well seen but is likely in the distal stomach. This document has been electronically signed by: Boo Parikh MD on    02/17/2021 02:30 AM      XR CHEST PORTABLE   Final Result   Impression:   1. Right lower lobe airspace disease may represent atelectasis or    pneumonia. This document has been electronically signed by: Boo Parikh MD on    02/17/2021 02:28 AM      FL MODIFIED BARIUM SWALLOW W VIDEO    (Results Pending)     Ct Abdomen Pelvis Wo Contrast Additional Contrast? None    Result Date: 2/17/2021  CT scan of the abdomen and pelvis without contrast Comparison: None Findings: The images are degraded by artifact related to patient body habitus.   The left lateral abdominal wall is incompletely imaged. There is a 4 mm nodule within the right middle lobe. There is patchy consolidation within both lower lobes which may be on the basis of atelectasis or pneumonia. A nasogastric tube is in place and extends to the proximal duodenum. Evaluation of the parenchymal organs is limited by the lack of intravenous contrast, however, the liver, spleen, pancreas and visualized portions of the adrenal glands are normal in appearance. There is a layering high density material within the lumen of the gallbladder consistent with small gallstones versus high density sludge. There is a small amount of intra-abdominal ascites. Small bilateral nonobstructing renal calculi are present. The kidneys are otherwise normal. There is diffuse thickening of the wall of the colon associated with inflammatory changes throughout the pericolonic fat. Findings are consistent with a pancolitis and with the clinical history of C. difficile colitis. There is no bowel perforation. The abdominal aorta is normal in course and caliber. There is no abdominal, pelvic or retroperitoneal adenopathy. The seminal vesicles and prostate gland are normal in appearance. A Velez catheter and rectal tube are in place. There are no acute bony abnormalities. No soft tissue abnormalities are present. Impression: 1. Pancolitis consistent with the clinical history of C. difficile colitis. No bowel perforation. 2.  Probable cholelithiasis. 3.  Bibasilar pulmonary consolidation which may be on the basis of atelectasis or pneumonia. 4.  Small bilateral nonobstructing renal calculi. 5.  Small right middle lobe pulmonary nodule. This document has been electronically signed by: Jarad Samaniego MD on 02/17/2021 05:51 AM All CTs at this facility use dose modulation techniques and iterative reconstructions, and/or weight-based dosing when appropriate to reduce radiation to a low as reasonably achievable.     Us Gallbladder Ruq    Result Date: 2/17/2021  PROCEDURE: US GALLBLADDER RUQ CLINICAL INFORMATION: 71-year-old female with cholelithiasis. Follow-up exam. COMPARISON: CT scan 2/17/2021. TECHNIQUE: Multiplanar sonographic images were obtained of the structures in the right upper quadrant. FINDINGS: Gallbladder - 12.3 x 4.9 x 4.7 cm Gallbladder Wall - 0.5 cm Common Duct - 0.5 cm Hamilton's Sign: positive The liver is of normal echogenicity. No masses are noted. The pancreatic head and body are within normal limits. The tail is not well seen due to overlying bowel gas. The gallbladder is distended. There is wall thickening. There is a large amount of sludge which is seen in the dependent portion of the gallbladder. The common bile duct is normal and measures 5 mm. There is no hydronephrosis in the visualized aspects of the right kidney. Distended appearance to the gallbladder which has a slightly thickened wall. Sludge is also present within its lumen. Sonographic Hamilton's sign is positive per the technologist. These findings could be on the basis of acute cholecystitis. If clinically warranted, further evaluation with a nuclear medicine HIDA scan would be beneficial for further characterization. **This report has been created using voice recognition software. It may contain minor errors which are inherent in voice recognition technology. ** Final report electronically signed by Dr Evelia Sotomayor on 2/17/2021 9:37 AM    Xr Chest Portable    Result Date: 2/17/2021  1 view chest x-ray. Comparison: 2/17/2021 Findings: Right central line, tip proximal SVC. NG tube unchanged. Cardiomegaly. Prior sternotomy. Stable bilateral basilar consolidation and atelectasis. Impression: Right central line tip proximal SVC. Stable bibasilar consolidation and atelectasis. This document has been electronically signed by: Omar Mackey MD on 02/17/2021 04:20 AM    Xr Chest Portable    Result Date: 2/17/2021  1 view chest x-ray.  Comparison: 2/17/2021 Findings: NG tube is unchanged. Bilateral basilar consolidation and atelectasis are stable. Cardiomegaly. No bony abnormality. Impression: Stable bilateral basilar consolidation and atelectasis This document has been electronically signed by: Belynda Kehr, MD on 02/17/2021 03:20 AM    Xr Chest Portable    Result Date: 2/17/2021  Exam: One View of the chest Comparison: None Clinical history: Hypotension Findings: NG tube is off the bottom of the image but is at least within the stomach Prior median sternotomy Cardiac silhouette is enlarged, without CHF No pneumothorax. No pleural fluid collection. Right lower lobe airspace disease may represent atelectasis or pneumonia. Impression: 1. Right lower lobe airspace disease may represent atelectasis or pneumonia. This document has been electronically signed by: Sherlyn Lynne MD on 02/17/2021 02:28 AM    Xr Abdomen For Ng/og/ne Tube Placement    Result Date: 2/17/2021  Exam: One view the abdomen Comparison: None Clinical history: NG placement Findings: NG tube tip is not well seen but is likely in the distal stomach. No free intraperitoneal air identified. No obstructive bowel gas pattern. Impression: 1. Distal NG tube is not well seen but is likely in the distal stomach. This document has been electronically signed by: Sherlyn Lynne MD on 02/17/2021 02:30 AM      **This report has been created using voice recognition software. It may contain minor errors which are inherent in voice recognition technology. **  Electronically signed by Nida Mendiola MD on 3/1/2021 at 1:48 PM

## 2021-03-02 ENCOUNTER — APPOINTMENT (OUTPATIENT)
Dept: GENERAL RADIOLOGY | Age: 81
DRG: 853 | End: 2021-03-02
Attending: INTERNAL MEDICINE
Payer: MEDICARE

## 2021-03-02 LAB
ANION GAP SERPL CALCULATED.3IONS-SCNC: 9 MEQ/L (ref 8–16)
BUN BLDV-MCNC: 18 MG/DL (ref 7–22)
CALCIUM IONIZED: 1.2 MMOL/L (ref 1.12–1.32)
CALCIUM SERPL-MCNC: 7.9 MG/DL (ref 8.5–10.5)
CHLORIDE BLD-SCNC: 114 MEQ/L (ref 98–111)
CO2: 19 MEQ/L (ref 23–33)
CREAT SERPL-MCNC: 1.5 MG/DL (ref 0.4–1.2)
EKG ATRIAL RATE: 77 BPM
EKG P AXIS: -10 DEGREES
EKG P-R INTERVAL: 220 MS
EKG Q-T INTERVAL: 442 MS
EKG QRS DURATION: 160 MS
EKG QTC CALCULATION (BAZETT): 500 MS
EKG R AXIS: -36 DEGREES
EKG T AXIS: -11 DEGREES
EKG VENTRICULAR RATE: 77 BPM
GFR SERPL CREATININE-BSD FRML MDRD: 45 ML/MIN/1.73M2
GLUCOSE BLD-MCNC: 110 MG/DL (ref 70–108)
MAGNESIUM: 1.8 MG/DL (ref 1.6–2.4)
PHOSPHORUS: 3.9 MG/DL (ref 2.4–4.7)
POTASSIUM SERPL-SCNC: 3.8 MEQ/L (ref 3.5–5.2)
SODIUM BLD-SCNC: 142 MEQ/L (ref 135–145)

## 2021-03-02 PROCEDURE — 97530 THERAPEUTIC ACTIVITIES: CPT

## 2021-03-02 PROCEDURE — 99232 SBSQ HOSP IP/OBS MODERATE 35: CPT | Performed by: INTERNAL MEDICINE

## 2021-03-02 PROCEDURE — 97110 THERAPEUTIC EXERCISES: CPT

## 2021-03-02 PROCEDURE — 6370000000 HC RX 637 (ALT 250 FOR IP): Performed by: INTERNAL MEDICINE

## 2021-03-02 PROCEDURE — 99024 POSTOP FOLLOW-UP VISIT: CPT | Performed by: SURGERY

## 2021-03-02 PROCEDURE — 6370000000 HC RX 637 (ALT 250 FOR IP): Performed by: SURGERY

## 2021-03-02 PROCEDURE — 80048 BASIC METABOLIC PNL TOTAL CA: CPT

## 2021-03-02 PROCEDURE — 84100 ASSAY OF PHOSPHORUS: CPT

## 2021-03-02 PROCEDURE — 82330 ASSAY OF CALCIUM: CPT

## 2021-03-02 PROCEDURE — 6370000000 HC RX 637 (ALT 250 FOR IP): Performed by: NURSE PRACTITIONER

## 2021-03-02 PROCEDURE — 2500000003 HC RX 250 WO HCPCS: Performed by: INTERNAL MEDICINE

## 2021-03-02 PROCEDURE — 74230 X-RAY XM SWLNG FUNCJ C+: CPT

## 2021-03-02 PROCEDURE — 6360000002 HC RX W HCPCS: Performed by: SURGERY

## 2021-03-02 PROCEDURE — 92611 MOTION FLUOROSCOPY/SWALLOW: CPT

## 2021-03-02 PROCEDURE — 93005 ELECTROCARDIOGRAM TRACING: CPT | Performed by: PHYSICIAN ASSISTANT

## 2021-03-02 PROCEDURE — 2580000003 HC RX 258: Performed by: SURGERY

## 2021-03-02 PROCEDURE — 1200000003 HC TELEMETRY R&B

## 2021-03-02 PROCEDURE — APPSS30 APP SPLIT SHARED TIME 16-30 MINUTES: Performed by: NURSE PRACTITIONER

## 2021-03-02 PROCEDURE — 6370000000 HC RX 637 (ALT 250 FOR IP): Performed by: PHYSICIAN ASSISTANT

## 2021-03-02 PROCEDURE — 99232 SBSQ HOSP IP/OBS MODERATE 35: CPT | Performed by: PHYSICIAN ASSISTANT

## 2021-03-02 PROCEDURE — 83735 ASSAY OF MAGNESIUM: CPT

## 2021-03-02 PROCEDURE — 36415 COLL VENOUS BLD VENIPUNCTURE: CPT

## 2021-03-02 RX ADMIN — DOXEPIN HYDROCHLORIDE 10 MG: 10 CAPSULE ORAL at 21:09

## 2021-03-02 RX ADMIN — BARIUM SULFATE 20 ML: 400 PASTE ORAL at 10:56

## 2021-03-02 RX ADMIN — Medication 500 MG: at 03:17

## 2021-03-02 RX ADMIN — BARIUM SULFATE 20 ML: 400 SUSPENSION ORAL at 10:56

## 2021-03-02 RX ADMIN — Medication 500 MG: at 21:10

## 2021-03-02 RX ADMIN — METOPROLOL TARTRATE 25 MG: 25 TABLET ORAL at 09:39

## 2021-03-02 RX ADMIN — HYDROCODONE BITARTRATE AND ACETAMINOPHEN 2 TABLET: 5; 325 TABLET ORAL at 09:58

## 2021-03-02 RX ADMIN — METOPROLOL TARTRATE 25 MG: 25 TABLET ORAL at 21:09

## 2021-03-02 RX ADMIN — BARIUM SULFATE 20 ML: 0.81 POWDER, FOR SUSPENSION ORAL at 10:57

## 2021-03-02 RX ADMIN — FAMOTIDINE 20 MG: 20 TABLET, FILM COATED ORAL at 09:39

## 2021-03-02 RX ADMIN — QUETIAPINE FUMARATE 100 MG: 100 TABLET ORAL at 21:09

## 2021-03-02 RX ADMIN — HEPARIN SODIUM 5000 UNITS: 5000 INJECTION INTRAVENOUS; SUBCUTANEOUS at 16:37

## 2021-03-02 RX ADMIN — HYDRALAZINE HYDROCHLORIDE 25 MG: 25 TABLET, FILM COATED ORAL at 09:39

## 2021-03-02 RX ADMIN — BUSPIRONE HYDROCHLORIDE 15 MG: 7.5 TABLET ORAL at 09:41

## 2021-03-02 RX ADMIN — SODIUM CHLORIDE, PRESERVATIVE FREE 10 ML: 5 INJECTION INTRAVENOUS at 09:38

## 2021-03-02 RX ADMIN — Medication 500 MG: at 16:35

## 2021-03-02 RX ADMIN — BUSPIRONE HYDROCHLORIDE 15 MG: 7.5 TABLET ORAL at 21:09

## 2021-03-02 RX ADMIN — BUSPIRONE HYDROCHLORIDE 15 MG: 7.5 TABLET ORAL at 16:34

## 2021-03-02 RX ADMIN — BUPROPION HYDROCHLORIDE 300 MG: 150 TABLET, EXTENDED RELEASE ORAL at 09:41

## 2021-03-02 RX ADMIN — ARIPIPRAZOLE 10 MG: 10 TABLET ORAL at 21:09

## 2021-03-02 RX ADMIN — SERTRALINE 200 MG: 100 TABLET, FILM COATED ORAL at 09:40

## 2021-03-02 RX ADMIN — Medication 1 CAPSULE: at 09:42

## 2021-03-02 RX ADMIN — MICONAZOLE NITRATE: 20 POWDER TOPICAL at 21:11

## 2021-03-02 RX ADMIN — MICONAZOLE NITRATE: 20 POWDER TOPICAL at 10:30

## 2021-03-02 RX ADMIN — ATORVASTATIN CALCIUM 40 MG: 40 TABLET, FILM COATED ORAL at 09:43

## 2021-03-02 RX ADMIN — MIRTAZAPINE 45 MG: 45 TABLET, FILM COATED ORAL at 21:09

## 2021-03-02 RX ADMIN — SODIUM CHLORIDE, PRESERVATIVE FREE 10 ML: 5 INJECTION INTRAVENOUS at 21:10

## 2021-03-02 RX ADMIN — Medication 500 MG: at 09:38

## 2021-03-02 RX ADMIN — HEPARIN SODIUM 5000 UNITS: 5000 INJECTION INTRAVENOUS; SUBCUTANEOUS at 05:59

## 2021-03-02 RX ADMIN — FAMOTIDINE 20 MG: 20 TABLET, FILM COATED ORAL at 21:11

## 2021-03-02 RX ADMIN — LEVOTHYROXINE SODIUM 75 MCG: 75 TABLET ORAL at 09:40

## 2021-03-02 ASSESSMENT — PAIN SCALES - GENERAL
PAINLEVEL_OUTOF10: 7
PAINLEVEL_OUTOF10: 2
PAINLEVEL_OUTOF10: 7
PAINLEVEL_OUTOF10: 6
PAINLEVEL_OUTOF10: 7

## 2021-03-02 NOTE — PROGRESS NOTES
[facility] - Dr. Willie Walker  Postoperative Progress Note    Pt Name: Jabier Burnett  Medical Record Number: [id number]  Date of Birth 1940   Today's Date: 3/2/2021    ASSESSMENT   1. 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 Hypotension secondary to shock - resolved   3. Acute on chronic kidney disease  4. Postoperative respiratory failure - resolved   5. Incidental pulmonary nodules  6. Complicated UTI  7. Metabolic encephalopathy  8. Leukocytosis - trending down  9. Acute on chronic anemia  10. Malnutrition/deconditioning   has a past medical history of Arthritis, Chronic kidney disease, Depression, Diabetes mellitus ([facility] [address] Hyperlipidemia, Hypertension, and Thyroid disease. PLAN   1. Tolerating thickened liquids with pureed diet - he wants to eat regular food. He understands that it is not recommended by speech at this time. Going for modified barium today - awaiting results. 2. ANTONIETTA drain care - serous fluid. Monitor outputs. Down to 325 mLs over past 24 hrs. Hopefully we can remove it later this week. 3. Wound & ostomy care - watch outputs. May need Imodium pending ostomy outputs. Slowing down - 500 mls in the last 24 hours. 4. Pain control  5. Therapy as tolerated   6. Pulmonary toileting   7. GI & DVT prophylaxis  8. Repeat H&H in am to monitor   9. C. difficile treatment per admitting/ID -okay from a surgical standpoint to stop the oral vancomycin  10. Social service for disposition  11. Doing well from a surgical standpoint   SUBJECTIVE   Stable over the weekend. No signs of bleeding. ANTONIETTA drain outputs are slowing down - Denies nausea or significant abdominal pain. Still having occasional sensation to have a bowel movement but no leakage or mucous. No significant fevers. Ostomy slowing down. No lightheadedness or dizziness. No chest pain or shortness of breath.   He states he would still like to have regular food as soon as possible. Incision healing well without drainage. Still feels weak overall but moving better with therapy. Incontinent of urine. CURRENT MEDICATIONS   Scheduled Meds:   lactobacillus  1 capsule Oral Daily with breakfast    sodium chloride flush  10 mL Intravenous 2 times per day    famotidine  20 mg Oral BID    vancomycin  500 mg Oral 4 times per day    lidocaine 1 % injection  5 mL Intradermal Once    buPROPion  300 mg Oral Daily    ARIPiprazole  10 mg Oral Daily    busPIRone  15 mg Oral TID    doxepin  10 mg Oral Nightly    QUEtiapine  100 mg Oral Nightly    mirtazapine  45 mg Oral Nightly    sertraline  200 mg Oral Daily    heparin (porcine)  5,000 Units Subcutaneous Q8H    atorvastatin  40 mg Oral Daily    levothyroxine  75 mcg Oral Daily    metoprolol tartrate  25 mg Oral BID    [Held by provider] tamsulosin  0.4 mg Oral Daily    calcium replacement protocol   Other RX Placeholder    miconazole   Topical BID     Continuous Infusions:   sodium chloride       PRN Meds:.hydrALAZINE, sodium chloride flush, promethazine **OR** ondansetron, morphine **OR** morphine, sodium chloride, potassium chloride **OR** potassium alternative oral replacement **OR** potassium chloride, LORazepam, HYDROcodone 5 mg - acetaminophen **OR** HYDROcodone 5 mg - acetaminophen, polyethylene glycol, acetaminophen **OR** acetaminophen  OBJECTIVE   CURRENT VITALS:  height is 5' 10\" (1.778 m) and weight is 266 lb 6.4 oz (120.8 kg). His oral temperature is 98.3 °F (36.8 °C). His blood pressure is 134/60 and his pulse is 81. His respiration is 18 and oxygen saturation is 98%.    Temperature Range (24h):Temp: 98.3 °F (36.8 °C) Temp  Av.1 °F (36.7 °C)  Min: 97.6 °F (36.4 °C)  Max: 98.4 °F (36.9 °C)  BP Range (48Q): Systolic (12KJJ), JJX:779 , Min:99 , HBV:000     Diastolic (57YAC), JFK:96, Min:38, Max:60    Pulse Range (24h): Pulse  Av.8  Min: 72  Max: 81  Respiration Range (24h): Resp Av.7  Min: 16  Max: 18  Current Pulse Ox (24h):  SpO2: 98 %  Pulse Ox Range (24h):  SpO2  Av.8 %  Min: 96 %  Max: 100 %  Oxygen Amount and Delivery: O2 Flow Rate (L/min): 1 L/min(O2 removed)  Incentive Spirometry Tx:            GENERAL: Alert, confusion better, more alert and talkative today  LUNGS: Decreased breath sounds at bases  HEART: normal rate and regular rhythm  ABDOMEN: obese, non-distended, soft, incisional tenderness, bowel sounds present. ANTONIETTA serous. Ostomy is pink patent with liquid stool  INCISION: Midline incision with staples well approximated, no bleeding   EXTERMITY: no cyanosis. In: 100 [P.O.:100]  Out: 725 [Drains:275]  Date 21 0000 - 21 2359   Shift 5400-6703 9316-2987 0609-2627 24 Hour Total   INTAKE   P.O. 100   100   I. V.(mL/kg/hr) 0(0)   0   Shift Total(mL/kg) 100(0.8)   100(0.8)   OUTPUT   Drains 60   60   Stool 200   200   Shift Total(mL/kg) 099(1.9)   799(4.1)   Weight (kg) 120.8 120.8 120.8 120.8     LABS     Recent Labs     21  0400 21  0411 21  0452   WBC 15.7* 13.4*  --    HGB 7.6* 7.7*  --    HCT 25.2* 25.1*  --     260  --     141 142   K 3.6 3.6 3.8   * 112* 114*   CO2 20* 20* 19*   BUN 18 18 18   CREATININE 1.5* 1.5* 1.5*   MG  --   --  1.8   PHOS  --   --  3.9   CALCIUM 7.4* 7.5* 7.9*      RADIOLOGY   No new imaging    Electronically signed by SYLVESTER Barlow - CNP on 3/2/2021 at 8:28 AM    Patient seen and examined independently by me early this AM. Above discussed and I agree with Janelle Laird CNP. See my additional comments below for updated orders and plan. Labs, cultures, and radiographs where available were reviewed. I discussed patient concerns with the patient's nurse and instructions were given. Please see our orders for the updated patient care plan. -Modified barium swallow today. Currently on thickened liquids with puréed. Still wanting to eat regular food. ANTONIETTA serous.   Overall output slowly getting better. Most likely removed prior to discharge. Wound and ostomy teaching and care. Imodium as needed pending any high output ostomy function. Pulmonary toileting. PT/OT. DVT prophylaxis. Infectious disease following as needed. Social service for disposition. Surgically very stable.     Electronically signed by Saba Puckett MD on 3/2/21 at 12:30 PM EST

## 2021-03-02 NOTE — PLAN OF CARE
Problem: Nutrition  Goal: Optimal nutrition therapy  Outcome: Ongoing   Nutrition Problem #1: Inadequate oral intake  Intervention: Food and/or Nutrient Delivery: Continue Current Diet, Continue Oral Nutrition Supplement, Vitamin Supplement  Nutritional Goals: Patient will tolerate and consume 75% or more of meals during LOS.

## 2021-03-02 NOTE — PROGRESS NOTES
[facility]   Nephrology progress note  3/2/2021, 10:52 AM      Pt Name:    Santos Rincon  MRN:     [id number]     YOB: 1940  Admit Date:    2/17/2021 12:35 AM  Primary Care Physician: Gracy Katz MD   Room number  1H-87/168-S    Chief Complaint: Nephrology following for CARMEN and hypernatremia    Subjective:  Patient seen and examined  Seen and examined earlier today  Poor historian  Comfortable      Objective:  24HR INTAKE/OUTPUT:      Intake/Output Summary (Last 24 hours) at 3/2/2021 1052  Last data filed at 3/2/2021 0336  Gross per 24 hour   Intake 100 ml   Output 825 ml   Net -725 ml     I/O last 3 completed shifts: In: 130 [P.O.:100; I.V.:30]  Out: 825 [Drains:325; Stool:500]  No intake/output data recorded. Admission weight: 296 lb 4.8 oz (134.4 kg)  Wt Readings from Last 3 Encounters:   02/28/21 266 lb 6.4 oz (120.8 kg)   11/25/20 283 lb 3.2 oz (128.5 kg)   10/16/20 273 lb 12.8 oz (124.2 kg)     Body mass index is 38.22 kg/m².     Physical examination  VITALS:     Vitals:    03/01/21 1715 03/01/21 2015 03/02/21 0336 03/02/21 0945   BP: (!) 127/56 (!) 117/56 134/60 (!) 130/50   Pulse: 78 76 81 83   Resp: 16 16 18 18   Temp: 98.4 °F (36.9 °C) 98.2 °F (36.8 °C) 98.3 °F (36.8 °C) 98.4 °F (36.9 °C)   TempSrc: Oral Oral Oral Oral   SpO2: 99% 97% 98% 97%   Weight:       Height:         General Appearance: Overall improved  No acute distress  Neck: No JVD  Lungs: no use of accessory muscles  Heart:  S1, S2 heard  GI: + Ostomy present no guarding  Extremities: trace LE edema      Lab Data  CBC:   Recent Labs     02/28/21  0400 03/01/21  0411   WBC 15.7* 13.4*   HGB 7.6* 7.7*   HCT 25.2* 25.1*    260     BMP:  Recent Labs     02/28/21  0400 03/01/21  0411 03/02/21  0452    141 142   K 3.6 3.6 3.8   * 112* 114*   CO2 20* 20* 19*   BUN 18 18 18   CREATININE 1.5* 1.5* 1.5*   GLUCOSE 117* 109* 110*   CALCIUM 7.4* 7.5* 7.9*   MG  --   --  1.8   PHOS  --   --  3.9 Hepatic:   No results for input(s): LABALBU, AST, ALT, ALB, BILITOT, ALKPHOS in the last 72 hours. Meds:  Infusion:    sodium chloride       Meds:    lactobacillus  1 capsule Oral Daily with breakfast    sodium chloride flush  10 mL Intravenous 2 times per day    famotidine  20 mg Oral BID    vancomycin  500 mg Oral 4 times per day    lidocaine 1 % injection  5 mL Intradermal Once    buPROPion  300 mg Oral Daily    ARIPiprazole  10 mg Oral Daily    busPIRone  15 mg Oral TID    doxepin  10 mg Oral Nightly    QUEtiapine  100 mg Oral Nightly    mirtazapine  45 mg Oral Nightly    sertraline  200 mg Oral Daily    heparin (porcine)  5,000 Units Subcutaneous Q8H    atorvastatin  40 mg Oral Daily    levothyroxine  75 mcg Oral Daily    metoprolol tartrate  25 mg Oral BID    [Held by provider] tamsulosin  0.4 mg Oral Daily    calcium replacement protocol   Other RX Placeholder    miconazole   Topical BID     Meds prn: hydrALAZINE, sodium chloride flush, promethazine **OR** ondansetron, morphine **OR** morphine, sodium chloride, potassium chloride **OR** potassium alternative oral replacement **OR** potassium chloride, LORazepam, HYDROcodone 5 mg - acetaminophen **OR** HYDROcodone 5 mg - acetaminophen, polyethylene glycol, acetaminophen **OR** acetaminophen       Impression and Plan:  1. Acute kidney injury secondary to septic ATN  Creatinine stabilizing at 1.5  Overall stable    2. Mild metabolic acidosis. Will monitor  3. Hypernatremia. Secondary to hypotonic losses from GI tract. Improved. 4.  C. difficile colitis with toxic megacolon. Patient status post total colectomy with end ileostomy  5. Azotemia: Significantly improved  6. Pulmonary nodule  7. History of CAD  8. Anemia  9. Hypotension, borderline but improved today.   Monitor otherwise may need to start midodrine if needed    Charmayne Carrie, MD  [facility]

## 2021-03-02 NOTE — PROGRESS NOTES
[facility]  SPEECH THERAPY  [facility] ON[facility] MED 5K  Modified Barium Swallow    SLP Individual Minutes  Time In: 7367  Time Out: 7391  Minutes: 9  Timed Code Treatment Minutes: 0 Minutes       Date: 3/2/2021  Patient Name: Phi Bustamante      CSN: [id number]   : 1940  (79 y.o.)  Gender: male   Referring Physician:  Dr Yaw Hale  Diagnosis: Hypotension   Secondary Diagnosis: Dysphagia   Precautions: Aspiration  History of Present Illness/Injury: Patient admitted to [facility] with above dx. See physician H&P for full report. ST currently following patient POC targeting dysphagia. Most recent MBS completed 2021 recommending puree diet with moderately thick liquids. Patient with ongoing improvements re: mentation and participation within dysphagia interventions. ST to complete repeat MBS in order to further evaluate pharyngeal function of the swallow and determine safety of potential diet/liquid advancement. Patient has a past medical history of Arthritis, Chronic kidney disease, Depression, Diabetes mellitus ([address] Hyperlipidemia, Hypertension, and Thyroid disease. Current Diet: Minced and moist diet with moderately thick liquids     Pain: No pain reported. SUBJECTIVE:  Patient seen at bedside with fluoroscopy suite; alert and pleasant. Patient with ongoing fat affect; appropriate participation within all trials     OBJECTIVE:    Respiratory Status:  Independent    Behavioral Observation:  Alert and Oriented, pleasant and cooperative    PATIENT WAS EVALUATED USING:  Barium:  Thin liquids, mildly thick liquids, moderately thick liquids, puree, soft solids, hard solids     ORAL PREPARATION PHASE:  Impaired:  Slow Mastication    ORAL PHASE: Impaired:  Slow AP Movement and Uncontrolled Bolus/Diffuse Fall Over Tongue Base     ORAL PHASE RONNY SCORE: (Dysphagia outcome and severity scale)  4 = Mild-Moderate Dysphagia - May have one or two diet consistencies restricted - Oral residue clears with cue - Intermittent supervision or cueing    PHARYNGEAL PHASE:  Impaired: Delayed Swallow, Decreased Airway Protection and Decreased Tongue Based Retraction     PHARYNGEAL PHASE RONNY SCORE: (Dysphagia outcome and severity scale)  5 = Mild Dysphagia - may need one consistency restricted - May have one or more of the following: Aspiration with thin - cough to clear, Airway penetration midway to the vocal cords with one or more consistency or to the vocal folds with one consistency, but clears spontaneously - Residue in the pharynx clears spontaneously    EVIDENCE FOR LARYNGEAL PENETRATION AND/OR ASPIRATION:  No evidence of aspiration  Laryngeal penetration evident with thin liquids via straw x1, thin liquid from peach juice (mixed consistency) x1    PENETRATION-ASPIRATION SCALE (PAS): Thin Liquids: 4 = Material enters the airway, contacts the vocal folds, and is ejected from the airway  Mildly Thick Liquids:  1 = Material does not enter the airway  Moderately Thick Liquids: 1 = Material does not enter the airway  Puree:  1 = Material does not enter the airway  Soft Solid:  1 = Material does not enter the airway  Hard Solid: 1 = Material does not enter the airway    ESOPHAGEAL PHASE:   No significant findings    ATTEMPTED TECHNIQUES:  Small Bolus Size Effective    Straw Ineffective    Cup Effective    Chin Tuck Not Attempted    Head Turn Not Attempted    Spoon Presentations Not Attempted    Volitional Cough Effective    Spontaneous Cough Not Attempted           DIAGNOSTIC IMPRESSIONS:  Patient presents with mild-moderate oral and mild pharyngeal dysphagia as evidence by the findings outlined above. Patient demonstrated with significantly improved mentation compared to previous MBS completed; patient able to follow all commands and maintain appropriate attention throughout entire study.  Patient consumed PO trials of moderately thick liquids, mildly thick liquids and thin liquids via cup; premature spillage noted consistently to the pyriforms, mildly delayed swallow, appropriate hyolaryngeal elevation and epiglottic inversion; no laryngeal penetration or aspiration noted. Laryngeal penetration noted before the swallow noted x1 with thin liquids via straw and x1 of thin liquid juice from peaches; contacted the vocal folds, spontaneously cleared with additional swallows. PO trials of puree, soft solids and hard solids completed; slow but appropriate oral mastication, adequate bolus formation, premature spillage over BOT to the pyriforms, delayed swallow, no penetration or aspiration noted. Appropriate pharyngeal constriction noted as evidence by limited-0 pharyngeal residue remaining post swallow. ST recommending minced and moist diet with THIN liquids, upright position, NO STRAWS, small bites/sips. Patient would benefit from skilled ST services to target dysphagia needs with goals to eventual return to baseline diet. Diet Recommendations:  Minced and moist diet with thin liquids   Strategies:  Full Upright Position, Small Bite/Sip, No Straw, Pulmonary Monitoring, Limit Distractions and Monitor for Fatigue   Rehabilitation Potential: good    EDUCATION:  Learner: Patient  Education:  Reviewed results and recommendations of this evaluation, Reviewed diet and strategies, Reviewed signs, symptoms and risks of aspiration, Reviewed ST goals and Plan of Care, Reviewed recommendations for follow-up and Education Related to Potential Risks and Complications Due to Impairment/Illness/Injury  Evaluation of Education: Verbalizes understanding, Demonstrates with assistance, Needs further instruction and Family not present    PLAN:  Skilled SLP intervention on acute care 3-5 x per week or until goals met and/or pt plateaus in function. Specific interventions for next session may include: dysphagia tx, cognitive tx. PATIENT GOAL:    Did not state. Will further assess during treatment.     SHORT TERM GOALS:  Short-term Goals  Timeframe for Short-term Goals: 2 weeks  Goal 1: Pt will consume minced and moist diet with thin liquids while utilizing compensatory strategies (NO straws, upright position, small bites/sips) without overt s/s of aspiration to assist in nutrition/hydration. Goal 2: Pt will complete conservative advanced PO trials (soft and bite size and/or regular as clinically appropriate) with skilled ST only to determine potential advancement of diet level. Goal 3: Pt will complete pharyngeal strengthening exercises x10 and improved timing of the swallow with good sucess to improve pharyngeal integrity of the swallow and overall timing of the swallow.   Goal 4: Patient will complete recall, short term memory and working memory tasks with 70% accuracy provided mod cues to improve overall recall of daily and medical information  Goal 5: Patient will complete problem solving/reasoning and sequencing tasks with 70% accuracy provided mod cues to improve overall safety, thought organization and processing speed      LONG TERM GOALS:  No LTGs due to [address]

## 2021-03-02 NOTE — CARE COORDINATION
3/2/21, 1:04 PM EST    DISCHARGE PLANNING EVALUATION    Call to [employer] for Bryanna Franco to contact SW.     1:21 PM Received call from [facility] therapy, nursing and physician notes for last 48 hours faxed to her for pre cert which she states has already been started.

## 2021-03-02 NOTE — PROGRESS NOTES
[facility]  Occupational Therapy  Daily Note  Time:   Time In: 7497  Time Out: 1447  Timed Code Treatment Minutes: 34 Minutes  Minutes: 34          Date: 3/2/2021  Patient Name: Eldon Armas,   Gender: male      Room: -17/017-A  MRN: [id number]  : 1940  (79 y.o.)  Referring Practitioner: Dr. Yanna Mendez DO  Diagnosis: Hypotension  Additional Pertinent Hx: Pt was transferred from [facility] [place] on  for acute kidney injury and C. difficile colitis. Patient was recently admitted to [facility] on  for altered mental status and sepsis secondary to right leg cellulitis. He was treated with Zosyn and Rocephin, and also discharged with antibiotics to the nursing home at the time. During this hospital course, the patient was originally sent from the SNF to [facility] [place] on  when he was found to be diaphoretic and hypotensive with a fever of 102 °F.  Patient was evaluated in the emergency room and diagnosed to have sepsis but the source was unknown at the time and he was given fluid resuscitation and started on a Levophed drip. Patient was started on Zosyn at time. During his hospital stay he was found to have worsening renal function and nephrology was consulted. On  the patient was seen by Dr. Jessica Campbell from nephrology. Further work-up did show C. difficile colitis. Patient was started on vancomycin. He had a noted mental change and worsening creatinine on . 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     Restrictions/Precautions:  Restrictions/Precautions: Fall Risk, Isolation  Position Activity Restriction  Other position/activity restrictions: ileostomy, C-diff isolation, minced and moist diet with honey thick liquids       SUBJECTIVE: Nurse Kami leyva'd session, In bed upon arrival, agreeable to OT session, [place], cooperative    PAIN:  Chest pain, did not rate, Nurse notified    COGNITION: Slow Processing and [place], Oriented x 4      ADL: for sequencing and safety to increase his independence with self care. Short term goal 4: Pt will tolerate standing for over 2 minute duration with MIN A to increase his endurance for ease of dressing or bathing. Short term goal 5: Pt will be oriented to place/situation/day and  his goals for safety each day with cues as needed to increase his safety and improve functioning. Following session, patient left in safe position with all fall risk precautions in place.

## 2021-03-02 NOTE — PROGRESS NOTES
[address] [address]  INPATIENT PHYSICAL THERAPY  DAILY NOTE  STR ONC MED 5K - 5K-17/017-A    Time In: 4064  Time Out: 8935  Timed Code Treatment Minutes: 40 Minutes  Minutes: 40          Date: 3/2/2021  Patient Name: Malina Narvaez,  Gender:  male        MRN: [id number]  : 1940  (79 y.o.)     Referring Practitioner: Dr. Zechariah Barajas  Diagnosis: hypotension  Additional Pertinent Hx: admit with above diagnosis, +Cdiff induced pancolitis, CARMEN. 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 Prior Level of Function:  Lives With: Alone  Type of Home: Assisted living  Home Layout: One level  Home Access: Level entry  Home Equipment: Rolling walker   Bathroom Shower/Tub: Walk-in shower  Bathroom Toilet: Standard  Bathroom Equipment: Grab bars in shower, Grab bars around toilet  Bathroom Accessibility: Accessible    ADL Assistance: Needs assistance  Homemaking Assistance: Needs assistance  Homemaking Responsibilities: No  Ambulation Assistance: Independent  Transfer Assistance: Independent  Active : No  Additional Comments: Pt was not able to provide reliable information about his level of function prior to admission secondary to confusion about recent events. Restrictions/Precautions:  Restrictions/Precautions: Fall Risk, Isolation  Position Activity Restriction  Other position/activity restrictions: ileostomy, C-diff isolation, minced and moist diet with honey thick liquids     SUBJECTIVE: RN approved session, pt is supine in bed and agreeable to PT, transport present at the end of the session to take pt to modified barium swallow test.    PAIN: 7/10: RN in and gave Norco at the beginning of the session    OBJECTIVE:  Bed Mobility:  Rolling to Left: Moderate Assistance   Supine to Sit: Maximum Assistance, verbal cues for log rolling technique  Sit to Supine:  Moderate Assistance   Scooting: Contact Guard Assistance, to EOB    Transfers:  Sit to Stand: Minimal Assistance, x 4 trials, from EOB and recliner, cues for hand placement, slow to ascend  Stand to Sit:Minimal Assistance    Ambulation:  Contact Guard Assistance, Minimal Assistance  Distance: 2 feet to the L towards HOB, 3 feet x 2 bed <--> recliner  Surface: Level Tile  Device:Rolling Walker  Gait Deviations: Forward Flexed Posture, Slow Dunia, Decreased Step Length Bilaterally and Decreased Gait Speed  **Pt anxious when up, requesting to sit down after ~30 seconds, cues for proper breathing and to continue standing, pt quick to sit down; stooped posture, decreased step length and height    Balance:  Static Sitting Balance:  Stand By Assistance  Dynamic Sitting Balance: Stand By Assistance; pt sits EOB at least 20 minutes during session  Static Standing Balance: Contact Guard Assistance; pt stands ~1 min x 2 during pericare  Dynamic Standing Balance: Contact Guard Assistance, Minimal Assistance    Exercise:  Patient was guided in 1 set(s) 10 reps of exercise to both lower extremities while seated EOB. Ankle pumps, Seated marches and Long arc quads. Exercises were completed for increased independence with functional mobility. Functional Outcome Measures: Completed  AM-PAC Inpatient Mobility Raw Score : 13  AM-PAC Inpatient T-Scale Score : 36.74    ASSESSMENT:  Assessment: Patient progressing toward established goals. Activity Tolerance:  Patient tolerance of  treatment: fair, limited by pain, fatigue. Equipment Recommendations: Other: cont to assess needs  Discharge Recommendations:  Subacute/Skilled Nursing Facility    Plan: Times per week: 3-5X GM  Times per day: Daily  Current Treatment Recommendations: Strengthening, Neuromuscular Re-education, Home Exercise Program, Balance Training, Endurance Training, Patient/Caregiver Education & Training, Safety Education & Training, Functional Mobility Training, Transfer Training, Gait Training    Patient Education  Patient Education: Transfers, Gait    Goals:  Patient goals : not stated  Short term goals  Time Frame for Short term goals: by discharge  Short term goal 1: roll L/R with Chuy for pressure relief  Short term goal 2: sidelying to/from sit with modAx1 to get in/out of bed  Short term goal 3: sit to std with minAx2 to get in/out of chairs  Short term goal 4: tolerate >15 min sitting balance activity with </=SBA to demonstrate inc strength for progression with home mobility  Short term goal 5: Pt to ambulate >10 feet with RW CGA for room ambulation. Long term goals  Time Frame for Long term goals : no LTGs set secondary to short ELOS    Following session, patient left in safe position with all fall risk precautions in place.

## 2021-03-02 NOTE — CARE COORDINATION
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 Creatinine 1.5. General Surgery and Nephrology following, PT/OT/ST, Dietician, SS, oral Vancomycin, prn medications, Potassium replacement protocol, minced and moist diet with honey thickened liquids, wound and drain care, SCD's, telemetry, up with assistance. Consult placed to Wound/Ostomy RN-new ileostomy  PCP: Brandan Kilgore MD  Readmission Risk Score: 21%  Patient Goals/Plan/Treatment Preferences: [facility] was a transfer to  with plans for SNF at discharge. PT evaluation needed for pre-cert to be initiated.

## 2021-03-02 NOTE — PROGRESS NOTES
Comprehensive Nutrition Assessment    Type and Reason for Visit:  Reassess(PO Monitor)    Nutrition Recommendations/Plan:   *Recommend a Multivitamin w/minerals daily. *Continue Magic Cups and greek yogurt with pureed food TID. *Diet per SLP & MD.    Nutrition Assessment: Pt improving from a nutritional standpoint AEB pt report of fair appetite consuming 26-50% of most meals with good acceptance of Magic Cups TID. Remains at risk for further nutritional compromise r/t ongoing decreased appetite, dysphagia, admit with hypotension, c-diff with toxic megacolon, s/p total colectomy with ileostomy 2/20/21, CARMEN on CKD, complicated UTI,  metabolic encephalopathy, increased nutrient needs to support wound healing, and underlying medical condition (hx: DM, CKD, and thyroid disease). Nutrition recommendations/interventions as per above. Malnutrition Assessment:  Malnutrition Status: At risk for malnutrition (Comment)    Context:  Acute Illness     Findings of the 6 clinical characteristics of malnutrition:  Energy Intake:  7 - 50% or less of estimated energy requirements for 5 or more days  Weight Loss:  (none noted pta; -2.8% in last week (? r/t edema, fluid))     Body Fat Loss:  No significant body fat loss     Muscle Mass Loss:  No significant muscle mass loss    Fluid Accumulation:  Unable to assess     Strength:  Not Performed    Estimated Daily Nutrient Needs:  Energy (kcal):  3577-3956 kcals (12-15); Weight Used for Energy Requirements:  (131 kgm 2/22)     Protein (g):  53-60 gm (0.7-0.8) - CKD; Weight Used for Protein Requirements:  Ideal(75 kgm)        Fluid (ml/day):  being managed by nephrology; Method Used for Fluid Requirements:  Other (Comment)      Nutrition Related Findings:  pt seen; he reports appetite is \"so-so\" consuming 26-50% of most meals with good acceptance of the Magic Cups TID. Pt has yet to try the [place] Yogurt mixed with Pureed peaches but states he would like to continue it. +ileostomy with 500 mL output in 24 hours. Labs: Cr. 1.5, Glucose 110. Rx includes: Lipitor, Culturelle, Synthroid, Remeron and Vancomycin      Wounds:  (s/p 2/20/21: totoal colectomy with end ileostomy, unstageable coccyx, penis stage II)       Current Nutrition Therapies:    Dietary Nutrition Supplements: Frozen Oral Supplement  Dietary Nutrition Supplements: Other Oral Supplement (see comment)  DIET DYSPHAGIA MINCED AND MOIST; Moderately Thick (Honey)    Anthropometric Measures:  · Height: 5' 10\" (177.8 cm)  · Current Body Weight: 266 lb 6.4 oz (120.8 kg)(2/28; +1 edema BLE)   · Admission Body Weight: 296 lb 4.8 oz (134.4 kg)(2/17 +1 edema)    · Usual Body Weight: (pt u/a state; per EMR: 6/28/20: 277#)     · Ideal Body Weight: 166 lbs  · BMI: 38.2  · BMI Categories: Obese Class 2 (BMI 35.0 -39.9)       Nutrition Diagnosis:   · Inadequate oral intake related to (poor appetite and dislike of dysphagia diet) as evidenced by intake 0-25%, intake 26-50%    Nutrition Interventions:   Food and/or Nutrient Delivery:  Continue Current Diet, Continue Oral Nutrition Supplement, Vitamin Supplement  Nutrition Education/Counseling:  Education initiated(Encouraged oral intake, ONS use, greek yogurt with pureed fruit mixed in it.)   Coordination of Nutrition Care:  Continue to monitor while inpatient, Speech Therapy    Goals:  Patient will tolerate and consume 75% or more of meals during LOS. Nutrition Monitoring and Evaluation:   Behavioral-Environmental Outcomes:  None Identified   Food/Nutrient Intake Outcomes:  Diet Advancement/Tolerance, Food and Nutrient Intake, Supplement Intake, Vitamin/Mineral Intake  Physical Signs/Symptoms Outcomes:  Biochemical Data, Chewing or Swallowing, GI Status, Weight, Skin, Nutrition Focused Physical Findings, Fluid Status or Edema     Discharge Planning:     Too soon to determine     Electronically signed by Stanford Fraga RD, LD on 3/2/21 at 10:25 AM EST    Contact: [phone]

## 2021-03-02 NOTE — PROGRESS NOTES
Vital signs obtained and charted. Patient complains of chest pain. Patient states of pain of \"7\" on a scale from 1-10. JOSLYN Hooker notified. Patient is alert and oriented to person, place, and time. Speech is clear and appropriate. Mucous membranes are pink, moist, and dry. Pupils are equal, round, and reactive to light. Bilateral pupils are size 4mm, down to size 2mm when reacting to light. Bilateral upper extremities are pink, warm, dry. Skin turgor and capillary refill less than three seconds bilaterally. Hand grasp strong and equal bilaterally. Patient on Room Air. Respirations are easy and unlabored in bilateral upper and lower lobes. Patient has a triple lumen PICC line in the right brachial vein that is capped off. Nancy Worcester has yellow, serous color drainage located on left lower abdomen. Ostomy is patent with liquid brown/yellow stool located in center of abdomen. Stoma is pink and moist. Patient has midline incision with no drainage or bleeding through dressing. JOSLYN Hooker notified. Abdomen round,  non-distended and tender at incision area. Bowel sounds active in all four quadrants. Heart tones clear and regular upon auscultation. Bilateral lower extremities pink, warm, dry, with some bruising. Patient able to move all extremities without difficulty. Patient denies numbness, tingling or discomfort in all extremities. Patient remains resting quietly in bed at this time. Bed alarm in place and functioning properly. Call light and water pitcher remain within reach.

## 2021-03-02 NOTE — PROGRESS NOTES
now.  CT of the chest should be repeated in 3 months. 9. Acute on chronic anemia: Hgb on arrival was 10.2, subsequent decrease in 2/18 to 8.7 following reports from the nursing staff of blood in his stools and his NG tube-> H&H stable this morning at 8.3/27, 1 unit of PRBC was given on 2/25. There is a component of blood loss anemia on chronic macrocytic anemia.  Continue to trend hemoglobin, will transfuse if <7.0. 10. Hypocalcemia: Resolved. Ionized calcium 1.14 this morning.  Replacement protocol in place if needed. 11. Obesity: BMI 38.35. Chief Complaint: Hypotension    HPI / Hospital Course: Gabriela Christine is a 61-year-old male was transferred from [address] [place] [address] on 2/17 for acute kidney injury and C. difficile colitis. Pillo Gan was recently admitted to [facility] on 2/26 for altered mental status and sepsis secondary to right leg cellulitis. [facility] was treated with Zosyn and Rocephin, and also discharged with antibiotics to the nursing home at the time.  During the course of hospital treatment, they also noted a pulmonary nodule on CT scan that measured 3 x 2 x 2.6 cm.  It was a concern for malignancy at the time, the requested follow-up however patient does not have any further scans or PET scans.  Blood cultures grew E. coli at the time.   During this hospital course, the patient was originally sent from the SNF to [facility] [place] on 2/14 when he was found to be diaphoretic and hypotensive with a fever of 102 °F.  Patient was evaluated in the emergency room and diagnosed to have sepsis but the source was unknown at the time and he was given fluid resuscitation and started on a Levophed drip.  Patient was started on Zosyn at time. Mikel Rutherfords his hospital stay he was found to have worsening renal function and nephrology was consulted. Sandrita Chatterjee 2/16 the patient was seen by Dr. Dotty Whitman from nephrology.  Further work-up did show C. difficile colitis. Pillo Gan was started on vancomycin. [facility] had a noted mental change and worsening creatinine on 2/16 so he was transferred to [address] [place] [address] ICU for further management and care per Dr. Enrique Ricardo"     2/18: Patient this morning remains on pressors as needed to maintain his blood pressure.  He has been receiving fluid throughout the night.  His hemoglobin did drop from 10 to about 8, however it is stayed stable since then, continue to monitor.  His WBC has decreased along with his creatinine.  However his BUN increased overnight.  The patient seems more confused today with an inability to answer where or when he is.  The nephrologist stated that if his BUN increases over 100 dialysis would be warranted at that time.  The surgeon also states that at this current point if surgery is warranted it would be a total colectomy.  Both of these options were discussed with the family who stated that they would be okay starting dialysis if needed.  They with prefer to hold off on surgery right now.     2/19: Patient remains on pressors to maintain his blood pressure however the dose has been weaned significantly. Radha Oven still currently receiving IVF.  Hgb has remained stable.  WBC did increase overnight.  His creatinine has decreased to 3.7. Benoit Monae has stated 95.  The nephrologist had seen the patient earlier in the morning, and stated that dialysis is not needed at this time.  Due to the rising WBC, infectious disease has been consulted.     2/20: Patient remains on pressors, cortisol was checked on 2/17 which was normal for a.m.  Infectious diseases did recommend a loop ileostomy with vancomycin irrigation believes the patient would benefit from it.  Obtaining surgery recommendations.  His creatinine continues to decrease, and no dialysis is needed at this point, BUN decreasing as well.  WBC irvin again overnight, and patient still complains of stomach pains.     2/21:  In spite of the aggressive treatment for C. difficile colitis patient is still complaining of severe abdominal pain with severe abdominal distention , discussion  with the medical resident and also with nursing team  the patient is not improving over the last few days even the nursing did mention the patient is worsening over the last 24 hours with more distention of the abdomen with leukocytosis so plan to obtain CT scan abdomen pelvis with p.o. contrast to reassess his C. difficile colitis of the abdomen to see if there is any progression or improvement, CT scan abdomen pelvis done with contrast did show progression of the ulcerative cecal area of severe cecal distention and possibility for pneumatosis so general surgery consulted stat and he discussed the options with the patient and the patient family and the plan was done to go ahead for OR that was done last night, total colectomy, patient did after surgery have a smooth postop course still intubated in the weaning trial currently, will continue IV antibiotics fluids support hemodynamics pain control and in collaboration with the ID and general surgery team.     2/22: Patient remains on pressors this morning, propofol from surgery stopped. Patient was intubated for the surgery, however extubated same day. Doing well on 3 L nasal cannula. Patient states that he feels he has to defecate, however his ostomy bag is draining properly. His wound is clean and dry. He still states some diffuse abdominal pain. His creatinine is trending back towards baseline, as well as his BUN. His Hgb remained stable. WBC has decreased mildly from yesterday. We will continue to monitor. \"     2/23-> discussed with Nephrology, okay with PICC line. Pt just had Norco prior to exam, not very interactive. Will open eyes and respond to yes no questions but is moaning continually in pain. Discussed with Gen Surg who are okay to start tube feeds as pt has been without nutrition, will consult dietician.      2/24-> patient sitting up on side of the bed with therapy at the bedside.   Patient continues to have large Units Subcutaneous Q8H    atorvastatin  40 mg Oral Daily    levothyroxine  75 mcg Oral Daily    metoprolol tartrate  25 mg Oral BID    [Held by provider] tamsulosin  0.4 mg Oral Daily    calcium replacement protocol   Other RX Placeholder    miconazole   Topical BID     PRN Meds: hydrALAZINE, sodium chloride flush, promethazine **OR** ondansetron, morphine **OR** morphine, sodium chloride, potassium chloride **OR** potassium alternative oral replacement **OR** potassium chloride, LORazepam, HYDROcodone 5 mg - acetaminophen **OR** HYDROcodone 5 mg - acetaminophen, polyethylene glycol, acetaminophen **OR** acetaminophen    I/O:     Intake/Output Summary (Last 24 hours) at 3/2/2021 1739  Last data filed at 3/2/2021 1513  Gross per 24 hour   Intake 100 ml   Output 705 ml   Net -605 ml       Diet:  Dietary Nutrition Supplements: Frozen Oral Supplement  Dietary Nutrition Supplements: Other Oral Supplement (see comment)  DIET DYSPHAGIA MINCED AND MOIST; No Drinking Straw    Exam:  BP (!) 117/56   Pulse 75   Temp 98.3 °F (36.8 °C) (Oral)   Resp 18   Ht 5' 10\" (1.778 m)   Wt 266 lb 6.4 oz (120.8 kg)   SpO2 98%   BMI 38.22 kg/m²   General:  Pleasant male. NAD. HEENT:  normocephalic and atraumatic. No scleral icterus. PERR. Neck: supple. No JVD. No thyromegaly. Lungs: clear to auscultation. No retractions  Cardiac: RRR without murmur. Abdomen: soft. Nontender. Bowel sounds positive. Extremities:  No clubbing, cyanosis, or edema x 4. Vasculature: capillary refill < 3 seconds. Palpable LE pulses bilaterally. Skin:  warm and dry. Psych:  Alert and oriented x3. Affect appropriate  Lymph:  No supraclavicular adenopathy. Neurologic:  No focal deficit. No seizures.       Data: (All radiographs, tracings, PFTs, and imaging are personally viewed and interpreted unless otherwise noted)  Labs:   Recent Labs     02/28/21  0400 03/01/21  0411   WBC 15.7* 13.4*   HGB 7.6* 7.7*   HCT 25.2* 25.1*    260 Recent Labs     02/28/21  0400 03/01/21  0411 03/02/21  0452    141 142   K 3.6 3.6 3.8   * 112* 114*   CO2 20* 20* 19*   BUN 18 18 18   CREATININE 1.5* 1.5* 1.5*   CALCIUM 7.4* 7.5* 7.9*   PHOS  --   --  3.9     No results for input(s): AST, ALT, BILIDIR, BILITOT, ALKPHOS in the last 72 hours. No results for input(s): INR in the last 72 hours. No results for input(s): Coralyn Rice in the last 72 hours. Urinalysis:   Lab Results   Component Value Date    NITRU NEGATIVE 02/17/2021    WBCUA 50-75 02/17/2021    BACTERIA MODERATE 02/17/2021    RBCUA 10-15 02/17/2021    BLOODU SMALL 02/17/2021    SPECGRAV 1.017 02/17/2021     Urine culture:   Lab Results   Component Value Date    LABURIN [place] count: 50,000-90,000 CFU/mL  02/17/2021     Micro:   Blood culture #1:   Lab Results   Component Value Date    BC No growth-preliminary No growth  02/17/2021     Blood culture #2:No results found for: Chip Linn  Organism:  Lab Results   Component Value Date    ORG Candida albicans 02/17/2021         Lab Results   Component Value Date    LABGRAM  06/10/2020     Rare segmented neutrophils observed. Rare epithelial cells observed. Many gram positive cocci occurring singly and in pairs. MRSA culture only:No results found for: [address] [place] [address]  Respiratory culture: No results found for: CULTRESP  Aerobic and Anaerobic :  Lab Results   Component Value Date    LABAERO  06/10/2020     moderate growth In the treatment of gram positive infections, GENTAMICIN should be CONSIDERED a SYNERGYSTIC agent ONLY. Ciprofloxacin and Levofloxacin, regardless of in vitro sensitivity, should not be used for staphylococcal infections other than uncomplicated lower UTIs. Moxifloxacin, regardless of in vitro sensitivity, shouldnot be used for staphylococcal infections. Lab Results   Component Value Date    LABANAE  06/10/2020     Culture yielded heavy mixed growth which included anaerobic gram positive cocci.   If a true mixed aerobic and anaerobic infection is suspected, then broad spectrum empiric antibiotic therapy is indicated and should include coverage for anaerobic organisms. Radiology Reports:  FL MODIFIED BARIUM SWALLOW W VIDEO   Final Result   1. Laryngeal penetration of thin and soft barium without evidence of aspiration. 2. Additional recommendations from the speech therapist will follow. **This report has been created using voice recognition software. It may contain minor errors which are inherent in voice recognition technology. **      Final report electronically signed by Dr. Jody Thompson on 3/2/2021 11:48 AM      XR CHEST PORTABLE   Final Result   Left lower lobe atelectasis and/or infiltrate. **This report has been created using voice recognition software. It may contain minor errors which are inherent in voice recognition technology. **      Final report electronically signed by Dr. Nehal Hunt on 2/24/2021 3:21 PM      IR FLUORO GUIDED CVA DEVICE PLMT/REPLACE/REMOVAL   Final Result      1. Status post successful PICC line insertion. 2. Of note, an attempt was made to access the right brachial vein initially. However this was unsuccessful due to patient's body habitus. Therefore the right IJ approach was utilized. **This report has been created using voice recognition software. It may contain minor errors which are inherent in voice recognition technology. **      Final report electronically signed by Dr. Robyn Khan on 2/24/2021 6:26 PM      FL MODIFIED BARIUM SWALLOW W VIDEO   Final Result   1.  . Laryngeal penetration of nectar thick and thin barium with aspiration of thin barium   2. Additional recommendations from the speech therapist will follow. **This report has been created using voice recognition software. It may contain minor errors which are inherent in voice recognition technology. **      Final report electronically signed by Dr. Jody Thompson on 2/24/2021 10:22 AM      XR CHEST PORTABLE   Final Result   Normally positioned enteric tube. Unchanged left lower lung consolidation and left pleural effusion. Unchanged right lower lung airspace disease or atelectasis. This document has been electronically signed by: Vicente Morris MD on    02/23/2021 11:52 PM      XR CHEST PORTABLE   Final Result   Malposition of the NG tube. **This report has been created using voice recognition software. It may contain minor errors which are inherent in voice recognition technology. **      Final report electronically signed by Dr. Chino Juarez on 2/23/2021 8:21 PM      XR ABDOMEN FOR NG/OG/NE TUBE PLACEMENT   Final Result   Impression:      NG tube tip proximal stomach. This document has been electronically signed by: Leonel Diaz MD on    02/22/2021 11:21 PM      XR CHEST PORTABLE   Final Result   1. Appropriately positioned lines and tubes. 2. Mild bibasilar atelectasis. 3. Small left-sided pleural effusion. **This report has been created using voice recognition software. It may contain minor errors which are inherent in voice recognition technology. **      Final report electronically signed by Dr. Fanny Fitzpatrick on 2/21/2021 11:27 AM      XR ABDOMEN (2 VIEWS)   Final Result   Impression:   1. Nonobstructing bowel gas pattern. 2.  Oral contrast is seen in the proximal small bowel cannot exclude    extravasation left midabdomen. Correlation with CT abdomen and pelvis is    recommended. 3.  Post total colectomy. Overlying skin staples. NG tube in stomach. Pleural-parenchymal disease both lung bases. This document has been electronically signed by: Christian Villegas MD on    02/21/2021 08:51 AM      CT ABDOMEN PELVIS WO CONTRAST Additional Contrast? Oral   Final Result       1. Worsening appearance to the colon. This is now diffusely distended. There is worsening thickening of the distal sigmoid colon and rectum.  There is new thickening of the cecum with possible pneumatosis. 2. Worsening free fluid in the abdomen. No free air is noted. 3. Small layering bilateral pleural effusions with dependent bibasilar atelectasis. 4. Distended gallbladder. **This report has been created using voice recognition software. It may contain minor errors which are inherent in voice recognition technology. **      Final report electronically signed by Dr. Jannie Crespo on 2/20/2021 2:38 PM      XR ABDOMEN FOR NG/OG/NE TUBE PLACEMENT   Final Result   1. Esophageal route tube tip in the stomach. **This report has been created using voice recognition software. It may contain minor errors which are inherent in voice recognition technology. **      Final report electronically signed by Dr. Jannie Crespo on 2/20/2021 11:15 AM      XR ABDOMEN FOR NG/OG/NE TUBE PLACEMENT   Final Result   Impression:   Limited exam   1. NG tube in the stomach. Nonspecific bowel gas pattern      This document has been electronically signed by: Brenda Velazquez MD on    02/19/2021 01:59 AM      US GALLBLADDER RUQ   Final Result   Distended appearance to the gallbladder which has a slightly thickened wall. Sludge is also present within its lumen. Sonographic Hamilton's sign is positive per the technologist. These findings could be on the basis of acute cholecystitis. If clinically    warranted, further evaluation with a nuclear medicine HIDA scan would be beneficial for further characterization. **This report has been created using voice recognition software. It may contain minor errors which are inherent in voice recognition technology. **      Final report electronically signed by Dr Dina Lindsey on 2/17/2021 9:37 AM      CT ABDOMEN PELVIS WO CONTRAST Additional Contrast? None   Final Result   Impression:   1. Pancolitis consistent with the clinical history of C. difficile    colitis. No bowel perforation.    2.  Probable cholelithiasis. 3.  Bibasilar pulmonary consolidation which may be on the basis of    atelectasis or pneumonia. 4.  Small bilateral nonobstructing renal calculi. 5.  Small right middle lobe pulmonary nodule. This document has been electronically signed by: Betzy Bain MD on    02/17/2021 05:51 AM      All CTs at this facility use dose modulation techniques and iterative    reconstructions, and/or weight-based dosing   when appropriate to reduce radiation to a low as reasonably achievable. XR CHEST PORTABLE   Final Result   Impression:      Right central line tip proximal SVC. Stable bibasilar consolidation and atelectasis. This document has been electronically signed by: Saritha Mccauley MD on    02/17/2021 04:20 AM      XR CHEST PORTABLE   Final Result   Impression:      Stable bilateral basilar consolidation and atelectasis      This document has been electronically signed by: Saritha Mccauley MD on    02/17/2021 03:20 AM      XR ABDOMEN FOR NG/OG/NE TUBE PLACEMENT   Final Result   Impression:   1. Distal NG tube is not well seen but is likely in the distal stomach. This document has been electronically signed by: Antoni Daly MD on    02/17/2021 02:30 AM      XR CHEST PORTABLE   Final Result   Impression:   1. Right lower lobe airspace disease may represent atelectasis or    pneumonia. This document has been electronically signed by: Antoni Daly MD on    02/17/2021 02:28 AM        Ct Abdomen Pelvis Wo Contrast Additional Contrast? None    Result Date: 2/17/2021  CT scan of the abdomen and pelvis without contrast Comparison: None Findings: The images are degraded by artifact related to patient body habitus. The left lateral abdominal wall is incompletely imaged. There is a 4 mm nodule within the right middle lobe. There is patchy consolidation within both lower lobes which may be on the basis of atelectasis or pneumonia.  A nasogastric tube is in place and extends to the proximal duodenum. Evaluation of the parenchymal organs is limited by the lack of intravenous contrast, however, the liver, spleen, pancreas and visualized portions of the adrenal glands are normal in appearance. There is a layering high density material within the lumen of the gallbladder consistent with small gallstones versus high density sludge. There is a small amount of intra-abdominal ascites. Small bilateral nonobstructing renal calculi are present. The kidneys are otherwise normal. There is diffuse thickening of the wall of the colon associated with inflammatory changes throughout the pericolonic fat. Findings are consistent with a pancolitis and with the clinical history of C. difficile colitis. There is no bowel perforation. The abdominal aorta is normal in course and caliber. There is no abdominal, pelvic or retroperitoneal adenopathy. The seminal vesicles and prostate gland are normal in appearance. A Velez catheter and rectal tube are in place. There are no acute bony abnormalities. No soft tissue abnormalities are present. Impression: 1. Pancolitis consistent with the clinical history of C. difficile colitis. No bowel perforation. 2.  Probable cholelithiasis. 3.  Bibasilar pulmonary consolidation which may be on the basis of atelectasis or pneumonia. 4.  Small bilateral nonobstructing renal calculi. 5.  Small right middle lobe pulmonary nodule. This document has been electronically signed by: Leonardo Espinosa MD on 02/17/2021 05:51 AM All CTs at this facility use dose modulation techniques and iterative reconstructions, and/or weight-based dosing when appropriate to reduce radiation to a low as reasonably achievable. Us Gallbladder Ruq    Result Date: 2/17/2021  PROCEDURE: US GALLBLADDER RUQ CLINICAL INFORMATION: 71-year-old female with cholelithiasis. Follow-up exam. COMPARISON: CT scan 2/17/2021.  TECHNIQUE: Multiplanar sonographic images were obtained of the structures in the right upper quadrant. FINDINGS: Gallbladder - 12.3 x 4.9 x 4.7 cm Gallbladder Wall - 0.5 cm Common Duct - 0.5 cm Hamilton's Sign: positive The liver is of normal echogenicity. No masses are noted. The pancreatic head and body are within normal limits. The tail is not well seen due to overlying bowel gas. The gallbladder is distended. There is wall thickening. There is a large amount of sludge which is seen in the dependent portion of the gallbladder. The common bile duct is normal and measures 5 mm. There is no hydronephrosis in the visualized aspects of the right kidney. Distended appearance to the gallbladder which has a slightly thickened wall. Sludge is also present within its lumen. Sonographic Hamilton's sign is positive per the technologist. These findings could be on the basis of acute cholecystitis. If clinically warranted, further evaluation with a nuclear medicine HIDA scan would be beneficial for further characterization. **This report has been created using voice recognition software. It may contain minor errors which are inherent in voice recognition technology. ** Final report electronically signed by Dr Rupert Veliz on 2/17/2021 9:37 AM    Xr Chest Portable    Result Date: 2/17/2021  1 view chest x-ray. Comparison: 2/17/2021 Findings: Right central line, tip proximal SVC. NG tube unchanged. Cardiomegaly. Prior sternotomy. Stable bilateral basilar consolidation and atelectasis. Impression: Right central line tip proximal SVC. Stable bibasilar consolidation and atelectasis. This document has been electronically signed by: Estuardo Baumann MD on 02/17/2021 04:20 AM    Xr Chest Portable    Result Date: 2/17/2021  1 view chest x-ray. Comparison: 2/17/2021 Findings: NG tube is unchanged. Bilateral basilar consolidation and atelectasis are stable. Cardiomegaly. No bony abnormality.      Impression: Stable bilateral basilar consolidation and atelectasis This document has been electronically signed by: Cierra Jacobsen MD on 02/17/2021 03:20 AM    Xr Chest Portable    Result Date: 2/17/2021  Exam: One View of the chest Comparison: None Clinical history: Hypotension Findings: NG tube is off the bottom of the image but is at least within the stomach Prior median sternotomy Cardiac silhouette is enlarged, without CHF No pneumothorax. No pleural fluid collection. Right lower lobe airspace disease may represent atelectasis or pneumonia. Impression: 1. Right lower lobe airspace disease may represent atelectasis or pneumonia. This document has been electronically signed by: Brad Stanford MD on 02/17/2021 02:28 AM    Xr Abdomen For Ng/og/ne Tube Placement    Result Date: 2/17/2021  Exam: One view the abdomen Comparison: None Clinical history: NG placement Findings: NG tube tip is not well seen but is likely in the distal stomach. No free intraperitoneal air identified. No obstructive bowel gas pattern. Impression: 1. Distal NG tube is not well seen but is likely in the distal stomach.  This document has been electronically signed by: Brad Stanford MD on 02/17/2021 02:30 AM        Tele:   [] yes             [x] no      Active Hospital Problems    Diagnosis Date Noted    Septic shock ([address] [A41.9, R65.21]     Hypovolemic shock ([address] [R57.1]     Colitis, Clostridium difficile [A04.72]     Hypotension [I95.9] 02/16/2021       Electronically signed by Delmy Espana PA-C on 3/2/2021 at 5:39 PM

## 2021-03-02 NOTE — CARE COORDINATION
3/2/21, 8:48 AM EST    DISCHARGE BARRIERS        Patient transferred to [facility]. Report given to twyla Vick, regarding discharge plan for this patient.

## 2021-03-03 LAB
ANION GAP SERPL CALCULATED.3IONS-SCNC: 10 MEQ/L (ref 8–16)
BUN BLDV-MCNC: 17 MG/DL (ref 7–22)
CALCIUM IONIZED: 1.19 MMOL/L (ref 1.12–1.32)
CALCIUM SERPL-MCNC: 8 MG/DL (ref 8.5–10.5)
CHLORIDE BLD-SCNC: 110 MEQ/L (ref 98–111)
CO2: 19 MEQ/L (ref 23–33)
CREAT SERPL-MCNC: 1.5 MG/DL (ref 0.4–1.2)
GFR SERPL CREATININE-BSD FRML MDRD: 45 ML/MIN/1.73M2
GLUCOSE BLD-MCNC: 112 MG/DL (ref 70–108)
HCT VFR BLD CALC: 26.7 % (ref 42–52)
HEMOGLOBIN: 8 GM/DL (ref 14–18)
POTASSIUM SERPL-SCNC: 4 MEQ/L (ref 3.5–5.2)
SODIUM BLD-SCNC: 139 MEQ/L (ref 135–145)

## 2021-03-03 PROCEDURE — 6370000000 HC RX 637 (ALT 250 FOR IP): Performed by: SURGERY

## 2021-03-03 PROCEDURE — 97530 THERAPEUTIC ACTIVITIES: CPT

## 2021-03-03 PROCEDURE — 36415 COLL VENOUS BLD VENIPUNCTURE: CPT

## 2021-03-03 PROCEDURE — 99232 SBSQ HOSP IP/OBS MODERATE 35: CPT | Performed by: PHYSICIAN ASSISTANT

## 2021-03-03 PROCEDURE — 6370000000 HC RX 637 (ALT 250 FOR IP): Performed by: NURSE PRACTITIONER

## 2021-03-03 PROCEDURE — 82330 ASSAY OF CALCIUM: CPT

## 2021-03-03 PROCEDURE — 97110 THERAPEUTIC EXERCISES: CPT

## 2021-03-03 PROCEDURE — 1200000003 HC TELEMETRY R&B

## 2021-03-03 PROCEDURE — 97535 SELF CARE MNGMENT TRAINING: CPT

## 2021-03-03 PROCEDURE — 85018 HEMOGLOBIN: CPT

## 2021-03-03 PROCEDURE — 99232 SBSQ HOSP IP/OBS MODERATE 35: CPT | Performed by: INTERNAL MEDICINE

## 2021-03-03 PROCEDURE — 85014 HEMATOCRIT: CPT

## 2021-03-03 PROCEDURE — 92526 ORAL FUNCTION THERAPY: CPT

## 2021-03-03 PROCEDURE — 6370000000 HC RX 637 (ALT 250 FOR IP): Performed by: INTERNAL MEDICINE

## 2021-03-03 PROCEDURE — 2580000003 HC RX 258: Performed by: SURGERY

## 2021-03-03 PROCEDURE — 6360000002 HC RX W HCPCS: Performed by: SURGERY

## 2021-03-03 PROCEDURE — 80048 BASIC METABOLIC PNL TOTAL CA: CPT

## 2021-03-03 PROCEDURE — 97116 GAIT TRAINING THERAPY: CPT

## 2021-03-03 PROCEDURE — 97129 THER IVNTJ 1ST 15 MIN: CPT

## 2021-03-03 PROCEDURE — 6370000000 HC RX 637 (ALT 250 FOR IP): Performed by: PHYSICIAN ASSISTANT

## 2021-03-03 RX ADMIN — BUSPIRONE HYDROCHLORIDE 15 MG: 7.5 TABLET ORAL at 08:11

## 2021-03-03 RX ADMIN — METOPROLOL TARTRATE 25 MG: 25 TABLET ORAL at 21:11

## 2021-03-03 RX ADMIN — QUETIAPINE FUMARATE 100 MG: 100 TABLET ORAL at 21:11

## 2021-03-03 RX ADMIN — BUSPIRONE HYDROCHLORIDE 15 MG: 7.5 TABLET ORAL at 15:18

## 2021-03-03 RX ADMIN — Medication 500 MG: at 02:32

## 2021-03-03 RX ADMIN — ATORVASTATIN CALCIUM 40 MG: 40 TABLET, FILM COATED ORAL at 08:11

## 2021-03-03 RX ADMIN — MICONAZOLE NITRATE: 20 POWDER TOPICAL at 21:28

## 2021-03-03 RX ADMIN — HEPARIN SODIUM 5000 UNITS: 5000 INJECTION INTRAVENOUS; SUBCUTANEOUS at 06:29

## 2021-03-03 RX ADMIN — FAMOTIDINE 20 MG: 20 TABLET, FILM COATED ORAL at 21:11

## 2021-03-03 RX ADMIN — DOXEPIN HYDROCHLORIDE 10 MG: 10 CAPSULE ORAL at 21:10

## 2021-03-03 RX ADMIN — ARIPIPRAZOLE 10 MG: 10 TABLET ORAL at 21:10

## 2021-03-03 RX ADMIN — BUSPIRONE HYDROCHLORIDE 15 MG: 7.5 TABLET ORAL at 21:11

## 2021-03-03 RX ADMIN — HYDROCODONE BITARTRATE AND ACETAMINOPHEN 2 TABLET: 5; 325 TABLET ORAL at 09:47

## 2021-03-03 RX ADMIN — FAMOTIDINE 20 MG: 20 TABLET, FILM COATED ORAL at 08:11

## 2021-03-03 RX ADMIN — MIRTAZAPINE 45 MG: 45 TABLET, FILM COATED ORAL at 21:10

## 2021-03-03 RX ADMIN — HEPARIN SODIUM 5000 UNITS: 5000 INJECTION INTRAVENOUS; SUBCUTANEOUS at 00:03

## 2021-03-03 RX ADMIN — HEPARIN SODIUM 5000 UNITS: 5000 INJECTION INTRAVENOUS; SUBCUTANEOUS at 15:05

## 2021-03-03 RX ADMIN — SERTRALINE 200 MG: 100 TABLET, FILM COATED ORAL at 08:10

## 2021-03-03 RX ADMIN — METOPROLOL TARTRATE 25 MG: 25 TABLET ORAL at 08:11

## 2021-03-03 RX ADMIN — HEPARIN SODIUM 5000 UNITS: 5000 INJECTION INTRAVENOUS; SUBCUTANEOUS at 21:11

## 2021-03-03 RX ADMIN — SODIUM CHLORIDE, PRESERVATIVE FREE 10 ML: 5 INJECTION INTRAVENOUS at 08:11

## 2021-03-03 RX ADMIN — Medication 1 CAPSULE: at 08:11

## 2021-03-03 RX ADMIN — BUPROPION HYDROCHLORIDE 300 MG: 150 TABLET, EXTENDED RELEASE ORAL at 08:11

## 2021-03-03 RX ADMIN — LEVOTHYROXINE SODIUM 75 MCG: 75 TABLET ORAL at 06:29

## 2021-03-03 RX ADMIN — SODIUM CHLORIDE, PRESERVATIVE FREE 10 ML: 5 INJECTION INTRAVENOUS at 21:29

## 2021-03-03 RX ADMIN — Medication 500 MG: at 08:11

## 2021-03-03 RX ADMIN — MICONAZOLE NITRATE: 20 POWDER TOPICAL at 08:10

## 2021-03-03 ASSESSMENT — PAIN DESCRIPTION - PAIN TYPE: TYPE: ACUTE PAIN

## 2021-03-03 ASSESSMENT — PAIN DESCRIPTION - DESCRIPTORS: DESCRIPTORS: PRESSURE;DISCOMFORT

## 2021-03-03 ASSESSMENT — PAIN SCALES - GENERAL
PAINLEVEL_OUTOF10: 7
PAINLEVEL_OUTOF10: 0
PAINLEVEL_OUTOF10: 2

## 2021-03-03 ASSESSMENT — PAIN DESCRIPTION - FREQUENCY: FREQUENCY: INTERMITTENT

## 2021-03-03 ASSESSMENT — PAIN DESCRIPTION - LOCATION: LOCATION: RECTUM

## 2021-03-03 ASSESSMENT — PAIN - FUNCTIONAL ASSESSMENT: PAIN_FUNCTIONAL_ASSESSMENT: ACTIVITIES ARE NOT PREVENTED

## 2021-03-03 NOTE — PROGRESS NOTES
Reported off to primary nurse Margot Horton RN. Patient resting quietly in bed at this time. Bed alarm in place and functioning properly. Call light and water pitcher remain within reach.

## 2021-03-03 NOTE — PROGRESS NOTES
[address] [facility] 5K  Occupational Therapy  Daily Note  Time:   Time In: 1050  Time Out: 1114  Timed Code Treatment Minutes: 24 Minutes  Minutes: 24          Date: 3/3/2021  Patient Name: Jorge Sinha,   Gender: male      Room: -17/017-A  MRN: [id number]  : 1940  (79 y.o.)  Referring Practitioner: Dr. Eliecer Tay DO  Diagnosis: Hypotension  Additional Pertinent Hx: Pt was transferred from [facility] [place] on  for acute kidney injury and C. difficile colitis. Patient was recently admitted to [facility] on  for altered mental status and sepsis secondary to right leg cellulitis. He was treated with Zosyn and Rocephin, and also discharged with antibiotics to the nursing home at the time. During this hospital course, the patient was originally sent from the SNF to [facility] [place] on  when he was found to be diaphoretic and hypotensive with a fever of 102 °F.  Patient was evaluated in the emergency room and diagnosed to have sepsis but the source was unknown at the time and he was given fluid resuscitation and started on a Levophed drip. Patient was started on Zosyn at time. During his hospital stay he was found to have worsening renal function and nephrology was consulted. On  the patient was seen by Dr. Marlene Yoo from nephrology. Further work-up did show C. difficile colitis. Patient was started on vancomycin. He had a noted mental change and worsening creatinine on . 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 Restrictions/Precautions:  Restrictions/Precautions: Fall Risk, Isolation  Position Activity Restriction  Other position/activity restrictions: ileostomy, C-diff isolation, minced and moist diet with thin liquids, no straws     VITALS: Vitals not assessed per clinical judgement, see nursing flowsheet    SUBJECTIVE: Pt resting in bed upon arrival, agreeable to OT session.     PAIN: Did not rate: L shoulder (during transfers- pt reports hx/o arthritis.)    COGNITION: Pt demo's min depressive symptoms during session. ADL:   Toileting: Maximum Assistance. MaxA stabilzing urinal for void while seated. Peter Bradley BALANCE:  Sitting Balance:  Stand By Assistance. EOB  Standing Balance: Contact Guard Assistance. x1 minute x2 trials during dyanmic standing trial    BED MOBILITY:  Supine to Sit: Minimal Assistance    Scooting: Stand By Assistance EOB    TRANSFERS:  Sit to Stand:  Minimal Assistance. Stand to Sit: Contact Guard Assistance. FUNCTIONAL MOBILITY:  Assistive Device: Rolling Walker  Assist Level:  Contact Guard Assistance. Distance:   Completed functional mobility short distance within pt room EOB to chair at slow pace, no LOB noted. Pt requires mod cues for walker safety to keep close to MELISSA- lenghty seated rest break after trial of mobility, mod fatigue noted. ADDITIONAL ACTIVITIES:  Patient identified one of their personal goals is to be able to sustain functional standing positions in order to complete various ADL and IADL skills in standing position, such as sinkside grooming or washing dishes. Pt participated in dynamic standing activity to challenge balance and facilitate functional reach into various planes for target with BUE and brief 1 UE release from RW. Completed to increase safety with dynamic standing required for showering tasks    ASSESSMENT:     Activity Tolerance:  Patient tolerance of  treatment: fair.       Discharge Recommendations: Patient would benefit from continued therapy after discharge, Subacute/Skilled Nursing Facility   Equipment Recommendations: Equipment Needed: Yes  Other: May benefit from [facility] or elevated toilet seat with armrests  Plan: Times per week: 3-5x  Specific instructions for Next Treatment: Functional mobility; ADLs and sequencing, safety awareness; reorientation  Current Treatment Recommendations: Self-Care / ADL, Cognitive Reorientation, Functional Mobility Training, Endurance Training,

## 2021-03-03 NOTE — PLAN OF CARE
Problem: Pain:  Goal: Control of acute pain  Description: Control of acute pain  Outcome: Met This Shift     Problem: Skin Integrity - Impaired:  Goal: Will show no infection signs and symptoms  Description: Will show no infection signs and symptoms  Outcome: Met This Shift     Problem: Falls - Risk of:  Goal: Will remain free from falls  Description: Will remain free from falls  Outcome: Ongoing

## 2021-03-03 NOTE — PROGRESS NOTES
[address] [address]  INPATIENT SPEECH THERAPY  [facility] 5K  DAILY NOTE    TIME   SLP Individual Minutes  Time In: 3445  Time Out: 2504  Minutes: 30  Timed Code Treatment Minutes: 13 Minutes   Cognitive tx: 13 minutes  Dysphagia tx: 17 minutes     Date: 3/3/2021  Patient Name: Brigido Denney      CSN: [id number]   : 1940  (79 y.o.)  Gender: male   Referring Physician:  SYLVESTER Smith CNP  Diagnosis: Hypotension  Secondary Diagnosis: Dysphagia, Cognitive deficits   Precautions: Aspiration, Fall Risk   Current Diet: Minced and moist diet with thin liquids *advanced to dental soft diet with thin liquids (no straws) 2021  Swallowing Strategies: Meal set up; encourage PO intake, NO STRAW, double swallow   Date of Last MBS/FEES: 2021    Pain:  No pain reported. Subjective:  Patient seen at bedside; alert and pleasant. Patient continues with flat affect and report \"I am just not hungry. \" Patient cooperative and engaged within therapeutic tasks provided positive encouragement     Short-Term Goals:  SHORT TERM GOAL #1:  Goal 1: Pt will consume minced and moist diet with thin liquids while utilizing compensatory strategies (NO straws, upright position, small bites/sips) without overt s/s of aspiration to assist in nutrition/hydration. GOAL MET. NEW GOAL 1: Patient will consume dental soft diet with thin liquids without s/s of aspiration (NO straws, upright position, small bites/sips) without s/s of aspiration in order to safely maintain adequate hydration and nutrition   INTERVENTIONS:Skilled dysphagia intervention completed with lunch meal consisting of minced and moist textures; patient refused majority of meal items. Agreeable to advanced trials as seen in STG2. Patient did consume multiple PO trials of magic cup; appropriate oral intake, timely swallow, no s/s of aspiration. Patient consumed multiple PO trials of thin liquids via cup; timely swallow, no s/s of aspiration. Education provided to patient reviewing MBS study completed 03/02/2021; recommending strict restriction to straws. Patient verbalized understanding and agreement. SHORT TERM GOAL #2:  Goal 2: Pt will complete conservative advanced PO trials (soft and bite size and/or regular as clinically appropriate) with skilled ST only to determine potential advancement of diet level. INTERVENTIONS: Advanced PO trials completed with butter bread dipped into gravy/mashed potatoes and hard solid cracker with tuna on top; despite very large bite sizes completed patient demonstrated with functional oral mastication, adequate bolus formation, timely swallow, very mild/dry throat clear, no additional s/s of aspiration. ST with ongoing concerns re: overall endurance d/t ongoing fatigue with high reliance on verbal encouragement to continue to consume PO intake. Although great performance overall this date; ST recommending diet advancement to dental soft diet with thin liquids. Patient appreciative of diet advancement. JOSLYN Brooks updated    SHORT TERM GOAL #3:  Goal 3: Pt will complete pharyngeal strengthening exercises x10 and improved timing of the swallow with good sucess to improve pharyngeal integrity of the swallow and overall timing of the swallow.   INTERVENTIONS: DNT secondary to focus on other goals     SHORT TERM GOAL #4:  Goal 4: Patient will complete recall, short term memory and working memory tasks with 70% accuracy provided mod cues to improve overall recall of daily and medical information  INTERVENTIONS:   Orientation: 5/5 indep     Recall x5 new units of information presented by ST:   Recall immediate recall; 4/5 indep, 1/5 correct provided FO2  10 minute delay: 4/5 indep, 1/5 correct provided FO2    SHORT TERM GOAL #5:  Goal 5: Patient will complete problem solving/reasoning and sequencing tasks with 70% accuracy provided mod cues to improve overall safety, thought organization and processing speed  INTERVENTIONS:  Reasoning word pairs: 3/3 indep     Problem solving:   Trial 1 Stomach ache: 3/3   Trial 2 pulled over: 2/3   Trial 3 come to a hospital: 3/3       Long-Term Goals: No LTGs due to short ELOS        EDUCATION:  Learner: Patient  Education:  Reviewed results and recommendations of this evaluation, Reviewed diet and strategies, Reviewed signs, symptoms and risks of aspiration, Reviewed ST goals and Plan of Care, Reviewed recommendations for follow-up and Education Related to Potential Risks and Complications Due to Impairment/Illness/Injury  Evaluation of Education: Verbalizes understanding, Demonstrates with assistance, Needs further instruction and Family not present    ASSESSMENT/PLAN:  Activity Tolerance:  Patient tolerance of  treatment: good. Assessment/Plan: Patient progressing toward established goals. Continues to require skilled care of licensed speech pathologist to progress toward achievement of established goals and plan of care. .     Plan for Next Session: cognitive tx, PO trials      GEOVANI Johnson 23

## 2021-03-03 NOTE — PROGRESS NOTES
[facility]   Nephrology progress note  3/3/2021, 8:08 AM      Pt Name:    Ventura Pyle  MRN:     [id number]     YOB: 1940  Admit Date:    2/17/2021 12:35 AM  Primary Care Physician: Nicholas Monsivais MD   Room number  6S-37/960-R    Chief Complaint: Nephrology following for CARMEN and hypernatremia    Subjective:  Patient seen and examined  More awake and alert  Very poor historian  Comfortable  No acute distress  Denies any shortness of breath    Objective:  24HR INTAKE/OUTPUT:      Intake/Output Summary (Last 24 hours) at 3/3/2021 0808  Last data filed at 3/3/2021 0651  Gross per 24 hour   Intake 10 ml   Output 950 ml   Net -940 ml     I/O last 3 completed shifts: In: 10 [I.V.:10]  Out: 950 [Urine:200; Drains:250; Stool:500]  No intake/output data recorded. Admission weight: 296 lb 4.8 oz (134.4 kg)  Wt Readings from Last 3 Encounters:   02/28/21 266 lb 6.4 oz (120.8 kg)   11/25/20 283 lb 3.2 oz (128.5 kg)   10/16/20 273 lb 12.8 oz (124.2 kg)     Body mass index is 38.22 kg/m².     Physical examination  VITALS:     Vitals:    03/02/21 1949 03/02/21 2100 03/03/21 0615 03/03/21 0745   BP: 122/71 (!) 117/58 (!) 120/57 (!) 111/53   Pulse: 77 73 72 73   Resp: 17  18 18   Temp: 98.6 °F (37 °C)  99 °F (37.2 °C) 98.4 °F (36.9 °C)   TempSrc: Oral  Axillary Oral   SpO2: 98%  97% 97%   Weight:       Height:         General Appearance: Overall improved  No acute distress  Neck: No JVD  Lungs: no use of accessory muscles  Heart:  S1, S2 heard  GI: + Ostomy present no guarding  Extremities: trace LE edema, significantly improved      Lab Data  CBC:   Recent Labs     03/01/21  0411 03/03/21  0630   WBC 13.4*  --    HGB 7.7* 8.0*   HCT 25.1* 26.7*     --      BMP:  Recent Labs     03/01/21  0411 03/02/21  0452 03/03/21  0630    142 139   K 3.6 3.8 4.0   * 114* 110   CO2 20* 19* 19*   BUN 18 18 17   CREATININE 1.5* 1.5* 1.5*   GLUCOSE 109* 110* 112*   CALCIUM 7.5* 7.9* 8.0*   MG --  1.8  --    PHOS  --  3.9  --      Hepatic:   No results for input(s): LABALBU, AST, ALT, ALB, BILITOT, ALKPHOS in the last 72 hours. Meds:  Infusion:    sodium chloride       Meds:    lactobacillus  1 capsule Oral Daily with breakfast    sodium chloride flush  10 mL Intravenous 2 times per day    famotidine  20 mg Oral BID    vancomycin  500 mg Oral 4 times per day    lidocaine 1 % injection  5 mL Intradermal Once    buPROPion  300 mg Oral Daily    ARIPiprazole  10 mg Oral Daily    busPIRone  15 mg Oral TID    doxepin  10 mg Oral Nightly    QUEtiapine  100 mg Oral Nightly    mirtazapine  45 mg Oral Nightly    sertraline  200 mg Oral Daily    heparin (porcine)  5,000 Units Subcutaneous Q8H    atorvastatin  40 mg Oral Daily    levothyroxine  75 mcg Oral Daily    metoprolol tartrate  25 mg Oral BID    [Held by provider] tamsulosin  0.4 mg Oral Daily    calcium replacement protocol   Other RX Placeholder    miconazole   Topical BID     Meds prn: hydrALAZINE, sodium chloride flush, promethazine **OR** ondansetron, morphine **OR** morphine, sodium chloride, potassium chloride **OR** potassium alternative oral replacement **OR** potassium chloride, LORazepam, HYDROcodone 5 mg - acetaminophen **OR** HYDROcodone 5 mg - acetaminophen, polyethylene glycol, acetaminophen **OR** acetaminophen       Impression and Plan:  1. Acute kidney injury secondary to septic ATN  Serum creatinine appearing to be stable at 1.5  Overall renal function is stable at this time  Patient is more awake and alert today  Discussed with patient but uncertain how much he understands  Avoid nephrotoxins  Avoid hypotension    2. Mild metabolic acidosis. Will monitor, if drops then will consider oral sodium bicarbonate  3. Hypernatremia. Secondary to hypotonic losses from GI tract. Improved. 4.  C. difficile colitis with toxic megacolon. Patient status post total colectomy with end ileostomy  5.   Azotemia: Significantly improved  6. Pulmonary nodule  7. History of CAD  8. Anemia  9.   Hypotension: Stable/improving    Massiel Vargas MD  [facility]

## 2021-03-03 NOTE — CARE COORDINATION
3/3/21, 3:53 PM EST    DISCHARGE PLANNING EVALUATION  Spoke with Ness Simpson at [facility] [phone]. He requested more chart information to help complete Boo's Level 2. He told SW that he has to send it to the state and they should get the results back tomorrow or Friday. Spoke with [place] at [address]. She reports that the patients precert has been approved and it is good through the 8th. KARL made her aware that we are waiting for the Level 2 which should be back before the end of the week.

## 2021-03-03 NOTE — PLAN OF CARE
Problem: Discharge Planning:  Goal: Participates in care planning  Outcome: Ongoing  Note: Discharge plans to [facility]. Discussed with pt. Problem: Bowel Function - Altered:  Goal: Bowel elimination is within specified parameters  Description: Bowel elimination is within specified parameters  Outcome: Ongoing     Problem: Pain:  Goal: Control of acute pain  Description: Control of acute pain  3/3/2021 1100 by Mirella Harris RN  Outcome: Ongoing  Note: Pain Assessment: 0-10  Pain Level: 7   Patient's Stated Pain Goal: No pain   Is pain goal met at this time? Yes, patient asleep with RR >10      Non-Pharmaceutical Pain Intervention(s): Rest, Repositioned      Problem: Skin Integrity - Impaired:  Goal: Will show no infection signs and symptoms  Description: Will show no infection signs and symptoms  3/3/2021 1100 by Mirella Harris RN  Outcome: Ongoing  Note: Patient midline incision is clean dry and intact. Pink with no drainage. Incision on the right side is clean dry and intact. No signs of infection. Educated on CHG soap. EPC cream applied to the buttocks. No new skin breakdown. Pt turned and repositioned. Will continue on going skin assessment. Problem: Urinary Elimination:  Goal: Signs and symptoms of infection will decrease  Description: Signs and symptoms of infection will decrease  Outcome: Ongoing  Note: PO Vanc administered per order. Continuing to monitor output. Afebrile. Isolation precautions maintained. Importance of hand washing of soap and water discussed. Problem: Falls - Risk of:  Goal: Will remain free from falls  Description: Will remain free from falls  3/3/2021 1100 by Mirella Harris RN  Outcome: Ongoing  Note: No falls this shift. Bed alarm on, nonskid socks on, and call light in reach. At risk due to generalized weakness and post op.        Problem: Physical Regulation:  Goal: Signs and symptoms of infection will decrease  Description: Signs and symptoms of infection will decrease  Outcome: Ongoing  Note: PO Vanc administered per order. Continuing to monitor output. Afebrile. Isolation precautions maintained. Importance of hand washing of soap and water discussed. Problem: Nutrition  Goal: Optimal nutrition therapy  Outcome: Ongoing  Note: Patients appetite fair. Continuing to encourage fluids. Speech continuing to follow for diet increases. Problem: Musculor/Skeletal Functional Status  Goal: Highest potential functional level  Note: Patient ambulates with 1 assist with walker and gait belt. Gait steady. Patient able to perform passive ROM. PT/OT working with patient. Care plan reviewed with patient and family. Patient and son verbalize understanding of the plan of care and contribute to goal setting.

## 2021-03-03 NOTE — PROGRESS NOTES
[address] [address]  INPATIENT PHYSICAL THERAPY  DAILY NOTE  [facility] 5K - 5K-17/017-A    Time In: 5971  Time Out: 0375  Timed Code Treatment Minutes: 40 Minutes  Minutes: 40          Date: 3/3/2021  Patient Name: Santos Rincon,  Gender:  male        MRN: [id number]  : 1940  (79 y.o.)     Referring Practitioner: Dr. Tom Cueva  Diagnosis: hypotension  Additional Pertinent Hx: admit with above diagnosis, +Cdiff induced pancolitis, CARMEN. 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 Prior Level of Function:  Lives With: Alone  Type of Home: Assisted living  Home Layout: One level  Home Access: Level entry  Home Equipment: Rolling walker   Bathroom Shower/Tub: Walk-in shower  Bathroom Toilet: Standard  Bathroom Equipment: Grab bars in shower, Grab bars around toilet  Bathroom Accessibility: Accessible    ADL Assistance: Needs assistance  Homemaking Assistance: Needs assistance  Homemaking Responsibilities: No  Ambulation Assistance: Independent  Transfer Assistance: Independent  Active : No  Additional Comments: Pt was not able to provide reliable information about his level of function prior to admission secondary to confusion about recent events. Restrictions/Precautions:  Restrictions/Precautions: Fall Risk, Isolation  Position Activity Restriction  Other position/activity restrictions: ileostomy, C-diff isolation, minced and moist diet with thin liquids, no straws     VITALS: Vitals not assessed per clinical judgement, see nursing flowsheet    SUBJECTIVE: RN approved session, pt is supine in bed and cooperative with PT. Pt with flat affect throughout, encouragement provided to pt.     PAIN: 7/10: abdomen    OBJECTIVE:  Bed Mobility:  Rolling to Right: Moderate Assistance   Supine to Sit: Moderate Assistance  Scooting: Contact Guard Assistance    Transfers:  Sit to Stand: [employer], several trials, from EOB and recliner  Stand to Sit:Contact [employer] Assistance    Ambulation:  Contact Guard Assistance  Distance: 20 feet, 12 feet  Surface: Level Tile  Device:Rolling Walker  Gait Deviations: Forward Flexed Posture, Slow Dunia, Decreased Step Length Bilaterally and Decreased Gait Speed  **Slow pace, increased trunk and knee flexion throughout, shuffles feet    Balance:  Static Sitting Balance:  Stand By Assistance  Static Standing Balance: Contact Guard Assistance; pt stands at [employer] during brief change and pericare, stands ~1 minute each trial  Dynamic Standing Balance: Contact Guard Assistance    Exercise:  Patient was guided in 1 set(s) 15 reps of exercise to both lower extremities. Ankle pumps, Seated marches and Long arc quads. Exercises were completed for increased independence with functional mobility. Functional Outcome Measures: Not completed     ASSESSMENT:  Assessment: Patient progressing toward established goals. Activity Tolerance:  Patient tolerance of  treatment: good. Equipment Recommendations: Other: cont to assess needs  Discharge Recommendations:  Subacute/Skilled Nursing Facility    Plan: Times per week: 3-5X GM  Times per day: Daily  Current Treatment Recommendations: Strengthening, Neuromuscular Re-education, Home Exercise Program, Balance Training, Endurance Training, Patient/Caregiver Education & Training, Safety Education & Training, Functional Mobility Training, Transfer Training, Gait Training    Patient Education  Patient Education: Transfers, Gait    Goals:  Patient goals : not stated  Short term goals  Time Frame for Short term goals: by discharge  Short term goal 1: roll L/R with Chuy for pressure relief  Short term goal 2: sidelying to/from sit with modAx1 to get in/out of bed  Short term goal 3: sit to std with minAx2 to get in/out of chairs  Short term goal 4: tolerate >15 min sitting balance activity with </=SBA to demonstrate inc strength for progression with home mobility  Short term goal 5: Pt to ambulate >10 feet

## 2021-03-03 NOTE — PROGRESS NOTES
Vitals obtained and charted. Assessment completed at this time. Patient states pain of a \"2\" on a scale of 1-10. Patient continues to rest quietly in bed at this time. Bed alarm in place and functioning properly. Call light and water pitcher remain within reach.

## 2021-03-03 NOTE — CARE COORDINATION
3/3/21, 2:35 PM EST    DISCHARGE ON GOING EVALUATION    [facility] day: 14  Location: [facility]17/017-A Reason for admit: Hypotension [I95.9]   Barriers to Discharge: Nephrology following, PT/OT/ST, prn pain medications and antiemetics, Potassium replacement protocol, daily inoized calcium, BMP, Magnesium and Phosphorus levels in a.m., SCD's, telemetry, wound care, drain care, colostomy, up with assistance. PCP: Gigi Mensah MD  Readmission Risk Score: 21%  Patient Goals/Plan/Treatment Preferences: New placement at [facility].

## 2021-03-03 NOTE — PROGRESS NOTES
Hospitalist Progress Note      Patient:  Judah Lee    Unit/Bed:5K-17/017-A  YOB: 1940  MRN: [id number]   Acct: [de-identified]   PCP: Adam Llanes MD  Date of Admission: 2/17/2021    Assessment/Plan:    1. 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 General surgery consulted. Continue ANTONIETTA drain care, wound & ostomy care. Output is decreasing. Plan to hopefully remove ANTONIETTA drain later this week. Pt is tolerating minced and moist diet. On oral vancomycin stopped on 3/3. Incision c/d/i.       2. Septic shock secondary to fulminant C. Diff colitis, resolved with noted hypotension, s/p Levophed, weaned off and currently stable. ID evaluted. Continue with current abx therapy. BP has been stable, continue to monitor.  PO Vanco stopped on 3/3.      3. Acute metabolic encephalopathy, resolved: Improved, related to hypotension/hypoperfusion along with uremia.     4. CARMEN, improving: Secondary to septic ATN.   Creatinine overall stable at 1.5. Nephrology following.      6. Hypernatremia:  Resolved, secondary to hypotonic losses from GI tract.      7. Acute postoperative hypoxic respiratory failure, resolved: Current saturations 99% on RA. Continue to monitor for respiratory needs     8. Incidental pulmonary nodules: Seen incidentally on CT scan in January.  PET scan completed in 2/12.  Nodule noted in right lower lobe, with primary rule out for metastatic process.  The nodule could also be infectious or inflammatory, monitor for now.  CT of the chest should be repeated in 3 months.      9. Acute on chronic anemia: Hgb on arrival was 10.2, Pt received 1 unit of pRBC.  Hgb stable ~8.      Chief Complaint: Hypotension    Initial H and P:-    HPI / Hospital Course: Sharona Galicia is a 44-year-old male was transferred from [address] [place] [address] on 2/17 for acute kidney injury and C. difficile colitis. Libby Sneed was recently admitted to [facility] on 2/26 for altered mental status and sepsis secondary to right leg cellulitis. Eileen Tom was treated with Zosyn and Rocephin, and also discharged with antibiotics to the nursing home at the time.  During the course of hospital treatment, they also noted a pulmonary nodule on CT scan that measured 3 x 2 x 2.6 cm.  It was a concern for malignancy at the time, the requested follow-up however patient does not have any further scans or PET scans.  Blood cultures grew E. coli at the time.   During this hospital course, the patient was originally sent from the SNF to [facility] [place] on 2/14 when he was found to be diaphoretic and hypotensive with a fever of 102 °F.  Patient was evaluated in the emergency room and diagnosed to have sepsis but the source was unknown at the time and he was given fluid resuscitation and started on a Levophed drip.  Patient was started on Zosyn at time. Calilaura Tafoyaas his hospital stay he was found to have worsening renal function and nephrology was consulted. Martin Gather 2/16 the patient was seen by Dr. Cristiana Rosario from nephrology. Caren Mac work-up did show C. difficile colitis. John Gale was started on vancomycin. Eileen Tom had a noted mental change and worsening creatinine on 2/16 so he was transferred to [facility] ICU for further management and care per Dr. Jacqueline Hernández"     2/18: Patient this morning remains on pressors as needed to maintain his blood pressure.  He has been receiving fluid throughout the night.  His hemoglobin did drop from 10 to about 8, however it is stayed stable since then, continue to monitor.  His WBC has decreased along with his creatinine.  However his BUN increased overnight.  The patient seems more confused today with an inability to answer where or when he is.  The nephrologist stated that if his BUN increases over 100 dialysis would be warranted at that time.  The surgeon also states that at this current point if surgery is warranted it would be a total colectomy.  Both of these options were discussed with the family who stated that they would be okay starting dialysis if needed.  They with prefer to hold off on surgery right now.     2/19: Patient remains on pressors to maintain his blood pressure however the dose has been weaned significantly. Racheal Cunningham still currently receiving IVF.  Hgb has remained stable.  WBC did increase overnight.  His creatinine has decreased to 3.7. Nikos Ryan has stated 95.  The nephrologist had seen the patient earlier in the morning, and stated that dialysis is not needed at this time.  Due to the rising WBC, infectious disease has been consulted.     2/20: Patient remains on pressors, cortisol was checked on 2/17 which was normal for a.m.  Infectious diseases did recommend a loop ileostomy with vancomycin irrigation believes the patient would benefit from it.  Obtaining surgery recommendations.  His creatinine continues to decrease, and no dialysis is needed at this point, BUN decreasing as well.  WBC irvin again overnight, and patient still complains of stomach pains.     2/21:  In spite of the aggressive treatment for C. difficile colitis patient is still complaining of severe abdominal pain with severe abdominal distention , discussion  with the medical resident and also with nursing team  the patient is not improving over the last few days even the nursing did mention the patient is worsening over the last 24 hours with more distention of the abdomen with leukocytosis so plan to obtain CT scan abdomen pelvis with p.o. contrast to reassess his C. difficile colitis of the abdomen to see if there is any progression or improvement, CT scan abdomen pelvis done with contrast did show progression of the ulcerative cecal area of severe cecal distention and possibility for pneumatosis so general surgery consulted stat and he discussed the options with the patient and the patient family and the plan was done to go ahead for OR that was done last night, total colectomy, patient did after surgery have a smooth postop course still intubated in the weaning trial currently, will continue IV antibiotics fluids support hemodynamics pain control and in collaboration with the ID and general surgery team.     2/22: Patient remains on pressors this morning, propofol from surgery stopped. Patient was intubated for the surgery, however extubated same day. Doing well on 3 L nasal cannula. Patient states that he feels he has to defecate, however his ostomy bag is draining properly. His wound is clean and dry. He still states some diffuse abdominal pain. His creatinine is trending back towards baseline, as well as his BUN. His Hgb remained stable. WBC has decreased mildly from yesterday. We will continue to monitor. \"     2/23-> discussed with Nephrology, okay with PICC line. Pt just had Norco prior to exam, not very interactive. Will open eyes and respond to yes no questions but is moaning continually in pain. Discussed with Gen Surg who are okay to start tube feeds as pt has been without nutrition, will consult dietician.      2/24-> patient sitting up on side of the bed with therapy at the bedside.  Patient continues to have large quantities of output from ANTONIETTA drain as well as from ostomy site.  Surgery aware.     2/25-> Patient sitting up bedside at the time of the interview.  Currently denies any physical complaints and denied any issues overnight and into the morning.  He was updated on the current plan of care, verbalizes understanding, and had no other needs or questions at this time. 2/27 -patient sitting in the chair not in acute distress denies any chest pain headache visual disturbances complains about food just not tasting well, patient has C. difficile colitis, without any nausea vomiting.  On puréed diet.   2/28 - Patient is a poor historian, denies any active complaints. Kayy Ochoa though had a lunch tray in front of him claims, did not get a tray.  Reviewed and appreciate consultants  input   3/1- No nausea no vomiting tolerating puréed diet. High output through the ostomy  3/2- Patient working with therapy. He reports that while working with therapy, he had an episode of chest pain, described as sharp, resolved with rest. There were no changes on his EKG. No associated SOB, diaphoresis. Pt denies fever/chills, SOB, CP, abd pain, n/v/d. Patient is doing well otherwise, awaiting placement. Subjective (past 24 hours):   No acute events overnight. Patient continuing to complain of rectal pressure. Oral vancomycin stopped today. Patient has no acute concerns or issues. Past medical history, family history, social history and allergies reviewed again and is unchanged since admission. ROS Pt denies CP, SOB, HA. Pt admits to rectal pressure.      Medications:  Reviewed    Infusion Medications    sodium chloride       Scheduled Medications    lactobacillus  1 capsule Oral Daily with breakfast    sodium chloride flush  10 mL Intravenous 2 times per day    famotidine  20 mg Oral BID    lidocaine 1 % injection  5 mL Intradermal Once    buPROPion  300 mg Oral Daily    ARIPiprazole  10 mg Oral Daily    busPIRone  15 mg Oral TID    doxepin  10 mg Oral Nightly    QUEtiapine  100 mg Oral Nightly    mirtazapine  45 mg Oral Nightly    sertraline  200 mg Oral Daily    heparin (porcine)  5,000 Units Subcutaneous Q8H    atorvastatin  40 mg Oral Daily    levothyroxine  75 mcg Oral Daily    metoprolol tartrate  25 mg Oral BID    [Held by provider] tamsulosin  0.4 mg Oral Daily    calcium replacement protocol   Other RX Placeholder    miconazole   Topical BID     PRN Meds: hydrALAZINE, sodium chloride flush, promethazine **OR** ondansetron, morphine **OR** morphine, sodium chloride, potassium chloride **OR** potassium alternative oral replacement **OR** potassium chloride, LORazepam, HYDROcodone 5 mg - acetaminophen **OR** HYDROcodone 5 mg - acetaminophen, polyethylene glycol, acetaminophen **OR** acetaminophen      Intake/Output Summary (Last 24 hours) at 3/3/2021 1716  Last data filed at 3/3/2021 1330  Gross per 24 hour   Intake 260 ml   Output 1105 ml   Net -845 ml       Diet:  Dietary Nutrition Supplements: Frozen Oral Supplement  Dietary Nutrition Supplements: Other Oral Supplement (see comment)  DIET DENTAL SOFT; No Drinking Straw    Exam:  BP (!) 119/56   Pulse 69   Temp 98 °F (36.7 °C) (Oral)   Resp 16   Ht 5' 10\" (1.778 m)   Wt 266 lb 6.4 oz (120.8 kg)   SpO2 96%   BMI 38.22 kg/m²   General appearance: No apparent distress, appears stated age and cooperative. HEENT: Pupils equal, round, and reactive to light. Conjunctivae/corneas clear. Neck: Supple, with full range of motion. No jugular venous distention. Trachea midline. Respiratory:  Normal respiratory effort on RA. Breath sounds diminished. Cardiovascular: Regular rate and rhythm with normal S1/S2 without murmurs, rubs or gallops. Abdomen: Soft, non-tender, non-distended with normal bowel sounds. ANTONIETTA drain intact. Midline incision c/d/i. Musculoskeletal: passive and active ROM x 4 extremities. Skin: Skin color, texture, turgor normal.  No rashes or lesions. Neurologic:  Neurovascularly intact without any focal sensory/motor deficits.  Cranial nerves: II-XII intact, grossly non-focal.  Psychiatric: Alert and oriented, thought content appropriate, normal insight  Capillary Refill: Brisk,< 3 seconds   Peripheral Pulses: +2 palpable, equal bilaterally     Labs:   Recent Labs     03/01/21  0411 03/03/21  0630   WBC 13.4*  --    HGB 7.7* 8.0*   HCT 25.1* 26.7*     --      Recent Labs     03/01/21  0411 03/02/21  0452 03/03/21  0630    142 139   K 3.6 3.8 4.0   * 114* 110   CO2 20* 19* 19*   BUN 18 18 17   CREATININE 1.5* 1.5* 1.5*   CALCIUM 7.5* 7.9* 8.0*   PHOS  --  3.9  --      Microbiology:    Blood culture #1:   Lab Results   Component Value Date    BC No growth-preliminary No growth  02/17/2021     Organism:  Lab Results   Component Value Date    ORG Candida albicans 02/17/2021         Urine culture:   Lab Results   Component Value Date    LABURIN [place] count: 50,000-90,000 CFU/mL  02/17/2021     Urinalysis:      Lab Results   Component Value Date    NITRU NEGATIVE 02/17/2021    WBCUA 50-75 02/17/2021    BACTERIA MODERATE 02/17/2021    RBCUA 10-15 02/17/2021    BLOODU SMALL 02/17/2021    SPECGRAV 1.017 02/17/2021       Radiology:  FL MODIFIED BARIUM SWALLOW W VIDEO   Final Result   1. Laryngeal penetration of thin and soft barium without evidence of aspiration. 2. Additional recommendations from the speech therapist will follow. **This report has been created using voice recognition software. It may contain minor errors which are inherent in voice recognition technology. **      Final report electronically signed by Dr. David Carpio on 3/2/2021 11:48 AM      XR CHEST PORTABLE   Final Result   Left lower lobe atelectasis and/or infiltrate. **This report has been created using voice recognition software. It may contain minor errors which are inherent in voice recognition technology. **      Final report electronically signed by Dr. Enrique Moreno on 2/24/2021 3:21 PM      IR FLUORO GUIDED CVA DEVICE PLMT/REPLACE/REMOVAL   Final Result      1. Status post successful PICC line insertion. 2. Of note, an attempt was made to access the right brachial vein initially. However this was unsuccessful due to patient's body habitus. Therefore the right IJ approach was utilized. **This report has been created using voice recognition software. It may contain minor errors which are inherent in voice recognition technology. **      Final report electronically signed by Dr. Jolanta Whitmore on 2/24/2021 6:26 PM      FL MODIFIED BARIUM SWALLOW W VIDEO   Final Result   1.  . Laryngeal penetration of nectar thick and thin barium with aspiration of thin barium   2.  Additional recommendations from the speech therapist will follow. **This report has been created using voice recognition software. It may contain minor errors which are inherent in voice recognition technology. **      Final report electronically signed by Dr. Jose Cruz Mauricio on 2/24/2021 10:22 AM      XR CHEST PORTABLE   Final Result   Normally positioned enteric tube. Unchanged left lower lung consolidation and left pleural effusion. Unchanged right lower lung airspace disease or atelectasis. This document has been electronically signed by: Vicente Morris MD on    02/23/2021 11:52 PM      XR CHEST PORTABLE   Final Result   Malposition of the NG tube. **This report has been created using voice recognition software. It may contain minor errors which are inherent in voice recognition technology. **      Final report electronically signed by Dr. Lavinia Nowak on 2/23/2021 8:21 PM      XR ABDOMEN FOR NG/OG/NE TUBE PLACEMENT   Final Result   Impression:      NG tube tip proximal stomach. This document has been electronically signed by: Leonel Diaz MD on    02/22/2021 11:21 PM      XR CHEST PORTABLE   Final Result   1. Appropriately positioned lines and tubes. 2. Mild bibasilar atelectasis. 3. Small left-sided pleural effusion. **This report has been created using voice recognition software. It may contain minor errors which are inherent in voice recognition technology. **      Final report electronically signed by Dr. Fanny Fitzpatrick on 2/21/2021 11:27 AM      XR ABDOMEN (2 VIEWS)   Final Result   Impression:   1. Nonobstructing bowel gas pattern. 2.  Oral contrast is seen in the proximal small bowel cannot exclude    extravasation left midabdomen. Correlation with CT abdomen and pelvis is    recommended. 3.  Post total colectomy. Overlying skin staples. NG tube in stomach. Pleural-parenchymal disease both lung bases.       This document has been electronically signed by: Christian Villegas MD on    02/21/2021 08:51 AM      CT ABDOMEN PELVIS WO CONTRAST Additional Contrast? Oral   Final Result       1. Worsening appearance to the colon. This is now diffusely distended. There is worsening thickening of the distal sigmoid colon and rectum. There is new thickening of the cecum with possible pneumatosis. 2. Worsening free fluid in the abdomen. No free air is noted. 3. Small layering bilateral pleural effusions with dependent bibasilar atelectasis. 4. Distended gallbladder. **This report has been created using voice recognition software. It may contain minor errors which are inherent in voice recognition technology. **      Final report electronically signed by Dr. Drew Hardin on 2/20/2021 2:38 PM      XR ABDOMEN FOR NG/OG/NE TUBE PLACEMENT   Final Result   1. Esophageal route tube tip in the stomach. **This report has been created using voice recognition software. It may contain minor errors which are inherent in voice recognition technology. **      Final report electronically signed by Dr. Drew Hardin on 2/20/2021 11:15 AM      XR ABDOMEN FOR NG/OG/NE TUBE PLACEMENT   Final Result   Impression:   Limited exam   1. NG tube in the stomach. Nonspecific bowel gas pattern      This document has been electronically signed by: Nishant Kumar MD on    02/19/2021 01:59 AM      US GALLBLADDER RUQ   Final Result   Distended appearance to the gallbladder which has a slightly thickened wall. Sludge is also present within its lumen. Sonographic Hamilton's sign is positive per the technologist. These findings could be on the basis of acute cholecystitis. If clinically    warranted, further evaluation with a nuclear medicine HIDA scan would be beneficial for further characterization. **This report has been created using voice recognition software. It may contain minor errors which are inherent in voice recognition technology. **      Final report electronically signed by Dr Rupert Veliz on 2/17/2021 9:37 AM      CT ABDOMEN PELVIS WO CONTRAST Additional Contrast? None   Final Result   Impression:   1. Pancolitis consistent with the clinical history of C. difficile    colitis. No bowel perforation. 2.  Probable cholelithiasis. 3.  Bibasilar pulmonary consolidation which may be on the basis of    atelectasis or pneumonia. 4.  Small bilateral nonobstructing renal calculi. 5.  Small right middle lobe pulmonary nodule. This document has been electronically signed by: Olden Mcburney, MD on    02/17/2021 05:51 AM      All CTs at this facility use dose modulation techniques and iterative    reconstructions, and/or weight-based dosing   when appropriate to reduce radiation to a low as reasonably achievable. XR CHEST PORTABLE   Final Result   Impression:      Right central line tip proximal SVC. Stable bibasilar consolidation and atelectasis. This document has been electronically signed by: Jose Eduardo Arauz MD on    02/17/2021 04:20 AM      XR CHEST PORTABLE   Final Result   Impression:      Stable bilateral basilar consolidation and atelectasis      This document has been electronically signed by: Jose Eduardo Arauz MD on    02/17/2021 03:20 AM      XR ABDOMEN FOR NG/OG/NE TUBE PLACEMENT   Final Result   Impression:   1. Distal NG tube is not well seen but is likely in the distal stomach. This document has been electronically signed by: Martinez Vincent MD on    02/17/2021 02:30 AM      XR CHEST PORTABLE   Final Result   Impression:   1. Right lower lobe airspace disease may represent atelectasis or    pneumonia.       This document has been electronically signed by: Martinez Vincent MD on    02/17/2021 02:28 AM        Electronically signed by HAY Oates on 3/3/2021 at 5:16 PM

## 2021-03-04 LAB
ANION GAP SERPL CALCULATED.3IONS-SCNC: 7 MEQ/L (ref 8–16)
BUN BLDV-MCNC: 17 MG/DL (ref 7–22)
CALCIUM IONIZED: 1.2 MMOL/L (ref 1.12–1.32)
CALCIUM SERPL-MCNC: 8.2 MG/DL (ref 8.5–10.5)
CHLORIDE BLD-SCNC: 110 MEQ/L (ref 98–111)
CO2: 20 MEQ/L (ref 23–33)
CREAT SERPL-MCNC: 1.6 MG/DL (ref 0.4–1.2)
GFR SERPL CREATININE-BSD FRML MDRD: 42 ML/MIN/1.73M2
GLUCOSE BLD-MCNC: 110 MG/DL (ref 70–108)
MAGNESIUM: 2.1 MG/DL (ref 1.6–2.4)
PHOSPHORUS: 4.1 MG/DL (ref 2.4–4.7)
POTASSIUM SERPL-SCNC: 4.1 MEQ/L (ref 3.5–5.2)
SODIUM BLD-SCNC: 137 MEQ/L (ref 135–145)

## 2021-03-04 PROCEDURE — APPSS30 APP SPLIT SHARED TIME 16-30 MINUTES: Performed by: NURSE PRACTITIONER

## 2021-03-04 PROCEDURE — 84100 ASSAY OF PHOSPHORUS: CPT

## 2021-03-04 PROCEDURE — 6370000000 HC RX 637 (ALT 250 FOR IP): Performed by: INTERNAL MEDICINE

## 2021-03-04 PROCEDURE — 6370000000 HC RX 637 (ALT 250 FOR IP): Performed by: PHYSICIAN ASSISTANT

## 2021-03-04 PROCEDURE — 6370000000 HC RX 637 (ALT 250 FOR IP): Performed by: NURSE PRACTITIONER

## 2021-03-04 PROCEDURE — 92526 ORAL FUNCTION THERAPY: CPT

## 2021-03-04 PROCEDURE — 99024 POSTOP FOLLOW-UP VISIT: CPT | Performed by: SURGERY

## 2021-03-04 PROCEDURE — 80048 BASIC METABOLIC PNL TOTAL CA: CPT

## 2021-03-04 PROCEDURE — 6360000002 HC RX W HCPCS: Performed by: SURGERY

## 2021-03-04 PROCEDURE — 36415 COLL VENOUS BLD VENIPUNCTURE: CPT

## 2021-03-04 PROCEDURE — 99231 SBSQ HOSP IP/OBS SF/LOW 25: CPT | Performed by: INTERNAL MEDICINE

## 2021-03-04 PROCEDURE — 82330 ASSAY OF CALCIUM: CPT

## 2021-03-04 PROCEDURE — 6370000000 HC RX 637 (ALT 250 FOR IP): Performed by: SURGERY

## 2021-03-04 PROCEDURE — 1200000003 HC TELEMETRY R&B

## 2021-03-04 PROCEDURE — 83735 ASSAY OF MAGNESIUM: CPT

## 2021-03-04 PROCEDURE — 2580000003 HC RX 258: Performed by: SURGERY

## 2021-03-04 PROCEDURE — 99232 SBSQ HOSP IP/OBS MODERATE 35: CPT | Performed by: PHYSICIAN ASSISTANT

## 2021-03-04 RX ADMIN — FAMOTIDINE 20 MG: 20 TABLET, FILM COATED ORAL at 10:14

## 2021-03-04 RX ADMIN — BUSPIRONE HYDROCHLORIDE 15 MG: 7.5 TABLET ORAL at 10:21

## 2021-03-04 RX ADMIN — HYDROCODONE BITARTRATE AND ACETAMINOPHEN 2 TABLET: 5; 325 TABLET ORAL at 10:13

## 2021-03-04 RX ADMIN — LEVOTHYROXINE SODIUM 75 MCG: 75 TABLET ORAL at 06:11

## 2021-03-04 RX ADMIN — MICONAZOLE NITRATE: 20 POWDER TOPICAL at 21:50

## 2021-03-04 RX ADMIN — Medication 1 CAPSULE: at 10:21

## 2021-03-04 RX ADMIN — METOPROLOL TARTRATE 25 MG: 25 TABLET ORAL at 10:21

## 2021-03-04 RX ADMIN — MIRTAZAPINE 45 MG: 45 TABLET, FILM COATED ORAL at 21:50

## 2021-03-04 RX ADMIN — SODIUM CHLORIDE, PRESERVATIVE FREE 10 ML: 5 INJECTION INTRAVENOUS at 10:28

## 2021-03-04 RX ADMIN — FAMOTIDINE 20 MG: 20 TABLET, FILM COATED ORAL at 19:15

## 2021-03-04 RX ADMIN — HEPARIN SODIUM 5000 UNITS: 5000 INJECTION INTRAVENOUS; SUBCUTANEOUS at 21:49

## 2021-03-04 RX ADMIN — ARIPIPRAZOLE 10 MG: 10 TABLET ORAL at 21:50

## 2021-03-04 RX ADMIN — BUPROPION HYDROCHLORIDE 300 MG: 150 TABLET, EXTENDED RELEASE ORAL at 10:14

## 2021-03-04 RX ADMIN — QUETIAPINE FUMARATE 100 MG: 100 TABLET ORAL at 21:50

## 2021-03-04 RX ADMIN — MICONAZOLE NITRATE: 20 POWDER TOPICAL at 10:27

## 2021-03-04 RX ADMIN — HEPARIN SODIUM 5000 UNITS: 5000 INJECTION INTRAVENOUS; SUBCUTANEOUS at 06:11

## 2021-03-04 RX ADMIN — BUSPIRONE HYDROCHLORIDE 15 MG: 7.5 TABLET ORAL at 21:50

## 2021-03-04 RX ADMIN — SODIUM CHLORIDE, PRESERVATIVE FREE 10 ML: 5 INJECTION INTRAVENOUS at 21:50

## 2021-03-04 RX ADMIN — SERTRALINE 200 MG: 100 TABLET, FILM COATED ORAL at 10:14

## 2021-03-04 RX ADMIN — ATORVASTATIN CALCIUM 40 MG: 40 TABLET, FILM COATED ORAL at 10:14

## 2021-03-04 RX ADMIN — BUSPIRONE HYDROCHLORIDE 15 MG: 7.5 TABLET ORAL at 15:39

## 2021-03-04 RX ADMIN — HEPARIN SODIUM 5000 UNITS: 5000 INJECTION INTRAVENOUS; SUBCUTANEOUS at 15:39

## 2021-03-04 RX ADMIN — DOXEPIN HYDROCHLORIDE 10 MG: 10 CAPSULE ORAL at 21:50

## 2021-03-04 RX ADMIN — HYDROCODONE BITARTRATE AND ACETAMINOPHEN 2 TABLET: 5; 325 TABLET ORAL at 19:15

## 2021-03-04 ASSESSMENT — PAIN SCALES - GENERAL
PAINLEVEL_OUTOF10: 5
PAINLEVEL_OUTOF10: 5
PAINLEVEL_OUTOF10: 8
PAINLEVEL_OUTOF10: 7
PAINLEVEL_OUTOF10: 8

## 2021-03-04 ASSESSMENT — PAIN DESCRIPTION - ORIENTATION
ORIENTATION: LOWER
ORIENTATION: LOWER

## 2021-03-04 ASSESSMENT — PAIN DESCRIPTION - PAIN TYPE
TYPE: ACUTE PAIN
TYPE: ACUTE PAIN

## 2021-03-04 NOTE — CARE COORDINATION
3/4/21, 2:32 PM EST    DISCHARGE ON GOING EVALUATION    [facility] day: 15  Location: [facility]17/017-A Reason for admit: Hypotension [I95.9]   Procedure: 2/20/2021 total colectomy with end ileostomy secondary to C. difficile colitis/toxic megacolon  Barriers to Discharge: General Surgery and Nephrology following, PT/OT/ST, SS, Heparin subq, prn pain medications and antiemetics, BMP in a.m., daily Ionized calcium, soft diet, wound care, ileostomy care, SCD's, telemetry. PCP: Asa Oneil MD  Readmission Risk Score: 21%  Patient Goals/Plan/Treatment Preferences: Kasey Paul is from [address] [facility]. He is returning skilled.

## 2021-03-04 NOTE — FLOWSHEET NOTE
[facility]--[facility] PROGRESS NOTE      Patient: Shefali Median  Room #: 4Y-60/914-A            YOB: 1940  Age: 79 y.o. Gender: male            Admit Date & Time: 2/17/2021 12:35 AM    Assessment:  According to the nurse pt may be depressed. Pinky Alvarado is a 79year old male who is in bed with his eyes closed, but he woke up as I called his name. We did not have a long conversation, as I thought he may want to sleep but after introductions I offered him prayer for healing. Interventions:  God is able to heal body, mind and spirit. Prayer for his healing and strength. Outcomes:  encouraged    Plan: 1. Care Plan:  Continue spiritual and emotional care for patient and family. Including prayers.      Electronically signed by Chris Agee on 3/4/2021 at [facility]  [phone]

## 2021-03-04 NOTE — PROGRESS NOTES
Hospitalist Progress Note      Patient:  Brandon Butler    Unit/Bed:5K-17/017-A  YOB: 1940  MRN: [id number]   Acct: [de-identified]   PCP: Asa Oneil MD  Date of Admission: 2/17/2021    Assessment/Plan:    1. 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 General surgery consulted. Continue ANTONIETTA drain care, wound & ostomy care. Output is decreasing. Plan to hopefully remove ANTONIETTA drain later this week. Pt is tolerating minced and moist diet. On oral vancomycin stopped on 3/3. Incision c/d/i.       2. Septic shock secondary to fulminant C. Diff colitis, resolved with noted hypotension, s/p Levophed, weaned off and currently stable. ID evaluted. Continue with current abx therapy. BP has been stable, continue to monitor.  PO Vanco stopped on 3/3.      3. Acute metabolic encephalopathy, resolved: Improved, related to hypotension/hypoperfusion along with uremia.     4. CARMEN, improving: Secondary to septic ATN.   Creatinine overall stable at 1.5. Nephrology following.      6. Hypernatremia:  Resolved, secondary to hypotonic losses from GI tract.      7. Acute postoperative hypoxic respiratory failure, resolved: Current saturations 99% on RA. Continue to monitor for respiratory needs     8. Incidental pulmonary nodules: Seen incidentally on CT scan in January.  PET scan completed in 2/12.  Nodule noted in right lower lobe, with primary rule out for metastatic process.  The nodule could also be infectious or inflammatory, monitor for now.  CT of the chest should be repeated in 3 months.      9. Acute on chronic anemia: Hgb on arrival was 10.2, Pt received 1 unit of pRBC.  Hgb stable ~8.      Chief Complaint: Hypotension    Initial H and P:-    HPI / Hospital Course: Hubert Toure is a 67-year-old male was transferred from [facility] on 2/17 for acute kidney injury and C. difficile colitis.  Patient was recently admitted to [facility] on 2/26 for altered mental status and sepsis secondary to right leg cellulitis. Lisa Ceja was treated with Zosyn and Rocephin, and also discharged with antibiotics to the nursing home at the time.  During the course of hospital treatment, they also noted a pulmonary nodule on CT scan that measured 3 x 2 x 2.6 cm.  It was a concern for malignancy at the time, the requested follow-up however patient does not have any further scans or PET scans.  Blood cultures grew E. coli at the time.   During this hospital course, the patient was originally sent from the SNF to [facility] [place] on 2/14 when he was found to be diaphoretic and hypotensive with a fever of 102 °F.  Patient was evaluated in the emergency room and diagnosed to have sepsis but the source was unknown at the time and he was given fluid resuscitation and started on a Levophed drip.  Patient was started on Zosyn at time. Josy Sand his hospital stay he was found to have worsening renal function and nephrology was consulted. Luis Paiz 2/16 the patient was seen by Dr. Mikhail Garcia from nephrology. Rosalia Art work-up did show C. difficile colitis. Adin Louis was started on vancomycin. Lisa Ceja had a noted mental change and worsening creatinine on 2/16 so he was transferred to [facility] ICU for further management and care per Dr. Salena Early"     2/18: Patient this morning remains on pressors as needed to maintain his blood pressure.  He has been receiving fluid throughout the night.  His hemoglobin did drop from 10 to about 8, however it is stayed stable since then, continue to monitor.  His WBC has decreased along with his creatinine.  However his BUN increased overnight.  The patient seems more confused today with an inability to answer where or when he is.  The nephrologist stated that if his BUN increases over 100 dialysis would be warranted at that time.  The surgeon also states that at this current point if surgery is warranted it would be a total colectomy.  Both of these options were discussed with the family who stated that they would be okay starting dialysis if needed.  They with prefer to hold off on surgery right now.     2/19: Patient remains on pressors to maintain his blood pressure however the dose has been weaned significantly. Reji Handler still currently receiving IVF.  Hgb has remained stable.  WBC did increase overnight.  His creatinine has decreased to 3.7. Martin Ortiz has stated 95.  The nephrologist had seen the patient earlier in the morning, and stated that dialysis is not needed at this time.  Due to the rising WBC, infectious disease has been consulted.     2/20: Patient remains on pressors, cortisol was checked on 2/17 which was normal for a.m.  Infectious diseases did recommend a loop ileostomy with vancomycin irrigation believes the patient would benefit from it.  Obtaining surgery recommendations.  His creatinine continues to decrease, and no dialysis is needed at this point, BUN decreasing as well.  WBC irvin again overnight, and patient still complains of stomach pains.     2/21:  In spite of the aggressive treatment for C. difficile colitis patient is still complaining of severe abdominal pain with severe abdominal distention , discussion  with the medical resident and also with nursing team  the patient is not improving over the last few days even the nursing did mention the patient is worsening over the last 24 hours with more distention of the abdomen with leukocytosis so plan to obtain CT scan abdomen pelvis with p.o. contrast to reassess his C. difficile colitis of the abdomen to see if there is any progression or improvement, CT scan abdomen pelvis done with contrast did show progression of the ulcerative cecal area of severe cecal distention and possibility for pneumatosis so general surgery consulted stat and he discussed the options with the patient and the patient family and the plan was done to go ahead for OR that was done last night, total colectomy, patient did after surgery have a smooth postop course still intubated in the weaning trial currently, will continue IV antibiotics fluids support hemodynamics pain control and in collaboration with the ID and general surgery team.     2/22: Patient remains on pressors this morning, propofol from surgery stopped. Patient was intubated for the surgery, however extubated same day. Doing well on 3 L nasal cannula. Patient states that he feels he has to defecate, however his ostomy bag is draining properly. His wound is clean and dry. He still states some diffuse abdominal pain. His creatinine is trending back towards baseline, as well as his BUN. His Hgb remained stable. WBC has decreased mildly from yesterday. We will continue to monitor. \"     2/23-> discussed with Nephrology, okay with PICC line. Pt just had Norco prior to exam, not very interactive. Will open eyes and respond to yes no questions but is moaning continually in pain. Discussed with Gen Surg who are okay to start tube feeds as pt has been without nutrition, will consult dietician.      2/24-> patient sitting up on side of the bed with therapy at the bedside.  Patient continues to have large quantities of output from ANTONIETTA drain as well as from ostomy site.  Surgery aware.     2/25-> Patient sitting up bedside at the time of the interview.  Currently denies any physical complaints and denied any issues overnight and into the morning.  He was updated on the current plan of care, verbalizes understanding, and had no other needs or questions at this time. 2/27 -patient sitting in the chair not in acute distress denies any chest pain headache visual disturbances complains about food just not tasting well, patient has C. difficile colitis, without any nausea vomiting.  On puréed diet.   2/28 - Patient is a poor historian, denies any active complaints. Martha Tavares though had a lunch tray in front of him claims, did not get a tray.  Reviewed and appreciate consultants  input   3/1- No nausea no vomiting tolerating puréed diet. High output through the ostomy  3/2- Patient working with therapy. He reports that while working with therapy, he had an episode of chest pain, described as sharp, resolved with rest. There were no changes on his EKG. No associated SOB, diaphoresis. Pt denies fever/chills, SOB, CP, abd pain, n/v/d. Patient is doing well otherwise, awaiting placement. 3/3: No acute events overnight. Patient continuing to complain of rectal pressure. Oral vancomycin stopped today. Patient has no acute concerns or issues. Subjective (past 24 hours):   No acute events overnight. ANTONIETTA drain output slowing down. Possibility of removal this evening. Patient in good spirits, alert and oriented. Awaiting placement. Past medical history, family history, social history and allergies reviewed again and is unchanged since admission. ROS Pt denies CP, SOB, HA. Pt admits to rectal pressure.      Medications:  Reviewed    Infusion Medications    sodium chloride       Scheduled Medications    lactobacillus  1 capsule Oral Daily with breakfast    sodium chloride flush  10 mL Intravenous 2 times per day    famotidine  20 mg Oral BID    lidocaine 1 % injection  5 mL Intradermal Once    buPROPion  300 mg Oral Daily    ARIPiprazole  10 mg Oral Daily    busPIRone  15 mg Oral TID    doxepin  10 mg Oral Nightly    QUEtiapine  100 mg Oral Nightly    mirtazapine  45 mg Oral Nightly    sertraline  200 mg Oral Daily    heparin (porcine)  5,000 Units Subcutaneous Q8H    atorvastatin  40 mg Oral Daily    levothyroxine  75 mcg Oral Daily    metoprolol tartrate  25 mg Oral BID    [Held by provider] tamsulosin  0.4 mg Oral Daily    calcium replacement protocol   Other RX Placeholder    miconazole   Topical BID     PRN Meds: hydrALAZINE, sodium chloride flush, promethazine **OR** ondansetron, morphine **OR** morphine, sodium chloride, potassium chloride **OR** potassium alternative oral replacement **OR** potassium chloride, LORazepam, HYDROcodone 5 mg - acetaminophen **OR** HYDROcodone 5 mg - acetaminophen, polyethylene glycol, acetaminophen **OR** acetaminophen      Intake/Output Summary (Last 24 hours) at 3/4/2021 1354  Last data filed at 3/4/2021 1027  Gross per 24 hour   Intake 250 ml   Output 855 ml   Net -605 ml       Diet:  Dietary Nutrition Supplements: Frozen Oral Supplement  Dietary Nutrition Supplements: Other Oral Supplement (see comment)  DIET DENTAL SOFT; No Drinking Straw    Exam:  BP (!) 123/58   Pulse 75   Temp 97.7 °F (36.5 °C) (Oral)   Resp 16   Ht 5' 10\" (1.778 m)   Wt 266 lb 6.4 oz (120.8 kg)   SpO2 98%   BMI 38.22 kg/m²   General appearance: No apparent distress, appears stated age and cooperative. HEENT: Pupils equal, round, and reactive to light. Conjunctivae/corneas clear. Neck: Supple, with full range of motion. No jugular venous distention. Trachea midline. Respiratory:  Normal respiratory effort on RA. Breath sounds diminished. Cardiovascular: Regular rate and rhythm with normal S1/S2 without murmurs, rubs or gallops. Abdomen: Soft, non-tender, non-distended with normal bowel sounds. ANTONIETTA drain intact. Midline incision c/d/i. Musculoskeletal: passive and active ROM x 4 extremities. Skin: Skin color, texture, turgor normal.  No rashes or lesions. Neurologic:  Neurovascularly intact without any focal sensory/motor deficits.  Cranial nerves: II-XII intact, grossly non-focal.  Psychiatric: Alert and oriented, thought content appropriate, normal insight  Capillary Refill: Brisk,< 3 seconds   Peripheral Pulses: +2 palpable, equal bilaterally     Labs:   Recent Labs     03/03/21  0630   HGB 8.0*   HCT 26.7*     Recent Labs     03/02/21  0452 03/03/21  0630 03/04/21  0446    139 137   K 3.8 4.0 4.1   * 110 110   CO2 19* 19* 20*   BUN 18 17 17   CREATININE 1.5* 1.5* 1.6*   CALCIUM 7.9* 8.0* 8.2*   PHOS 3.9  --  4.1     Microbiology: Blood culture #1:   Lab Results   Component Value Date    BC No growth-preliminary No growth  02/17/2021     Organism:  Lab Results   Component Value Date    ORG Candida albicans 02/17/2021         Urine culture:   Lab Results   Component Value Date    LABURIN [place] count: 50,000-90,000 CFU/mL  02/17/2021     Urinalysis:      Lab Results   Component Value Date    NITRU NEGATIVE 02/17/2021    WBCUA 50-75 02/17/2021    BACTERIA MODERATE 02/17/2021    RBCUA 10-15 02/17/2021    BLOODU SMALL 02/17/2021    SPECGRAV 1.017 02/17/2021       Radiology:  FL MODIFIED BARIUM SWALLOW W VIDEO   Final Result   1. Laryngeal penetration of thin and soft barium without evidence of aspiration. 2. Additional recommendations from the speech therapist will follow. **This report has been created using voice recognition software. It may contain minor errors which are inherent in voice recognition technology. **      Final report electronically signed by Dr. Danny Gallagher on 3/2/2021 11:48 AM      XR CHEST PORTABLE   Final Result   Left lower lobe atelectasis and/or infiltrate. **This report has been created using voice recognition software. It may contain minor errors which are inherent in voice recognition technology. **      Final report electronically signed by Dr. Bobby Oneill on 2/24/2021 3:21 PM      IR FLUORO GUIDED CVA DEVICE PLMT/REPLACE/REMOVAL   Final Result      1. Status post successful PICC line insertion. 2. Of note, an attempt was made to access the right brachial vein initially. However this was unsuccessful due to patient's body habitus. Therefore the right IJ approach was utilized. **This report has been created using voice recognition software. It may contain minor errors which are inherent in voice recognition technology. **      Final report electronically signed by Dr. Lilian Samuel on 2/24/2021 6:26 PM      FL MODIFIED BARIUM SWALLOW W VIDEO   Final Result   1.  . Laryngeal penetration of nectar thick and thin barium with aspiration of thin barium   2. Additional recommendations from the speech therapist will follow. **This report has been created using voice recognition software. It may contain minor errors which are inherent in voice recognition technology. **      Final report electronically signed by Dr. Gm Feldman on 2/24/2021 10:22 AM      XR CHEST PORTABLE   Final Result   Normally positioned enteric tube. Unchanged left lower lung consolidation and left pleural effusion. Unchanged right lower lung airspace disease or atelectasis. This document has been electronically signed by: Alison Dietrich MD on    02/23/2021 11:52 PM      XR CHEST PORTABLE   Final Result   Malposition of the NG tube. **This report has been created using voice recognition software. It may contain minor errors which are inherent in voice recognition technology. **      Final report electronically signed by Dr. Gloria Sin on 2/23/2021 8:21 PM      XR ABDOMEN FOR NG/OG/NE TUBE PLACEMENT   Final Result   Impression:      NG tube tip proximal stomach. This document has been electronically signed by: Estuardo Baumann MD on    02/22/2021 11:21 PM      XR CHEST PORTABLE   Final Result   1. Appropriately positioned lines and tubes. 2. Mild bibasilar atelectasis. 3. Small left-sided pleural effusion. **This report has been created using voice recognition software. It may contain minor errors which are inherent in voice recognition technology. **      Final report electronically signed by Dr. Drew Hardin on 2/21/2021 11:27 AM      XR ABDOMEN (2 VIEWS)   Final Result   Impression:   1. Nonobstructing bowel gas pattern. 2.  Oral contrast is seen in the proximal small bowel cannot exclude    extravasation left midabdomen. Correlation with CT abdomen and pelvis is    recommended. 3.  Post total colectomy. Overlying skin staples. NG tube in stomach. Pleural-parenchymal disease both lung bases. This document has been electronically signed by: Aide Boone MD on    02/21/2021 08:51 AM      CT ABDOMEN PELVIS WO CONTRAST Additional Contrast? Oral   Final Result       1. Worsening appearance to the colon. This is now diffusely distended. There is worsening thickening of the distal sigmoid colon and rectum. There is new thickening of the cecum with possible pneumatosis. 2. Worsening free fluid in the abdomen. No free air is noted. 3. Small layering bilateral pleural effusions with dependent bibasilar atelectasis. 4. Distended gallbladder. **This report has been created using voice recognition software. It may contain minor errors which are inherent in voice recognition technology. **      Final report electronically signed by Dr. Meseret Lopez on 2/20/2021 2:38 PM      XR ABDOMEN FOR NG/OG/NE TUBE PLACEMENT   Final Result   1. Esophageal route tube tip in the stomach. **This report has been created using voice recognition software. It may contain minor errors which are inherent in voice recognition technology. **      Final report electronically signed by Dr. Meseert Lopez on 2/20/2021 11:15 AM      XR ABDOMEN FOR NG/OG/NE TUBE PLACEMENT   Final Result   Impression:   Limited exam   1. NG tube in the stomach. Nonspecific bowel gas pattern      This document has been electronically signed by: Praful Freedman MD on    02/19/2021 01:59 AM      US GALLBLADDER RUQ   Final Result   Distended appearance to the gallbladder which has a slightly thickened wall. Sludge is also present within its lumen. Sonographic Hamilton's sign is positive per the technologist. These findings could be on the basis of acute cholecystitis. If clinically    warranted, further evaluation with a nuclear medicine HIDA scan would be beneficial for further characterization. **This report has been created using voice recognition software.  It may contain minor errors which are inherent in voice recognition technology. **      Final report electronically signed by Dr Jose Carrion on 2/17/2021 9:37 AM      CT ABDOMEN PELVIS WO CONTRAST Additional Contrast? None   Final Result   Impression:   1. Pancolitis consistent with the clinical history of C. difficile    colitis. No bowel perforation. 2.  Probable cholelithiasis. 3.  Bibasilar pulmonary consolidation which may be on the basis of    atelectasis or pneumonia. 4.  Small bilateral nonobstructing renal calculi. 5.  Small right middle lobe pulmonary nodule. This document has been electronically signed by: Priya Reese MD on    02/17/2021 05:51 AM      All CTs at this facility use dose modulation techniques and iterative    reconstructions, and/or weight-based dosing   when appropriate to reduce radiation to a low as reasonably achievable. XR CHEST PORTABLE   Final Result   Impression:      Right central line tip proximal SVC. Stable bibasilar consolidation and atelectasis. This document has been electronically signed by: Oniel Godoy MD on    02/17/2021 04:20 AM      XR CHEST PORTABLE   Final Result   Impression:      Stable bilateral basilar consolidation and atelectasis      This document has been electronically signed by: Oniel Godoy MD on    02/17/2021 03:20 AM      XR ABDOMEN FOR NG/OG/NE TUBE PLACEMENT   Final Result   Impression:   1. Distal NG tube is not well seen but is likely in the distal stomach. This document has been electronically signed by: Mahendra Carlson MD on    02/17/2021 02:30 AM      XR CHEST PORTABLE   Final Result   Impression:   1. Right lower lobe airspace disease may represent atelectasis or    pneumonia.       This document has been electronically signed by: Mahendra Carlson MD on    02/17/2021 02:28 AM        Electronically signed by HAY Gunter on 3/4/2021 at 1:54 PM

## 2021-03-04 NOTE — PROGRESS NOTES
[facility]   Nephrology progress note  3/4/2021, 8:49 AM      Pt Name:    Drew Becerril  MRN:     [id number]     YOB: 1940  Admit Date:    2/17/2021 12:35 AM  Primary Care Physician: Ruddy Muniz MD   Room number  7W-47/291-O    Chief Complaint: Nephrology following for CARMEN and metabolic acidosis    Subjective:  Patient seen and examined  Awake and alert  No chest pain  No shortness of breath    Objective:  24HR INTAKE/OUTPUT:      Intake/Output Summary (Last 24 hours) at 3/4/2021 0849  Last data filed at 3/4/2021 0403  Gross per 24 hour   Intake 440 ml   Output 885 ml   Net -445 ml     I/O last 3 completed shifts: In: 26 [P.O.:440; I.V.:30]  Out: 885 [Urine:300; Drains:235; Stool:350]  No intake/output data recorded. Admission weight: 296 lb 4.8 oz (134.4 kg)  Wt Readings from Last 3 Encounters:   02/28/21 266 lb 6.4 oz (120.8 kg)   11/25/20 283 lb 3.2 oz (128.5 kg)   10/16/20 273 lb 12.8 oz (124.2 kg)     Body mass index is 38.22 kg/m².     Physical examination  VITALS:     Vitals:    03/03/21 1330 03/03/21 1515 03/03/21 2045 03/04/21 0403   BP: (!) 120/20 (!) 119/56 (!) 117/56 (!) 118/58   Pulse: 66 69 67 73   Resp: 16 16 16 14   Temp: 98.2 °F (36.8 °C) 98 °F (36.7 °C) 98.1 °F (36.7 °C) 98.1 °F (36.7 °C)   TempSrc: Oral Oral Oral Oral   SpO2: 96%  98% 98%   Weight:       Height:         General Appearance: Overall improved  No acute distress  Neck: No JVD  Lungs: no use of accessory muscles  Heart:  S1, S2 heard  GI: + Ostomy present no guarding  Extremities: trace LE edema, significantly improved      Lab Data  CBC:   Recent Labs     03/03/21  0630   HGB 8.0*   HCT 26.7*     BMP:  Recent Labs     03/02/21  0452 03/03/21  0630 03/04/21  0446    139 137   K 3.8 4.0 4.1   * 110 110   CO2 19* 19* 20*   BUN 18 17 17   CREATININE 1.5* 1.5* 1.6*   GLUCOSE 110* 112* 110*   CALCIUM 7.9* 8.0* 8.2*   MG 1.8  --  2.1   PHOS 3.9  --  4.1     Hepatic:   No results for input(s): LABALBU, AST, ALT, ALB, BILITOT, ALKPHOS in the last 72 hours. Meds:  Infusion:    sodium chloride       Meds:    lactobacillus  1 capsule Oral Daily with breakfast    sodium chloride flush  10 mL Intravenous 2 times per day    famotidine  20 mg Oral BID    lidocaine 1 % injection  5 mL Intradermal Once    buPROPion  300 mg Oral Daily    ARIPiprazole  10 mg Oral Daily    busPIRone  15 mg Oral TID    doxepin  10 mg Oral Nightly    QUEtiapine  100 mg Oral Nightly    mirtazapine  45 mg Oral Nightly    sertraline  200 mg Oral Daily    heparin (porcine)  5,000 Units Subcutaneous Q8H    atorvastatin  40 mg Oral Daily    levothyroxine  75 mcg Oral Daily    metoprolol tartrate  25 mg Oral BID    [Held by provider] tamsulosin  0.4 mg Oral Daily    calcium replacement protocol   Other RX Placeholder    miconazole   Topical BID     Meds prn: hydrALAZINE, sodium chloride flush, promethazine **OR** ondansetron, morphine **OR** morphine, sodium chloride, potassium chloride **OR** potassium alternative oral replacement **OR** potassium chloride, LORazepam, HYDROcodone 5 mg - acetaminophen **OR** HYDROcodone 5 mg - acetaminophen, polyethylene glycol, acetaminophen **OR** acetaminophen       Impression and Plan:  1. Acute kidney injury secondary to septic ATN  Creatinine stable at 1.5-1.6  Likely new baseline at this time    2. Mild metabolic acidosis. Stable. 3.  Hypernatremia. Secondary to hypotonic losses from GI tract. Improved/resolved   4.  C. difficile colitis with toxic megacolon. Patient status post total colectomy with end ileostomy, surgery following  5. Azotemia: Significantly improved  6. Pulmonary nodule  7. History of CAD  8. Anemia  9.   Hypotension: Stable/improving    Charmayne Carrie, MD  [facility]

## 2021-03-04 NOTE — PROGRESS NOTES
[address] [address]  INPATIENT SPEECH THERAPY  [facility]  DAILY NOTE    TIME   SLP Individual Minutes  Time In: 9397  Time Out: [address]  Minutes: 8  Timed Code Treatment Minutes: 0 Minutes   Cognitive tx: 13 minutes  Dysphagia tx: 17 minutes     Date: 3/4/2021  Patient Name: Aida Rivers      CSN: [id number]   : 1940  (79 y.o.)  Gender: male   Referring Physician:  SYLVESTER Velarde CNP  Diagnosis: Hypotension  Secondary Diagnosis: Dysphagia, Cognitive deficits   Precautions: Aspiration, Fall Risk   Current Diet: Dental soft diet with thin liquids (no straws)   Swallowing Strategies: Meal set up; encourage PO intake, NO STRAW, double swallow   Date of Last MBS/FEES: 2021    Pain:  No pain reported. Subjective:  RN Edwyna Frankel confirmed pt appropriate to be seen this date for skilled dysphagia therapy. Pt seen at bedside; alert and pleasant. Pt reports eating 50% of meals d/t \"loss of appetite\". ST encouraged and explained to pt importance in consuming at least 80% of meals to ensure adequate nutrition/hydration. Pt verbalized understanding. Short-Term Goals:  SHORT TERM GOAL #1:  Goal 1: Patient will consume dental soft diet with thin liquids without s/s of aspiration (NO straws, upright position, small bites/sips) without s/s of aspiration in order to safely maintain adequate hydration and nutrition   INTERVENTIONS: DNT d/t focus on additional goals. SHORT TERM GOAL #2:  Goal 2: Pt will complete conservative advanced PO trials (regular as clinically appropriate) with skilled ST only to determine potential advancement of diet level. INTERVENTIONS: DNT d/t pt's lack of interest.      SHORT TERM GOAL #3:  Goal 3: Pt will complete pharyngeal strengthening exercises x10 and improved timing of the swallow with good sucess to improve pharyngeal integrity of the swallow and overall timing of the swallow.   INTERVENTIONS:  Effortful Swallow:   -Pt completed x15 effortful swallows with GOOD Noted last drink was 16 hours at admission, started having some tremor  Valium     success via ST tactile palpation. Danette:  -Pt completed x15 danette swallows with FAIR success via ST tactile palpation. *Pt with increased difficulties initiating swallow with danette exercise. *ST recommended pt demonstrate at least x20 effortful swallows as HEP. Pt verbalized understanding. SHORT TERM GOAL #4:  Goal 4: Patient will complete recall, short term memory and working memory tasks with 70% accuracy provided mod cues to improve overall recall of daily and medical information  INTERVENTIONS: DNT d/t focus on additional goals. SHORT TERM GOAL #5:  Goal 5: Patient will complete problem solving/reasoning and sequencing tasks with 70% accuracy provided mod cues to improve overall safety, thought organization and processing speed  INTERVENTIONS: DNT d/t focus on additional goals. Long-Term Goals:   No LTGs at this date d/t ELOS. EDUCATION:  Learner: Patient  Education:  Reviewed results and recommendations of this evaluation, Reviewed diet and strategies, Reviewed signs, symptoms and risks of aspiration, Reviewed ST goals and Plan of Care, Reviewed recommendations for follow-up and Education Related to Potential Risks and Complications Due to Impairment/Illness/Injury  Evaluation of Education: Verbalizes understanding, Demonstrates with assistance, Needs further instruction and Family not present    ASSESSMENT/PLAN:  Activity Tolerance:  Patient tolerance of  treatment: good. Assessment/Plan: Patient progressing toward established goals. Continues to require skilled care of licensed speech pathologist to progress toward achievement of established goals and plan of care. .     Plan for Next Session: cognitive tx, PO trials      Kelin Bae, B.S.  Intern  Thao Junior) Shena Garcia MA., CCC-SLP

## 2021-03-04 NOTE — PROGRESS NOTES
[facility] - Dr. Lyssa Saldivar  Postoperative Progress Note    Pt Name: Bryan Fay  Medical Record Number: [id number]  Date of Birth 1940   Today's Date: 3/4/2021    ASSESSMENT   1. 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 Acute on chronic kidney disease  3. Incidental pulmonary nodules  4. Complicated UTI  5. Metabolic encephalopathy  6. Leukocytosis - trending down  7. Acute on chronic anemia  8. Malnutrition/deconditioning   has a past medical history of Arthritis, Chronic kidney disease, Depression, Diabetes mellitus ([facility] [address] Hyperlipidemia, Hypertension, and Thyroid disease. PLAN   1. Increased to soft diet and tolerating well. Ostomy functioning well. 350 cc out in the last 24 hours. 2. ANTONIETTA drain care - serous fluid. Continues to decrease - 235 out yesterday. Only 20 cc out today so if outputs stay low today then okay to remove later tonight or tomorrow morning. 3. Pain control  4. Therapy as tolerated   5. Pulmonary toileting   6. GI & DVT prophylaxis  7. Repeat H&H in am to monitor   8. C. difficile treatment per admitting/ID  9. Social service for disposition  10. Doing well from a surgical standpoint  SUBJECTIVE   Stable. No signs of bleeding. ANTONIETTA drain outputs are slowing down - Denies nausea or significant abdominal pain. Still having occasional sensation to have a bowel movement but no leakage or mucous. No significant fevers. Ostomy slowing down. No lightheadedness or dizziness. No chest pain or shortness of breath. Incision healing well without drainage. Still feels weak overall but moving better with therapy. Incontinent of urine.   CURRENT MEDICATIONS   Scheduled Meds:   lactobacillus  1 capsule Oral Daily with breakfast    sodium chloride flush  10 mL Intravenous 2 times per day    famotidine  20 mg Oral BID    lidocaine 1 % injection  5 mL Intradermal Once    buPROPion  300 mg Oral

## 2021-03-05 VITALS
HEART RATE: 87 BPM | RESPIRATION RATE: 16 BRPM | SYSTOLIC BLOOD PRESSURE: 118 MMHG | HEIGHT: 70 IN | DIASTOLIC BLOOD PRESSURE: 54 MMHG | OXYGEN SATURATION: 97 % | BODY MASS INDEX: 38.14 KG/M2 | WEIGHT: 266.4 LBS | TEMPERATURE: 98.4 F

## 2021-03-05 LAB
ANION GAP SERPL CALCULATED.3IONS-SCNC: 6 MEQ/L (ref 8–16)
BUN BLDV-MCNC: 16 MG/DL (ref 7–22)
CALCIUM IONIZED: 1.21 MMOL/L (ref 1.12–1.32)
CALCIUM SERPL-MCNC: 8.1 MG/DL (ref 8.5–10.5)
CHLORIDE BLD-SCNC: 109 MEQ/L (ref 98–111)
CO2: 20 MEQ/L (ref 23–33)
CREAT SERPL-MCNC: 1.6 MG/DL (ref 0.4–1.2)
GFR SERPL CREATININE-BSD FRML MDRD: 42 ML/MIN/1.73M2
GLUCOSE BLD-MCNC: 107 MG/DL (ref 70–108)
POTASSIUM SERPL-SCNC: 3.9 MEQ/L (ref 3.5–5.2)
SARS-COV-2, NAAT: NOT DETECTED
SODIUM BLD-SCNC: 135 MEQ/L (ref 135–145)

## 2021-03-05 PROCEDURE — 99232 SBSQ HOSP IP/OBS MODERATE 35: CPT | Performed by: INTERNAL MEDICINE

## 2021-03-05 PROCEDURE — 87635 SARS-COV-2 COVID-19 AMP PRB: CPT

## 2021-03-05 PROCEDURE — 36592 COLLECT BLOOD FROM PICC: CPT

## 2021-03-05 PROCEDURE — 82330 ASSAY OF CALCIUM: CPT

## 2021-03-05 PROCEDURE — 6370000000 HC RX 637 (ALT 250 FOR IP): Performed by: INTERNAL MEDICINE

## 2021-03-05 PROCEDURE — 6370000000 HC RX 637 (ALT 250 FOR IP): Performed by: NURSE PRACTITIONER

## 2021-03-05 PROCEDURE — 99239 HOSP IP/OBS DSCHRG MGMT >30: CPT | Performed by: PHYSICIAN ASSISTANT

## 2021-03-05 PROCEDURE — 36415 COLL VENOUS BLD VENIPUNCTURE: CPT

## 2021-03-05 PROCEDURE — 6370000000 HC RX 637 (ALT 250 FOR IP): Performed by: PHYSICIAN ASSISTANT

## 2021-03-05 PROCEDURE — 6370000000 HC RX 637 (ALT 250 FOR IP): Performed by: SURGERY

## 2021-03-05 PROCEDURE — 80048 BASIC METABOLIC PNL TOTAL CA: CPT

## 2021-03-05 PROCEDURE — 2580000003 HC RX 258: Performed by: SURGERY

## 2021-03-05 PROCEDURE — 6360000002 HC RX W HCPCS: Performed by: SURGERY

## 2021-03-05 RX ORDER — HYDRALAZINE HYDROCHLORIDE 25 MG/1
25 TABLET, FILM COATED ORAL 3 TIMES DAILY PRN
Qty: 90 TABLET | Refills: 3 | DISCHARGE
Start: 2021-03-05

## 2021-03-05 RX ORDER — HYDROCODONE BITARTRATE AND ACETAMINOPHEN 5; 325 MG/1; MG/1
1 TABLET ORAL EVERY 8 HOURS PRN
Qty: 21 TABLET | Refills: 0 | Status: SHIPPED | OUTPATIENT
Start: 2021-03-05 | End: 2021-03-12

## 2021-03-05 RX ADMIN — LEVOTHYROXINE SODIUM 75 MCG: 75 TABLET ORAL at 06:11

## 2021-03-05 RX ADMIN — BUSPIRONE HYDROCHLORIDE 15 MG: 7.5 TABLET ORAL at 14:42

## 2021-03-05 RX ADMIN — FAMOTIDINE 20 MG: 20 TABLET, FILM COATED ORAL at 08:45

## 2021-03-05 RX ADMIN — BUSPIRONE HYDROCHLORIDE 15 MG: 7.5 TABLET ORAL at 08:44

## 2021-03-05 RX ADMIN — HEPARIN SODIUM 5000 UNITS: 5000 INJECTION INTRAVENOUS; SUBCUTANEOUS at 06:11

## 2021-03-05 RX ADMIN — SERTRALINE 200 MG: 100 TABLET, FILM COATED ORAL at 08:44

## 2021-03-05 RX ADMIN — HYDROCODONE BITARTRATE AND ACETAMINOPHEN 2 TABLET: 5; 325 TABLET ORAL at 10:59

## 2021-03-05 RX ADMIN — METOPROLOL TARTRATE 25 MG: 25 TABLET ORAL at 08:44

## 2021-03-05 RX ADMIN — BUPROPION HYDROCHLORIDE 300 MG: 150 TABLET, EXTENDED RELEASE ORAL at 08:44

## 2021-03-05 RX ADMIN — SODIUM CHLORIDE, PRESERVATIVE FREE 10 ML: 5 INJECTION INTRAVENOUS at 08:56

## 2021-03-05 RX ADMIN — Medication 1 CAPSULE: at 08:43

## 2021-03-05 RX ADMIN — ATORVASTATIN CALCIUM 40 MG: 40 TABLET, FILM COATED ORAL at 08:43

## 2021-03-05 RX ADMIN — MICONAZOLE NITRATE: 20 POWDER TOPICAL at 08:44

## 2021-03-05 ASSESSMENT — PAIN DESCRIPTION - PAIN TYPE: TYPE: ACUTE PAIN

## 2021-03-05 ASSESSMENT — PAIN SCALES - GENERAL: PAINLEVEL_OUTOF10: 0

## 2021-03-05 NOTE — PROGRESS NOTES
LABALBU, AST, ALT, ALB, BILITOT, ALKPHOS in the last 72 hours. Meds:  Infusion:    sodium chloride       Meds:    lactobacillus  1 capsule Oral Daily with breakfast    sodium chloride flush  10 mL Intravenous 2 times per day    famotidine  20 mg Oral BID    lidocaine 1 % injection  5 mL Intradermal Once    buPROPion  300 mg Oral Daily    ARIPiprazole  10 mg Oral Daily    busPIRone  15 mg Oral TID    doxepin  10 mg Oral Nightly    QUEtiapine  100 mg Oral Nightly    mirtazapine  45 mg Oral Nightly    sertraline  200 mg Oral Daily    heparin (porcine)  5,000 Units Subcutaneous Q8H    atorvastatin  40 mg Oral Daily    levothyroxine  75 mcg Oral Daily    metoprolol tartrate  25 mg Oral BID    [Held by provider] tamsulosin  0.4 mg Oral Daily    calcium replacement protocol   Other RX Placeholder    miconazole   Topical BID     Meds prn: hydrALAZINE, sodium chloride flush, promethazine **OR** ondansetron, morphine **OR** morphine, sodium chloride, potassium chloride **OR** potassium alternative oral replacement **OR** potassium chloride, LORazepam, HYDROcodone 5 mg - acetaminophen **OR** HYDROcodone 5 mg - acetaminophen, polyethylene glycol, acetaminophen **OR** acetaminophen       Impression and Plan:  1. Acute kidney injury secondary to septic ATN  Creatinine stable at 1.5-1.6  Overall stable at this time  Electrolytes and volume status have also remained stable  Nephrology will sign off at this time  Please schedule follow-up with me in office in 4 weeks with BMP    2. Mild metabolic acidosis. Stable. 3.  Hypernatremia. Secondary to hypotonic losses from GI tract. Improved/resolved   4.  C. difficile colitis with toxic megacolon. Patient status post total colectomy with end ileostomy, surgery following  5. Azotemia: Significantly improved  6. Pulmonary nodule  7. History of CAD  8.   Anemia    Rachana Garcia MD  [facility]

## 2021-03-05 NOTE — PROGRESS NOTES
Patient ANTONIETTA drain in the left abdomen was removed. Patient tolerated well. Cleaned with betadine and covered with gauze.

## 2021-03-05 NOTE — PROGRESS NOTES
Called report to [facility] at [place] . Patient sent with all personal belongings via [employer].

## 2021-03-05 NOTE — DISCHARGE SUMMARY
Hospitalist Discharge Summary        Patient: Yasmin Godinez  YOB: 1940  MRN: [id number]   Acct: [de-identified]    Primary Care Physician: Joanie Monge MD    Admit date  2/17/2021    Discharge date:  3/5/2021 12:33 PM    Chief Complaint on presentation :-  Hypotension     Discharge Assessment and Plan:-   1. 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 General surgery consulted. Continue ANTONIETTA drain care, wound & ostomy care. Output is decreasing. ANTONIETTA drain removed on 3/5. Pt is tolerating minced and moist diet. On oral vancomycin stopped on 3/3. Incision c/d/i. General Surgery will prescribe pain medication if appropriate.      2. Septic shock secondary to fulminant C. Diff colitis, resolved with noted hypotension, s/p Levophed, weaned off and currently stable. ID evaluted. Continue with current abx therapy. BP has been stable, continue to monitor.  PO Vanco stopped on 3/3.      3. Acute metabolic encephalopathy, resolved: Improved, related to hypotension/hypoperfusion along with uremia.     4. CARMEN, improving: Secondary to septic ATN.   Creatinine overall stable at ~1.5. Nephrology following.      6. Hypernatremia:  Resolved, secondary to hypotonic losses from GI tract.      7. Acute postoperative hypoxic respiratory failure, resolved: Current saturations 99% on RA. Continue to monitor for respiratory needs     8. Incidental pulmonary nodules: Seen incidentally on CT scan in January.  PET scan completed in 2/12.  Nodule noted in right lower lobe, with primary rule out for metastatic process.  The nodule could also be infectious or inflammatory, monitor for now.  CT of the chest should be repeated in 3 months.      9. Acute on chronic anemia: Hgb on arrival was 10.2, Pt received 1 unit of pRBC. Hgb stable ~8.      Initial H and P and Hospital course:-  NAVIN / Bronsone Babinski is a 29-year-old male was transferred from [facility] on 2/17 for acute kidney injury and C. difficile colitis.  Patient was recently admitted to [facility] on 2/26 for altered mental status and sepsis secondary to right leg cellulitis. Kacie Jo was treated with Zosyn and Rocephin, and also discharged with antibiotics to the nursing home at the time.  During the course of hospital treatment, they also noted a pulmonary nodule on CT scan that measured 3 x 2 x 2.6 cm.  It was a concern for malignancy at the time, the requested follow-up however patient does not have any further scans or PET scans.  Blood cultures grew E. coli at the time.   During this hospital course, the patient was originally sent from the SNF to [facility] on 2/14 when he was found to be diaphoretic and hypotensive with a fever of 102 °F.  Patient was evaluated in the emergency room and diagnosed to have sepsis but the source was unknown at the time and he was given fluid resuscitation and started on a Levophed drip.  Patient was started on Zosyn at time. Abdirizak Ferraraons his hospital stay he was found to have worsening renal function and nephrology was consulted. Temo Arrieta 2/16 the patient was seen by Dr. Nehal Galicia from nephrology.  Further work-up did show C. difficile colitis. Jannette Du was started on vancomycin. Kacie Jo had a noted mental change and worsening creatinine on 2/16 so he was transferred to [facility] ICU for further management and care per Dr. Osman Lou"     2/18: Patient this morning remains on pressors as needed to maintain his blood pressure.  He has been receiving fluid throughout the night.  His hemoglobin did drop from 10 to about 8, however it is stayed stable since then, continue to monitor.  His WBC has decreased along with his creatinine.  However his BUN increased overnight.  The patient seems more confused today with an inability to answer where or when he is.  The nephrologist stated that if his BUN increases over 100 dialysis would be warranted at that time.  The surgeon also states that at this current point if surgery is warranted it would be a total colectomy.  Both of these options were discussed with the family who stated that they would be okay starting dialysis if needed.  They with prefer to hold off on surgery right now.     2/19: Patient remains on pressors to maintain his blood pressure however the dose has been weaned significantly. [facility] still currently receiving IVF.  Hgb has remained stable.  WBC did increase overnight.  His creatinine has decreased to 3.7. Sameer Coulter has stated 95.  The nephrologist had seen the patient earlier in the morning, and stated that dialysis is not needed at this time.  Due to the rising WBC, infectious disease has been consulted.     2/20: Patient remains on pressors, cortisol was checked on 2/17 which was normal for a.m.  Infectious diseases did recommend a loop ileostomy with vancomycin irrigation believes the patient would benefit from it.  Obtaining surgery recommendations.  His creatinine continues to decrease, and no dialysis is needed at this point, BUN decreasing as well.  WBC irvin again overnight, and patient still complains of stomach pains.     2/21:  In spite of the aggressive treatment for C. difficile colitis patient is still complaining of severe abdominal pain with severe abdominal distention , discussion  with the medical resident and also with nursing team  the patient is not improving over the last few days even the nursing did mention the patient is worsening over the last 24 hours with more distention of the abdomen with leukocytosis so plan to obtain CT scan abdomen pelvis with p.o. contrast to reassess his C. difficile colitis of the abdomen to see if there is any progression or improvement, CT scan abdomen pelvis done with contrast did show progression of the ulcerative cecal area of severe cecal distention and possibility for pneumatosis so general surgery consulted stat and he discussed the options with the patient and the patient family and the plan was done to go ahead for OR that was done last night, total colectomy, patient did after surgery have a smooth postop course still intubated in the weaning trial currently, will continue IV antibiotics fluids support hemodynamics pain control and in collaboration with the ID and general surgery team.     2/22: Patient remains on pressors this morning, propofol from surgery stopped. Patient was intubated for the surgery, however extubated same day. Doing well on 3 L nasal cannula. Patient states that he feels he has to defecate, however his ostomy bag is draining properly. His wound is clean and dry. He still states some diffuse abdominal pain. His creatinine is trending back towards baseline, as well as his BUN. His Hgb remained stable. WBC has decreased mildly from yesterday. We will continue to monitor. \"     2/23-> discussed with Nephrology, okay with PICC line. Pt just had Norco prior to exam, not very interactive. Will open eyes and respond to yes no questions but is moaning continually in pain. Discussed with Gen Surg who are okay to start tube feeds as pt has been without nutrition, will consult dietician.      2/24-> patient sitting up on side of the bed with therapy at the bedside.  Patient continues to have large quantities of output from ANTONIETTA drain as well as from ostomy site.  Surgery aware.     2/25-> Patient sitting up bedside at the time of the interview.  Currently denies any physical complaints and denied any issues overnight and into the morning.  He was updated on the current plan of care, verbalizes understanding, and had no other needs or questions at this time. 2/27 -patient sitting in the chair not in acute distress denies any chest pain headache visual disturbances complains about food just not tasting well, patient has C. difficile colitis, without any nausea vomiting.  On puréed diet.   2/28 - Patient is a poor historian, denies any active complaints. Cristopher Henderson though had a lunch tray in front of him claims, did not get a tray.  Reviewed and appreciate consultants  input   3/1- No nausea no vomiting tolerating puréed diet. High output through the ostomy  3/2- Patient working with therapy. He reports that while working with therapy, he had an episode of chest pain, described as sharp, resolved with rest. There were no changes on his EKG. No associated SOB, diaphoresis. Pt denies fever/chills, SOB, CP, abd pain, n/v/d. Patient is doing well otherwise, awaiting placement.   3/3: No acute events overnight. Patient continuing to complain of rectal pressure. Oral vancomycin stopped today. Patient has no acute concerns or issues. 3/4: No acute events overnight. ANTONIETTA drain output slowing down. Possibility of removal this evening. Patient in good spirits, alert and oriented. Awaiting placement. 3/5: SNF accepted patient and ANTONIETTA drain was removed, output had greatly decreased. Pt was up in the chair and in good spirits durin evaluation. On the day of discharge, it was explained to the patient that it was very important to follow up with his PCP and General Surgery to have continued care. Appointments were made and information was given.        Physical Exam:-  General appearance: No apparent distress, appears stated age and cooperative. HEENT: Pupils equal, round, and reactive to light. Conjunctivae/corneas clear. Neck: Supple, with full range of motion. No jugular venous distention. Trachea midline. Respiratory:  Normal respiratory effort on RA. Breath sounds diminished. Cardiovascular: Regular rate and rhythm with normal S1/S2 without murmurs, rubs or gallops. Abdomen: Soft, non-tender, non-distended with normal bowel sounds. Midline incision c/d/i. Musculoskeletal: passive and active ROM x 4 extremities. Skin: Skin color, texture, turgor normal.  No rashes or lesions. Neurologic:  Neurovascularly intact without any focal sensory/motor deficits.  Cranial nerves: II-XII intact, grossly non-focal.  Psychiatric: Alert and oriented, thought content appropriate, normal insight  Capillary Refill: Brisk,< 3 seconds   Peripheral Pulses: +2 palpable, equal bilaterally     Vitals:   Patient Vitals for the past 24 hrs:   BP Temp Temp src Pulse Resp SpO2   03/05/21 0830 (!) 118/54 98.4 °F (36.9 °C) Oral 87 16 97 %   03/05/21 0403 135/63 98.7 °F (37.1 °C) Oral 85 16 96 %   03/04/21 2039 (!) 111/55 98.4 °F (36.9 °C) Oral 78 16 99 %   03/04/21 1422 (!) 137/59 97.6 °F (36.4 °C) Oral 69 16 95 %     Weight:   Weight: 266 lb 6.4 oz (120.8 kg)   24 hour intake/output:     Intake/Output Summary (Last 24 hours) at 3/5/2021 1233  Last data filed at 3/5/2021 1210  Gross per 24 hour   Intake 460 ml   Output 1025 ml   Net -565 ml     Discharge Medications:-      Medication List      START taking these medications    hydrALAZINE 25 MG tablet  Commonly known as: APRESOLINE  Take 1 tablet by mouth 3 times daily as needed (see below)     HYDROcodone-acetaminophen 5-325 MG per tablet  Commonly known as: NORCO  Take 1 tablet by mouth every 8 hours as needed for Pain for up to 7 days. miconazole 2 % powder  Commonly known as: MICOTIN  Apply topically 2 times daily. CHANGE how you take these medications    * metoprolol tartrate 25 MG tablet  Commonly known as: LOPRESSOR  What changed: Another medication with the same name was changed. Make sure you understand how and when to take each. * metoprolol tartrate 25 MG tablet  Commonly known as: LOPRESSOR  Take 1 tablet by mouth 2 times daily  What changed: how much to take         * This list has 2 medication(s) that are the same as other medications prescribed for you. Read the directions carefully, and ask your doctor or other care provider to review them with you.             CONTINUE taking these medications    acetaminophen 325 MG tablet  Commonly known as: TYLENOL     ARIPiprazole 10 MG tablet  Commonly known as: ABILIFY     atorvastatin 40 MG tablet  Commonly known as: LIPITOR     buPROPion 300 MG extended release tablet  Commonly known as: WELLBUTRIN XL     busPIRone 15 MG tablet  Commonly known as: BUSPAR     Calcium-Vitamin D 600-200 MG-UNIT Tabs     * docusate sodium 100 MG capsule  Commonly known as: COLACE     * docusate sodium 100 MG capsule  Commonly known as: COLACE     doxepin 10 MG capsule  Commonly known as: SINEQUAN     gabapentin 800 MG tablet  Commonly known as: NEURONTIN     levothyroxine 75 MCG tablet  Commonly known as: SYNTHROID     loperamide 2 MG capsule  Commonly known as: IMODIUM     magnesium hydroxide 400 MG/5ML suspension  Commonly known as: MILK OF MAGNESIA     mirtazapine 45 MG tablet  Commonly known as: REMERON     QUEtiapine 100 MG tablet  Commonly known as: SEROQUEL     sertraline 100 MG tablet  Commonly known as: ZOLOFT     tamsulosin 0.4 MG capsule  Commonly known as: FLOMAX     therapeutic multivitamin-minerals tablet     vitamin D 1.25 MG (43867 UT) Caps capsule  Commonly known as: ERGOCALCIFEROL         * This list has 2 medication(s) that are the same as other medications prescribed for you. Read the directions carefully, and ask your doctor or other care provider to review them with you.             STOP taking these medications    Aleve 220 MG Caps  Generic drug: Naproxen Sodium     lisinopril 10 MG tablet  Commonly known as: PRINIVIL;ZESTRIL           Where to Get Your Medications      You can get these medications from any pharmacy    Bring a paper prescription for each of these medications  · HYDROcodone-acetaminophen 5-325 MG per tablet     Information about where to get these medications is not yet available    Ask your nurse or doctor about these medications  · hydrALAZINE 25 MG tablet  · metoprolol tartrate 25 MG tablet  · miconazole 2 % powder          Labs :-  Recent Results (from the past 72 hour(s))   EKG 12 Lead    Collection Time: 03/02/21  3:12 PM   Result Value Ref Range    Ventricular Rate 77 BPM    Atrial Rate 77 BPM P-R Interval 220 ms    QRS Duration 160 ms    Q-T Interval 442 ms    QTc Calculation (Bazett) 500 ms    P Axis -10 degrees    R Axis -36 degrees    T Axis -11 degrees   Calcium, Ionized    Collection Time: 03/03/21  6:30 AM   Result Value Ref Range    Calcium, Ion 1.19 1.12 - 1.32 mmol/L   Hemoglobin and Hematocrit, Blood    Collection Time: 03/03/21  6:30 AM   Result Value Ref Range    Hemoglobin 8.0 (L) 14.0 - 18.0 gm/dl    Hematocrit 26.7 (L) 42.0 - 52.0 %   Basic Metabolic Panel    Collection Time: 03/03/21  6:30 AM   Result Value Ref Range    Sodium 139 135 - 145 meq/L    Potassium 4.0 3.5 - 5.2 meq/L    Chloride 110 98 - 111 meq/L    CO2 19 (L) 23 - 33 meq/L    Glucose 112 (H) 70 - 108 mg/dL    BUN 17 7 - 22 mg/dL    CREATININE 1.5 (H) 0.4 - 1.2 mg/dL    Calcium 8.0 (L) 8.5 - 10.5 mg/dL   Anion Gap    Collection Time: 03/03/21  6:30 AM   Result Value Ref Range    Anion Gap 10.0 8.0 - 16.0 meq/L   Glomerular Filtration Rate, Estimated    Collection Time: 03/03/21  6:30 AM   Result Value Ref Range    Est, Glom Filt Rate 45 (A) ml/min/1.73m2   Calcium, Ionized    Collection Time: 03/04/21  4:46 AM   Result Value Ref Range    Calcium, Ion 1.20 1. 12 - 1.32 mmol/L   Phosphorus    Collection Time: 03/04/21  4:46 AM   Result Value Ref Range    Phosphorus 4.1 2.4 - 4.7 mg/dL   Magnesium    Collection Time: 03/04/21  4:46 AM   Result Value Ref Range    Magnesium 2.1 1.6 - 2.4 mg/dL   Basic Metabolic Panel    Collection Time: 03/04/21  4:46 AM   Result Value Ref Range    Sodium 137 135 - 145 meq/L    Potassium 4.1 3.5 - 5.2 meq/L    Chloride 110 98 - 111 meq/L    CO2 20 (L) 23 - 33 meq/L    Glucose 110 (H) 70 - 108 mg/dL    BUN 17 7 - 22 mg/dL    CREATININE 1.6 (H) 0.4 - 1.2 mg/dL    Calcium 8.2 (L) 8.5 - 10.5 mg/dL   Anion Gap    Collection Time: 03/04/21  4:46 AM   Result Value Ref Range    Anion Gap 7.0 (L) 8.0 - 16.0 meq/L   Glomerular Filtration Rate, Estimated    Collection Time: 03/04/21  4:46 AM   Result Value Ref Range    Est, Glom Filt Rate 42 (A) ml/min/1.73m2   Calcium, Ionized    Collection Time: 03/05/21  4:13 AM   Result Value Ref Range    Calcium, Ion 1.21 1.12 - 1.32 mmol/L   Basic Metabolic Panel    Collection Time: 03/05/21  4:13 AM   Result Value Ref Range    Sodium 135 135 - 145 meq/L    Potassium 3.9 3.5 - 5.2 meq/L    Chloride 109 98 - 111 meq/L    CO2 20 (L) 23 - 33 meq/L    Glucose 107 70 - 108 mg/dL    BUN 16 7 - 22 mg/dL    CREATININE 1.6 (H) 0.4 - 1.2 mg/dL    Calcium 8.1 (L) 8.5 - 10.5 mg/dL   Anion Gap    Collection Time: 03/05/21  4:13 AM   Result Value Ref Range    Anion Gap 6.0 (L) 8.0 - 16.0 meq/L   Glomerular Filtration Rate, Estimated    Collection Time: 03/05/21  4:13 AM   Result Value Ref Range    Est, Glom Filt Rate 42 (A) ml/min/1.73m2        Microbiology:    Blood culture #1:   Lab Results   Component Value Date    BC No growth-preliminary No growth  02/17/2021     Urine culture:   Lab Results   Component Value Date    LABURIN Neptune count: 50,000-90,000 CFU/mL  02/17/2021     Lab Results   Component Value Date    ORG Candida albicans 02/17/2021      Urinalysis:      Lab Results   Component Value Date    NITRU NEGATIVE 02/17/2021    WBCUA 50-75 02/17/2021    BACTERIA MODERATE 02/17/2021    RBCUA 10-15 02/17/2021    BLOODU SMALL 02/17/2021    SPECGRAV 1.017 02/17/2021       Radiology:-  Ct Abdomen Pelvis Wo Contrast Additional Contrast? None    Result Date: 2/17/2021  CT scan of the abdomen and pelvis without contrast Comparison: None Findings: The images are degraded by artifact related to patient body habitus. The left lateral abdominal wall is incompletely imaged. There is a 4 mm nodule within the right middle lobe. There is patchy consolidation within both lower lobes which may be on the basis of atelectasis or pneumonia. A nasogastric tube is in place and extends to the proximal duodenum.  Evaluation of the parenchymal organs is limited by the lack of intravenous contrast, however, the liver, spleen, pancreas and visualized portions of the adrenal glands are normal in appearance. There is a layering high density material within the lumen of the gallbladder consistent with small gallstones versus high density sludge. There is a small amount of intra-abdominal ascites. Small bilateral nonobstructing renal calculi are present. The kidneys are otherwise normal. There is diffuse thickening of the wall of the colon associated with inflammatory changes throughout the pericolonic fat. Findings are consistent with a pancolitis and with the clinical history of C. difficile colitis. There is no bowel perforation. The abdominal aorta is normal in course and caliber. There is no abdominal, pelvic or retroperitoneal adenopathy. The seminal vesicles and prostate gland are normal in appearance. A Velez catheter and rectal tube are in place. There are no acute bony abnormalities. No soft tissue abnormalities are present. Impression: 1. Pancolitis consistent with the clinical history of C. difficile colitis. No bowel perforation. 2.  Probable cholelithiasis. 3.  Bibasilar pulmonary consolidation which may be on the basis of atelectasis or pneumonia. 4.  Small bilateral nonobstructing renal calculi. 5.  Small right middle lobe pulmonary nodule. This document has been electronically signed by: Rowan Lorenzo MD on 02/17/2021 05:51 AM All CTs at this facility use dose modulation techniques and iterative reconstructions, and/or weight-based dosing when appropriate to reduce radiation to a low as reasonably achievable. Us Gallbladder Ruq    Result Date: 2/17/2021  PROCEDURE: US GALLBLADDER RUQ CLINICAL INFORMATION: 55-year-old female with cholelithiasis. Follow-up exam. COMPARISON: CT scan 2/17/2021. TECHNIQUE: Multiplanar sonographic images were obtained of the structures in the right upper quadrant.  FINDINGS: Gallbladder - 12.3 x 4.9 x 4.7 cm Gallbladder Wall - 0.5 cm Common Duct - 0.5 cm Hamilton's Sign: positive The liver is of normal echogenicity. No masses are noted. The pancreatic head and body are within normal limits. The tail is not well seen due to overlying bowel gas. The gallbladder is distended. There is wall thickening. There is a large amount of sludge which is seen in the dependent portion of the gallbladder. The common bile duct is normal and measures 5 mm. There is no hydronephrosis in the visualized aspects of the right kidney. Distended appearance to the gallbladder which has a slightly thickened wall. Sludge is also present within its lumen. Sonographic Hamilton's sign is positive per the technologist. These findings could be on the basis of acute cholecystitis. If clinically warranted, further evaluation with a nuclear medicine HIDA scan would be beneficial for further characterization. **This report has been created using voice recognition software. It may contain minor errors which are inherent in voice recognition technology. ** Final report electronically signed by Dr Evelia Sotomayor on 2/17/2021 9:37 AM    Xr Chest Portable    Result Date: 2/17/2021  1 view chest x-ray. Comparison: 2/17/2021 Findings: Right central line, tip proximal SVC. NG tube unchanged. Cardiomegaly. Prior sternotomy. Stable bilateral basilar consolidation and atelectasis. Impression: Right central line tip proximal SVC. Stable bibasilar consolidation and atelectasis. This document has been electronically signed by: Omar Mackey MD on 02/17/2021 04:20 AM    Xr Chest Portable    Result Date: 2/17/2021  1 view chest x-ray. Comparison: 2/17/2021 Findings: NG tube is unchanged. Bilateral basilar consolidation and atelectasis are stable. Cardiomegaly. No bony abnormality.      Impression: Stable bilateral basilar consolidation and atelectasis This document has been electronically signed by: Omar Mackey MD on 02/17/2021 03:20 AM    Xr Chest Portable    Result Date: 2/17/2021  Exam: One View of the chest Comparison: None Clinical history: Hypotension Findings: NG tube is off the bottom of the image but is at least within the stomach Prior median sternotomy Cardiac silhouette is enlarged, without CHF No pneumothorax. No pleural fluid collection. Right lower lobe airspace disease may represent atelectasis or pneumonia. Impression: 1. Right lower lobe airspace disease may represent atelectasis or pneumonia. This document has been electronically signed by: Adrián Saez MD on 02/17/2021 02:28 AM    Xr Abdomen For Ng/og/ne Tube Placement    Result Date: 2/17/2021  Exam: One view the abdomen Comparison: None Clinical history: NG placement Findings: NG tube tip is not well seen but is likely in the distal stomach. No free intraperitoneal air identified. No obstructive bowel gas pattern. Impression: 1. Distal NG tube is not well seen but is likely in the distal stomach.  This document has been electronically signed by: Adrián Saez MD on 02/17/2021 02:30 AM       Follow-up scheduled after discharge :-    in the next few days with Asa Oneil MD  in the next few weeks with General Surgery     Consultations during this hospital stay:-  [] NONE [] Cardiology  [] Nephrology  [] Hemo onco   [] GI   [] ID  [] Endocrine  [] Pulm    [] Neuro    [] Psych   [] Urology  [] ENT   [x] G SURGERY   []Ortho    []CV surg    [] Palliative  [] Hospice [] Pain management   []    []TCU   [] PT/OT  OTHERS:-    Disposition: SNF  Condition at Discharge: Stable    Time Spent:- 45 minutes    Electronically signed by HAY Schuster on 3/5/2021 at 12:33 PM  Discharging Hospitalist

## 2021-03-05 NOTE — PLAN OF CARE
Problem: Fluid Volume - Imbalance:  Goal: Absence of imbalanced fluid volume signs and symptoms  Description: Absence of imbalanced fluid volume signs and symptoms  3/5/2021 0118 by Madelin Ontiveros RN  Outcome: Met This Shift  3/5/2021 0118 by Madelin Ontiveros RN  Outcome: Ongoing     Problem: Pain:  Goal: Control of acute pain  Description: Control of acute pain  3/5/2021 0118 by Madelin Ontiveros RN  Outcome: Met This Shift  3/5/2021 0118 by Madelin Ontiveros RN  Outcome: Ongoing  Goal: Control of chronic pain  Description: Control of chronic pain  3/5/2021 0118 by Madelin Ontiveros RN  Outcome: Met This Shift  3/5/2021 0118 by Madelin Ontiveros RN  Outcome: Ongoing     Problem: Skin Integrity - Impaired:  Goal: Will show no infection signs and symptoms  Description: Will show no infection signs and symptoms  3/5/2021 0118 by Madelin Ontiveros RN  Outcome: Met This Shift  3/5/2021 0118 by Madelin Ontiveros RN  Outcome: Ongoing   No new skin breakdown this shift, continuing to turn pt every 2 hours with pillow support. Using protective barrier cream. Heels elevated off bed. Problem: Urinary Elimination:  Goal: Complications related to the disease process, condition or treatment will be avoided or minimized  Description: Complications related to the disease process, condition or treatment will be avoided or minimized  3/5/2021 0118 by Madelin Ontiveros RN  Outcome: Met This Shift  3/5/2021 0118 by Madelin Ontiveros RN  Outcome: Ongoing   Pt incontinent at times, otherwise uses urinal with assistance. Problem: Falls - Risk of:  Goal: Will remain free from falls  Description: Will remain free from falls  3/5/2021 0118 by Madelin Ontiveros RN  Outcome: Met This Shift  3/5/2021 0118 by Madelin Ontiveros RN  Outcome: Ongoing  Bed alarm on.   Goal: Absence of physical injury  Description: Absence of physical injury  3/5/2021 0118 by Madelin Ontiveros RN  Outcome: Met This Shift  3/5/2021 0118 by Madelin Ontiveros

## 2021-03-05 NOTE — CARE COORDINATION
3/5/21, 11:14 AM EST    DISCHARGE PLANNING EVALUATION    Results of further review received in [facility]-patient has been approved NF services. Call to [facility] at [place] point [facility] left message for her that further review has been completed and patient has been approved. Requested call back. RN Damaris Sanchez and patient updated. Spoke with patient re: transportation-he feels that he can go by ambulette. HAY Rod notified that patient can be discharged. Received call from [facility] with [address] [place]-they need result of PASRR and COVID test before they can admit. Requested information faxed. ANAMARIA and last dose MAR faxed to facility. Spoke with son Yen Boo and advised him of discharge and transport time. He will notify his sister. Number left for RN for report. No other needs at this time. 3/5/21, 2:37 PM EST    Patient goals/plan/ treatment preferences discussed by  and . Patient goals/plan/ treatment preferences reviewed with patient/ family. Patient/ family verbalize understanding of discharge plan and are in agreement with goal/plan/treatment preferences. Understanding was demonstrated using the teach back method. AVS provided by RN at time of discharge, which includes all necessary medical information pertaining to the patients current course of illness, treatment, post-discharge goals of care, and treatment preferences. Services After Discharge  Services At/After Discharge: Skilled Therapy, Nursing Services, In [facility], Transport([facility] [place]/[facility])   IMM Letter  IMM Letter given to Patient/Family/Significant other/Guardian/POA/by[de-identified] Copy delivered to patient by mgr. Quintero  IMM Letter date given[de-identified] 03/05/21  IMM Letter time given[de-identified] 3623

## 2021-03-09 ENCOUNTER — OUTSIDE SERVICES (OUTPATIENT)
Dept: FAMILY MEDICINE CLINIC | Age: 81
End: 2021-03-09
Payer: MEDICARE

## 2021-03-09 VITALS
BODY MASS INDEX: 34.59 KG/M2 | OXYGEN SATURATION: 98 % | RESPIRATION RATE: 18 BRPM | DIASTOLIC BLOOD PRESSURE: 62 MMHG | SYSTOLIC BLOOD PRESSURE: 116 MMHG | HEART RATE: 89 BPM | WEIGHT: 241.1 LBS | TEMPERATURE: 98 F

## 2021-03-09 DIAGNOSIS — Z92.89 HISTORY OF RECENT HOSPITALIZATION: ICD-10-CM

## 2021-03-09 DIAGNOSIS — F33.2 SEVERE EPISODE OF RECURRENT MAJOR DEPRESSIVE DISORDER, WITHOUT PSYCHOTIC FEATURES (HCC): ICD-10-CM

## 2021-03-09 DIAGNOSIS — I10 ESSENTIAL HYPERTENSION: ICD-10-CM

## 2021-03-09 DIAGNOSIS — Z86.19 HISTORY OF SEPTIC SHOCK: ICD-10-CM

## 2021-03-09 DIAGNOSIS — R91.1 PULMONARY NODULE: ICD-10-CM

## 2021-03-09 DIAGNOSIS — E03.9 ACQUIRED HYPOTHYROIDISM: ICD-10-CM

## 2021-03-09 DIAGNOSIS — I25.10 ASHD (ARTERIOSCLEROTIC HEART DISEASE): ICD-10-CM

## 2021-03-09 DIAGNOSIS — E11.9 TYPE 2 DIABETES MELLITUS WITHOUT COMPLICATION, WITHOUT LONG-TERM CURRENT USE OF INSULIN (HCC): ICD-10-CM

## 2021-03-09 DIAGNOSIS — M19.90 ARTHRITIS: ICD-10-CM

## 2021-03-09 DIAGNOSIS — Z90.49 STATUS POST TOTAL COLECTOMY: Primary | ICD-10-CM

## 2021-03-09 PROCEDURE — 99309 SBSQ NF CARE MODERATE MDM 30: CPT | Performed by: NURSE PRACTITIONER

## 2021-03-09 ASSESSMENT — ENCOUNTER SYMPTOMS
SHORTNESS OF BREATH: 1
ABDOMINAL DISTENTION: 0
ABDOMINAL PAIN: 0

## 2021-03-09 NOTE — PROGRESS NOTES
Eldon Armas is a 79 y.o. male who presents today for his medical conditions/complaints as noted below. Chief Complaint   Patient presents with    Other     weakness           HPI:     Aline Wolf today for weakness. He was admitted to the facility on 3/5/2021 from [facility]. He had a lengthy admission to the hospital from 2/17-3/5. He had been transferred to [facility] on [address] where he was found to be hypotensive and diaphoretic. Patient was evaluated in the emergency room and diagnosed to have sepsis but the source was unknown at that time. He was given fluid resuscitation and started on Levophed. Patient was started on Zosyn at that time. During his hospital stay he was found to have worsening renal function and nephrology was consulted. On 2/16/2021 patient was seen by Dr. Jessica Campbell from nephrology. Further work-up did show that he had C. difficile colitis. Patient was started on vancomycin. He was noted to have a mental status change and worsening creatinine on 216 and was transferred to [facility]. 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 He continued on oral vancomycin through 3/3/2021. His septic shock was secondary to fulminant C. difficile colitis which was resolved with Levophed and fluids. His metabolic encephalopathy resolved again with fluids and resolving acute kidney injury. His creatinine overall became stable and was a 1.5 at time of discharge. Prior to admission to the hospital he was having follow-up for pulmonary nodules which was noted at previous admission to hospital.  He did have a PET scan completed on 2/12/2021 which noted a right lower lobe nodule. The nodule could also be infectious or inflammatory. He is to have a repeat CT in 3 months. His hemoglobin was noted to be 10.2 on admission to the hospital and he did receive blood.   His last H&H was 8 and 26.7 which was improving overall compared to previous days. His last white blood cell count was 13.4 which was improved from 15.4 the day prior on 3 1. Today he states he is weak but he is feeling much better. Incision is clean dry and intact with some crusting at the bottom of the incision. He does have follow-up with general surgery on 3/15. Ileostomy is functioning well. He is here for therapy and plans to return to assisted living. Past Medical History:   Diagnosis Date    Arthritis     Chronic kidney disease     Depression     Diabetes mellitus ([address]     Hyperlipidemia     Hypertension     Thyroid disease       Past Surgical History:   Procedure Laterality Date    COLOSTOMY N/A 2/20/2021    TOTAL COLECTOMY AND ILLEOSTOMY PLACEMENT performed by Fior Mead MD at [facility] [address]        No family history on file. Social History     Tobacco Use    Smoking status: Never Smoker    Smokeless tobacco: Never Used   Substance Use Topics    Alcohol use: Not Currently      Allergies   Allergen Reactions    Triamcinolone Rash     Patient states his arm felt like it was hot, like it was burning. Health Maintenance   Topic Date Due    Hepatitis C screen  Never done    Lipid screen  Never done    COVID-19 Vaccine (1 of 2) Never done    DTaP/Tdap/Td vaccine (1 - Tdap) Never done    Shingles Vaccine (1 of 2) Never done    Pneumococcal 65+ years Vaccine (1 of 1 - PPSV23) Never done   [employer] Visit (AWV)  Never done    Flu vaccine (1) 09/01/2020    TSH testing  06/28/2021    Potassium monitoring  03/05/2022    Creatinine monitoring  03/05/2022    Hepatitis A vaccine  Aged Out    Hib vaccine  Aged Out    Meningococcal (ACWY) vaccine  Aged Out       Subjective:      Review of Systems   Unable to perform ROS: Other (weakness)   Constitutional: Positive for activity change, fatigue and unexpected weight change (down 47.5 pounds).    HENT: Positive for hearing loss.    Eyes: Positive for visual disturbance. Respiratory: Positive for shortness of breath (with exertion). Cardiovascular: Negative for leg swelling. Gastrointestinal: Negative for abdominal distention and abdominal pain. Musculoskeletal: Positive for arthralgias and gait problem. Skin: Positive for pallor and wound. Neurological: Positive for weakness. Psychiatric/Behavioral: Positive for confusion. Objective:     Physical Exam  Vitals signs and nursing note reviewed. Constitutional:       General: He is not in acute distress. Appearance: He is well-developed. He is ill-appearing (chronically). HENT:      Head: Normocephalic and atraumatic. Right Ear: External ear normal.      Left Ear: External ear normal.      Nose: Nose normal.   Eyes:      General: No scleral icterus. Right eye: No discharge. Left eye: No discharge. Conjunctiva/sclera: Conjunctivae normal.   Neck:      Musculoskeletal: Neck supple. Thyroid: No thyromegaly. Vascular: No JVD. Cardiovascular:      Rate and Rhythm: Normal rate and regular rhythm. Heart sounds: Normal heart sounds. Pulmonary:      Effort: Pulmonary effort is normal. No respiratory distress. Breath sounds: Normal breath sounds. No stridor. No wheezing or rales. Abdominal:      General: The ostomy site is clean. Bowel sounds are normal. There is no distension. Palpations: Abdomen is soft. Tenderness: There is no abdominal tenderness. There is no guarding. Musculoskeletal: Normal range of motion. General: No deformity. Right shoulder: He exhibits no tenderness, no bony tenderness and no pain. Left shoulder: He exhibits no tenderness, no bony tenderness and no pain. Cervical back: He exhibits no tenderness, no bony tenderness and no pain. Thoracic back: He exhibits no tenderness, no bony tenderness, no pain and no spasm.       Lumbar back: He exhibits no tenderness, no bony tenderness and no pain. Lymphadenopathy:      Cervical: No cervical adenopathy. Upper Body:      Right upper body: No supraclavicular adenopathy. Left upper body: No supraclavicular adenopathy. Skin:     General: Skin is warm and dry. Findings: Erythema (at base of incision) and wound present. No rash. Neurological:      Mental Status: He is alert and oriented to person, place, and time. Motor: No atrophy, abnormal muscle tone or seizure activity. Psychiatric:         Mood and Affect: Affect is labile. Speech: Speech normal.         Behavior: Behavior normal.         Cognition and Memory: Memory is impaired. /62   Pulse 89   Temp 98 °F (36.7 °C)   Resp 18   Wt 241 lb 1.6 oz (109.4 kg)   SpO2 98%   BMI 34.59 kg/m²     Assessment/Plan      1. Status post total colectomy -follow-up with surgery next week. Message sent to [facility] regarding staples. Continue to monitor ileostomy. Down 47 pounds since the end of January. We will add med Pass. And weekly weights   2. History of septic shock -therapy to eval and treat. Overall resolved we will add labs. 3. Severe episode of recurrent major depressive disorder, without psychotic features ([address] -chronic and continue current medications. Follows with Dr. Sydni Young. Will have nursing set up a follow-up with him to review medications. 4. Essential hypertension -blood pressure stable currently. Continue metoprolol and hydralazine. Monitor for hypotension. 5. Arthritis -denies pain today. Continue Norco and Tylenol as needed. 6. ASHD (arteriosclerotic heart disease) -denies chest pain but does have shortness of breath with exertion but likely related to deconditioning. Continue metoprolol and atorvastatin. 7. Acquired hypothyroidism -chronic and continue levothyroxine. 8. Type 2 diabetes mellitus without complication, without long-term current use of insulin (HCC) -A1c is stable. Continue to monitor. 9. Pulmonary nodule -will up CT in 3 months. 10. History of recent hospitalization -multiple hospitalizations in the last 3 months. Continue therapy. Patient seen and examined. History partially obtained by chart review and nursing notes. I have reviewed patient's past medical, surgical, social, and family history and have made updates where appropriate.   See facility EMR for updated medication list.       Electronically signed by SYLVESTER Huggins CNP on 3/9/2021 at 8:20 AM

## 2021-03-12 ENCOUNTER — OUTSIDE SERVICES (OUTPATIENT)
Dept: FAMILY MEDICINE CLINIC | Age: 81
End: 2021-03-12
Payer: MEDICARE

## 2021-03-12 VITALS
RESPIRATION RATE: 18 BRPM | HEART RATE: 72 BPM | WEIGHT: 242 LBS | OXYGEN SATURATION: 99 % | TEMPERATURE: 98.4 F | BODY MASS INDEX: 34.72 KG/M2 | DIASTOLIC BLOOD PRESSURE: 61 MMHG | SYSTOLIC BLOOD PRESSURE: 133 MMHG

## 2021-03-12 DIAGNOSIS — F33.1 MODERATE EPISODE OF RECURRENT MAJOR DEPRESSIVE DISORDER (HCC): ICD-10-CM

## 2021-03-12 DIAGNOSIS — I25.10 ASHD (ARTERIOSCLEROTIC HEART DISEASE): ICD-10-CM

## 2021-03-12 DIAGNOSIS — E11.9 TYPE 2 DIABETES MELLITUS WITHOUT COMPLICATION, WITHOUT LONG-TERM CURRENT USE OF INSULIN (HCC): ICD-10-CM

## 2021-03-12 DIAGNOSIS — A04.72 COLITIS, CLOSTRIDIUM DIFFICILE: ICD-10-CM

## 2021-03-12 DIAGNOSIS — I10 ESSENTIAL HYPERTENSION: ICD-10-CM

## 2021-03-12 DIAGNOSIS — Z90.49 STATUS POST TOTAL COLECTOMY: Primary | ICD-10-CM

## 2021-03-12 DIAGNOSIS — E03.9 ACQUIRED HYPOTHYROIDISM: ICD-10-CM

## 2021-03-12 PROCEDURE — 99306 1ST NF CARE HIGH MDM 50: CPT | Performed by: FAMILY MEDICINE

## 2021-03-12 PROCEDURE — 99490 CHRNC CARE MGMT STAFF 1ST 20: CPT | Performed by: FAMILY MEDICINE

## 2021-03-12 ASSESSMENT — ENCOUNTER SYMPTOMS
SHORTNESS OF BREATH: 0
WHEEZING: 0
BLOOD IN STOOL: 0
SORE THROAT: 0
NAUSEA: 0
VOMITING: 0
EYE REDNESS: 0
RHINORRHEA: 0
COUGH: 0
DIARRHEA: 0
CONSTIPATION: 0

## 2021-03-12 NOTE — PROGRESS NOTES
Zoila Caballero is a 79 y.o. male who presents today for his medical conditions/complaints as noted below. Chief Complaint   Patient presents with    Other     readmission to the facility           HPI:     HPI    Patient is seen and examined in his room today for admission to facility after a hospital stay from 2-17-3-5. He had an episode of hypotension and was found to have septic shock related to C. difficile colitis. Also ended up with acute kidney injury. He was treated with antibiotics and nephrology was consulted. He did end up with a colectomy related to the colitis. C. difficile has now resolved and his renal function has remained stable. He was also found to have a pulmonary nodule and is scheduled to have repeat CT in 3 months. He states that he is feeling better than from when he was in the hospital.  He has a history of major depressive disorder and states that he still has some sadness but overall his mood is stable. He denies any chest pain or shortness of breath. He states his appetite is good he is sleeping well. He is working with therapy. He has a past medical history significant for hypertension, coronary artery disease, hypothyroidism, diabetes. Chronic conditions have been well controlled. No further concerns per staff. Past Medical History:   Diagnosis Date    Arthritis     Chronic kidney disease     Depression     Diabetes mellitus ([address]     Hyperlipidemia     Hypertension     Thyroid disease       Past Surgical History:   Procedure Laterality Date    COLOSTOMY N/A 2/20/2021    TOTAL COLECTOMY AND ILLEOSTOMY PLACEMENT performed by Nadine Mitchell MD at [facility] [address]        No family history on file.     Social History     Tobacco Use    Smoking status: Never Smoker    Smokeless tobacco: Never Used   Substance Use Topics    Alcohol use: Not Currently      Allergies   Allergen Reactions    Triamcinolone Rash     Patient states his arm felt like it was hot, like it was burning. Health Maintenance   Topic Date Due    Hepatitis C screen  Never done    Lipid screen  Never done    COVID-19 Vaccine (1) Never done    DTaP/Tdap/Td vaccine (1 - Tdap) Never done    Shingles Vaccine (1 of 2) Never done    Pneumococcal 65+ years Vaccine (1 of 1 - PPSV23) Never done   [employer] Visit (AWV)  Never done    Flu vaccine (1) 09/01/2020    TSH testing  06/28/2021    Potassium monitoring  03/05/2022    Creatinine monitoring  03/05/2022    Hepatitis A vaccine  Aged Out    Hib vaccine  Aged Out    Meningococcal (ACWY) vaccine  Aged Out       Subjective:      Review of Systems   Constitutional: Negative for activity change, appetite change, chills, fatigue and fever. HENT: Negative for congestion, rhinorrhea and sore throat. Eyes: Negative for redness and visual disturbance. Respiratory: Negative for cough, shortness of breath and wheezing. Cardiovascular: Negative for chest pain and leg swelling. Gastrointestinal: Negative for blood in stool, constipation, diarrhea, nausea and vomiting. Has ileostomy bag for stool   Endocrine: Negative for polydipsia and polyuria. Genitourinary: Negative for dysuria, frequency and hematuria. Musculoskeletal: Negative for arthralgias, gait problem and myalgias. Skin: Negative for rash and wound. Allergic/Immunologic: Negative for environmental allergies and immunocompromised state. Neurological: Negative for dizziness, light-headedness and headaches. Hematological: Does not bruise/bleed easily. Psychiatric/Behavioral: Positive for dysphoric mood. Negative for sleep disturbance. The patient is not nervous/anxious. Objective:     Physical Exam  Vitals signs and nursing note reviewed. Constitutional:       General: He is not in acute distress. Appearance: He is well-developed. HENT:      Head: Normocephalic and atraumatic.       Right Ear: External ear normal.      Left Ear: External ear normal.      Nose: Nose normal.   Eyes:      General: No scleral icterus. Right eye: No discharge. Left eye: No discharge. Conjunctiva/sclera: Conjunctivae normal.   Neck:      Musculoskeletal: Neck supple. Thyroid: No thyromegaly. Vascular: No JVD. Cardiovascular:      Rate and Rhythm: Normal rate and regular rhythm. Heart sounds: Normal heart sounds. Pulmonary:      Effort: Pulmonary effort is normal. No respiratory distress. Breath sounds: Normal breath sounds. No stridor. No wheezing or rales. Abdominal:      General: Bowel sounds are normal. There is no distension. Palpations: Abdomen is soft. Tenderness: There is no abdominal tenderness. Musculoskeletal: Normal range of motion. General: No deformity. Right shoulder: He exhibits no tenderness, no bony tenderness and no pain. Left shoulder: He exhibits no tenderness, no bony tenderness and no pain. Cervical back: He exhibits no tenderness, no bony tenderness and no pain. Thoracic back: He exhibits no tenderness, no bony tenderness, no pain and no spasm. Lumbar back: He exhibits no tenderness, no bony tenderness and no pain. Lymphadenopathy:      Cervical: No cervical adenopathy. Upper Body:      Right upper body: No supraclavicular adenopathy. Left upper body: No supraclavicular adenopathy. Skin:     General: Skin is warm and dry. Findings: No erythema or rash. Neurological:      Mental Status: He is alert and oriented to person, place, and time. Motor: No atrophy, abnormal muscle tone or seizure activity. Psychiatric:         Attention and Perception: Attention normal.         Mood and Affect: Mood is depressed. Affect is flat. Speech: Speech normal.         Behavior: Behavior is slowed.          Cognition and Memory: Memory normal.       /61   Pulse 72   Temp 98.4 °F (36.9 °C)   Resp 18   Wt 242 lb (109.8 kg)

## 2021-03-14 NOTE — PROGRESS NOTES
[address] [facility] [address]  Dept: [phone]  Dept Fax: [phone]  Loc: [phone]    Visit Date: 3/15/2021    Jeffery Best is a 79 y.o. male who presents today for:  Chief Complaint   Patient presents with    Post-Op Check     s/p Total colectomy with end ileostomy 2/20/21       HPI:     HPI    Sridhar Holland is a 71-year old male patient who presents for staple removal. He is status post total colectomy with end ileostomy over 3 weeks ago for C. Diff colitis. Patient very deconditioned and living in nursing home. Presents with ambulance transport. Patient is drowsy but appropriate. States she does not have any abdominal pain. Ostomy is functioning as far as he knows. Has lighter loose stool noted in bag. Stoma viable. No bleeding. Midline incision is well approximated with every other staple still intact. No signs of infection noted but skin is excoriated, possibly from staples. No bleeding or drainage. Appetite is still decreased. Tolerating diet without nausea or vomiting though. Off all pain medication. Urinating without complaints. Incontinent. Still has some rectal pressure but states it is better than when he was in the hospital. Worse when he sits for long periods. No rectal drainage. No fevers or chills. No SOB or chest pain. Very weak but he continues to work with therapy as tolerated. Past Medical History:   Diagnosis Date    Arthritis     Chronic kidney disease     Depression     Diabetes mellitus ([address]     Hyperlipidemia     Hypertension     Thyroid disease       Past Surgical History:   Procedure Laterality Date    COLOSTOMY N/A 2/20/2021    TOTAL COLECTOMY AND ILLEOSTOMY PLACEMENT performed by Jah Garcia MD at / [facility] [address]        No family history on file.     Social History     Tobacco Use    Smoking status: Never Smoker    Smokeless tobacco: Never Used Substance Use Topics    Alcohol use: Not Currently      Current Outpatient Medications   Medication Sig Dispense Refill    hydrALAZINE (APRESOLINE) 25 MG tablet Take 1 tablet by mouth 3 times daily as needed (see below) 90 tablet 3    metoprolol tartrate (LOPRESSOR) 25 MG tablet Take 1 tablet by mouth 2 times daily 60 tablet 3    miconazole (MICOTIN) 2 % powder Apply topically 2 times daily. 45 g 1    Calcium-Vitamin D 600-200 MG-UNIT TABS Take 1 tablet by mouth daily      gabapentin (NEURONTIN) 800 MG tablet Take 800 mg by mouth 3 times daily.  sertraline (ZOLOFT) 100 MG tablet Take 200 mg by mouth daily      busPIRone (BUSPAR) 15 MG tablet Take 15 mg by mouth 3 times daily      docusate sodium (COLACE) 100 MG capsule Take 100 mg by mouth daily as needed for Constipation      doxepin (SINEQUAN) 10 MG capsule Take 10 mg by mouth nightly      loperamide (IMODIUM) 2 MG capsule Take 2 mg by mouth every 8 hours as needed for Diarrhea       magnesium hydroxide (MILK OF MAGNESIA) 400 MG/5ML suspension Take 30 mLs by mouth daily as needed for Constipation       Multiple Vitamins-Minerals (THERAPEUTIC MULTIVITAMIN-MINERALS) tablet Take 1 tablet by mouth daily      QUEtiapine (SEROQUEL) 100 MG tablet Take 100 mg by mouth daily      mirtazapine (REMERON) 45 MG tablet Take 45 mg by mouth daily       vitamin D (ERGOCALCIFEROL) 1.25 MG (09499 UT) CAPS capsule Take 50,000 Units by mouth once a week Every friday      buPROPion (WELLBUTRIN XL) 300 MG extended release tablet Take 300 mg by mouth every morning      levothyroxine (SYNTHROID) 75 MCG tablet Take 75 mcg by mouth Daily      tamsulosin (FLOMAX) 0.4 MG capsule Take 0.4 mg by mouth daily      atorvastatin (LIPITOR) 40 MG tablet Take 40 mg by mouth daily       No current facility-administered medications for this visit. Allergies   Allergen Reactions    Triamcinolone Rash     Patient states his arm felt like it was hot, like it was burning. Subjective:     Review of Systems   Constitutional: Positive for activity change, appetite change, fatigue and unexpected weight change. Negative for chills, diaphoresis and fever. HENT: Negative for congestion, dental problem, hearing loss, rhinorrhea, sinus pressure and sore throat. Eyes: Negative for photophobia, pain, discharge, itching and visual disturbance. Respiratory: Negative for apnea, cough, choking, chest tightness, shortness of breath and wheezing. Cardiovascular: Negative for chest pain, palpitations and leg swelling. Gastrointestinal: Negative for abdominal distention, abdominal pain, anal bleeding, blood in stool, constipation, diarrhea, nausea, rectal pain and vomiting. Endocrine: Negative. Genitourinary: Negative for decreased urine volume, difficulty urinating, dysuria, frequency and urgency. Musculoskeletal: Negative for arthralgias, back pain, gait problem, joint swelling, myalgias and neck pain. Skin: Positive for wound. Negative for color change, pallor and rash. Allergic/Immunologic: Negative. Neurological: Positive for weakness. Negative for dizziness, tremors, numbness and headaches. Hematological: Negative. Psychiatric/Behavioral: Negative. Objective:   /76 (Site: Right Upper Arm, Position: Sitting, Cuff Size: Medium Adult)   Pulse 70   Temp 96.7 °F (35.9 °C) (Temporal)   Resp 16   Ht 5' 10\" (1.778 m)   Wt 242 lb (109.8 kg)   SpO2 97%   BMI 34.72 kg/m²     Physical Exam  Vitals signs reviewed. Constitutional:       General: He is not in acute distress. Appearance: Normal appearance. He is well-developed. He is obese. He is ill-appearing (chronically). He is not toxic-appearing. HENT:      Head: Normocephalic and atraumatic.       Right Ear: Hearing and external ear normal.      Left Ear: Hearing and external ear normal.      Nose: Nose normal.      Mouth/Throat:      Mouth: Mucous membranes are not pale, not dry and not cyanotic. Eyes:      General: Lids are normal.   Neck:      Musculoskeletal: Normal range of motion and neck supple. Trachea: Trachea and phonation normal.   Cardiovascular:      Rate and Rhythm: Normal rate and regular rhythm. Pulses: Normal pulses. Heart sounds: S1 normal and S2 normal.   Pulmonary:      Effort: Pulmonary effort is normal. No tachypnea, bradypnea, accessory muscle usage or respiratory distress. Breath sounds: Normal breath sounds. No decreased breath sounds, wheezing or rales. Chest:      Chest wall: No tenderness. Abdominal:      General: Bowel sounds are normal. There is no distension. Palpations: Abdomen is soft. There is no mass. Tenderness: There is abdominal tenderness (midline). Musculoskeletal: Normal range of motion. General: No tenderness. Skin:     General: Skin is warm and dry. Findings: No abrasion, bruising, burn, ecchymosis, erythema, laceration, lesion or rash. Neurological:      Mental Status: He is alert and oriented to person, place, and time. Motor: Weakness present. No tremor, atrophy or abnormal muscle tone. Coordination: Coordination abnormal.      Gait: Gait abnormal.      Deep Tendon Reflexes: Reflexes are normal and symmetric. Psychiatric:         Speech: Speech normal.         Behavior: Behavior normal.         Thought Content:  Thought content normal.       Lab Results   Component Value Date    WBC 13.4 (H) 03/01/2021    HGB 8.0 (L) 03/03/2021    HCT 26.7 (L) 03/03/2021    MCV 98.0 (H) 03/01/2021     03/01/2021     Lab Results   Component Value Date     03/05/2021    K 3.9 03/05/2021    K 3.5 02/26/2021     03/05/2021    CO2 20 03/05/2021    BUN 16 03/05/2021    CREATININE 1.6 03/05/2021    GLUCOSE 107 03/05/2021    CALCIUM 8.1 03/05/2021      Patient Active Problem List   Diagnosis    Essential hypertension    ASHD (arteriosclerotic heart disease)    Arthritis    Severe episode of recurrent major depressive disorder, without psychotic features ([facility]    Acquired hypothyroidism    Type 2 diabetes mellitus without complication, without long-term current use of insulin ([facility]    COVID-19 with multiple comorbidities    History of recent hospitalization    Cellulitis of right lower extremity    Pulmonary nodule    Hypotension    Septic shock (HCC)    Hypovolemic shock (HCC)    Colitis, Clostridium difficile     Assessment:     1. Status post total colectomy with end colostomy  2. Recent C. Diff colitis   3. Depression disorder  4. Obesity (BMI 34)    Plan:     1. Remaining staples removed from midline incision without issues. Midline incision well approximated. Continue to monitor skin excoriation. No breakdown or signs of infection. 2. Ostomy functioning well. Continue routine care. 3. Appetite slowly improving. Continue diet as tolerated. High protein supplements encouraged. 4. Off narcotics. Tylenol as needed for discomfort. 5. Continue PT/OT at nursing home  6. Follow up with PCP as directed  7. Follow up as needed. Signs and symptoms reviewed with patient that would be concerning and need him to return to office for re-evaluation. Patient states he will call if he has questions or concerns.       Electronically signed by SYLVESTER Lu CNP on 3/16/2021 at 11:35 AM

## 2021-03-15 ENCOUNTER — TELEPHONE (OUTPATIENT)
Dept: SURGERY | Age: 81
End: 2021-03-15

## 2021-03-15 ENCOUNTER — OFFICE VISIT (OUTPATIENT)
Dept: SURGERY | Age: 81
End: 2021-03-15

## 2021-03-15 VITALS
OXYGEN SATURATION: 97 % | TEMPERATURE: 96.7 F | RESPIRATION RATE: 16 BRPM | SYSTOLIC BLOOD PRESSURE: 130 MMHG | BODY MASS INDEX: 34.65 KG/M2 | DIASTOLIC BLOOD PRESSURE: 76 MMHG | HEIGHT: 70 IN | WEIGHT: 242 LBS | HEART RATE: 70 BPM

## 2021-03-15 DIAGNOSIS — Z90.49 S/P TOTAL COLECTOMY: Primary | ICD-10-CM

## 2021-03-15 DIAGNOSIS — Z93.2 S/P ILEOSTOMY (HCC): ICD-10-CM

## 2021-03-15 PROCEDURE — 99024 POSTOP FOLLOW-UP VISIT: CPT | Performed by: NURSE PRACTITIONER

## 2021-03-15 NOTE — TELEPHONE ENCOUNTER
Patient is s/p Total colectomy with end ileostomy 2/20/21 Seen today in our office remaining staples removed. Noted skin excoriation around where staples removed. He can see us as needed.

## 2021-03-16 ASSESSMENT — ENCOUNTER SYMPTOMS
COLOR CHANGE: 0
SHORTNESS OF BREATH: 0
SORE THROAT: 0
APNEA: 0
ANAL BLEEDING: 0
VOMITING: 0
PHOTOPHOBIA: 0
RECTAL PAIN: 0
EYE PAIN: 0
CHOKING: 0
CONSTIPATION: 0
BLOOD IN STOOL: 0
EYE ITCHING: 0
WHEEZING: 0
SINUS PRESSURE: 0
BACK PAIN: 0
NAUSEA: 0
DIARRHEA: 0
ABDOMINAL PAIN: 0
RHINORRHEA: 0
ALLERGIC/IMMUNOLOGIC NEGATIVE: 1
COUGH: 0
ABDOMINAL DISTENTION: 0
CHEST TIGHTNESS: 0
EYE DISCHARGE: 0

## 2021-04-16 ENCOUNTER — OUTSIDE SERVICES (OUTPATIENT)
Dept: FAMILY MEDICINE CLINIC | Age: 81
End: 2021-04-16
Payer: MEDICARE

## 2021-04-16 VITALS
SYSTOLIC BLOOD PRESSURE: 125 MMHG | HEART RATE: 64 BPM | WEIGHT: 243 LBS | RESPIRATION RATE: 16 BRPM | TEMPERATURE: 97.8 F | BODY MASS INDEX: 34.87 KG/M2 | DIASTOLIC BLOOD PRESSURE: 65 MMHG | OXYGEN SATURATION: 96 %

## 2021-04-16 DIAGNOSIS — E11.9 TYPE 2 DIABETES MELLITUS WITHOUT COMPLICATION, WITHOUT LONG-TERM CURRENT USE OF INSULIN (HCC): ICD-10-CM

## 2021-04-16 DIAGNOSIS — F33.2 SEVERE EPISODE OF RECURRENT MAJOR DEPRESSIVE DISORDER, WITHOUT PSYCHOTIC FEATURES (HCC): ICD-10-CM

## 2021-04-16 DIAGNOSIS — I25.10 ASHD (ARTERIOSCLEROTIC HEART DISEASE): ICD-10-CM

## 2021-04-16 DIAGNOSIS — I10 ESSENTIAL HYPERTENSION: ICD-10-CM

## 2021-04-16 DIAGNOSIS — M19.90 ARTHRITIS: ICD-10-CM

## 2021-04-16 DIAGNOSIS — Z90.49 STATUS POST TOTAL COLECTOMY: Primary | ICD-10-CM

## 2021-04-16 DIAGNOSIS — F33.1 MODERATE EPISODE OF RECURRENT MAJOR DEPRESSIVE DISORDER (HCC): ICD-10-CM

## 2021-04-16 DIAGNOSIS — E03.9 ACQUIRED HYPOTHYROIDISM: ICD-10-CM

## 2021-04-16 DIAGNOSIS — R91.1 PULMONARY NODULE: ICD-10-CM

## 2021-04-16 PROCEDURE — 99490 CHRNC CARE MGMT STAFF 1ST 20: CPT | Performed by: NURSE PRACTITIONER

## 2021-04-16 PROCEDURE — 99309 SBSQ NF CARE MODERATE MDM 30: CPT | Performed by: NURSE PRACTITIONER

## 2021-04-16 ASSESSMENT — ENCOUNTER SYMPTOMS
RHINORRHEA: 0
WHEEZING: 0
NAUSEA: 0
COUGH: 0
EYE REDNESS: 0
VOMITING: 0
DIARRHEA: 0
SORE THROAT: 0
SHORTNESS OF BREATH: 0
BLOOD IN STOOL: 0
CONSTIPATION: 0

## 2021-04-16 NOTE — PROGRESS NOTES
Renetta Tejada is a 79 y.o. male who presents today for his medical conditions/complaints as noted below. Chief Complaint   Patient presents with    1 Month Follow-Up     Follow up on chronic health conditions           HPI:     Patient is seen and examined in his room today for follow up on chronic health conditions. He is resting in bed. He denies pain, but states he is tired. He has been in the facility after septic shock from c diff colitis requiring colectomy. He was noted to have a pulmonary nodule and is scheduled to have repeat CT in 3 months, but this has been placed on hold. He has a history of major depressive disorder and states that he still has some sadness but overall his mood is stable. He denies any chest pain or shortness of breath. He states his appetite is good he is sleeping well. His weight is starting to improve and is up 8 pounds in the last month. He is working with therapy, but has poor endurance. He has a past medical history significant for hypertension, coronary artery disease, hypothyroidism, diabetes. Chronic conditions have been well controlled. No further concerns per staff. Past Medical History:   Diagnosis Date    Arthritis     Chronic kidney disease     Depression     Diabetes mellitus ([address]     Hyperlipidemia     Hypertension     Thyroid disease       Past Surgical History:   Procedure Laterality Date    COLOSTOMY N/A 2/20/2021    TOTAL COLECTOMY AND ILLEOSTOMY PLACEMENT performed by Karen Schroeder MD at [address] [place] [address] Right        History reviewed. No pertinent family history. Social History     Tobacco Use    Smoking status: Never Smoker    Smokeless tobacco: Never Used   Substance Use Topics    Alcohol use: Not Currently      Allergies   Allergen Reactions    Triamcinolone Rash     Patient states his arm felt like it was hot, like it was burning.          Health Maintenance   Topic Date Due    Hepatitis C screen  Never done  Lipid screen  Never done    COVID-19 Vaccine (1) Never done    DTaP/Tdap/Td vaccine (1 - Tdap) Never done    Shingles Vaccine (1 of 2) Never done    Pneumococcal 65+ years Vaccine (1 of 1 - PPSV23) Never done   [employer] Visit (AWV)  Never done    TSH testing  06/28/2021    Flu vaccine (Season Ended) 09/01/2021    Potassium monitoring  03/05/2022    Creatinine monitoring  03/05/2022    Hepatitis A vaccine  Aged Out    Hib vaccine  Aged Out    Meningococcal (ACWY) vaccine  Aged Out       Subjective:      Review of Systems   Constitutional: Positive for fatigue. Negative for activity change, appetite change, chills, fever and unexpected weight change. HENT: Positive for hearing loss. Negative for congestion, rhinorrhea and sore throat. Eyes: Negative for redness and visual disturbance. Respiratory: Negative for cough, shortness of breath and wheezing. Cardiovascular: Negative for chest pain and leg swelling. Gastrointestinal: Negative for blood in stool, constipation, diarrhea, nausea and vomiting. Has ileostomy bag for stool   Endocrine: Negative for polydipsia and polyuria. Genitourinary: Negative for dysuria, frequency and hematuria. Musculoskeletal: Positive for arthralgias and gait problem. Negative for myalgias. Skin: Positive for pallor. Negative for rash and wound. Allergic/Immunologic: Negative for environmental allergies and immunocompromised state. Neurological: Positive for weakness. Negative for dizziness, light-headedness and headaches. Hematological: Does not bruise/bleed easily. Psychiatric/Behavioral: Positive for dysphoric mood. Negative for sleep disturbance. The patient is not nervous/anxious. Objective:     Physical Exam  Vitals signs and nursing note reviewed. Constitutional:       General: He is not in acute distress. Appearance: He is well-developed. HENT:      Head: Normocephalic and atraumatic.       Right Ear: External ear normal.      Left Ear: External ear normal.      Nose: Nose normal.   Eyes:      General: No scleral icterus. Right eye: No discharge. Left eye: No discharge. Conjunctiva/sclera: Conjunctivae normal.   Neck:      Musculoskeletal: Neck supple. Thyroid: No thyromegaly. Vascular: No JVD. Cardiovascular:      Rate and Rhythm: Normal rate and regular rhythm. Heart sounds: Normal heart sounds. Pulmonary:      Effort: Pulmonary effort is normal. No respiratory distress. Breath sounds: Normal breath sounds. No stridor. No wheezing or rales. Abdominal:      General: The ostomy site is clean. Bowel sounds are normal. There is no distension. Palpations: Abdomen is soft. Tenderness: There is no abdominal tenderness. Musculoskeletal: Normal range of motion. General: No deformity. Right shoulder: He exhibits no tenderness, no bony tenderness and no pain. Left shoulder: He exhibits no tenderness, no bony tenderness and no pain. Cervical back: He exhibits no tenderness, no bony tenderness and no pain. Thoracic back: He exhibits no tenderness, no bony tenderness, no pain and no spasm. Lumbar back: He exhibits no tenderness, no bony tenderness and no pain. Lymphadenopathy:      Cervical: No cervical adenopathy. Upper Body:      Right upper body: No supraclavicular adenopathy. Left upper body: No supraclavicular adenopathy. Skin:     General: Skin is warm and dry. Findings: No erythema or rash. Neurological:      Mental Status: He is alert and oriented to person, place, and time. Motor: No atrophy, abnormal muscle tone or seizure activity. Psychiatric:         Attention and Perception: Attention normal.         Mood and Affect: Affect is labile. Speech: Speech is delayed. Behavior: Behavior is slowed.          Cognition and Memory: Memory normal.       /65   Pulse 64   Temp 97.8 °F (36.6 °C) Resp 16   Wt 243 lb (110.2 kg)   SpO2 96%   BMI 34.87 kg/m²     Assessment/Plan      1. Status post total colectomy - continue ostomy care. Incision has healed. 2. Moderate episode of recurrent major depressive disorder (HCC) -doxepin discontinued. I have decreased BuSpar due to being somnolent. Continue Seroquel and sertraline. Continues to follow with Dr. Carmen Radford. 3. Essential hypertension -blood pressures controlled with metoprolol and hydralazine. Continue to monitor. 4. ASHD (arteriosclerotic heart disease) -denies chest pain. Continue statin. 5. Acquired hypothyroidism -chronic and continue levothyroxine. 6. Type 2 diabetes mellitus without complication, without long-term current use of insulin (HCC) -blood sugars controlled without medications. Continue statin and monitor labs. 7. Severe episode of recurrent major depressive disorder, without psychotic features ([facility] -continue follow-up with psych services. Continue Wellbutrin. 8. Pulmonary nodule -follow-up with pulmonology for CT. 9. Arthritis -chronic and continue Tylenol. Continue therapy. Resident has MDD, HTN, ASHD, DM and it is expected to last 15 or more months. These chronic conditions place resident at a significant risk of death, acute exacerbation, or functional decline. The patient's comprehensive plan was monitored today. I spent 20 minutes reviewing plan. Patient seen and examined. History partially obtained by chart review and nursing notes. I have reviewed patient's past medical, surgical, social, and family history and have made updates where appropriate.   See facility EMR for updated medication list.       Electronically signed by SYLVESTER Gottlieb CNP on 4/16/2021 at 2:16 PM

## 2021-05-18 ENCOUNTER — OUTSIDE SERVICES (OUTPATIENT)
Dept: FAMILY MEDICINE CLINIC | Age: 81
End: 2021-05-18
Payer: MEDICARE

## 2021-05-18 VITALS
DIASTOLIC BLOOD PRESSURE: 62 MMHG | WEIGHT: 253.8 LBS | RESPIRATION RATE: 16 BRPM | HEART RATE: 62 BPM | SYSTOLIC BLOOD PRESSURE: 123 MMHG | OXYGEN SATURATION: 96 % | BODY MASS INDEX: 36.42 KG/M2 | TEMPERATURE: 97.7 F

## 2021-05-18 DIAGNOSIS — E11.9 TYPE 2 DIABETES MELLITUS WITHOUT COMPLICATION, WITHOUT LONG-TERM CURRENT USE OF INSULIN (HCC): ICD-10-CM

## 2021-05-18 DIAGNOSIS — M19.90 ARTHRITIS: ICD-10-CM

## 2021-05-18 DIAGNOSIS — R91.1 PULMONARY NODULE: ICD-10-CM

## 2021-05-18 DIAGNOSIS — Z90.49 STATUS POST TOTAL COLECTOMY: Primary | ICD-10-CM

## 2021-05-18 DIAGNOSIS — I25.10 ASHD (ARTERIOSCLEROTIC HEART DISEASE): ICD-10-CM

## 2021-05-18 DIAGNOSIS — F33.1 MODERATE EPISODE OF RECURRENT MAJOR DEPRESSIVE DISORDER (HCC): ICD-10-CM

## 2021-05-18 DIAGNOSIS — E03.9 ACQUIRED HYPOTHYROIDISM: ICD-10-CM

## 2021-05-18 DIAGNOSIS — I10 ESSENTIAL HYPERTENSION: ICD-10-CM

## 2021-05-18 DIAGNOSIS — F33.2 SEVERE EPISODE OF RECURRENT MAJOR DEPRESSIVE DISORDER, WITHOUT PSYCHOTIC FEATURES (HCC): ICD-10-CM

## 2021-05-18 PROCEDURE — 99309 SBSQ NF CARE MODERATE MDM 30: CPT | Performed by: FAMILY MEDICINE

## 2021-05-18 PROCEDURE — 99490 CHRNC CARE MGMT STAFF 1ST 20: CPT | Performed by: FAMILY MEDICINE

## 2021-05-18 ASSESSMENT — ENCOUNTER SYMPTOMS
RHINORRHEA: 0
SHORTNESS OF BREATH: 0
NAUSEA: 0
EYE REDNESS: 0
COUGH: 0
VOMITING: 0
WHEEZING: 0
SORE THROAT: 0
DIARRHEA: 0
CONSTIPATION: 0

## 2021-05-18 NOTE — PROGRESS NOTES
Nikunj Mooney is a 79 y.o. male who presents today for his medical conditions/complaints as noted below. Chief Complaint   Patient presents with    1 Month Follow-Up     Follow up on chronic health conditions           HPI:     Patient is seen today for follow up on chronic health conditions. He is seen in his room and is lying in bed. He denies chest pain, shortness of breath, nausea, or insomnia. No recent falls or labs. Review of MAR has noted blood pressures between 105-138/58-64. Hydralazine has been held 13 times for a blood pressure less than 100/60. He is currently on Metoprolol and Hydralazine. He has chronic health conditions of hypertension, coronary artery disease, HLD, JAYLIN, MDD,  hypothyroidism, diabetes, pulmonary nodule. Will decrease Hydralazine to twice daily and continue parameters. His Seroquel was recently discontinued from Dr. Kavitha Stoddard and is tolerating this. He is currently on Abilify, Buproprion, and Remeron. Followed by psychiatry for MDD. Past Medical History:   Diagnosis Date    Arthritis     Chronic kidney disease     Depression     Diabetes mellitus ([address]     Hyperlipidemia     Hypertension     Thyroid disease       Past Surgical History:   Procedure Laterality Date    COLOSTOMY N/A 2/20/2021    TOTAL COLECTOMY AND ILLEOSTOMY PLACEMENT performed by Jayro Kirkpatrick MD at / [facility] [place] [address]        History reviewed. No pertinent family history. Social History     Tobacco Use    Smoking status: Never Smoker    Smokeless tobacco: Never Used   Substance Use Topics    Alcohol use: Not Currently      Allergies   Allergen Reactions    Triamcinolone Rash     Patient states his arm felt like it was hot, like it was burning.          Health Maintenance   Topic Date Due    Hepatitis C screen  Never done    Lipid screen  Never done    COVID-19 Vaccine (1) Never done    DTaP/Tdap/Td vaccine (1 - Tdap) Never done    Shingles Vaccine (1 of 2) Never done   [facility] Pneumococcal 65+ years Vaccine (1 of 1 - PPSV23) Never done   [employer] Visit (AWV)  Never done    TSH testing  06/28/2021    Flu vaccine (Season Ended) 09/01/2021    Potassium monitoring  03/05/2022    Creatinine monitoring  03/05/2022    Hepatitis A vaccine  Aged Out    Hib vaccine  Aged Out    Meningococcal (ACWY) vaccine  Aged Out       Subjective:      Review of Systems   Constitutional: Negative for chills, fatigue, fever and unexpected weight change. HENT: Positive for hearing loss. Negative for congestion, rhinorrhea and sore throat. Eyes: Positive for visual disturbance. Negative for redness. Respiratory: Negative for cough, shortness of breath and wheezing. Cardiovascular: Negative for chest pain and leg swelling. Gastrointestinal: Negative for constipation, diarrhea, nausea and vomiting. Colostomy   Endocrine: Negative for polydipsia and polyuria. Genitourinary: Negative for dysuria, frequency and hematuria. Musculoskeletal: Positive for gait problem. Negative for arthralgias and myalgias. Skin: Negative for rash and wound. Allergic/Immunologic: Negative for environmental allergies and immunocompromised state. Neurological: Positive for weakness. Negative for dizziness, light-headedness and headaches. Hematological: Does not bruise/bleed easily. Psychiatric/Behavioral: Positive for confusion (mild). Negative for dysphoric mood and sleep disturbance. The patient is not nervous/anxious. Objective:     Physical Exam  Vitals and nursing note reviewed. Constitutional:       General: He is not in acute distress. Appearance: He is well-developed. HENT:      Head: Normocephalic and atraumatic. Right Ear: External ear normal.      Left Ear: External ear normal.      Nose: Nose normal.   Eyes:      General: No scleral icterus. Right eye: No discharge. Left eye: No discharge.       Conjunctiva/sclera: Conjunctivae normal.   Neck: Thyroid: No thyromegaly. Vascular: No JVD. Cardiovascular:      Rate and Rhythm: Normal rate and regular rhythm. Heart sounds: Normal heart sounds. Pulmonary:      Effort: Pulmonary effort is normal. No respiratory distress. Breath sounds: Normal breath sounds. No stridor. No wheezing or rales. Abdominal:      General: The ostomy site is clean. Bowel sounds are normal. There is no distension. Palpations: Abdomen is soft. Tenderness: There is no abdominal tenderness. Musculoskeletal:         General: No deformity. Normal range of motion. Right shoulder: No tenderness or bony tenderness. Left shoulder: No tenderness or bony tenderness. Cervical back: Neck supple. No tenderness or bony tenderness. Thoracic back: No spasms, tenderness or bony tenderness. Lumbar back: No tenderness or bony tenderness. Lymphadenopathy:      Cervical: No cervical adenopathy. Upper Body:      Right upper body: No supraclavicular adenopathy. Left upper body: No supraclavicular adenopathy. Skin:     General: Skin is warm and dry. Findings: No erythema or rash. Neurological:      Mental Status: He is alert and oriented to person, place, and time. Motor: No atrophy, abnormal muscle tone or seizure activity. Psychiatric:         Mood and Affect: Affect is labile. Speech: Speech normal.         Behavior: Behavior normal.         Cognition and Memory: Memory normal.       /62   Pulse 62   Temp 97.7 °F (36.5 °C)   Resp 16   Wt 253 lb 12.8 oz (115.1 kg)   SpO2 96%   BMI 36.42 kg/m²     Assessment/Plan      1. Status post total colectomy - Continue to monitor. Assist with care. 2. Moderate episode of recurrent major depressive disorder (HCC) - Chronic and overall stable. Recently seen by Dr. Bang Velez. Follow up for all med changes. Seroquel recently discontinued. Continue Sertraline, Abilify, Buproprion, Remeron and Buspar.     3. Essential hypertension - Blood pressures lower. Continue Metoprolol and will decrease Hydralazine to 2 times daily with same parameters. 4. ASHD (arteriosclerotic heart disease) - Denies chest pain. Continue beta blocker and stain. 5. Acquired hypothyroidism - Chronic and continue levothyroxine. 6. Type 2 diabetes mellitus without complication, without long-term current use of insulin (HCC) - Chronic and continue follow labs. Blood sugars stable. 7. Severe episode of recurrent major depressive disorder, without psychotic features ([facility] [address] -Chronic and continue as above. 8. Pulmonary nodule - Follow up with pulm. 9. Arthritis - Chronic and continue Tylenol. Resident has HTN, ASHD, DM, Pulmonary nodule and it is expected to last 12 or more months. These chronic conditions place resident at a significant risk of death, acute exacerbation, or functional decline. The patient's comprehensive plan was monitored today. I spent 20 minutes reviewing plan. Patient seen and examined. History partially obtained by chart review and nursing notes. I have reviewed patient's past medical, surgical, social, and family history and have made updates where appropriate.   See facility EMR for updated medication list.       Electronically signed by Augustina Gallegos MD on 5/18/2021 at 11:17 AM

## 2021-06-15 ENCOUNTER — OUTSIDE SERVICES (OUTPATIENT)
Dept: FAMILY MEDICINE CLINIC | Age: 81
End: 2021-06-15
Payer: MEDICARE

## 2021-06-15 VITALS
HEART RATE: 85 BPM | SYSTOLIC BLOOD PRESSURE: 126 MMHG | OXYGEN SATURATION: 95 % | WEIGHT: 254.2 LBS | RESPIRATION RATE: 18 BRPM | BODY MASS INDEX: 36.47 KG/M2 | TEMPERATURE: 97.6 F | DIASTOLIC BLOOD PRESSURE: 67 MMHG

## 2021-06-15 DIAGNOSIS — F33.2 SEVERE EPISODE OF RECURRENT MAJOR DEPRESSIVE DISORDER, WITHOUT PSYCHOTIC FEATURES (HCC): ICD-10-CM

## 2021-06-15 DIAGNOSIS — R91.1 PULMONARY NODULE: ICD-10-CM

## 2021-06-15 DIAGNOSIS — Z90.49 STATUS POST TOTAL COLECTOMY: Primary | ICD-10-CM

## 2021-06-15 DIAGNOSIS — I25.10 ASHD (ARTERIOSCLEROTIC HEART DISEASE): ICD-10-CM

## 2021-06-15 DIAGNOSIS — F33.1 MODERATE EPISODE OF RECURRENT MAJOR DEPRESSIVE DISORDER (HCC): ICD-10-CM

## 2021-06-15 DIAGNOSIS — E03.9 ACQUIRED HYPOTHYROIDISM: ICD-10-CM

## 2021-06-15 DIAGNOSIS — E11.9 TYPE 2 DIABETES MELLITUS WITHOUT COMPLICATION, WITHOUT LONG-TERM CURRENT USE OF INSULIN (HCC): ICD-10-CM

## 2021-06-15 DIAGNOSIS — I10 ESSENTIAL HYPERTENSION: ICD-10-CM

## 2021-06-15 DIAGNOSIS — M19.90 ARTHRITIS: ICD-10-CM

## 2021-06-15 PROCEDURE — 99309 SBSQ NF CARE MODERATE MDM 30: CPT | Performed by: FAMILY MEDICINE

## 2021-06-15 PROCEDURE — 99490 CHRNC CARE MGMT STAFF 1ST 20: CPT | Performed by: FAMILY MEDICINE

## 2021-06-15 ASSESSMENT — ENCOUNTER SYMPTOMS
CONSTIPATION: 0
DIARRHEA: 0
WHEEZING: 0
SHORTNESS OF BREATH: 0
RHINORRHEA: 0
EYE REDNESS: 0
SORE THROAT: 0
VOMITING: 0
NAUSEA: 0
COUGH: 0

## 2021-06-15 NOTE — PROGRESS NOTES
Skylar Montemayor is a [de-identified] y.o. male who presents today for his medical conditions/complaints as noted below. Chief Complaint   Patient presents with    1 Month Follow-Up     Follow up on chronic health conditions           HPI:     Patient is seen today for follow up on chronic health conditions. He is seen in his room and is lying in bed. He denies chest pain, shortness of breath, nausea, or insomnia, but does have complaints of left shoulder pain which is chronic and does state tylenol helps with the pain. No recent falls or labs. He has chronic health conditions of hypertension, coronary artery disease, HLD, JAYLIN, MDD,  hypothyroidism, diabetes, pulmonary nodule. He is currently on Abilify, Buproprion, and Remeron. Followed by psychiatry for MDD. He is long term as he is unable to manage his colostomy. Past Medical History:   Diagnosis Date    Arthritis     Chronic kidney disease     Depression     Diabetes mellitus ([facility] [address]     Hyperlipidemia     Hypertension     Thyroid disease       Past Surgical History:   Procedure Laterality Date    COLOSTOMY N/A 2/20/2021    TOTAL COLECTOMY AND ILLEOSTOMY PLACEMENT performed by Ricky Ivan MD at [facility]        No family history on file. Social History     Tobacco Use    Smoking status: Never Smoker    Smokeless tobacco: Never Used   Substance Use Topics    Alcohol use: Not Currently      Allergies   Allergen Reactions    Triamcinolone Rash     Patient states his arm felt like it was hot, like it was burning.          Health Maintenance   Topic Date Due    Lipid screen  Never done    DTaP/Tdap/Td vaccine (1 - Tdap) Never done    Shingles Vaccine (1 of 2) Never done    Pneumococcal 65+ years Vaccine (1 of 1 - PPSV23) Never done   [employer] Visit (AWV)  Never done    COVID-19 Vaccine (2 - Pfizer 2-dose series) 01/30/2021    TSH testing  06/28/2021    Flu vaccine (Season Ended) 09/01/2021    Potassium monitoring 03/05/2022    Creatinine monitoring  03/05/2022    Hepatitis A vaccine  Aged Out    Hib vaccine  Aged Out    Meningococcal (ACWY) vaccine  Aged Out       Subjective:      Review of Systems   Constitutional: Positive for unexpected weight change (up 11 pounds in 2 months). Negative for chills, fatigue and fever. HENT: Positive for hearing loss. Negative for congestion, rhinorrhea and sore throat. Eyes: Positive for visual disturbance. Negative for redness. Respiratory: Negative for cough, shortness of breath and wheezing. Cardiovascular: Negative for chest pain and leg swelling. Gastrointestinal: Negative for constipation, diarrhea, nausea and vomiting. Colostomy   Endocrine: Negative for polydipsia and polyuria. Genitourinary: Negative for dysuria, frequency and hematuria. Musculoskeletal: Positive for gait problem. Negative for arthralgias and myalgias. Skin: Negative for rash and wound. Allergic/Immunologic: Negative for environmental allergies and immunocompromised state. Neurological: Positive for weakness. Negative for dizziness, light-headedness and headaches. Hematological: Does not bruise/bleed easily. Psychiatric/Behavioral: Positive for confusion (mild). Negative for dysphoric mood and sleep disturbance. The patient is not nervous/anxious. Objective:     Physical Exam  Vitals and nursing note reviewed. Constitutional:       General: He is not in acute distress. Appearance: He is well-developed. HENT:      Head: Normocephalic and atraumatic. Right Ear: External ear normal.      Left Ear: External ear normal.      Nose: Nose normal.   Eyes:      General: No scleral icterus. Right eye: No discharge. Left eye: No discharge. Conjunctiva/sclera: Conjunctivae normal.   Neck:      Thyroid: No thyromegaly. Vascular: No JVD. Cardiovascular:      Rate and Rhythm: Normal rate and regular rhythm. Heart sounds: Normal heart sounds. Pulmonary:      Effort: Pulmonary effort is normal. No respiratory distress. Breath sounds: Normal breath sounds. No stridor. No wheezing or rales. Abdominal:      General: The ostomy site is clean. Bowel sounds are normal. There is no distension. Palpations: Abdomen is soft. Tenderness: There is no abdominal tenderness. Musculoskeletal:         General: No deformity. Normal range of motion. Right shoulder: No tenderness or bony tenderness. Left shoulder: No tenderness or bony tenderness. Cervical back: Neck supple. No tenderness or bony tenderness. Thoracic back: No spasms, tenderness or bony tenderness. Lumbar back: No tenderness or bony tenderness. Lymphadenopathy:      Cervical: No cervical adenopathy. Upper Body:      Right upper body: No supraclavicular adenopathy. Left upper body: No supraclavicular adenopathy. Skin:     General: Skin is warm and dry. Findings: No erythema or rash. Neurological:      Mental Status: He is alert and oriented to person, place, and time. Motor: No atrophy, abnormal muscle tone or seizure activity. Psychiatric:         Mood and Affect: Affect is labile. Speech: Speech normal.         Behavior: Behavior normal.         Cognition and Memory: Memory normal.       /67   Pulse 85   Temp 97.6 °F (36.4 °C)   Resp 18   Wt 254 lb 3.2 oz (115.3 kg)   SpO2 95%   BMI 36.47 kg/m²     Assessment/Plan      1. Status post total colectomy - Continue to monitor. Assist with care. 2. Moderate episode of recurrent major depressive disorder (HCC) - Chronic and overall stable. Follows with Dr. Malina Lock. Continue Sertraline, Abilify, Buproprion, Remeron and Buspar. 3. Essential hypertension - Chronic and stable. Continue Metoprolol and Hydralazine. 4. ASHD (arteriosclerotic heart disease) - Denies chest pain. Continue beta blocker and stain.     5. Acquired hypothyroidism - Chronic and continue levothyroxine. 6. Type 2 diabetes mellitus without complication, without long-term current use of insulin (HCC) - Chronic and continue follow labs. Blood sugars stable. 7. Severe episode of recurrent major depressive disorder, without psychotic features ([facility] [address] -Chronic and continue as above. 8. Pulmonary nodule - Follow up with pulm. 9. Arthritis - Chronic and continue Tylenol. Encourage ambulation     Resident has HTN, ASHD, DM, Pulmonary nodule and it is expected to last 12 or more months. These chronic conditions place resident at a significant risk of death, acute exacerbation, or functional decline. The patient's comprehensive plan was monitored today. I spent 20 minutes reviewing plan. Patient seen and examined. History partially obtained by chart review and nursing notes. I have reviewed patient's past medical, surgical, social, and family history and have made updates where appropriate.   See facility EMR for updated medication list.       Electronically signed by Erma Marino MD on 6/15/2021 at 10:36 AM

## 2021-06-24 NOTE — PROGRESS NOTES
Monae Maciel MD  Daily Progress Note  Pt Name: Brandon Butler  Medical Record Number: [id number]  Date of Birth 1940   Today's Date: 2/20/2021  Chief complaint: Nursing reports increased confusion  ASSESSMENT:   1. Hospital day # 3 /4  2. C. difficile pancolitis-liquid stool non bloody. Flexi-Seal L loose stool seem decreased  3. Gallbladder distention likely secondary to #2. I do not think he has cholecystitis. Normal hepatic function  4. Acute on chronic renal failure improved  5. Leukocytosis, multifactorial  6. UTI-yeast and most recent culture   has a past medical history of Arthritis, Chronic kidney disease, Depression, Diabetes mellitus ([address] Hyperlipidemia, Hypertension, and Thyroid disease. RECOMMENDATIONS:   1. IV hydration, oral vancomycin and IV Flagyl. Consider vancomycin enema  2. Analgesics and antiemetics as needed  3. NG placed for meds. Okay for liquids from a surgical standpoint  4. medical management per ICU. 5. Pt less confused today. Renal function improved. No need for dialysis. 6. ID consult appreciated. If fails to improve and patient agreeable could consider a loop ileostomy early next week for vancomycin flushes  7. Pt stating would desire surgical intervention as last resort    SUBJECTIVE:   Kasey Humberto answering all questions appropriately feels bloated. Abdominal x-ray after NG tube placed for meds nonspecific bowel gas pattern. Repeat films ordered for tomorrow a.m. Abdomen mildly tender no peritonitis. Flexi-Seal is out. Attempting to have bowel movement.   MEDICATIONS   Scheduled Meds:   famotidine (PEPCID) injection  20 mg Intravenous Daily    heparin (porcine)  5,000 Units Subcutaneous Q8H    atorvastatin  40 mg Oral Daily    clomiPRAMINE  50 mg Oral Nightly    levothyroxine  75 mcg Oral Daily    metoprolol tartrate  25 mg Oral BID    sertraline  100 mg Oral Daily    tamsulosin  0.4 mg Oral Daily    metroNIDAZOLE  500 mg Intravenous Q8H    vancomycin  500 mg Per NG tube 4 times per day    calcium replacement protocol   Other RX Placeholder    miconazole   Topical BID     Continuous Infusions:   norepinephrine 3 mcg/min (21 0845)    sodium chloride 75 mL/hr at 21 0857     PRN Meds:.morphine, promethazine **OR** ondansetron, polyethylene glycol, acetaminophen **OR** acetaminophen  OBJECTIVE   CURRENT VITALS:  height is 5' 10\" (1.778 m) and weight is 299 lb 6.2 oz (135.8 kg). His oral temperature is 98.7 °F (37.1 °C). His blood pressure is 118/52 (abnormal) and his pulse is 96. His respiration is 15 and oxygen saturation is 95%. Temperature Range (24h):Temp: 98.7 °F (37.1 °C) Temp  Av.1 °F (37.3 °C)  Min: 98.4 °F (36.9 °C)  Max: 100.4 °F (38 °C)  BP Range (52A): Systolic (91SML), RZP:062 , Min:91 , RAY:255     Diastolic (16CBG), LAKEISHA:75, Min:39, Max:67    Pulse Range (24h): Pulse  Av.9  Min: 92  Max: 104  Respiration Range (24h): Resp  Av.8  Min: 14  Max: 26  Current Pulse Ox (24h):  SpO2: 95 %  Pulse Ox Range (24h):  SpO2  Av.9 %  Min: 91 %  Max: 100 %  Oxygen Amount and Delivery: O2 Flow Rate (L/min): 0 L/min  Incentive Spirometry Tx:            GENERAL: Fatigued appearing, no acute distress. LUNGS: Diminished in the bases  HEART: Normal heart rate  ABDOMEN: Obese soft nontympanic. Mild tenderness to palpation nonfocal   EXTREMITY: Mild edema  In: 3197.6 [P.O.:900; I.V.:2297.6]  Out: 2967 [Urine:2375]     Date 21 0000 - 21 235   Shift 6032-1214 6496-2363 8175-1955 24 Hour Total   INTAKE   P.O.(mL/kg/hr) 500(0.5) 300  800   I. V.(mL/kg) 789. 4(5.8)   789. 4(5.8)   Shift Total(mL/kg) 1289.4(9.5) 300(2.2)  1589. 4(11.7)   OUTPUT   Urine(mL/kg/hr) 700(0.6)   700   Stool(mL/kg) 0(0)   0(0)   Shift Total(mL/kg) 700(5.2)   700(5.2)   Weight (kg) 135.8 135.8 135.8 135.8     LABS     Recent Labs     21  0350 21  0620 21  0640   WBC 14.5* 17.3* 22.6*   HGB 8.7* 9.4* 9.9*   HCT 27.2* 30.3* 32.5*    142 196    140 141   K 4.2 4.2 4.1    105 109   CO2 26 26 24   BUN 95* 95* 84*   CREATININE 4.6* 3.7* 2.8*   MG 2.2  --  2.0   PHOS 2.7  --  3.3   CALCIUM 7.7* 7.6* 7.8*      Recent Labs     02/17/21  2145   INR 1.25*     Recent Labs     02/19/21  0620   AST 16   ALT 9*   BILITOT 0.2*   BILIDIR <0.2     No results for input(s): TROPONINT in the last 72 hours. RADIOLOGY     XR ABDOMEN FOR NG/OG/NE TUBE PLACEMENT   Final Result   Impression:   Limited exam   1. NG tube in the stomach. Nonspecific bowel gas pattern      This document has been electronically signed by: Nishant Kumar MD on    02/19/2021 01:59 AM      US GALLBLADDER RUQ   Final Result   Distended appearance to the gallbladder which has a slightly thickened wall. Sludge is also present within its lumen. Sonographic Hamilton's sign is positive per the technologist. These findings could be on the basis of acute cholecystitis. If clinically    warranted, further evaluation with a nuclear medicine HIDA scan would be beneficial for further characterization. **This report has been created using voice recognition software. It may contain minor errors which are inherent in voice recognition technology. **      Final report electronically signed by Dr Rupert Veliz on 2/17/2021 9:37 AM      CT ABDOMEN PELVIS WO CONTRAST Additional Contrast? None   Final Result   Impression:   1. Pancolitis consistent with the clinical history of C. difficile    colitis. No bowel perforation. 2.  Probable cholelithiasis. 3.  Bibasilar pulmonary consolidation which may be on the basis of    atelectasis or pneumonia. 4.  Small bilateral nonobstructing renal calculi. 5.  Small right middle lobe pulmonary nodule.       This document has been electronically signed by: Vane Peacock MD on    02/17/2021 05:51 AM      All CTs at this facility use dose modulation techniques and iterative    reconstructions, and/or weight-based dosing   when appropriate to reduce radiation to a low as reasonably achievable. XR CHEST PORTABLE   Final Result   Impression:      Right central line tip proximal SVC. Stable bibasilar consolidation and atelectasis. This document has been electronically signed by: Stephany Fothergill, MD on    02/17/2021 04:20 AM      XR CHEST PORTABLE   Final Result   Impression:      Stable bilateral basilar consolidation and atelectasis      This document has been electronically signed by: Stephany Fothergill, MD on    02/17/2021 03:20 AM      XR ABDOMEN FOR NG/OG/NE TUBE PLACEMENT   Final Result   Impression:   1. Distal NG tube is not well seen but is likely in the distal stomach. This document has been electronically signed by: Boo Parikh MD on    02/17/2021 02:30 AM      XR CHEST PORTABLE   Final Result   Impression:   1. Right lower lobe airspace disease may represent atelectasis or    pneumonia.       This document has been electronically signed by: Boo Parikh MD on    02/17/2021 02:28 AM      XR ABDOMEN (2 VIEWS)    (Results Pending)       Electronically signed by Tano Esparza MD on 2/20/2021 at 9:22 AM Sonia Martinez(Attending)

## 2021-07-07 ENCOUNTER — HOSPITAL ENCOUNTER (OUTPATIENT)
Dept: WOUND CARE | Age: 81
Discharge: HOME | End: 2021-07-07
Payer: MEDICARE

## 2021-07-07 VITALS
HEART RATE: 66 BPM | RESPIRATION RATE: 18 BRPM | TEMPERATURE: 97.9 F | OXYGEN SATURATION: 98 % | DIASTOLIC BLOOD PRESSURE: 65 MMHG | SYSTOLIC BLOOD PRESSURE: 145 MMHG

## 2021-07-07 DIAGNOSIS — Z93.2 ILEOSTOMY IN PLACE (HCC): ICD-10-CM

## 2021-07-07 DIAGNOSIS — L30.8 PERISTOMAL DERMATITIS ASSOCIATED WITH MOISTURE: ICD-10-CM

## 2021-07-07 PROCEDURE — 99214 OFFICE O/P EST MOD 30 MIN: CPT

## 2021-07-07 PROCEDURE — 99214 OFFICE O/P EST MOD 30 MIN: CPT | Performed by: NURSE PRACTITIONER

## 2021-07-07 RX ORDER — ACETAMINOPHEN 500 MG
500 TABLET ORAL 2 TIMES DAILY
COMMUNITY

## 2021-07-07 RX ORDER — ARIPIPRAZOLE 10 MG/1
10 TABLET ORAL DAILY
COMMUNITY

## 2021-07-07 ASSESSMENT — PAIN SCALES - GENERAL: PAINLEVEL_OUTOF10: 8

## 2021-07-07 ASSESSMENT — PAIN DESCRIPTION - ORIENTATION: ORIENTATION: LEFT

## 2021-07-07 ASSESSMENT — PAIN DESCRIPTION - LOCATION: LOCATION: ARM;SHOULDER

## 2021-07-07 ASSESSMENT — PAIN DESCRIPTION - PAIN TYPE: TYPE: CHRONIC PAIN

## 2021-07-07 NOTE — PROGRESS NOTES
[facility]  Ostomy Progress Note      NAME:  Daren Franklin  MEDICAL RECORD NUMBER:  [id number]  AGE: [de-identified] y.o. GENDER:  male  :  1940  TODAY'S DATE:  2021    Subjective       Chief Complaint   Patient presents with    New Patient     Ileostomy         HISTORY of PRESENT ILLNESS HPI     Daren Franklin is a [de-identified] y.o. male New patient  who presents today for ostomy/stoma evaluation. History of Ostomy Context:  Patient has history of total colectomy with ileostomy February of this year related to C-diff colitis. Patient presents today for education of self care of ostomy as well as with concerns of difficulty with pouch adherence. Current pouching system includes 1 piece, soft, flat pouch. Patient states that nursing staff has been cleaning shaquille-stomal skin with alcohol wipes. He states that they have been using stoma paste to entire flange then securing with plastic tape around the borders. He reports frequent leakage of stool, has been being patched with tape at facility. He complains of pain to peristomal skin with cleaning. Patient empties pouch himself every few hours. He denies any further needs or concerns. PAST MEDICAL HISTORY        Diagnosis Date    Arthritis     Chronic kidney disease     Depression     Diabetes mellitus ([address]     Hyperlipidemia     Hypertension     Thyroid disease        PAST SURGICAL HISTORY    Past Surgical History:   Procedure Laterality Date    COLOSTOMY N/A 2021    TOTAL COLECTOMY AND ILLEOSTOMY PLACEMENT performed by Tr Bates [facility]        FAMILY HISTORY    History reviewed. No pertinent family history.     SOCIAL HISTORY    Social History     Tobacco Use    Smoking status: Never Smoker    Smokeless tobacco: Never Used   Vaping Use    Vaping Use: Never used   Substance Use Topics    Alcohol use: Not Currently    Drug use: Never       ALLERGIES    Allergies   Allergen Reactions    SYSTEMS  Constitutional: Denies fever, chills, night sweats, fatigue, weight loss/gain, loss of appetite   Head: Denies headache,  dizziness, loss of consciousness  Respiratory: Denies shortness of breath, cough, wheezing, dyspnea with exertion  Cardiovascular:Denies chest pain, palpitations, edema  Gastrointestinal: +ileostomy Denies nausea, vomiting, constipation, diarrhea, abdominal pain   CARI: Denies dysuria, frequency, urgency, hematuria  Musculoskeletal: +generalized weakness Denies joint pain, swelling , stiffness,  Endocrine: Denies polyuria, polydipsia, cold or heat intolerance  Hematology: Denies easy brusing or bleeding, hx of clotting disorder  Dermatology: Denies skin rash, eczema, pruritis    Objective    BP (!) 145/65   Pulse 66   Temp 97.9 °F (36.6 °C) (Infrared)   Resp 18   SpO2 98%     Wt Readings from Last 3 Encounters:   06/15/21 254 lb 3.2 oz (115.3 kg)   05/18/21 253 lb 12.8 oz (115.1 kg)   04/16/21 243 lb (110.2 kg)       PHYSICAL EXAM    General:  Awake, alert, no apparent distress. Appears stated age  [de-identified]: conjuctivae are clear without exudate or hemorrhage, anicteric sclera, moist oral mucosa. Chest:  Respirations regular, non-labored. Chest rise and fall equal bilaterally. Psychiatric:  Appropriate mood and affect  Extremities: non-traumatic in appearance. Skin:  Warm and dry    Ostomy type: TYPE OF OSTOMY: Ileostomy  Ostomy location: Right lower quardrant   Stoma size: 38 mm  Stoma description: Beefy red  Rosy stomal skin: red and irritated  Mucocutaneous junction: intact  Stool color and consistency: Brown, loose  Stool amount: large  Abdomen is soft and crease noted 3 and 6 O'clock. Ileostomy Ileostomy RLQ (Active)   Stomal Appliance 1 piece;Leaking; Changed 07/07/21 1441   Stoma  Assessment Moist;Protrudes; Red 07/07/21 1441   Mucocutaneous Junction Intact 07/07/21 1441   Peristomal Assessment Denuded;Blanchable erythema 07/07/21 1441   Treatment Stoma paste;Site care;Bag change; Heat applied;Stoma powder 07/07/21 1441   Stool Color Brown 07/07/21 1441   Stool Appearance Loose 07/07/21 1441   Stool Amount Large 07/07/21 1441   Number of days: 156         Colostomy LLQ (Active)   Number of days: 136              Wound 02/17/21 Coccyx DTI  purple areas (Active)   Number of days: 140       Wound 02/22/21 Penis Left Ulcerated spot (Active)   Number of days: 134       LABS       CBC:   Lab Results   Component Value Date    WBC 13.4 03/01/2021    HGB 8.0 03/03/2021    HCT 26.7 03/03/2021    MCV 98.0 03/01/2021     03/01/2021     BMP:   Lab Results   Component Value Date     03/05/2021    K 3.9 03/05/2021    K 3.5 02/26/2021     03/05/2021    CO2 20 03/05/2021    PHOS 4.1 03/04/2021    BUN 16 03/05/2021    CREATININE 1.6 03/05/2021     PT/INR:   Lab Results   Component Value Date    INR 1.25 02/17/2021     Prealbumin: No results found for: PREALBUMIN  Albumin:  Lab Results   Component Value Date    LABALBU 2.6 02/19/2021     Sed Rate:No results found for: [address]  Micro:   Lab Results   Component Value Date    BC No growth-preliminary No growth  02/17/2021          Assessment     -Ileostomy care  -Peristomal moisture associated dermatitis  -Encounter for ileostomy education    Patient Active Problem List   Diagnosis Code    Essential hypertension I10    ASHD (arteriosclerotic heart disease) I25.10    Arthritis M19.90    Severe episode of recurrent major depressive disorder, without psychotic features ([address] F33.2    Acquired hypothyroidism E03.9    Type 2 diabetes mellitus without complication, without long-term current use of insulin ([address] E11.9    COVID-19 with multiple comorbidities U07.1    History of recent hospitalization Z92.89    Cellulitis of right lower extremity L03.115    Pulmonary nodule R91.1    Hypotension I95.9    Septic shock (HCC) A41.9, R65.21    Hypovolemic shock (HCC) R57.1    Colitis, Clostridium difficile A04.72    Ileostomy in place [facility]) Z93.2    Peristomal dermatitis associated with moisture L30.8       Plan   Patient examined and evaluated    -Ileostomy care- On removal of pouching system opening for flange was much too large for patient, exposing peristomal skin to stool. Discussed the importance of limiting exposure of skin to stool to avoid further irritation and breakdown. Will order pre-cut pouching system to aid in proper fitting of system. Discussed proper use of stoma paste as a filler, not an adhesive. Thin bead of paste should be applied to inner edge of flange to help fill in crease to abdomen and prevent leakage of stool. Recommend discontinuing flat pouching system. Start soft convex, 1 piece pouching system to help promote adherence and prevent leakage. Recommend changing system twice weekly. Patient voices concerns regarding completing this care independently, stating he is not sure he will be able to apply related to his arthritis and ongoing weakness. Recommend he start to assist with care, this is a complicated process and may take some time until he is comfortable completing independently. Suggested using a mirror to better allow him to see his abdomen.      -Peristomal moisture associated dermatitis- Discussed importance of changing pouch promptly with any leakage of stool. Do not attempt to patch or reinforce system as this will lead to further skin damage. Start use of stoma powder to irritated skin. Do not use alcohol on peristomal skin.    -Pouch needs to be emptied about 1 hour after meals, before bedtime, and as needed when 1/3 full.    -Clean your stoma and surrounding skin with plain tap water. Soap, body wash, baby wipes, or other bathroom wipes are not necessary and can interfere with pouch adherence. Once clean, the skin surrounding the stoma should be completely dry prior to application of pouching system.      -Orders for pouching supplies sent with patient at discharge from clinic just to the inner flange. 8. Pull skin above stoma up to make stoma rounded while applying pouch. Center the flange around your stoma. Smooth the adhesive collar to your abdomen. Hold in place with your hand for about 5 minutes to apply warmth to get good seal.  9. Apply belt. 10. Empty when 1/3 full.     Follow up: Thursday August 5th at 1:00 pm        Electronically signed by SYLVESTER Lopez CNP on 7/7/2021 at 3:46 PM

## 2021-07-07 NOTE — PLAN OF CARE
Problem: Skin Integrity:  Goal: Risk for impaired skin integrity will decrease  Description: Risk for impaired skin integrity will decrease  Outcome: Ongoing   Patient presents to clinic for evaluation of ileostomy. Instructed patient and nursing home to not use alcohol or baby wipes around stoma or skin around stoma an check pouch every few hours, pouch must be emptied when 1/3 full to prevent pressure of pulling on appliance. Follow up: Thursday August 5th at 1:00 pm.    Supplies   Size   Order #     Monserrat Soft Convex Drainable Pouch with Filter (1 piece)  1 1/2 inch (38 mm)  Pre-cut [id number]   Adapt Paste  [id number]   [facility] Skin Barrier Wipes  [id number]   [address] [facility]     Care plan reviewed with patient and son in law. Patient and son in law verbalize understanding of the plan of care and contribute to goal setting.

## 2021-07-07 NOTE — PROGRESS NOTES
Clinical Level of Care Assessment    Outpatient Ostomy Care      NAME:  Sravanthi Boyer  YOB: 1940  MEDICAL RECORD NUMBER:  [id number]   DATE:  7/7/2021      Patient Juan M Amaral Assessment- Document in Flowsheet I&O   Points   Review of chart []   0   Assess Complete Ostomy tab in Navigator for assessment of; stoma status, peristomal skin, presence of hernia/stool consistency/diet/related medications   Simple adjustments to pouch size/pouch system, new stoma pattern, accessory addition/deletion. []   1   Assess Complete Ostomy tab in Navigator for assessment of; stoma status, peristomal skin, presence of hernia/stool consistency/diet/related medications   Moderate adjustments to pouch size/pouch system, new stoma pattern, accessory addition/deletion. Observe patient/caregiver with hands-on care. 1-2 adjustments to pouch size/system/skin care/accessory addition or deletion. []   2   Assess Complete Ostomy tab in Navigator for assessment of; stoma status, peristomal skin, presence of hernia/stool consistency/diet/related medications   Complex adjustments to pouch size/pouch system, new stoma pattern, accessory addition/deletion. 3 or more complex adjustments to pouch size/system/skin care/accessory addition or deletion. Observe patient/caregiver with hands-on care. Assess patient/patient abdomen for optimal pre-marked stoma site. Assess patient abdomen for type of hernia belt/accessory needed. [x]   3         Ambulation Status Documented in CN Clinical Note  Status Definition Points   Independent Independently able to ambulate. Fully able (without any assistance) to get on/off exam table/chair. []   0   Minimal Physical Assistance Requires physical assistance of one person to ambulate and/or position patient to be examined. Includes necessary physical assistance to position lower extremities on/off stool.    []   1   Moderate Physical Assistance Requires at least one staff member to physically assist patient in ambulating into treatment room, and/or on off chair/bed. Requires assistance to bathroom. [x]   2   Full Assistance Requires assistance of at least two staff members to transfer patient into treatment room and/or on/off bed/chair. \"Total Transfer\". Unable to use bathroom requires bedside commode and/or bedpan []   3       Teaching Effort Documented in [facility] Clinical Note  Effort Definition Points   No Teaching  []   0   General Initial/Simple lesson:  Assess readiness to learn, assess patient learning style to determine educational flow/special needs for learning. Teaching related to 1-3 topics  Documentation in [facility] completed. []   1   Intermediate Assess readiness to learn, assess patient learning style to determine educational flow/special needs for learning. Teaching related to 3-4 topics. Hernia belt application and care considerations  Documentation in [facility] completed. [x]   2   Complex Assess readiness to learn, assess patient learning style to determine educational flow/special needs for learning. Teaching of greater than 5 additional topics   Pre-operative ostomy education with review of written resources for patient/family/caregiver as needed. Demonstration/return demonstration of ostomy irrigation  Documentation in [facility] completed. []   3     Patient Assessment and Planning in [facility] Clinical Note   Planning Definition Points   Simple Simple pouch change procedure completed and reviewed with patient/family/caregiver   Documentation in [facility] completed. []   1   Intermediate Moderate level of follow-up needs:   Pouch change/discharge procedure revised and reviewed with patient/caregiver. Communications with outside resources; i.e. Telephone calls to Surgeon/ PCP, family/caregiver, home health, ECF. Documentation in [facility] completed.      []   2   Complex Complex level of instructions/changes:   Family/Caregiver learning/demonstration/return demonstration visit. Pouching/discharge procedure revised/reviewed with patient/family/caregiver. Contact with outside resources; i.e. communication with Surgeon/ PCP, home health, ECF. Contact/referral to ostomy appliance supplier for new or additional products. Review when to call [facility] or schedule follow-up visit. Referral to Emergency Department   Documentation in [facility] completed. [x]   3       Is this the Patient's First Visit with [facility] @ [facility]? Yes    Is this Patient Established to this [facility] within the last 3 years?    Yes             Clinical Level of Care      Points  0-3  Level 1 []     Points  4-6  Level 2 []     Points  7-8  Level 3 []     Points  9-10  Level 4 [x]     Points  11-12  Level 5 []       Electronically signed by Destiney Gunn RN on 7/7/2021 at 3:38 PM

## 2021-07-22 ENCOUNTER — OUTSIDE SERVICES (OUTPATIENT)
Dept: FAMILY MEDICINE CLINIC | Age: 81
End: 2021-07-22
Payer: MEDICARE

## 2021-07-22 VITALS
DIASTOLIC BLOOD PRESSURE: 70 MMHG | HEART RATE: 85 BPM | OXYGEN SATURATION: 95 % | TEMPERATURE: 97.3 F | WEIGHT: 252 LBS | RESPIRATION RATE: 18 BRPM | BODY MASS INDEX: 36.16 KG/M2 | SYSTOLIC BLOOD PRESSURE: 133 MMHG

## 2021-07-22 DIAGNOSIS — I10 ESSENTIAL HYPERTENSION: ICD-10-CM

## 2021-07-22 DIAGNOSIS — R91.1 PULMONARY NODULE: ICD-10-CM

## 2021-07-22 DIAGNOSIS — F33.2 SEVERE EPISODE OF RECURRENT MAJOR DEPRESSIVE DISORDER, WITHOUT PSYCHOTIC FEATURES (HCC): ICD-10-CM

## 2021-07-22 DIAGNOSIS — Z90.49 STATUS POST TOTAL COLECTOMY: Primary | ICD-10-CM

## 2021-07-22 DIAGNOSIS — I25.10 ASHD (ARTERIOSCLEROTIC HEART DISEASE): ICD-10-CM

## 2021-07-22 DIAGNOSIS — M19.90 ARTHRITIS: ICD-10-CM

## 2021-07-22 DIAGNOSIS — F33.1 MODERATE EPISODE OF RECURRENT MAJOR DEPRESSIVE DISORDER (HCC): ICD-10-CM

## 2021-07-22 DIAGNOSIS — E03.9 ACQUIRED HYPOTHYROIDISM: ICD-10-CM

## 2021-07-22 DIAGNOSIS — E11.9 TYPE 2 DIABETES MELLITUS WITHOUT COMPLICATION, WITHOUT LONG-TERM CURRENT USE OF INSULIN (HCC): ICD-10-CM

## 2021-07-22 PROCEDURE — 99490 CHRNC CARE MGMT STAFF 1ST 20: CPT | Performed by: NURSE PRACTITIONER

## 2021-07-22 PROCEDURE — 99309 SBSQ NF CARE MODERATE MDM 30: CPT | Performed by: NURSE PRACTITIONER

## 2021-07-22 ASSESSMENT — ENCOUNTER SYMPTOMS
EYE REDNESS: 0
VOMITING: 0
NAUSEA: 0
RHINORRHEA: 0
DIARRHEA: 0
SORE THROAT: 0
WHEEZING: 0
CONSTIPATION: 0
COUGH: 0
SHORTNESS OF BREATH: 0

## 2021-07-22 NOTE — PROGRESS NOTES
Ata Costa is a [de-identified] y.o. male who presents today for his medical conditions/complaints as noted below. Chief Complaint   Patient presents with    1 Month Follow-Up     Follow up on chronic health conditions           HPI:     Seen and examined today for follow-up on his chronic health conditions. He is seen in his room and is sitting up in his bedside chair. Patient has been seen by ostomy nurse recently and has had much more success with ileostomy since this is happened. Patient states that that initial appliance worked for 5 days and subsequent wounds have worked for 5 and most recent one has been on for 3 days. We will continue to monitor. Patient has also been seen by palliative care nurse and is pleased with this. Has some pain in his right shoulder which is chronic and wants to know whether or not he is currently on any pain medication. Patient is currently on Tylenol 1000 mg twice daily and also has an as needed dose. We will also continue to monitor if he needs increased pain medication. Patient has chronic health conditions of hypertension, ASHD, arthritis, depression, hypothyroidism, diabetes. Patient is followed by psychiatry for his depression history of suicide. Mood is stable and has tolerated most recent gradual dose reduction of Seroquel. Past Medical History:   Diagnosis Date    Arthritis     Chronic kidney disease     Depression     Diabetes mellitus ([address]     Hyperlipidemia     Hypertension     Thyroid disease       Past Surgical History:   Procedure Laterality Date    COLOSTOMY N/A 2/20/2021    TOTAL COLECTOMY AND ILLEOSTOMY PLACEMENT performed by Kelley Chau MD at [address]        No family history on file.     Social History     Tobacco Use    Smoking status: Never Smoker    Smokeless tobacco: Never Used   Substance Use Topics    Alcohol use: Not Currently      Allergies   Allergen Reactions    Triamcinolone Rash     Patient states his arm felt like it was hot, like it was burning. Health Maintenance   Topic Date Due    Lipid screen  Never done    DTaP/Tdap/Td vaccine (1 - Tdap) Never done    Shingles Vaccine (3 of 3) 10/26/2019    Annual Wellness Visit (AWV)  Never done    TSH testing  06/28/2021    Flu vaccine (1) 09/01/2021    Potassium monitoring  03/05/2022    Creatinine monitoring  03/05/2022    Pneumococcal 65+ years Vaccine  Completed    COVID-19 Vaccine  Completed    Hepatitis A vaccine  Aged Out    Hib vaccine  Aged Out    Meningococcal (ACWY) vaccine  Aged Out       Subjective:      Review of Systems   Constitutional: Negative for chills, fatigue and fever. HENT: Negative for congestion, rhinorrhea and sore throat. Eyes: Negative for redness and visual disturbance. Respiratory: Negative for cough, shortness of breath and wheezing. Cardiovascular: Negative for chest pain and leg swelling. Gastrointestinal: Negative for constipation, diarrhea, nausea and vomiting. Endocrine: Negative for polydipsia and polyuria. Genitourinary: Negative for dysuria, frequency and hematuria. Musculoskeletal: Positive for arthralgias. Negative for gait problem and myalgias. Skin: Negative for rash and wound. Allergic/Immunologic: Negative for environmental allergies and immunocompromised state. Neurological: Negative for dizziness, light-headedness and headaches. Hematological: Does not bruise/bleed easily. Psychiatric/Behavioral: Positive for confusion (mild). Negative for dysphoric mood and sleep disturbance. The patient is not nervous/anxious. Objective:     Physical Exam  Vitals and nursing note reviewed. Constitutional:       General: He is not in acute distress. Appearance: He is well-developed. HENT:      Head: Normocephalic and atraumatic. Right Ear: External ear normal.      Left Ear: External ear normal.      Nose: Nose normal.   Eyes:      General: No scleral icterus. Right eye: No discharge. Left eye: No discharge. Conjunctiva/sclera: Conjunctivae normal.   Neck:      Thyroid: No thyromegaly. Vascular: No JVD. Cardiovascular:      Rate and Rhythm: Normal rate and regular rhythm. Heart sounds: Normal heart sounds. Pulmonary:      Effort: Pulmonary effort is normal. No respiratory distress. Breath sounds: Normal breath sounds. No stridor. No wheezing or rales. Abdominal:      General: The ostomy site is clean. Bowel sounds are normal. There is no distension. Palpations: Abdomen is soft. Tenderness: There is no abdominal tenderness. Musculoskeletal:         General: No deformity. Normal range of motion. Right shoulder: No tenderness or bony tenderness. Left shoulder: No tenderness or bony tenderness. Cervical back: Neck supple. No tenderness or bony tenderness. Thoracic back: No spasms, tenderness or bony tenderness. Lumbar back: No tenderness or bony tenderness. Lymphadenopathy:      Cervical: No cervical adenopathy. Upper Body:      Right upper body: No supraclavicular adenopathy. Left upper body: No supraclavicular adenopathy. Skin:     General: Skin is warm and dry. Findings: No erythema or rash. Neurological:      Mental Status: He is alert and oriented to person, place, and time. Motor: No atrophy, abnormal muscle tone or seizure activity. Psychiatric:         Speech: Speech normal.         Behavior: Behavior normal.       /70   Pulse 85   Temp 97.3 °F (36.3 °C)   Resp 18   Wt 252 lb (114.3 kg)   SpO2 95%   BMI 36.16 kg/m²     Assessment/Plan      1. Status post total colectomy -continue ostomy care. Follow-up with ostomy wound nurse. 2. Moderate episode of recurrent major depressive disorder (HCC) -mood is stable. Continue BuSpar, bupropion, Abilify, sertraline, and Remeron.    3. Essential hypertension -chronic and continue current dose of metoprolol and hydralazine. Blood pressure stable. 4. ASHD (arteriosclerotic heart disease) -denies chest pain but occasionally has shortness of breath. Continue to monitor. Continue statin. 5. Acquired hypothyroidism -chronic and continue current dose of levothyroxine. 6. Type 2 diabetes mellitus without complication, without long-term current use of insulin (HCC) -blood sugars controlled without medications. Continue to monitor labs. 7. Severe episode of recurrent major depressive disorder, without psychotic features ([address] -mood stable on current medications. Continue to follow with Dr. Soo Persaud   8. Pulmonary nodule -follow-up with pulmonology if family desires. CAT scan as directed. 9. Arthritis -continue to follow with palliative care. Continue routine Tylenol and as needed. Resident has MDD, HTN, ASHD, DM and it is expected to last 15 or more months. These chronic conditions place resident at a significant risk of death, acute exacerbation, or functional decline. The patient's comprehensive plan was monitored today. I spent 20 minutes reviewing plan. Patient seen and examined. History partially obtained by chart review and nursing notes. I have reviewed patient's past medical, surgical, social, and family history and have made updates where appropriate.   See facility EMR for updated medication list.       Electronically signed by SYLVESTER Dhillon CNP on 7/22/2021 at 4:29 PM

## 2021-08-05 ENCOUNTER — HOSPITAL ENCOUNTER (OUTPATIENT)
Dept: WOUND CARE | Age: 81
Discharge: HOME | End: 2021-08-05
Payer: MEDICARE

## 2021-08-05 VITALS
TEMPERATURE: 98 F | DIASTOLIC BLOOD PRESSURE: 56 MMHG | RESPIRATION RATE: 18 BRPM | OXYGEN SATURATION: 99 % | SYSTOLIC BLOOD PRESSURE: 116 MMHG | HEART RATE: 65 BPM

## 2021-08-05 DIAGNOSIS — Z93.2 ILEOSTOMY IN PLACE (HCC): Primary | ICD-10-CM

## 2021-08-05 DIAGNOSIS — L30.8 PERISTOMAL DERMATITIS ASSOCIATED WITH MOISTURE: ICD-10-CM

## 2021-08-05 PROCEDURE — 99213 OFFICE O/P EST LOW 20 MIN: CPT

## 2021-08-05 PROCEDURE — 99214 OFFICE O/P EST MOD 30 MIN: CPT | Performed by: NURSE PRACTITIONER

## 2021-08-05 NOTE — PROGRESS NOTES
Clinical Level of Care Assessment    Outpatient Ostomy Care      NAME:  Kwame Benz  YOB: 1940  MEDICAL RECORD NUMBER:  [id number]   DATE:  8/5/2021      Patient Kevin Vigil Assessment- Document in Flowsheet I&O   Points   Review of chart []   0   Assess Complete Ostomy tab in Navigator for assessment of; stoma status, peristomal skin, presence of hernia/stool consistency/diet/related medications   Simple adjustments to pouch size/pouch system, new stoma pattern, accessory addition/deletion. []   1   Assess Complete Ostomy tab in Navigator for assessment of; stoma status, peristomal skin, presence of hernia/stool consistency/diet/related medications   Moderate adjustments to pouch size/pouch system, new stoma pattern, accessory addition/deletion. Observe patient/caregiver with hands-on care. 1-2 adjustments to pouch size/system/skin care/accessory addition or deletion. [x]   2   Assess Complete Ostomy tab in Navigator for assessment of; stoma status, peristomal skin, presence of hernia/stool consistency/diet/related medications   Complex adjustments to pouch size/pouch system, new stoma pattern, accessory addition/deletion. 3 or more complex adjustments to pouch size/system/skin care/accessory addition or deletion. Observe patient/caregiver with hands-on care. Assess patient/patient abdomen for optimal pre-marked stoma site. Assess patient abdomen for type of hernia belt/accessory needed. []   3         Ambulation Status Documented in CN Clinical Note  Status Definition Points   Independent Independently able to ambulate. Fully able (without any assistance) to get on/off exam table/chair. []   0   Minimal Physical Assistance Requires physical assistance of one person to ambulate and/or position patient to be examined. Includes necessary physical assistance to position lower extremities on/off stool.    [x]   1   Moderate Physical Assistance Requires at least one staff member to physically assist patient in ambulating into treatment room, and/or on off chair/bed. Requires assistance to bathroom. []   2   Full Assistance Requires assistance of at least two staff members to transfer patient into treatment room and/or on/off bed/chair. \"Total Transfer\". Unable to use bathroom requires bedside commode and/or bedpan []   3       Teaching Effort Documented in [facility] Clinical Note  Effort Definition Points   No Teaching  []   0   General Initial/Simple lesson:  Assess readiness to learn, assess patient learning style to determine educational flow/special needs for learning. Teaching related to 1-3 topics  Documentation in [facility] completed. []   1   Intermediate Assess readiness to learn, assess patient learning style to determine educational flow/special needs for learning. Teaching related to 3-4 topics. Hernia belt application and care considerations  Documentation in [facility] completed. [x]   2   Complex Assess readiness to learn, assess patient learning style to determine educational flow/special needs for learning. Teaching of greater than 5 additional topics   Pre-operative ostomy education with review of written resources for patient/family/caregiver as needed. Demonstration/return demonstration of ostomy irrigation  Documentation in [facility] completed. []   3     Patient Assessment and Planning in [facility] Clinical Note   Planning Definition Points   Simple Simple pouch change procedure completed and reviewed with patient/family/caregiver   Documentation in [facility] completed. []   1   Intermediate Moderate level of follow-up needs:   Pouch change/discharge procedure revised and reviewed with patient/caregiver. Communications with outside resources; i.e. Telephone calls to Surgeon/ PCP, family/caregiver, home health, ECF. Documentation in [facility] completed.      [x]   2   Complex Complex level of instructions/changes:   Family/Caregiver learning/demonstration/return demonstration visit. Pouching/discharge procedure revised/reviewed with patient/family/caregiver. Contact with outside resources; i.e. communication with Surgeon/ PCP, home health, ECF. Contact/referral to ostomy appliance supplier for new or additional products. Review when to call [facility] or schedule follow-up visit. Referral to Emergency Department   Documentation in [facility] completed. []   3       Is this the Patient's First Visit with [facility] @ [facility]? No    Is this Patient Established to this [facility] within the last 3 years?    Yes             Clinical Level of Care      Points  0-3  Level 1 []     Points  4-6  Level 2 []     Points  7-8  Level 3 [x]     Points  9-10  Level 4 []     Points  11-12  Level 5 []       Electronically signed by Yoly Gilbert RN on 8/5/2021 at 2:20 PM

## 2021-08-05 NOTE — PLAN OF CARE
Problem: Skin Integrity:  Goal: Risk for impaired skin integrity will decrease  Description: Risk for impaired skin integrity will decrease  Outcome: Ongoing   Pt. Seen today for ostomy care see AVS for new orders. Follow up as needed. Care plan reviewed with patient and family. Patient and family verbalize understanding of the plan of care and contribute to goal setting.

## 2021-08-17 ENCOUNTER — OUTSIDE SERVICES (OUTPATIENT)
Dept: FAMILY MEDICINE CLINIC | Age: 81
End: 2021-08-17
Payer: MEDICARE

## 2021-08-17 VITALS
TEMPERATURE: 97.3 F | BODY MASS INDEX: 37.62 KG/M2 | OXYGEN SATURATION: 95 % | WEIGHT: 262.2 LBS | SYSTOLIC BLOOD PRESSURE: 129 MMHG | DIASTOLIC BLOOD PRESSURE: 52 MMHG | HEART RATE: 85 BPM | RESPIRATION RATE: 18 BRPM

## 2021-08-17 DIAGNOSIS — I25.10 ASHD (ARTERIOSCLEROTIC HEART DISEASE): ICD-10-CM

## 2021-08-17 DIAGNOSIS — R91.1 PULMONARY NODULE: ICD-10-CM

## 2021-08-17 DIAGNOSIS — F33.1 MODERATE EPISODE OF RECURRENT MAJOR DEPRESSIVE DISORDER (HCC): Primary | ICD-10-CM

## 2021-08-17 DIAGNOSIS — E11.9 TYPE 2 DIABETES MELLITUS WITHOUT COMPLICATION, WITHOUT LONG-TERM CURRENT USE OF INSULIN (HCC): ICD-10-CM

## 2021-08-17 DIAGNOSIS — I10 ESSENTIAL HYPERTENSION: ICD-10-CM

## 2021-08-17 DIAGNOSIS — Z90.49 STATUS POST TOTAL COLECTOMY: ICD-10-CM

## 2021-08-17 DIAGNOSIS — E03.9 ACQUIRED HYPOTHYROIDISM: ICD-10-CM

## 2021-08-17 PROCEDURE — 99309 SBSQ NF CARE MODERATE MDM 30: CPT | Performed by: FAMILY MEDICINE

## 2021-08-17 PROCEDURE — 99490 CHRNC CARE MGMT STAFF 1ST 20: CPT | Performed by: FAMILY MEDICINE

## 2021-08-17 ASSESSMENT — ENCOUNTER SYMPTOMS
SORE THROAT: 0
COUGH: 0
NAUSEA: 0
RHINORRHEA: 0
DIARRHEA: 0
VOMITING: 0
SHORTNESS OF BREATH: 0
WHEEZING: 0
CONSTIPATION: 0
EYE REDNESS: 0

## 2021-08-17 NOTE — PROGRESS NOTES
Rosa Hernandez is a [de-identified] y.o. male that presents for Other (Monthly follow-up of chronic conditions)      This visit was performed via synchronous telecommunication system. Patient is located in the SNF  I am located in my office    HPI:  Patient is seen today via telehealth evaluation for monthly follow-up of chronic conditions. He is resting in his bed and states that he is feeling well today. He has been being seen by ostomy nurse recently and has had improved duration of his ostomy. His ostomy will now last 3 to 5 days where it was previously often changed multiple times per day. He has been seen by palliative care as well and is pleased with this. Today he states he is feeling well. Denies any chest pain or shortness of breath. Appetite has been good and his bowels are working normally. Denies any issues with sadness or nervousness. His chronic medical conditions include hypertension, coronary artery disease, arthritis, depression, hypothyroidism, diabetes. He is also followed by psychiatry for his mood which has been stable. Otherwise he is feeling well and no complaints per patient or staff. I have reviewed the patient's past medical history, past surgical history, allergies,medications, social and family history and I have made updates where appropriate. Past Medical History:   Diagnosis Date    Arthritis     Chronic kidney disease     Depression     Diabetes mellitus ([facility] [address]     Hyperlipidemia     Hypertension     Thyroid disease        Past Surgical History:   Procedure Laterality Date    COLOSTOMY N/A 2/20/2021    TOTAL COLECTOMY AND ILLEOSTOMY PLACEMENT performed by Jamari Best MD [facility] [address]        Social History     Tobacco Use    Smoking status: Never Smoker    Smokeless tobacco: Never Used   Vaping Use    Vaping Use: Never used   Substance Use Topics    Alcohol use: Not Currently    Drug use: Never       No family history on file.       Review of Systems   Constitutional: Negative for chills, fatigue and fever. HENT: Negative for congestion, rhinorrhea and sore throat. Eyes: Negative for redness and visual disturbance. Respiratory: Negative for cough, shortness of breath and wheezing. Cardiovascular: Negative for chest pain and leg swelling. Gastrointestinal: Negative for constipation, diarrhea, nausea and vomiting. Has colostomy   Endocrine: Negative for polydipsia and polyuria. Genitourinary: Negative for dysuria, frequency and hematuria. Musculoskeletal: Positive for arthralgias (chronic). Negative for gait problem and myalgias. Skin: Negative for rash and wound. Allergic/Immunologic: Negative for environmental allergies and immunocompromised state. Neurological: Negative for dizziness, light-headedness and headaches. Hematological: Does not bruise/bleed easily. Psychiatric/Behavioral: Negative for dysphoric mood and sleep disturbance. The patient is not nervous/anxious. PHYSICAL EXAM:  There were no vitals filed for this visit. Physical Exam  Vitals and nursing note reviewed. Exam conducted with a chaperone present. Constitutional:       General: He is not in acute distress. Appearance: Normal appearance. HENT:      Head: Normocephalic and atraumatic. Eyes:      General: No scleral icterus. Pulmonary:      Effort: Pulmonary effort is normal. No respiratory distress. Skin:     Coloration: Skin is not jaundiced. Neurological:      Mental Status: He is alert and oriented to person, place, and time. Mental status is at baseline. Psychiatric:         Mood and Affect: Mood normal.         Behavior: Behavior normal.         Thought Content: Thought content normal.         Judgment: Judgment normal.          ASSESSMENT & PLAN  Rachel Guardado was seen today for other. Diagnoses and all orders for this visit:      Essential hypertension  -Chronic and controlled with metoprolol and hydralazine.   Continue to monitor  ASHD (arteriosclerotic heart disease)  -Currently without symptoms. Continue statin and blood pressure control  Moderate episode of recurrent major depressive disorder (HCC)  -Mood overall stable with good appetite and sleeping well. Continue BuSpar, Wellbutrin, Abilify, sertraline, Remeron. Has followed with psych for mood  Acquired hypothyroidism  -Chronic and controlled. Continue current dose of levothyroxine and monitor labs annually  Status post total colectomy  -Continue ostomy care per ostomy nurse  Pulmonary nodule  -We will monitor with repeat CT lung 6 months from previous. Monitor for symptoms  Type 2 diabetes mellitus without complication, without long-term current use of insulin (HCC)  -Chronic and currently controlled without medications. Continue to monitor      No follow-ups on file. Resident has MDD, HTN, ASHD, hypothyroidism, colostomy in place, DM, pulmonary nodule and it is expected to last 12 or more months. These chronic conditions place resident at a significant risk of death, acute exacerbation, decline in function or functional decline. The patient's comprehensive plan was monitored today. I spent 20 minutes reviewing plan. I have seen and examined the patient virtually and chaperone was present. The history was obtained through the patient, past medical records, and the staff. The patient's chart was updated as appropriate. Please see facility EMR for complete medication reconciliation. Pursuant to the emergency declaration under the [facility] [address] waiver authority and the [employer] Act, this Virtual  Visit was conducted, with patient's consent, to reduce the patient's risk of exposure to COVID-19 and provide continuity of care for an established patient.     Services were provided through a video synchronous discussion virtually to substitute for in-person SNF visit.     Electronically signed by Domo Martinez MD on 8/17/2021 at 10:15 AM

## 2021-09-14 ENCOUNTER — OUTSIDE SERVICES (OUTPATIENT)
Dept: FAMILY MEDICINE CLINIC | Age: 81
End: 2021-09-14
Payer: MEDICARE

## 2021-09-14 VITALS
DIASTOLIC BLOOD PRESSURE: 64 MMHG | WEIGHT: 264.6 LBS | TEMPERATURE: 97.5 F | SYSTOLIC BLOOD PRESSURE: 138 MMHG | BODY MASS INDEX: 37.97 KG/M2 | OXYGEN SATURATION: 97 % | RESPIRATION RATE: 18 BRPM | HEART RATE: 53 BPM

## 2021-09-14 DIAGNOSIS — I25.10 ASHD (ARTERIOSCLEROTIC HEART DISEASE): ICD-10-CM

## 2021-09-14 DIAGNOSIS — R91.1 PULMONARY NODULE: ICD-10-CM

## 2021-09-14 DIAGNOSIS — F33.1 MODERATE EPISODE OF RECURRENT MAJOR DEPRESSIVE DISORDER (HCC): Primary | ICD-10-CM

## 2021-09-14 DIAGNOSIS — E11.9 TYPE 2 DIABETES MELLITUS WITHOUT COMPLICATION, WITHOUT LONG-TERM CURRENT USE OF INSULIN (HCC): ICD-10-CM

## 2021-09-14 DIAGNOSIS — I10 ESSENTIAL HYPERTENSION: ICD-10-CM

## 2021-09-14 DIAGNOSIS — M19.90 ARTHRITIS: ICD-10-CM

## 2021-09-14 DIAGNOSIS — E03.9 ACQUIRED HYPOTHYROIDISM: ICD-10-CM

## 2021-09-14 DIAGNOSIS — Z90.49 STATUS POST TOTAL COLECTOMY: ICD-10-CM

## 2021-09-14 PROCEDURE — 99309 SBSQ NF CARE MODERATE MDM 30: CPT | Performed by: NURSE PRACTITIONER

## 2021-09-14 PROCEDURE — 99490 CHRNC CARE MGMT STAFF 1ST 20: CPT | Performed by: NURSE PRACTITIONER

## 2021-09-14 ASSESSMENT — ENCOUNTER SYMPTOMS
DIARRHEA: 0
WHEEZING: 0
SORE THROAT: 0
RHINORRHEA: 0
VOMITING: 0
SHORTNESS OF BREATH: 0
EYE REDNESS: 0
CONSTIPATION: 0
NAUSEA: 0
COUGH: 0

## 2021-09-14 NOTE — PROGRESS NOTES
Kaity Piña is a [de-identified] y.o. male who presents today for his medical conditions/complaints as noted below. Chief Complaint   Patient presents with    1 Month Follow-Up     Follow up on chronic health conditions           HPI:     Seen and examined today for follow-up on his chronic health conditions. He is seen in his room and is resting in bed. He had a CT of the chest on 9/10 and shows stable left lower mas measuring 3.0 cm but increase in size  Of the right hilar lymph nodes when compared to prior PET-CT measuring up to 1.7 cm and previously 1.3 cm. We will follow up with pulmonology if family and patient desire. Patient has also been seen by palliative care nurse and is pleased with this. We will update them with findings. Patient is currently on Tylenol 1000 mg twice daily and also has an as needed dose. We will also continue to monitor if he needs increased pain medication. Patient has chronic health conditions of hypertension, ASHD, arthritis, depression, hypothyroidism, diabetes. Patient is followed by psychiatry for his depression history of suicide. Patient does have a mild rash on bilateral shoulders and will switch to hydrocortisone. Past Medical History:   Diagnosis Date    Arthritis     Chronic kidney disease     Depression     Diabetes mellitus ([facility] [address]     Hyperlipidemia     Hypertension     Thyroid disease       Past Surgical History:   Procedure Laterality Date    COLOSTOMY N/A 2/20/2021    TOTAL COLECTOMY AND ILLEOSTOMY PLACEMENT performed by Tio De Dios MD at / [address]        History reviewed. No pertinent family history. Social History     Tobacco Use    Smoking status: Never Smoker    Smokeless tobacco: Never Used   Substance Use Topics    Alcohol use: Not Currently      Allergies   Allergen Reactions    Triamcinolone Rash     Patient states his arm felt like it was hot, like it was burning.          Health Maintenance   Topic Date Due    Lipid screen  Never done    DTaP/Tdap/Td vaccine (1 - Tdap) Never done    Shingles Vaccine (3 of 3) 10/26/2019    Annual Wellness Visit (AWV)  Never done    TSH testing  06/28/2021    Flu vaccine (1) 09/01/2021    Potassium monitoring  03/05/2022    Creatinine monitoring  03/05/2022    Pneumococcal 65+ years Vaccine  Completed    COVID-19 Vaccine  Completed    Hepatitis A vaccine  Aged Out    Hib vaccine  Aged Out    Meningococcal (ACWY) vaccine  Aged Out       Subjective:      Review of Systems   Constitutional: Negative for chills, fatigue and fever. HENT: Negative for congestion, rhinorrhea and sore throat. Eyes: Negative for redness and visual disturbance. Respiratory: Negative for cough, shortness of breath and wheezing. Cardiovascular: Negative for chest pain and leg swelling. Gastrointestinal: Negative for constipation, diarrhea, nausea and vomiting. Endocrine: Negative for polydipsia and polyuria. Genitourinary: Negative for dysuria, frequency and hematuria. Musculoskeletal: Positive for arthralgias. Negative for gait problem and myalgias. Skin: Positive for rash. Negative for wound. Allergic/Immunologic: Negative for environmental allergies and immunocompromised state. Neurological: Negative for dizziness, light-headedness and headaches. Hematological: Does not bruise/bleed easily. Psychiatric/Behavioral: Negative for confusion (mild), dysphoric mood and sleep disturbance. The patient is not nervous/anxious. Objective:     Physical Exam  Vitals and nursing note reviewed. Constitutional:       General: He is not in acute distress. Appearance: He is well-developed. HENT:      Head: Normocephalic and atraumatic. Right Ear: External ear normal.      Left Ear: External ear normal.      Nose: Nose normal.   Eyes:      General: No scleral icterus. Right eye: No discharge. Left eye: No discharge.       Conjunctiva/sclera: Conjunctivae normal. Neck:      Thyroid: No thyromegaly. Vascular: No JVD. Cardiovascular:      Rate and Rhythm: Normal rate and regular rhythm. Heart sounds: Normal heart sounds. Pulmonary:      Effort: Pulmonary effort is normal. No respiratory distress. Breath sounds: Normal breath sounds. No stridor. No wheezing or rales. Abdominal:      General: The ostomy site is clean. Bowel sounds are normal. There is no distension. Palpations: Abdomen is soft. Tenderness: There is no abdominal tenderness. Musculoskeletal:         General: No deformity. Normal range of motion. Right shoulder: No tenderness or bony tenderness. Left shoulder: No tenderness or bony tenderness. Cervical back: Neck supple. No tenderness or bony tenderness. Thoracic back: No spasms, tenderness or bony tenderness. Lumbar back: No tenderness or bony tenderness. Lymphadenopathy:      Cervical: No cervical adenopathy. Upper Body:      Right upper body: No supraclavicular adenopathy. Left upper body: No supraclavicular adenopathy. Skin:     General: Skin is warm and dry. Findings: Rash present. No erythema. Neurological:      Mental Status: He is alert and oriented to person, place, and time. Motor: No atrophy, abnormal muscle tone or seizure activity. Psychiatric:         Speech: Speech normal.         Behavior: Behavior normal.       /64   Pulse 53   Temp 97.5 °F (36.4 °C)   Resp 18   Wt 264 lb 9.6 oz (120 kg)   SpO2 97%   BMI 37.97 kg/m²     Assessment/Plan      1. Status post total colectomy -continue ostomy care. Follow-up with ostomy wound nurse. 2. Moderate episode of recurrent major depressive disorder (HCC) -mood is stable. Continue BuSpar, bupropion, Abilify, sertraline, and Remeron. 3. Essential hypertension -chronic and continue current dose of metoprolol and hydralazine. Blood pressure stable.    4. ASHD (arteriosclerotic heart disease) -denies chest pain but occasionally has shortness of breath. Continue to monitor. Continue statin. 5. Acquired hypothyroidism -chronic and continue current dose of levothyroxine. 6. Type 2 diabetes mellitus without complication, without long-term current use of insulin (HCC) -blood sugars controlled without medications. Continue to monitor labs. 7. Severe episode of recurrent major depressive disorder, without psychotic features ([facility] [address] -mood stable on current medications. Continue to follow with Dr. Bg Razo   8. Pulmonary nodule -follow-up with pulmonology if family desires. CAT scan reviewed. 9. Arthritis -continue to follow with palliative care. Continue routine Tylenol and as needed. Resident has MDD, HTN, ASHD, DM and it is expected to last 15 or more months. These chronic conditions place resident at a significant risk of death, acute exacerbation, or functional decline. The patient's comprehensive plan was monitored today. I spent 20 minutes reviewing plan. Patient seen and examined. History partially obtained by chart review and nursing notes. I have reviewed patient's past medical, surgical, social, and family history and have made updates where appropriate.   See facility EMR for updated medication list.       Electronically signed by SYLVESTER Trevino CNP on 9/14/2021 at 10:30 AM

## 2021-10-13 ENCOUNTER — OUTSIDE SERVICES (OUTPATIENT)
Dept: FAMILY MEDICINE CLINIC | Age: 81
End: 2021-10-13
Payer: MEDICARE

## 2021-10-13 VITALS
DIASTOLIC BLOOD PRESSURE: 77 MMHG | RESPIRATION RATE: 18 BRPM | OXYGEN SATURATION: 95 % | TEMPERATURE: 97.2 F | SYSTOLIC BLOOD PRESSURE: 166 MMHG | HEART RATE: 70 BPM

## 2021-10-13 DIAGNOSIS — F33.1 MODERATE EPISODE OF RECURRENT MAJOR DEPRESSIVE DISORDER (HCC): Primary | ICD-10-CM

## 2021-10-13 DIAGNOSIS — M19.90 ARTHRITIS: ICD-10-CM

## 2021-10-13 DIAGNOSIS — E11.9 TYPE 2 DIABETES MELLITUS WITHOUT COMPLICATION, WITHOUT LONG-TERM CURRENT USE OF INSULIN (HCC): ICD-10-CM

## 2021-10-13 DIAGNOSIS — E03.9 ACQUIRED HYPOTHYROIDISM: ICD-10-CM

## 2021-10-13 DIAGNOSIS — I25.10 ASHD (ARTERIOSCLEROTIC HEART DISEASE): ICD-10-CM

## 2021-10-13 DIAGNOSIS — R91.1 PULMONARY NODULE: ICD-10-CM

## 2021-10-13 DIAGNOSIS — Z90.49 STATUS POST TOTAL COLECTOMY: ICD-10-CM

## 2021-10-13 DIAGNOSIS — I10 ESSENTIAL HYPERTENSION: ICD-10-CM

## 2021-10-13 PROCEDURE — 99490 CHRNC CARE MGMT STAFF 1ST 20: CPT | Performed by: NURSE PRACTITIONER

## 2021-10-13 PROCEDURE — 99309 SBSQ NF CARE MODERATE MDM 30: CPT | Performed by: NURSE PRACTITIONER

## 2021-10-13 ASSESSMENT — ENCOUNTER SYMPTOMS
COUGH: 0
VOMITING: 0
SINUS PRESSURE: 0
NAUSEA: 0
RHINORRHEA: 0
SHORTNESS OF BREATH: 0
BACK PAIN: 0
TROUBLE SWALLOWING: 0
EYE DISCHARGE: 0
CHOKING: 0
CONSTIPATION: 0
DIARRHEA: 0

## 2021-10-13 NOTE — PROGRESS NOTES
Aster Graf is a [de-identified] y.o. male who presents today for his medical conditions/complaints as noted below. Chief Complaint   Patient presents with    1 Month Follow-Up           HPI:     Aster Graf is a [de-identified]year old male that is seen today sitting in his recliner. He is seen for his chronic medical conditions which include hypertension, ASHD, arthritis, depression, hypothyroidism, and diabetes. Patient appears well today and denies any concerns. He is continuing to do well with his ileostomy. He does still complain at left shoulder pain. He states the biofreeze is working some. He is still following psychiatry for his depression and history of suicide. His mood appears to be stable at this time. He does have a DTI to his right heel that is being managed by the podiatrist.            Past Medical History:   Diagnosis Date    Arthritis     Chronic kidney disease     Depression     Diabetes mellitus ([address]     Hyperlipidemia     Hypertension     Thyroid disease       Past Surgical History:   Procedure Laterality Date    COLOSTOMY N/A 2/20/2021    TOTAL COLECTOMY AND ILLEOSTOMY PLACEMENT performed by Lili Page MD at [address] [place] [address]        No family history on file. Social History     Tobacco Use    Smoking status: Never Smoker    Smokeless tobacco: Never Used   Substance Use Topics    Alcohol use: Not Currently      Allergies   Allergen Reactions    Triamcinolone Rash     Patient states his arm felt like it was hot, like it was burning.          Health Maintenance   Topic Date Due    Lipid screen  Never done    DTaP/Tdap/Td vaccine (1 - Tdap) Never done    Shingles Vaccine (3 of 3) 10/26/2019    Annual Wellness Visit (AWV)  Never done    TSH testing  06/28/2021    Flu vaccine (1) 09/01/2021    Potassium monitoring  03/05/2022    Creatinine monitoring  03/05/2022    Pneumococcal 65+ years Vaccine  Completed    COVID-19 Vaccine  Completed    Hepatitis A vaccine  Aged Out  Hib vaccine  Aged Out    Meningococcal (ACWY) vaccine  Aged Out       Subjective:      Review of Systems   Constitutional: Negative for chills, fatigue and fever. HENT: Negative for congestion, ear pain, rhinorrhea, sinus pressure and trouble swallowing. Eyes: Negative for discharge and visual disturbance. Respiratory: Negative for cough, choking and shortness of breath. Cardiovascular: Negative for chest pain, palpitations and leg swelling. Gastrointestinal: Negative for constipation, diarrhea, nausea and vomiting. Musculoskeletal: Positive for myalgias. Negative for back pain, neck pain and neck stiffness. Left shoulder discomfort   Neurological: Negative for dizziness and headaches. Psychiatric/Behavioral: Negative for agitation, behavioral problems, hallucinations, self-injury, sleep disturbance and suicidal ideas. The patient is not nervous/anxious. Objective:     Physical Exam  Constitutional:       General: He is not in acute distress. Appearance: He is obese. He is not ill-appearing. HENT:      Head: Normocephalic and atraumatic. Right Ear: External ear normal.      Left Ear: External ear normal.      Nose: Nose normal.   Eyes:      General:         Right eye: No discharge. Left eye: No discharge. Cardiovascular:      Rate and Rhythm: Normal rate and regular rhythm. Heart sounds: Normal heart sounds. Pulmonary:      Effort: Pulmonary effort is normal.      Breath sounds: Normal breath sounds. Abdominal:      General: The ostomy site is clean. Bowel sounds are normal.      Palpations: Abdomen is soft. Musculoskeletal:      Cervical back: Normal range of motion and neck supple. Neurological:      Mental Status: He is alert. BP (!) 166/77   Pulse 70   Temp 97.2 °F (36.2 °C)   Resp 18   SpO2 95%     Assessment/Plan      1. Moderate episode of recurrent major depressive disorder (HCC) - Mood is stable at this this time.  Continue current dose of Buspar, Remeron, Zoloft, Aripiprazole, and Wellbutrin. Continue to monitor. 2. Essential hypertension - Blood pressure have been high. He is currently on 25 mg of hydralazine BID and 25mg of lopressor BID. I will add Norvasc 5mg daily. 3. ASHD (arteriosclerotic heart disease) - No signs and symptoms of chest pain. Continue to monitor. 4. Acquired hypothyroidism - Continue current dose of Synthroid. Monitor labs. 5. Status post total colectomy - Patient is managing ileostomy better. Continue to follow up with wound care. 6. Pulmonary nodule - Stable at this time. Continue to monitor. Upcoming appointment for an EBUS. 7. Type 2 diabetes mellitus without complication, without long-term current use of insulin (HCC) - Not currently on medications. Continue to monitor. 8. Arthritis - Chronic and continue Tylenol and Biofreeze. Resident has MDD, HTN, ASHD, DM and it is expected to last 15 or more months. These chronic conditions place resident at a significant risk of death, acute exacerbation, or functional decline. The patient's comprehensive plan was monitored today. I spent 20 minutes reviewing plan. Patient seen and examined. History partially obtained by chart review and nursing notes. I have reviewed patient's past medical, surgical, social, and family history and have made updates where appropriate.   See facility EMR for updated medication list.       Electronically signed by Loraine Kussmaul, APRN - CNP on 10/13/2021 at 1:41 PM

## 2021-11-16 ENCOUNTER — OUTSIDE SERVICES (OUTPATIENT)
Dept: FAMILY MEDICINE CLINIC | Age: 81
End: 2021-11-16
Payer: MEDICARE

## 2021-11-16 VITALS
WEIGHT: 271.3 LBS | HEART RATE: 68 BPM | OXYGEN SATURATION: 95 % | SYSTOLIC BLOOD PRESSURE: 145 MMHG | DIASTOLIC BLOOD PRESSURE: 63 MMHG | BODY MASS INDEX: 38.93 KG/M2 | TEMPERATURE: 98 F | RESPIRATION RATE: 18 BRPM

## 2021-11-16 DIAGNOSIS — E11.9 TYPE 2 DIABETES MELLITUS WITHOUT COMPLICATION, WITHOUT LONG-TERM CURRENT USE OF INSULIN (HCC): ICD-10-CM

## 2021-11-16 DIAGNOSIS — E03.9 ACQUIRED HYPOTHYROIDISM: ICD-10-CM

## 2021-11-16 DIAGNOSIS — C34.90 SMALL CELL LUNG CANCER (HCC): ICD-10-CM

## 2021-11-16 DIAGNOSIS — I10 ESSENTIAL HYPERTENSION: ICD-10-CM

## 2021-11-16 DIAGNOSIS — I25.10 ASHD (ARTERIOSCLEROTIC HEART DISEASE): ICD-10-CM

## 2021-11-16 DIAGNOSIS — F33.1 MODERATE EPISODE OF RECURRENT MAJOR DEPRESSIVE DISORDER (HCC): Primary | ICD-10-CM

## 2021-11-16 DIAGNOSIS — M19.90 ARTHRITIS: ICD-10-CM

## 2021-11-16 DIAGNOSIS — Z90.49 STATUS POST TOTAL COLECTOMY: ICD-10-CM

## 2021-11-16 PROCEDURE — 99309 SBSQ NF CARE MODERATE MDM 30: CPT | Performed by: FAMILY MEDICINE

## 2021-11-16 PROCEDURE — 99490 CHRNC CARE MGMT STAFF 1ST 20: CPT | Performed by: FAMILY MEDICINE

## 2021-11-16 NOTE — PROGRESS NOTES
Luis Felipe Villeda is a [de-identified] y.o. male who presents today for his medical conditions/complaints as noted below. Chief Complaint   Patient presents with    1 Month Follow-Up     Follow up on Chronic health conditions           HPI:     Patient seen and examined today for follow-up on his chronic health conditions. He is in his room and is sleeping initially he does arouse for asse has ssment but does not provide review of systems. He had a biopsy recently and was noted to have small cell lung cancer. He does have a referral to oncology. Patient has chronic health conditions hypertension, ASHD, osteoarthritis, hypothyroidism, type 2 diabetes. He is seeing podiatry for a right heel issue. Does complain of right shoulder pain and is currently being followed by therapy. Weight is stable at 271 pounds. Past Medical History:   Diagnosis Date    Arthritis     Chronic kidney disease     Depression     Diabetes mellitus ([address]     Hyperlipidemia     Hypertension     Thyroid disease       Past Surgical History:   Procedure Laterality Date    COLOSTOMY N/A 2/20/2021    TOTAL COLECTOMY AND ILLEOSTOMY PLACEMENT performed by Juhi Reza MD at [address] [facility] [address]        No family history on file. Social History     Tobacco Use    Smoking status: Never Smoker    Smokeless tobacco: Never Used   Substance Use Topics    Alcohol use: Not Currently      Allergies   Allergen Reactions    Triamcinolone Rash     Patient states his arm felt like it was hot, like it was burning.          Health Maintenance   Topic Date Due    Lipid screen  Never done    DTaP/Tdap/Td vaccine (1 - Tdap) Never done    Shingles Vaccine (3 of 3) 10/26/2019    Annual Wellness Visit (AWV)  Never done    TSH testing  06/28/2021    COVID-19 Vaccine (3 - Booster for Pfizer series) 07/29/2021    Flu vaccine (1) 09/01/2021    Potassium monitoring  03/05/2022    Creatinine monitoring  03/05/2022    Pneumococcal 65+ years Vaccine  Completed    Hepatitis A vaccine  Aged Out    Hib vaccine  Aged Out    Meningococcal (ACWY) vaccine  Aged Out       Subjective:      Review of Systems   Unable to perform ROS: Patient nonverbal       Objective:     Physical Exam  Vitals and nursing note reviewed. Constitutional:       General: He is sleeping. He is not in acute distress. Appearance: He is well-developed. He is obese. HENT:      Head: Normocephalic and atraumatic. Right Ear: External ear normal.      Left Ear: External ear normal.      Nose: Nose normal.   Eyes:      General: No scleral icterus. Right eye: No discharge. Left eye: No discharge. Conjunctiva/sclera: Conjunctivae normal.   Neck:      Thyroid: No thyromegaly. Vascular: No JVD. Cardiovascular:      Rate and Rhythm: Normal rate and regular rhythm. Heart sounds: Normal heart sounds. Pulmonary:      Effort: Pulmonary effort is normal. No respiratory distress. Breath sounds: Normal breath sounds. No stridor. No wheezing or rales. Chest:   Breasts:      Right: No supraclavicular adenopathy. Left: No supraclavicular adenopathy. Abdominal:      General: Bowel sounds are normal. There is no distension. Palpations: Abdomen is soft. Tenderness: There is no abdominal tenderness. Musculoskeletal:         General: No deformity. Normal range of motion. Right shoulder: No tenderness or bony tenderness. Left shoulder: No tenderness or bony tenderness. Cervical back: Neck supple. No tenderness or bony tenderness. Thoracic back: No spasms, tenderness or bony tenderness. Lumbar back: No tenderness or bony tenderness. Lymphadenopathy:      Cervical: No cervical adenopathy. Upper Body:      Right upper body: No supraclavicular adenopathy. Left upper body: No supraclavicular adenopathy. Skin:     General: Skin is warm and dry. Findings: No erythema or rash.    Neurological: Mental Status: He is oriented to person, place, and time and easily aroused. Motor: No atrophy, abnormal muscle tone or seizure activity. Psychiatric:         Mood and Affect: Affect is labile. Speech: Speech is delayed. Behavior: Behavior normal.       BP (!) 145/63   Pulse 68   Temp 98 °F (36.7 °C)   Resp 18   Wt 271 lb 4.8 oz (123.1 kg)   SpO2 95%   BMI 38.93 kg/m²     Assessment/Plan      1. Moderate episode of recurrent major depressive disorder (HCC) -chronic and continue BuSpar, Remeron, Zoloft, Abilify, and Wellbutrin. Continue to monitor. 2. Essential hypertension -blood pressure stable on hydralazine, Norvasc, Lopressor. 3. ASHD (arteriosclerotic heart disease) -chronic and continue to monitor. Denies any chest pain. 4. Acquired hypothyroidism -chronic and continue current dose of levothyroxine. 5. Status post total colectomy -continue to monitor skin. Ostomy without issues. 6. Small cell lung cancer ([facility] [address] -recent biopsy with diagnosis of small cell lung cancer. Will follow up with family as if wanting further referral to oncology. 7. Type 2 diabetes mellitus without complication, without long-term current use of insulin (HCC) -chronic and continue current monitoring. No current medications. 8. Arthritis -does have chronic pain in his shoulder. Continue Voltaren, Tylenol, Biofreeze. Resident has MDD, HTN, ASHD, DM and it is expected to last 15 or more months. These chronic conditions place resident at a significant risk of death, acute exacerbation, or functional decline. The patient's comprehensive plan was monitored today. I spent 20 minutes reviewing plan. Patient seen and examined. History partially obtained by chart review and nursing notes. I have reviewed patient's past medical, surgical, social, and family history and have made updates where appropriate.   See facility EMR for updated medication list.       Electronically signed by Keyonna Schneider Shelbi Rios MD on 11/16/2021 at 11:27 AM

## 2021-12-14 ENCOUNTER — OFFICE VISIT (OUTPATIENT)
Dept: ONCOLOGY | Age: 81
End: 2021-12-14
Payer: MEDICARE

## 2021-12-14 ENCOUNTER — HOSPITAL ENCOUNTER (OUTPATIENT)
Dept: INFUSION THERAPY | Age: 81
Discharge: HOME | End: 2021-12-14
Payer: MEDICARE

## 2021-12-14 VITALS
HEART RATE: 62 BPM | WEIGHT: 276.4 LBS | HEIGHT: 70 IN | DIASTOLIC BLOOD PRESSURE: 67 MMHG | TEMPERATURE: 98.7 F | BODY MASS INDEX: 39.57 KG/M2 | SYSTOLIC BLOOD PRESSURE: 157 MMHG | OXYGEN SATURATION: 99 % | RESPIRATION RATE: 20 BRPM

## 2021-12-14 DIAGNOSIS — Z90.49 S/P TOTAL COLECTOMY: ICD-10-CM

## 2021-12-14 DIAGNOSIS — A04.72: ICD-10-CM

## 2021-12-14 DIAGNOSIS — C7A.8 NEUROENDOCRINE CARCINOMA (HCC): Primary | ICD-10-CM

## 2021-12-14 PROCEDURE — 99211 OFF/OP EST MAY X REQ PHY/QHP: CPT

## 2021-12-14 PROCEDURE — 99205 OFFICE O/P NEW HI 60 MIN: CPT | Performed by: INTERNAL MEDICINE

## 2021-12-14 RX ORDER — DIAPER,BRIEF,INFANT-TODD,DISP
EACH MISCELLANEOUS PRN
COMMUNITY

## 2021-12-14 RX ORDER — AMLODIPINE BESYLATE 5 MG/1
5 TABLET ORAL DAILY
COMMUNITY

## 2021-12-16 ENCOUNTER — OUTSIDE SERVICES (OUTPATIENT)
Dept: FAMILY MEDICINE CLINIC | Age: 81
End: 2021-12-16
Payer: MEDICARE

## 2021-12-16 VITALS
SYSTOLIC BLOOD PRESSURE: 121 MMHG | DIASTOLIC BLOOD PRESSURE: 60 MMHG | HEART RATE: 63 BPM | WEIGHT: 274.8 LBS | TEMPERATURE: 97.5 F | OXYGEN SATURATION: 96 % | RESPIRATION RATE: 16 BRPM | BODY MASS INDEX: 39.43 KG/M2

## 2021-12-16 DIAGNOSIS — F33.1 MODERATE EPISODE OF RECURRENT MAJOR DEPRESSIVE DISORDER (HCC): Primary | ICD-10-CM

## 2021-12-16 DIAGNOSIS — I25.10 ASHD (ARTERIOSCLEROTIC HEART DISEASE): ICD-10-CM

## 2021-12-16 DIAGNOSIS — I10 ESSENTIAL HYPERTENSION: ICD-10-CM

## 2021-12-16 DIAGNOSIS — C7A.8 NEUROENDOCRINE CARCINOMA (HCC): ICD-10-CM

## 2021-12-16 DIAGNOSIS — E11.9 TYPE 2 DIABETES MELLITUS WITHOUT COMPLICATION, WITHOUT LONG-TERM CURRENT USE OF INSULIN (HCC): ICD-10-CM

## 2021-12-16 DIAGNOSIS — Z90.49 STATUS POST TOTAL COLECTOMY: ICD-10-CM

## 2021-12-16 DIAGNOSIS — E03.9 ACQUIRED HYPOTHYROIDISM: ICD-10-CM

## 2021-12-16 DIAGNOSIS — M19.90 ARTHRITIS: ICD-10-CM

## 2021-12-16 PROCEDURE — 99309 SBSQ NF CARE MODERATE MDM 30: CPT | Performed by: NURSE PRACTITIONER

## 2021-12-16 PROCEDURE — 99490 CHRNC CARE MGMT STAFF 1ST 20: CPT | Performed by: NURSE PRACTITIONER

## 2021-12-16 RX ORDER — HYDROCODONE BITARTRATE AND ACETAMINOPHEN 5; 325 MG/1; MG/1
1 TABLET ORAL EVERY 6 HOURS PRN
Qty: 120 TABLET | Refills: 0 | Status: SHIPPED | OUTPATIENT
Start: 2021-12-16 | End: 2022-01-15

## 2021-12-16 NOTE — PROGRESS NOTES
Lisset Broussard is a [de-identified] y.o. male who presents today for his medical conditions/complaints as noted below. Chief Complaint   Patient presents with    1 Month Follow-Up           HPI:     Yessenia Rahman is a [de-identified]year old male that is seen today sitting in his recliner. He is seen for his chronic medical conditions which include hypertension, ASHD, arthritis, depression, hypothyroidism, and diabetes. Patient appears well today and denies any concerns. He is continuing to do well with his ileostomy. He does still complain at left shoulder pain. He states the biofreeze is working some. He is still following psychiatry for his depression and history of suicide. His mood appears to be stable at this time. He does have a DTI to his right heel that is being managed by the podiatrist. He was seen by oncology for the neuroendocrine carcinoma and states he will have a repeat CT next time and they will continue to watch this. Will add Norco for his pain in his shoulder. Past Medical History:   Diagnosis Date    Arthritis     Chronic kidney disease     Depression     Diabetes mellitus ([address]     Hyperlipidemia     Hypertension     Thyroid disease       Past Surgical History:   Procedure Laterality Date    COLOSTOMY N/A 2/20/2021    TOTAL COLECTOMY AND ILLEOSTOMY PLACEMENT performed by Miles Vazquez MD at [facility] [address] [facility]        Family History   Problem Relation Age of Onset    Stroke Mother     Heart Disease Father     Heart Attack Brother        Social History     Tobacco Use    Smoking status: Never Smoker    Smokeless tobacco: Never Used   Substance Use Topics    Alcohol use: Not Currently      Allergies   Allergen Reactions    Triamcinolone Rash     Patient states his arm felt like it was hot, like it was burning.          Health Maintenance   Topic Date Due    Lipid screen  Never done    DTaP/Tdap/Td vaccine (1 - Tdap) Never done    Shingles Vaccine (3 of 3) 10/26/2019    Annual Wellness Visit (AWV)  Never done    TSH testing  06/28/2021    COVID-19 Vaccine (3 - Booster for Pfizer series) 07/29/2021    Flu vaccine (1) 09/01/2021    Potassium monitoring  03/05/2022    Creatinine monitoring  03/05/2022    Pneumococcal 65+ years Vaccine  Completed    Hepatitis A vaccine  Aged Out    Hib vaccine  Aged Out    Meningococcal (ACWY) vaccine  Aged Out       Subjective:      Review of Systems   Constitutional: Negative for chills, fatigue and fever. HENT: Negative for congestion, rhinorrhea and sore throat. Eyes: Negative for redness and visual disturbance. Respiratory: Negative for cough, shortness of breath and wheezing. Cardiovascular: Negative for chest pain and leg swelling. Gastrointestinal: Negative for constipation, diarrhea, nausea and vomiting. Endocrine: Negative for polydipsia and polyuria. Genitourinary: Negative for dysuria, frequency and hematuria. Musculoskeletal: Positive for arthralgias and gait problem. Negative for myalgias. Skin: Negative for rash and wound. Allergic/Immunologic: Negative for environmental allergies and immunocompromised state. Neurological: Negative for dizziness, light-headedness and headaches. Hematological: Does not bruise/bleed easily. Psychiatric/Behavioral: Positive for decreased concentration. Negative for dysphoric mood and sleep disturbance. The patient is not nervous/anxious. Objective:     Physical Exam  Vitals and nursing note reviewed. Constitutional:       General: He is not in acute distress. Appearance: He is well-developed. He is obese. HENT:      Head: Normocephalic and atraumatic. Right Ear: External ear normal.      Left Ear: External ear normal.      Nose: Nose normal.   Eyes:      General: No scleral icterus. Right eye: No discharge. Left eye: No discharge. Conjunctiva/sclera: Conjunctivae normal.   Neck:      Thyroid: No thyromegaly. Vascular: No JVD. Cardiovascular:      Rate and Rhythm: Normal rate and regular rhythm. Heart sounds: Normal heart sounds. Pulmonary:      Effort: Pulmonary effort is normal. No respiratory distress. Breath sounds: Normal breath sounds. No stridor. No wheezing or rales. Chest:   Breasts:      Right: No supraclavicular adenopathy. Left: No supraclavicular adenopathy. Abdominal:      General: Bowel sounds are normal. There is no distension. Palpations: Abdomen is soft. Tenderness: There is no abdominal tenderness. Musculoskeletal:         General: No deformity. Normal range of motion. Right shoulder: No tenderness or bony tenderness. Left shoulder: No tenderness or bony tenderness. Cervical back: Neck supple. No tenderness or bony tenderness. Thoracic back: No spasms, tenderness or bony tenderness. Lumbar back: No tenderness or bony tenderness. Lymphadenopathy:      Cervical: No cervical adenopathy. Upper Body:      Right upper body: No supraclavicular adenopathy. Left upper body: No supraclavicular adenopathy. Skin:     General: Skin is warm and dry. Findings: No erythema or rash. Neurological:      Mental Status: He is alert and oriented to person, place, and time. Motor: Weakness present. No atrophy, abnormal muscle tone or seizure activity. Psychiatric:         Mood and Affect: Affect is flat. Speech: Speech normal.         Behavior: Behavior is slowed and withdrawn. /60   Pulse 63   Temp 97.5 °F (36.4 °C)   Resp 16   Wt 274 lb 12.8 oz (124.6 kg)   SpO2 96%   BMI 39.43 kg/m²     Assessment/Plan      1. Moderate episode of recurrent major depressive disorder (HCC) -chronic and continue BuSpar, Remeron, Zoloft, Abilify, and Wellbutrin. Follows with psychiatry. 2. Essential hypertension -chronic and continue hydralazine, Norvasc, Lopressor. Overall controlled.    3. ASHD (arteriosclerotic heart disease) -denies chest pain. Continue to monitor. 4. Acquired hypothyroidism -chronic and continue current dose of levothyroxine. 5. Status post total colectomy -continue to monitor skin. Follow-up with ostomy wound care as directed. 6. Neuroendocrine yxybhcstm-icuyxt-ax with oncology as directed. 7. Type 2 diabetes mellitus without complication, without long-term current use of insulin (HCC) -chronic and blood sugars overall stable. On no medications. 8. Arthritis -chronic and will continue Voltaren and Biofreeze. We will add Norco as needed. Resident has MDD, HTN, ASHD, DM and it is expected to last 15 or more months. These chronic conditions place resident at a significant risk of death, acute exacerbation, or functional decline. The patient's comprehensive plan was monitored today. I spent 20 minutes reviewing plan. Patient seen and examined. History partially obtained by chart review and nursing notes. I have reviewed patient's past medical, surgical, social, and family history and have made updates where appropriate.   See facility EMR for updated medication list.       Electronically signed by SYLVESTER Cohen CNP on 12/16/2021 at 5:06 PM

## 2021-12-17 ASSESSMENT — ENCOUNTER SYMPTOMS
NAUSEA: 0
VOMITING: 0
CONSTIPATION: 0
EYE REDNESS: 0
DIARRHEA: 0
SHORTNESS OF BREATH: 0
COUGH: 0
RHINORRHEA: 0
WHEEZING: 0
SORE THROAT: 0

## 2021-12-29 NOTE — PROGRESS NOTES
[facility]  CANCER NETWORK OF [facility]  [facility] [address] [facility]  [address] [address]  Dept: [phone]  Dept Fax: [phone]: [phone]     Encounter Date:  12/14/2021    Referring Physician:  Kory Padilla DO     Primary Provider: Devante Fletcher MD     Subjective:      Chief Complaint:  Mari Lugo is a [de-identified] y.o. with neuroendocrine carcinoma. HPI:  This is the [facility] visit to the [facility] for this patient who was referred by Kory Padilla DO for evaluation of low-grade neuroendocrine carcinoma. 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 Rhonda Gonzalez developed abdominal pain and diarrhea in early 2021 that he describes as severe in nature. He was hospitalized at [facility]. 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 He had complications of septic shock, acute metabolic encephalopathy, acute kidney injury, and metabolic abnormalities. The patient made a slow but steady recovery from his infection and abdominal surgery. However as part of his work-up while done in the hospital the patient was found to have a solitary pulmonary nodule in the left lower lobe of the lung. He was seen by Dr. Karsten Rosenthal and underwent EBUS procedure. Surgical pathology from a biopsy of the 10 R lymph node confirmed the presence of a low-grade neuroendocrine carcinoma. Ki-67 was noted to be 1%. The patient was referred to our office for further evaluation regarding the low-grade neuroendocrine carcinoma. Rhonda Gonzalez states that he generally feels well today. His only complaint on review of systems is generalized muscle weakness. The patient's not had recent fever or other signs of infection.   He denies shortness of breath or hot flashes. His ileostomy is functioning well. Past Medical History  He  has a past medical history of Arthritis, Chronic kidney disease, Depression, Diabetes mellitus ([address] Hyperlipidemia, Hypertension, and Thyroid disease. Surgical History  He  has a past surgical history that includes knee surgery (Right) and colostomy (N/A, 2/20/2021). Home Medications  He has a current medication list which includes the following prescription(s): hydrocortisone, amlodipine, diclofenac sodium, acetaminophen, aripiprazole, hydralazine, metoprolol tartrate, miconazole, calcium-vitamin d, gabapentin, sertraline, buspirone, docusate sodium, loperamide, magnesium hydroxide, therapeutic multivitamin-minerals, mirtazapine, vitamin d, bupropion, levothyroxine, tamsulosin, atorvastatin, and hydrocodone-acetaminophen. Allergies  Allergies   Allergen Reactions    Triamcinolone Rash     Patient states his arm felt like it was hot, like it was burning. Social History  He  reports that he has never smoked. He has never used smokeless tobacco. He reports previous alcohol use. He reports that he does not use drugs. Family History  His family history includes Heart Attack in his brother; Heart Disease in his father; Stroke in his mother. Review of Systems  Constitutional: Negative. HENT: Negative. Eyes: Negative. Respiratory: Negative. Cardiovascular: Negative. Gastrointestinal: Negative. Genitourinary: Negative. Musculoskeletal: Weakness. Skin: Negative. Neurological: Negative. Hematological: Negative. Psychiatric/Behavioral: Negative. Objective:   Physical Exam  Vitals:    12/14/21 1121   BP: (!) 157/67   Pulse: 62   Resp: 20   Temp: 98.7 °F (37.1 °C)   SpO2: 99%   Vitals reviewed and are stable. Constitutional: Elderly. No acute distress. HENT: Normocephalic and atraumatic. Eyes: Pupils appear equal. No scleral icterus.    Pulmonary: Effort normal. No respiratory distress. Musculoskeletal: Gait is abnormal. Muscle strength and tone grossly decreased. Neurological: Alert and oriented to person, place, and time. Judgment and thought content normal.  Skin: Scattered ecchymosis noted on bilateral upper forearms. Psychiatric: Mood and affect appropriate for the clinical situation. .     Assessment:   1. Neuroendocrine carcinoma involving the lung. 2.  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 Plan:   I had a 60-minute consultation with the patient and 2 family members. The majority this time was spent in direct face-to-face discussion regarding the surgical pathology report confirming neuroendocrine carcinoma, the natural history of this disease, is general prognosis, and treatment options. It was explained to the patient that neuroendocrine carcinomas may start in the lung but often may start in other areas of metastasized to the lung. On recent scans from the time of his toxic megacolon there was no evidence of malignancy other than the solitary pulmonary nodule. There was also explained to the patient that his neuroendocrine carcinoma was low-grade with a low Ki-67. Therefore, oftentimes we would recommend a pattern of observation and surveillance in asymptomatic patients. Currently the patient is a up symptomatic and is uncertain if he would consider receiving therapy even if recommended. Therefore, we are going to enter into a pattern surveillance of his neuroendocrine carcinoma. Multiple questions were answered throughout the course the consultation. By the end of the consultation, all the patient's questions had been answered. The patient was asked to call if there were any questions or concerns prior to the next scheduled clinic visit. CT scans of the chest abdomen pelvis will be completed prior to his return to clinic to be reviewed at the time of his clinic visit.     Adelso Hameed MATT Kidd Director: [facility]  [address], [facility], [address] [place] [address], [address] [place] [address], [address] of the [facility] at [facility]      **This report has been created using voice recognition software. It may contain minor errors which are inherent in voice recognition technology. **

## 2022-03-08 NOTE — PROGRESS NOTES
[facility]         Renal Inpatient Follow-Up note         2/27/2021 9:35 AM    Pt Name:   Douglas Harvey  YOB: 1940  Attending:   Sandy Jennings MD    Chief Complaint : Douglas Harvey is a 79 y.o. male being followed by nephrology for acute kidney injury and hypernatremia    Interval History :   Patient seen and examined by me.  No distress  Feels okay denies any complaints  Poor historian no chest pain shortness of breath no diarrhea     Scheduled Medications :    lactobacillus  1 capsule Oral Daily with breakfast    sodium chloride flush  10 mL Intravenous 2 times per day    famotidine  20 mg Oral BID    vancomycin  500 mg Oral 4 times per day    lidocaine 1 % injection  5 mL Intradermal Once    buPROPion  300 mg Oral Daily    ARIPiprazole  10 mg Oral Daily    busPIRone  15 mg Oral TID    doxepin  10 mg Oral Nightly    QUEtiapine  100 mg Oral Nightly    mirtazapine  45 mg Oral Nightly    sertraline  200 mg Oral Daily    piperacillin-tazobactam  3,375 mg Intravenous Once    [Held by provider] heparin (porcine)  5,000 Units Subcutaneous Q8H    atorvastatin  40 mg Oral Daily    levothyroxine  75 mcg Oral Daily    metoprolol tartrate  25 mg Oral BID    [Held by provider] tamsulosin  0.4 mg Oral Daily    metroNIDAZOLE  500 mg Intravenous Q8H    calcium replacement protocol   Other RX Placeholder    miconazole   Topical BID      sodium chloride      sodium chloride      dextrose 100 mL/hr at 02/27/21 0400       Vitals :  BP (!) 132/49   Pulse 76   Temp 99 °F (37.2 °C) (Oral)   Resp 18   Ht 5' 10\" (1.778 m)   Wt 271 lb (122.9 kg)   SpO2 97%   BMI 38.88 kg/m²     24HR INTAKE/OUTPUT:      Intake/Output Summary (Last 24 hours) at 2/27/2021 0935  Last data filed at 2/27/2021 0802  Gross per 24 hour   Intake 2982.97 ml   Output 1820 ml   Net 1162.97 ml     Last 3 weights  Wt Readings from Last 3 Encounters:   02/27/21 271 lb (122.9 kg)   11/25/20 283 lb 3.2 oz (128.5 kg)   10/16/20 273 lb 12.8 oz (124.2 kg)           Physical Exam :  General Appearance:  Well developed. No distress  Mouth/Throat:  Oral mucosa moist  Neck:  Supple, no JVD  Lungs:  Breath sounds: clear  Heart[de-identified]  S1,S2 heard  Abdomen:  Soft, non - tender  Musculoskeletal:  Edema -minimal         Last 3 CBC   Recent Labs     02/25/21  0610 02/25/21  1855 02/26/21  0615 02/27/21  0353   WBC 16.8*  --  19.2* 18.0*   RBC 2.27*  --  2.79* 2.65*   HGB 6.8* 7.3* 8.3* 7.9*   HCT 22.5* 23.8* 27.0* 25.9*     --  292 273     Last 3 CMP  Recent Labs     02/25/21  0610 02/26/21  0615   * 143   K 3.3* 3.5   * 116*   CO2 22* 21*   BUN 32* 26*   CREATININE 1.6* 1.3*   CALCIUM 7.4* 7.5*             ASSESSMENT / Plan   1 Renal -acute kidney injury secondary to septic ATN  ? Decent urine output and the creatinine is improving  ? Currently it is down to 1.3 however no new labs today  ? We will order a BMP today    2 Electrolytes -hyponatremia which is improving we will obtain a BMP today and based on that might discontinue the D5W  3 Mild acidosis  4 C. difficile colitis with toxic megacolon status post colectomy and end ileostomy  5 Hypokalemia borderline recheck  6 Anemia  7 Meds reviewed and discussed with patient    MARIVEL Cota D.  [facility]. No

## 2022-05-10 ENCOUNTER — HOSPITAL ENCOUNTER (OUTPATIENT)
Dept: INFUSION THERAPY | Age: 82
Discharge: HOME | End: 2022-05-10
Payer: MEDICARE

## 2022-05-10 ENCOUNTER — OFFICE VISIT (OUTPATIENT)
Dept: ONCOLOGY | Age: 82
End: 2022-05-10
Payer: MEDICARE

## 2022-05-10 VITALS
TEMPERATURE: 99.2 F | SYSTOLIC BLOOD PRESSURE: 145 MMHG | HEART RATE: 63 BPM | DIASTOLIC BLOOD PRESSURE: 67 MMHG | RESPIRATION RATE: 20 BRPM | WEIGHT: 276 LBS | OXYGEN SATURATION: 96 % | BODY MASS INDEX: 39.51 KG/M2 | HEIGHT: 70 IN

## 2022-05-10 VITALS
OXYGEN SATURATION: 96 % | RESPIRATION RATE: 20 BRPM | DIASTOLIC BLOOD PRESSURE: 67 MMHG | BODY MASS INDEX: 39.51 KG/M2 | WEIGHT: 276 LBS | TEMPERATURE: 99.2 F | HEART RATE: 63 BPM | HEIGHT: 70 IN | SYSTOLIC BLOOD PRESSURE: 145 MMHG

## 2022-05-10 DIAGNOSIS — A04.72: ICD-10-CM

## 2022-05-10 DIAGNOSIS — I10 HYPERTENSION, UNSPECIFIED TYPE: ICD-10-CM

## 2022-05-10 DIAGNOSIS — D64.9 CHRONIC ANEMIA: ICD-10-CM

## 2022-05-10 DIAGNOSIS — C7A.8 NEUROENDOCRINE CARCINOMA (HCC): ICD-10-CM

## 2022-05-10 DIAGNOSIS — C7A.8 NEUROENDOCRINE CARCINOMA (HCC): Primary | ICD-10-CM

## 2022-05-10 DIAGNOSIS — Z90.49 S/P TOTAL COLECTOMY: ICD-10-CM

## 2022-05-10 LAB
ABSOLUTE IMMATURE GRANULOCYTE: 0.06 THOU/MM3 (ref 0–0.07)
ALBUMIN SERPL-MCNC: 4 G/DL (ref 3.5–5.1)
ALP BLD-CCNC: 92 U/L (ref 38–126)
ALT SERPL-CCNC: 13 U/L (ref 11–66)
AST SERPL-CCNC: 25 U/L (ref 5–40)
BASINOPHIL, AUTOMATED: 1 % (ref 0–3)
BASOPHILS ABSOLUTE: 0.1 THOU/MM3 (ref 0–0.1)
BILIRUB SERPL-MCNC: 0.3 MG/DL (ref 0.3–1.2)
BILIRUBIN DIRECT: < 0.2 MG/DL (ref 0–0.3)
BUN, WHOLE BLOOD: 28 MG/DL (ref 8–26)
CHLORIDE, WHOLE BLOOD: 108 MEQ/L (ref 98–109)
CREATININE, WHOLE BLOOD: 2.2 MG/DL (ref 0.5–1.2)
EOSINOPHILS ABSOLUTE: 0.3 THOU/MM3 (ref 0–0.4)
EOSINOPHILS RELATIVE PERCENT: 4 % (ref 0–4)
GFR, ESTIMATED ,CON: 31 ML/MIN/1.73M2
GLUCOSE, WHOLE BLOOD: 109 MG/DL (ref 70–108)
HCT VFR BLD CALC: 39 % (ref 42–52)
HEMOGLOBIN: 12.6 GM/DL (ref 14–18)
IMMATURE GRANULOCYTES: 1 %
IONIZED CALCIUM, WHOLE BLOOD: 1.19 MMOL/L (ref 1.12–1.32)
LYMPHOCYTES # BLD: 23 % (ref 15–47)
LYMPHOCYTES ABSOLUTE: 1.5 THOU/MM3 (ref 1–4.8)
MCH RBC QN AUTO: 31.6 PG (ref 26–33)
MCHC RBC AUTO-ENTMCNC: 32.3 GM/DL (ref 32.2–35.5)
MCV RBC AUTO: 98 FL (ref 80–94)
MONOCYTES ABSOLUTE: 0.6 THOU/MM3 (ref 0.4–1.3)
MONOCYTES: 10 % (ref 0–12)
PDW BLD-RTO: 13.8 % (ref 11.5–14.5)
PLATELET # BLD: 163 THOU/MM3 (ref 130–400)
PMV BLD AUTO: 10.1 FL (ref 9.4–12.4)
POTASSIUM, WHOLE BLOOD: 4.6 MEQ/L (ref 3.5–4.9)
RBC # BLD: 3.99 MILL/MM3 (ref 4.7–6.1)
SEG NEUTROPHILS: 61 % (ref 43–75)
SEGMENTED NEUTROPHILS ABSOLUTE COUNT: 4 THOU/MM3 (ref 1.8–7.7)
SODIUM, WHOLE BLOOD: 144 MEQ/L (ref 138–146)
TOTAL CO2, WHOLE BLOOD: 28 MEQ/L (ref 23–33)
TOTAL PROTEIN: 7.2 G/DL (ref 6.1–8)
WBC # BLD: 6.6 THOU/MM3 (ref 4.8–10.8)

## 2022-05-10 PROCEDURE — 80047 BASIC METABLC PNL IONIZED CA: CPT

## 2022-05-10 PROCEDURE — 80076 HEPATIC FUNCTION PANEL: CPT

## 2022-05-10 PROCEDURE — 99211 OFF/OP EST MAY X REQ PHY/QHP: CPT

## 2022-05-10 PROCEDURE — 85025 COMPLETE CBC W/AUTO DIFF WBC: CPT

## 2022-05-10 PROCEDURE — 1123F ACP DISCUSS/DSCN MKR DOCD: CPT | Performed by: INTERNAL MEDICINE

## 2022-05-10 PROCEDURE — 99214 OFFICE O/P EST MOD 30 MIN: CPT | Performed by: INTERNAL MEDICINE

## 2022-05-10 NOTE — PROGRESS NOTES
[facility] CANCER CENTER  CANCER NETWORK OF [facility]  [facility] [address] [facility]  [address] [address]  Dept: [phone]  Dept Fax: [phone]: [phone]     Encounter Date:  05/10/2022    Primary Provider: Belen Lea MD     Subjective:      Chief Complaint:  Luz Paz is a [de-identified] y.o. with neuroendocrine carcinoma. 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 Marifer Monzon developed abdominal pain and diarrhea in early 2021 that he describes as severe in nature. He was hospitalized at [facility]. 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 He had complications of septic shock, acute metabolic encephalopathy, acute kidney injury, and metabolic abnormalities. The patient made a slow but steady recovery from his infection and abdominal surgery. However as part of his work-up while done in the hospital the patient was found to have a solitary pulmonary nodule in the left lower lobe of the lung. He was seen by Dr. Aneesh Morrison and underwent EBUS procedure. Surgical pathology from a biopsy of the 10 R lymph node confirmed the presence of a low-grade neuroendocrine carcinoma. Ki-67 was noted to be 1%    HPI: Marifer Monzon is here today for follow-up regarding his history of neuroendocrine carcinoma. Since being seen here last he has continued to have care at his extended care facility and his general condition is slightly improved. He continues to be an ECOG performance status level 2-3 status but this is slowly improving. We have been monitoring a pulmonary nodule consistent with previous neuroendocrine carcinoma. The patient had a recent CT scan of the chest that found slight increase in the size of the nodule.   Marifer Monzon does not have 3/1/2021   WBC 6.6  13.4 (H)   RBC 3.99 (L)  2.56 (L)   HGB 12.6 (L) 8.0 (L) 7.7 (L)   HCT 39.0 (L) 26.7 (L) 25.1 (L)   MCV 98 (H)  98.0 (H)   RDW 13.8       260     Assessment:   1. Neuroendocrine carcinoma involving the lung. 2.  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 3.  Anemia. 4.  Hypertension. Plan:   1. Monitor for progression of malignancy. 2.  Monitor blood pressure and follow-up with primary care provider for further management. 3.  Monitor hemoglobin hematocrit and for any evidence of blood loss. 4.  CT scans prior to return to clinic to evaluate status of malignancy. Benigno Troy M.D. Medical Director: [facility]  Can[facility] Network [facility]  [address] [facility] [address] [facility], [address] [place] [address], [address] [place] [address], [address] [place] [address] of the [facility] at [facility]      **This report has been created using voice recognition software. It may contain minor errors which are inherent in voice recognition technology. **

## 2022-08-05 ENCOUNTER — HOSPITAL ENCOUNTER (OUTPATIENT)
Dept: WOUND CARE | Age: 82
Discharge: HOME | End: 2022-08-05

## 2022-10-01 DIAGNOSIS — C7A.8 NEUROENDOCRINE CARCINOMA (HCC): Primary | ICD-10-CM

## 2022-11-15 ENCOUNTER — OFFICE VISIT (OUTPATIENT)
Dept: ONCOLOGY | Age: 82
End: 2022-11-15
Payer: MEDICARE

## 2022-11-15 ENCOUNTER — HOSPITAL ENCOUNTER (OUTPATIENT)
Dept: INFUSION THERAPY | Age: 82
Discharge: HOME | End: 2022-11-15
Payer: MEDICARE

## 2022-11-15 VITALS
BODY MASS INDEX: 38.14 KG/M2 | WEIGHT: 266.4 LBS | TEMPERATURE: 98.5 F | HEIGHT: 70 IN | DIASTOLIC BLOOD PRESSURE: 71 MMHG | SYSTOLIC BLOOD PRESSURE: 142 MMHG | RESPIRATION RATE: 20 BRPM | HEART RATE: 75 BPM | OXYGEN SATURATION: 96 %

## 2022-11-15 VITALS
OXYGEN SATURATION: 96 % | TEMPERATURE: 98.5 F | BODY MASS INDEX: 38.14 KG/M2 | DIASTOLIC BLOOD PRESSURE: 71 MMHG | WEIGHT: 266.4 LBS | RESPIRATION RATE: 20 BRPM | HEIGHT: 70 IN | SYSTOLIC BLOOD PRESSURE: 142 MMHG | HEART RATE: 75 BPM

## 2022-11-15 DIAGNOSIS — C7A.8 NEUROENDOCRINE CARCINOMA (HCC): Primary | ICD-10-CM

## 2022-11-15 DIAGNOSIS — C7A.8 NEUROENDOCRINE CARCINOMA (HCC): ICD-10-CM

## 2022-11-15 LAB
ABSOLUTE IMMATURE GRANULOCYTE: 0.03 THOU/MM3 (ref 0–0.07)
ALBUMIN SERPL-MCNC: 4.2 G/DL (ref 3.5–5.1)
ALP BLD-CCNC: 120 U/L (ref 38–126)
ALT SERPL-CCNC: 13 U/L (ref 11–66)
AST SERPL-CCNC: 22 U/L (ref 5–40)
BASINOPHIL, AUTOMATED: 0 % (ref 0–3)
BASOPHILS ABSOLUTE: 0 THOU/MM3 (ref 0–0.1)
BILIRUB SERPL-MCNC: 0.4 MG/DL (ref 0.3–1.2)
BILIRUBIN DIRECT: < 0.2 MG/DL (ref 0–0.3)
BUN, WHOLE BLOOD: 34 MG/DL (ref 8–26)
CHLORIDE, WHOLE BLOOD: 108 MEQ/L (ref 98–109)
CREATININE, WHOLE BLOOD: 1.9 MG/DL (ref 0.5–1.2)
EOSINOPHILS ABSOLUTE: 0.1 THOU/MM3 (ref 0–0.4)
EOSINOPHILS RELATIVE PERCENT: 2 % (ref 0–4)
GFR, ESTIMATED ,CON: 35 ML/MIN/1.73M2
GLUCOSE, WHOLE BLOOD: 124 MG/DL (ref 70–108)
HCT VFR BLD CALC: 43.5 % (ref 42–52)
HEMOGLOBIN: 14.1 GM/DL (ref 14–18)
IMMATURE GRANULOCYTES: 0 %
IONIZED CALCIUM, WHOLE BLOOD: 1.06 MMOL/L (ref 1.12–1.32)
LYMPHOCYTES # BLD: 16 % (ref 15–47)
LYMPHOCYTES ABSOLUTE: 1.3 THOU/MM3 (ref 1–4.8)
MCH RBC QN AUTO: 31.5 PG (ref 26–33)
MCHC RBC AUTO-ENTMCNC: 32.4 GM/DL (ref 32.2–35.5)
MCV RBC AUTO: 97 FL (ref 80–94)
MONOCYTES ABSOLUTE: 0.7 THOU/MM3 (ref 0.4–1.3)
MONOCYTES: 9 % (ref 0–12)
PDW BLD-RTO: 13.9 % (ref 11.5–14.5)
PLATELET # BLD: 164 THOU/MM3 (ref 130–400)
PMV BLD AUTO: 10.5 FL (ref 9.4–12.4)
POTASSIUM, WHOLE BLOOD: 4.4 MEQ/L (ref 3.5–4.9)
RBC # BLD: 4.47 MILL/MM3 (ref 4.7–6.1)
SEG NEUTROPHILS: 73 % (ref 43–75)
SEGMENTED NEUTROPHILS ABSOLUTE COUNT: 5.7 THOU/MM3 (ref 1.8–7.7)
SODIUM, WHOLE BLOOD: 141 MEQ/L (ref 138–146)
TOTAL CO2, WHOLE BLOOD: 22 MEQ/L (ref 23–33)
TOTAL PROTEIN: 7.2 G/DL (ref 6.1–8)
WBC # BLD: 7.8 THOU/MM3 (ref 4.8–10.8)

## 2022-11-15 PROCEDURE — 99211 OFF/OP EST MAY X REQ PHY/QHP: CPT

## 2022-11-15 PROCEDURE — 80047 BASIC METABLC PNL IONIZED CA: CPT

## 2022-11-15 PROCEDURE — 99214 OFFICE O/P EST MOD 30 MIN: CPT | Performed by: INTERNAL MEDICINE

## 2022-11-15 PROCEDURE — 1123F ACP DISCUSS/DSCN MKR DOCD: CPT | Performed by: INTERNAL MEDICINE

## 2022-11-15 PROCEDURE — 85025 COMPLETE CBC W/AUTO DIFF WBC: CPT

## 2022-11-15 PROCEDURE — 86316 IMMUNOASSAY TUMOR OTHER: CPT

## 2022-11-15 PROCEDURE — 3078F DIAST BP <80 MM HG: CPT | Performed by: INTERNAL MEDICINE

## 2022-11-15 PROCEDURE — 80076 HEPATIC FUNCTION PANEL: CPT

## 2022-11-15 PROCEDURE — 3074F SYST BP LT 130 MM HG: CPT | Performed by: INTERNAL MEDICINE

## 2022-11-15 NOTE — PROGRESS NOTES
[facility] CANCER CENTER  CANCER NETWORK OF [facility]  [facility] [address] [facility]  [address] [address]  Dept: [phone]  Dept Fax: [phone]: [phone]     Encounter Date: 11/15/2022     Primary Provider: Srinivasa Szymanski MD     HPI:  Alexandra Rodriguez is a [de-identified] y.o. with neuroendocrine carcinoma. 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 He had complications of septic shock, acute metabolic encephalopathy, acute kidney injury, and metabolic abnormalities. The patient made a slow but steady recovery from his infection and abdominal surgery. However as part of his work-up while done in the hospital the patient was found to have a solitary pulmonary nodule in the left lower lobe of the lung. EBUS biopsy of the 10 R lymph node confirmed the presence of a low-grade neuroendocrine carcinoma. Ki-67 was noted to be 1%      He continues to be an ECOG performance status level 2-3 status but this is slowly improving. We have been monitoring a pulmonary nodule consistent with previous neuroendocrine carcinoma. Review of Systems  Constitutional: Negative. HENT: Negative. Eyes: Negative. Respiratory: Negative. Cardiovascular: Negative. Gastrointestinal: Negative. Genitourinary: Negative. Musculoskeletal: Negative. Skin: Negative. Neurological: Negative. Hematological: Negative. Psychiatric/Behavioral: Negative. Past Medical History   has a past medical history of Arthritis, Chronic kidney disease, Depression, Diabetes mellitus ([address] Hyperlipidemia, Hypertension, and Thyroid disease.     Surgical History   has a past surgical history that includes knee surgery (Right) and colostomy (N/A, 2/20/2021). Home Medications  has a current medication list which includes the following prescription(s): hydrocodone-acetaminophen, hydrocortisone, amlodipine, diclofenac sodium, acetaminophen, aripiprazole, hydralazine, metoprolol tartrate, miconazole, calcium-vitamin d, gabapentin, sertraline, buspirone, docusate sodium, loperamide, magnesium hydroxide, therapeutic multivitamin-minerals, mirtazapine, vitamin d, bupropion, levothyroxine, tamsulosin, and atorvastatin. Allergies  Allergies   Allergen Reactions    Triamcinolone Rash     Patient states his arm felt like it was hot, like it was burning. Social History   reports that he has never smoked. He has never used smokeless tobacco. He reports that he does not currently use alcohol. He reports that he does not use drugs. Family History  family history includes Heart Attack in his brother; Heart Disease in his father; Stroke in his mother. Labs: Reviewed    Physical Exam  Vitals:    11/15/22 1115   BP: (!) 142/71   Pulse: 75   Resp: 20   Temp: 98.5 °F (36.9 °C)   SpO2: 96%      General: Non-ill appearing. HEENT: NC/AT,nonicteric, perrla,eom intact, no mucosal lesions  Neck: normal thyroid, no masses  Nodes: No adenopathy  Lungs/chest: clear, no rales,rhonchi or wheezing, lung bases clear  CV: rrr, no rubs ,gallops or murmurs  Abdomen: soft, non-tender,bowel sounds normal , no palpable hepatosplenomegaly  Back: normal curvature, No midline tenderness. flanks nontender  : Not Examined  Extremities: no cyanosis,clubbing or edema. Skin: unremarkable  Neuro: A and O x 4, CN exam nonfocal, Motor- no deficits, Sensory- no deficits, gait-nl, speech- fluent, no ataxia.      Assessment/Plan:    Neuroendocrine carcinoma involving lung     Monitor for progression of malignancy    Patient Instructions   RTC 6 months with labs and ct chest     Filiberto Holbrook MD  11/21/2022      **This report has been created using voice recognition software. It may contain minor errors which are inherent in voice recognition technology. **

## 2022-11-19 LAB — CHROMOGRANIN A: 326 NG/ML (ref 0–103)

## 2022-11-30 DIAGNOSIS — F33.1 MODERATE EPISODE OF RECURRENT MAJOR DEPRESSIVE DISORDER (HCC): Primary | ICD-10-CM

## 2022-11-30 RX ORDER — CLONAZEPAM 0.5 MG/1
0.5 TABLET ORAL 2 TIMES DAILY
Qty: 60 TABLET | Refills: 3 | Status: SHIPPED | OUTPATIENT
Start: 2022-11-30 | End: 2022-12-30

## 2022-12-08 ENCOUNTER — OUTSIDE SERVICES (OUTPATIENT)
Dept: FAMILY MEDICINE CLINIC | Age: 82
End: 2022-12-08
Payer: MEDICARE

## 2022-12-08 VITALS
HEART RATE: 77 BPM | OXYGEN SATURATION: 95 % | BODY MASS INDEX: 37.56 KG/M2 | SYSTOLIC BLOOD PRESSURE: 135 MMHG | TEMPERATURE: 97.7 F | WEIGHT: 261.8 LBS | RESPIRATION RATE: 18 BRPM | DIASTOLIC BLOOD PRESSURE: 73 MMHG

## 2022-12-08 DIAGNOSIS — E03.9 ACQUIRED HYPOTHYROIDISM: ICD-10-CM

## 2022-12-08 DIAGNOSIS — I25.10 ASHD (ARTERIOSCLEROTIC HEART DISEASE): ICD-10-CM

## 2022-12-08 DIAGNOSIS — E11.9 TYPE 2 DIABETES MELLITUS WITHOUT COMPLICATION, WITHOUT LONG-TERM CURRENT USE OF INSULIN (HCC): ICD-10-CM

## 2022-12-08 DIAGNOSIS — C7A.8 NEUROENDOCRINE CARCINOMA (HCC): ICD-10-CM

## 2022-12-08 DIAGNOSIS — F33.1 MODERATE EPISODE OF RECURRENT MAJOR DEPRESSIVE DISORDER (HCC): Primary | ICD-10-CM

## 2022-12-08 DIAGNOSIS — M19.90 OSTEOARTHRITIS, CHRONIC: ICD-10-CM

## 2022-12-08 DIAGNOSIS — Z90.49 STATUS POST TOTAL COLECTOMY: ICD-10-CM

## 2022-12-08 DIAGNOSIS — I10 ESSENTIAL HYPERTENSION: ICD-10-CM

## 2022-12-08 PROCEDURE — 99490 CHRNC CARE MGMT STAFF 1ST 20: CPT | Performed by: NURSE PRACTITIONER

## 2022-12-08 PROCEDURE — 99309 SBSQ NF CARE MODERATE MDM 30: CPT | Performed by: NURSE PRACTITIONER

## 2022-12-08 ASSESSMENT — ENCOUNTER SYMPTOMS
SORE THROAT: 0
VOMITING: 0
NAUSEA: 0
SHORTNESS OF BREATH: 0
WHEEZING: 0
EYE REDNESS: 0
CONSTIPATION: 0
COUGH: 0
DIARRHEA: 0
RHINORRHEA: 0

## 2022-12-08 NOTE — PROGRESS NOTES
Magaly Barrios is a 81 y.o. male who presents today for his medical conditions/complaints as noted below. Chief Complaint   Patient presents with    1 Month Follow-Up     Federally mandated 30 day visit           HPI:     Patient seen and examined today for follow-up on his chronic health conditions. Patient unfortunately had a psychiatric stay on 11/18/2022 for suicidal/homicidal thoughts. Patient returned with changes in Buspar and Abilify. He had also had a change in his Wellbutrin. Yesterday he had a televisit with psych and adjustments were made to his meds with a decrease in Wellbutrin and added Viibryd. He was also started on Klonopin. Is a past medical history of major depression, osteoarthritis, hypertension, ASHD, hypothyroidism, diabetes, neuroendocrine carcinoma, status post colectomy. Patient is seen at bedside today and states that he is just feeling anxious he is moving his chair up and down to help combat this. He denies any hallucinations, homicidal, suicidal thoughts but just feels anxious on the inside and feels like he has been tormented. He also complains of ruminating thoughts. On nursing staff update Dr. Edson Youngblood after change in medications or if declining condition. He denies any chest pain or shortness of breath. He has no swelling.     Past Medical History:   Diagnosis Date    Arthritis     Chronic kidney disease     Depression     Diabetes mellitus ([facility] [address]     Hyperlipidemia     Hypertension     Thyroid disease       Past Surgical History:   Procedure Laterality Date    COLOSTOMY N/A 2/20/2021    TOTAL COLECTOMY AND ILLEOSTOMY PLACEMENT performed by Reinaldo Maguire MD at [address]        Family History   Problem Relation Age of Onset    Stroke Mother     Heart Disease Father     Heart Attack Brother        Social History     Tobacco Use    Smoking status: Never    Smokeless tobacco: Never   Substance Use Topics    Alcohol use: Not Currently      Allergies Allergen Reactions    Triamcinolone Rash     Patient states his arm felt like it was hot, like it was burning. Health Maintenance   Topic Date Due    Lipids  Never done    Depression Monitoring  Never done    DTaP/Tdap/Td vaccine (1 - Tdap) Never done    Shingles vaccine (3 of 3) 10/26/2019    Annual Wellness Visit (AWV)  Never done    Flu vaccine (1) 08/01/2022    Pneumococcal 65+ years Vaccine  Completed    COVID-19 Vaccine  Completed    Hepatitis A vaccine  Aged Out    Hib vaccine  Aged Out    Meningococcal (ACWY) vaccine  Aged Out       Subjective:      Review of Systems   Constitutional:  Negative for activity change, chills, fatigue and fever. HENT:  Positive for hearing loss. Negative for congestion, rhinorrhea and sore throat. Eyes:  Negative for redness and visual disturbance. Respiratory:  Negative for cough, shortness of breath and wheezing. Cardiovascular:  Negative for chest pain and leg swelling. Gastrointestinal:  Negative for constipation, diarrhea, nausea and vomiting. Endocrine: Negative for polydipsia and polyuria. Genitourinary:  Negative for dysuria, frequency and hematuria. Musculoskeletal:  Positive for arthralgias and gait problem. Negative for myalgias. Skin:  Negative for rash and wound. Allergic/Immunologic: Negative for environmental allergies and immunocompromised state. Neurological:  Negative for dizziness, light-headedness and headaches. Hematological:  Does not bruise/bleed easily. Psychiatric/Behavioral:  Positive for dysphoric mood and sleep disturbance. Negative for hallucinations. The patient is nervous/anxious. The patient is not hyperactive. Objective:     Physical Exam  Vitals and nursing note reviewed. Constitutional:       General: He is not in acute distress. Appearance: He is well-developed. He is ill-appearing (chronically). HENT:      Head: Normocephalic and atraumatic.       Right Ear: External ear normal.      Left Ear: External ear normal.      Nose: Nose normal.   Eyes:      General: No scleral icterus. Right eye: No discharge. Left eye: No discharge. Conjunctiva/sclera: Conjunctivae normal.   Neck:      Thyroid: No thyromegaly. Vascular: No JVD. Cardiovascular:      Rate and Rhythm: Normal rate and regular rhythm. Heart sounds: Normal heart sounds. Pulmonary:      Effort: Pulmonary effort is normal. No respiratory distress. Breath sounds: Normal breath sounds. No stridor. No wheezing or rales. Abdominal:      General: Bowel sounds are normal. There is no distension. Palpations: Abdomen is soft. Tenderness: There is no abdominal tenderness. Musculoskeletal:         General: No deformity. Normal range of motion. Right shoulder: No tenderness or bony tenderness. Left shoulder: No tenderness or bony tenderness. Cervical back: Neck supple. No tenderness or bony tenderness. Thoracic back: No spasms, tenderness or bony tenderness. Lumbar back: No tenderness or bony tenderness. Lymphadenopathy:      Cervical: No cervical adenopathy. Upper Body:      Right upper body: No supraclavicular adenopathy. Left upper body: No supraclavicular adenopathy. Skin:     General: Skin is warm and dry. Findings: No erythema or rash. Neurological:      Mental Status: He is alert and oriented to person, place, and time. Motor: No atrophy, abnormal muscle tone or seizure activity. Psychiatric:         Attention and Perception: He does not perceive auditory or visual hallucinations. Mood and Affect: Mood is anxious. Speech: Speech is delayed. Behavior: Behavior is slowed and withdrawn. Thought Content: Thought content is not paranoid or delusional. Thought content does not include homicidal or suicidal ideation. Thought content does not include homicidal or suicidal plan. Cognition and Memory: Memory is impaired. Judgment: Judgment is inappropriate. /73   Pulse 77   Temp 97.7 °F (36.5 °C)   Resp 18   Wt 261 lb 12.8 oz (118.8 kg)   SpO2 95%   BMI 37.56 kg/m²     Assessment/Plan      1. Moderate episode of recurrent major depressive disorder ([facility]    2. Osteoarthritis, chronic    3. Essential hypertension    4. ASHD (arteriosclerotic heart disease)    5. Acquired hypothyroidism    6. Type 2 diabetes mellitus without complication, without long-term current use of insulin (HCC)    7. Neuroendocrine carcinoma ([facility]    8. Status post total colectomy      Continue current medications and monitor for any suicidal ideation. We will update psychiatry. 2.  [place] restarted. Does have chronic pain in his shoulder. Continue to monitor for falls. 3.  Chronic and continue Norvasc, Apresoline, metoprolol. 4.  Denies chest pain. Continue metoprolol. 5.  Continue levothyroxine. Monitor labs as directed. 6.  Controlled without medications. 7.  Follow-up with oncology as directed. 8.  Continue colostomy care. Monitor skin. Patient seen and examined. History partially obtained by chart review and nursing notes. I have reviewed patient's past medical, surgical, social, and family history and have made updates where appropriate. See facility EMR for updated medication list.      More than 50% of the total visit time must involve counseling/coordination of care. The total visit time encompasses both the face-to-face time spent with the patient at the bedside and the additional time spent on the unit/floor reviewing data, obtaining relevant patient information, and discussing the case with other involved healthcare providers. Resident has MDD, OA, ASHD, DM, HTN and it is expected to last 15 or more months. These chronic conditions place resident at a significant risk of death, acute exacerbation, decline in function or functional decline.  The comprehensive plan was established, implemented, revised or monitored today and were provided a copy if requested from the facility. I spent 20 minutes reviewing plan. Patient or designee were informed of the role of chronic care management services and gave verbal consent to accept these services.            Electronically signed by SYLVESTER Ochoa CNP on 12/8/2022 at 4:25 PM

## (undated) DEVICE — SUTURE VCRL SZ 0 L18IN ABSRB VLT SUTUPAK PRECUT W/O NDL J106T

## (undated) DEVICE — RELOAD STPL H4.1X2MM DIA60MM THCK TISS GRN 6 ROW PWR GST B

## (undated) DEVICE — Device

## (undated) DEVICE — SUTURE ABSORBABLE MONOFILAMENT 0 CTX 60 IN VIO PDS + PDP990G

## (undated) DEVICE — RELOAD STPL L60MM H1.5-3.6MM REG TISS BLU GRIPPING SURF B

## (undated) DEVICE — GLOVE ORANGE PI 8   MSG9080

## (undated) DEVICE — SUTURE VCRL SZ 3-0 L18IN ABSRB VLT L26MM SH 1/2 CIR J774D

## (undated) DEVICE — SPONGE LAP W18XL18IN WHT COT 4 PLY FLD STRUNG RADPQ DISP ST

## (undated) DEVICE — DRAIN CHN 19FR L0.25IN DIA6.3MM SIL RND HUBLESS FULL FLUT

## (undated) DEVICE — STAPLER INT L60MM DIA12MM STD TISS TI LNAR CUT LN 6 ROW

## (undated) DEVICE — TOWEL,OR,DSP,ST,WHITE,DLX,XR,4/PK,20PK/C: Brand: MEDLINE

## (undated) DEVICE — SUTURE V-LOC 180 SZ 3-0 L9IN ABSRB GRN L26MM V-20 1/2 CIR VLOCL0644

## (undated) DEVICE — GOWN,SIRUS,NON REINFRCD,LARGE,SET IN SL: Brand: MEDLINE

## (undated) DEVICE — SUTURE VCRL SZ 2-0 L18IN ABSRB VLT L26MM SH 1/2 CIR J775D

## (undated) DEVICE — SPONGE GZ W4XL4IN COT 12 PLY TYP VII WVN C FLD DSGN

## (undated) DEVICE — BREAST HERNIA PACK: Brand: MEDLINE INDUSTRIES, INC.

## (undated) DEVICE — SEALER ENDOSCP NANO COAT OPN DIV CRV L JAW LIGASURE IMPACT

## (undated) DEVICE — GLOVE ORTHO 7 1/2   MSG9475

## (undated) DEVICE — RELOAD STPL L60MM H1-2.6MM MESENTERY THN TISS WHT 6 ROW

## (undated) DEVICE — BASIC SINGLE BASIN BTC-LF: Brand: MEDLINE INDUSTRIES, INC.